# Patient Record
Sex: FEMALE | Race: WHITE | NOT HISPANIC OR LATINO | Employment: PART TIME | URBAN - METROPOLITAN AREA
[De-identification: names, ages, dates, MRNs, and addresses within clinical notes are randomized per-mention and may not be internally consistent; named-entity substitution may affect disease eponyms.]

---

## 2017-02-27 ENCOUNTER — APPOINTMENT (OUTPATIENT)
Dept: LAB | Age: 57
End: 2017-02-27
Payer: COMMERCIAL

## 2017-02-27 ENCOUNTER — TRANSCRIBE ORDERS (OUTPATIENT)
Dept: ADMINISTRATIVE | Age: 57
End: 2017-02-27

## 2017-02-27 DIAGNOSIS — M33.19: ICD-10-CM

## 2017-02-27 DIAGNOSIS — M33.90 DERMATOMYOSITIS (HCC): ICD-10-CM

## 2017-02-27 DIAGNOSIS — C50.112 BILATERAL MALIGNANT NEOPLASM OF CENTRAL PORTION OF BREAST IN FEMALE (HCC): ICD-10-CM

## 2017-02-27 DIAGNOSIS — R63.4 LOSS OF WEIGHT: Primary | ICD-10-CM

## 2017-02-27 DIAGNOSIS — M65.9 SAPHO SYNDROME (HCC): ICD-10-CM

## 2017-02-27 DIAGNOSIS — R63.4 LOSS OF WEIGHT: ICD-10-CM

## 2017-02-27 DIAGNOSIS — L40.3 SAPHO SYNDROME (HCC): ICD-10-CM

## 2017-02-27 DIAGNOSIS — Z15.02 GENETIC SUSCEPTIBILITY TO MALIGNANT NEOPLASM OF OVARY: ICD-10-CM

## 2017-02-27 DIAGNOSIS — M85.80 SAPHO SYNDROME (HCC): ICD-10-CM

## 2017-02-27 DIAGNOSIS — M86.9 SAPHO SYNDROME (HCC): ICD-10-CM

## 2017-02-27 DIAGNOSIS — C50.111 BILATERAL MALIGNANT NEOPLASM OF CENTRAL PORTION OF BREAST IN FEMALE (HCC): ICD-10-CM

## 2017-02-27 DIAGNOSIS — L70.9 SAPHO SYNDROME (HCC): ICD-10-CM

## 2017-02-27 DIAGNOSIS — M33.19: Primary | ICD-10-CM

## 2017-02-27 DIAGNOSIS — Z15.01 GENETIC SUSCEPTIBILITY TO MALIGNANT NEOPLASM OF BREAST: ICD-10-CM

## 2017-02-27 LAB
25(OH)D3 SERPL-MCNC: 38.3 NG/ML (ref 30–100)
ALBUMIN SERPL BCP-MCNC: 4.3 G/DL (ref 3.5–5)
ALP SERPL-CCNC: 58 U/L (ref 46–116)
ALT SERPL W P-5'-P-CCNC: 22 U/L (ref 12–78)
ANION GAP SERPL CALCULATED.3IONS-SCNC: 4 MMOL/L (ref 4–13)
AST SERPL W P-5'-P-CCNC: 17 U/L (ref 5–45)
BASOPHILS # BLD AUTO: 0.01 THOUSANDS/ΜL (ref 0–0.1)
BASOPHILS NFR BLD AUTO: 0 % (ref 0–1)
BILIRUB SERPL-MCNC: 0.52 MG/DL (ref 0.2–1)
BUN SERPL-MCNC: 15 MG/DL (ref 5–25)
CALCIUM SERPL-MCNC: 9.3 MG/DL (ref 8.3–10.1)
CANCER AG125 SERPL-ACNC: 7.2 U/ML (ref 0–30)
CHLORIDE SERPL-SCNC: 105 MMOL/L (ref 100–108)
CO2 SERPL-SCNC: 33 MMOL/L (ref 21–32)
CREAT SERPL-MCNC: 0.71 MG/DL (ref 0.6–1.3)
EOSINOPHIL # BLD AUTO: 0.04 THOUSAND/ΜL (ref 0–0.61)
EOSINOPHIL NFR BLD AUTO: 1 % (ref 0–6)
ERYTHROCYTE [DISTWIDTH] IN BLOOD BY AUTOMATED COUNT: 13.5 % (ref 11.6–15.1)
EST. AVERAGE GLUCOSE BLD GHB EST-MCNC: 105 MG/DL
GFR SERPL CREATININE-BSD FRML MDRD: >60 ML/MIN/1.73SQ M
GLUCOSE SERPL-MCNC: 84 MG/DL (ref 65–140)
HBA1C MFR BLD: 5.3 % (ref 4.2–6.3)
HCT VFR BLD AUTO: 41.3 % (ref 34.8–46.1)
HGB BLD-MCNC: 13.6 G/DL (ref 11.5–15.4)
LYMPHOCYTES # BLD AUTO: 1.17 THOUSANDS/ΜL (ref 0.6–4.47)
LYMPHOCYTES NFR BLD AUTO: 24 % (ref 14–44)
MCH RBC QN AUTO: 29.7 PG (ref 26.8–34.3)
MCHC RBC AUTO-ENTMCNC: 32.9 G/DL (ref 31.4–37.4)
MCV RBC AUTO: 90 FL (ref 82–98)
MONOCYTES # BLD AUTO: 0.36 THOUSAND/ΜL (ref 0.17–1.22)
MONOCYTES NFR BLD AUTO: 7 % (ref 4–12)
NEUTROPHILS # BLD AUTO: 3.25 THOUSANDS/ΜL (ref 1.85–7.62)
NEUTS SEG NFR BLD AUTO: 68 % (ref 43–75)
NRBC BLD AUTO-RTO: 0 /100 WBCS
PLATELET # BLD AUTO: 216 THOUSANDS/UL (ref 149–390)
PMV BLD AUTO: 9.7 FL (ref 8.9–12.7)
POTASSIUM SERPL-SCNC: 4.2 MMOL/L (ref 3.5–5.3)
PROT SERPL-MCNC: 7.6 G/DL (ref 6.4–8.2)
RBC # BLD AUTO: 4.58 MILLION/UL (ref 3.81–5.12)
SODIUM SERPL-SCNC: 142 MMOL/L (ref 136–145)
TSH SERPL DL<=0.05 MIU/L-ACNC: 1.35 UIU/ML (ref 0.36–3.74)
WBC # BLD AUTO: 4.84 THOUSAND/UL (ref 4.31–10.16)

## 2017-02-27 PROCEDURE — 80053 COMPREHEN METABOLIC PANEL: CPT

## 2017-02-27 PROCEDURE — 86304 IMMUNOASSAY TUMOR CA 125: CPT

## 2017-02-27 PROCEDURE — 82306 VITAMIN D 25 HYDROXY: CPT

## 2017-02-27 PROCEDURE — 85025 COMPLETE CBC W/AUTO DIFF WBC: CPT

## 2017-02-27 PROCEDURE — 36415 COLL VENOUS BLD VENIPUNCTURE: CPT

## 2017-02-27 PROCEDURE — 84443 ASSAY THYROID STIM HORMONE: CPT

## 2017-02-27 PROCEDURE — 83036 HEMOGLOBIN GLYCOSYLATED A1C: CPT

## 2017-03-13 ENCOUNTER — ALLSCRIPTS OFFICE VISIT (OUTPATIENT)
Dept: OTHER | Facility: OTHER | Age: 57
End: 2017-03-13

## 2017-03-16 ENCOUNTER — APPOINTMENT (OUTPATIENT)
Dept: LAB | Age: 57
End: 2017-03-16
Payer: COMMERCIAL

## 2017-03-16 ENCOUNTER — TRANSCRIBE ORDERS (OUTPATIENT)
Dept: ADMINISTRATIVE | Age: 57
End: 2017-03-16

## 2017-03-16 DIAGNOSIS — R35.0 URINARY FREQUENCY: Primary | ICD-10-CM

## 2017-03-16 LAB
BACTERIA UR QL AUTO: NORMAL /HPF
BILIRUB UR QL STRIP: NEGATIVE
CLARITY UR: CLEAR
COLOR UR: YELLOW
GLUCOSE UR STRIP-MCNC: NEGATIVE MG/DL
HGB UR QL STRIP.AUTO: NEGATIVE
HYALINE CASTS #/AREA URNS LPF: NORMAL /LPF
KETONES UR STRIP-MCNC: NEGATIVE MG/DL
LEUKOCYTE ESTERASE UR QL STRIP: NEGATIVE
NITRITE UR QL STRIP: NEGATIVE
NON-SQ EPI CELLS URNS QL MICRO: NORMAL /HPF
PH UR STRIP.AUTO: 7.5 [PH] (ref 4.5–8)
PROT UR STRIP-MCNC: ABNORMAL MG/DL
RBC #/AREA URNS AUTO: NORMAL /HPF
SP GR UR STRIP.AUTO: 1.02 (ref 1–1.03)
UROBILINOGEN UR QL STRIP.AUTO: 0.2 E.U./DL
WBC #/AREA URNS AUTO: NORMAL /HPF

## 2017-03-16 PROCEDURE — 81001 URINALYSIS AUTO W/SCOPE: CPT | Performed by: PHYSICIAN ASSISTANT

## 2017-03-23 ENCOUNTER — GENERIC CONVERSION - ENCOUNTER (OUTPATIENT)
Dept: OTHER | Facility: OTHER | Age: 57
End: 2017-03-23

## 2017-04-12 ENCOUNTER — TRANSCRIBE ORDERS (OUTPATIENT)
Dept: ADMINISTRATIVE | Age: 57
End: 2017-04-12

## 2017-04-12 ENCOUNTER — HOSPITAL ENCOUNTER (OUTPATIENT)
Dept: RADIOLOGY | Age: 57
Discharge: HOME | End: 2017-04-12
Payer: COMMERCIAL

## 2017-04-12 ENCOUNTER — APPOINTMENT (OUTPATIENT)
Dept: LAB | Age: 57
End: 2017-04-12
Payer: COMMERCIAL

## 2017-04-12 DIAGNOSIS — Z85.3 PERSONAL HISTORY OF MALIGNANT NEOPLASM OF BREAST: ICD-10-CM

## 2017-04-12 DIAGNOSIS — Z85.3 PERSONAL HISTORY OF MALIGNANT NEOPLASM OF BREAST: Primary | ICD-10-CM

## 2017-04-12 LAB
ALBUMIN SERPL BCP-MCNC: 4.2 G/DL (ref 3.5–5)
ALP SERPL-CCNC: 59 U/L (ref 46–116)
ALT SERPL W P-5'-P-CCNC: 19 U/L (ref 12–78)
ANION GAP SERPL CALCULATED.3IONS-SCNC: 4 MMOL/L (ref 4–13)
AST SERPL W P-5'-P-CCNC: 13 U/L (ref 5–45)
BASOPHILS # BLD AUTO: 0.01 THOUSANDS/ΜL (ref 0–0.1)
BASOPHILS NFR BLD AUTO: 0 % (ref 0–1)
BILIRUB SERPL-MCNC: 0.42 MG/DL (ref 0.2–1)
BUN SERPL-MCNC: 23 MG/DL (ref 5–25)
CALCIUM SERPL-MCNC: 9.1 MG/DL (ref 8.3–10.1)
CANCER AG27-29 SERPL-ACNC: 24.8 U/ML (ref 0–42.3)
CEA SERPL-MCNC: 1 NG/ML (ref 0–3)
CHLORIDE SERPL-SCNC: 104 MMOL/L (ref 100–108)
CO2 SERPL-SCNC: 32 MMOL/L (ref 21–32)
CREAT SERPL-MCNC: 0.7 MG/DL (ref 0.6–1.3)
EOSINOPHIL # BLD AUTO: 0.05 THOUSAND/ΜL (ref 0–0.61)
EOSINOPHIL NFR BLD AUTO: 1 % (ref 0–6)
ERYTHROCYTE [DISTWIDTH] IN BLOOD BY AUTOMATED COUNT: 13.5 % (ref 11.6–15.1)
GFR SERPL CREATININE-BSD FRML MDRD: >60 ML/MIN/1.73SQ M
GLUCOSE P FAST SERPL-MCNC: 80 MG/DL (ref 65–99)
HCT VFR BLD AUTO: 42.4 % (ref 34.8–46.1)
HGB BLD-MCNC: 13.8 G/DL (ref 11.5–15.4)
LYMPHOCYTES # BLD AUTO: 1.22 THOUSANDS/ΜL (ref 0.6–4.47)
LYMPHOCYTES NFR BLD AUTO: 24 % (ref 14–44)
MCH RBC QN AUTO: 29.7 PG (ref 26.8–34.3)
MCHC RBC AUTO-ENTMCNC: 32.5 G/DL (ref 31.4–37.4)
MCV RBC AUTO: 91 FL (ref 82–98)
MONOCYTES # BLD AUTO: 0.4 THOUSAND/ΜL (ref 0.17–1.22)
MONOCYTES NFR BLD AUTO: 8 % (ref 4–12)
NEUTROPHILS # BLD AUTO: 3.44 THOUSANDS/ΜL (ref 1.85–7.62)
NEUTS SEG NFR BLD AUTO: 67 % (ref 43–75)
NRBC BLD AUTO-RTO: 0 /100 WBCS
PLATELET # BLD AUTO: 233 THOUSANDS/UL (ref 149–390)
PMV BLD AUTO: 10 FL (ref 8.9–12.7)
POTASSIUM SERPL-SCNC: 4 MMOL/L (ref 3.5–5.3)
PROT SERPL-MCNC: 7.6 G/DL (ref 6.4–8.2)
RBC # BLD AUTO: 4.64 MILLION/UL (ref 3.81–5.12)
SODIUM SERPL-SCNC: 140 MMOL/L (ref 136–145)
WBC # BLD AUTO: 5.13 THOUSAND/UL (ref 4.31–10.16)

## 2017-04-12 PROCEDURE — 76700 US EXAM ABDOM COMPLETE: CPT

## 2017-04-12 PROCEDURE — 82378 CARCINOEMBRYONIC ANTIGEN: CPT

## 2017-04-12 PROCEDURE — 85025 COMPLETE CBC W/AUTO DIFF WBC: CPT

## 2017-04-12 PROCEDURE — 36415 COLL VENOUS BLD VENIPUNCTURE: CPT

## 2017-04-12 PROCEDURE — 80053 COMPREHEN METABOLIC PANEL: CPT

## 2017-04-12 PROCEDURE — 86300 IMMUNOASSAY TUMOR CA 15-3: CPT

## 2017-05-01 ENCOUNTER — ALLSCRIPTS OFFICE VISIT (OUTPATIENT)
Dept: OTHER | Facility: OTHER | Age: 57
End: 2017-05-01

## 2017-06-06 ENCOUNTER — ALLSCRIPTS OFFICE VISIT (OUTPATIENT)
Dept: OTHER | Facility: OTHER | Age: 57
End: 2017-06-06

## 2017-06-06 ENCOUNTER — TRANSCRIBE ORDERS (OUTPATIENT)
Dept: ADMINISTRATIVE | Facility: HOSPITAL | Age: 57
End: 2017-06-06

## 2017-06-06 DIAGNOSIS — M33.19: Primary | ICD-10-CM

## 2017-06-21 ENCOUNTER — GENERIC CONVERSION - ENCOUNTER (OUTPATIENT)
Dept: OTHER | Facility: OTHER | Age: 57
End: 2017-06-21

## 2017-09-11 ENCOUNTER — APPOINTMENT (OUTPATIENT)
Dept: LAB | Age: 57
End: 2017-09-11
Payer: COMMERCIAL

## 2017-09-11 ENCOUNTER — TRANSCRIBE ORDERS (OUTPATIENT)
Dept: ADMINISTRATIVE | Age: 57
End: 2017-09-11

## 2017-09-11 DIAGNOSIS — R35.0 URINARY FREQUENCY: Primary | ICD-10-CM

## 2017-09-11 DIAGNOSIS — R35.0 URINARY FREQUENCY: ICD-10-CM

## 2017-09-11 LAB — CANCER AG125 SERPL-ACNC: 6.7 U/ML (ref 0–30)

## 2017-09-11 PROCEDURE — 86304 IMMUNOASSAY TUMOR CA 125: CPT

## 2017-09-11 PROCEDURE — 36415 COLL VENOUS BLD VENIPUNCTURE: CPT

## 2017-09-18 ENCOUNTER — TRANSCRIBE ORDERS (OUTPATIENT)
Dept: ADMINISTRATIVE | Facility: HOSPITAL | Age: 57
End: 2017-09-18

## 2017-09-18 ENCOUNTER — TRANSCRIBE ORDERS (OUTPATIENT)
Dept: ADMINISTRATIVE | Age: 57
End: 2017-09-18

## 2017-09-18 ENCOUNTER — APPOINTMENT (OUTPATIENT)
Dept: LAB | Age: 57
End: 2017-09-18
Payer: COMMERCIAL

## 2017-09-18 ENCOUNTER — ALLSCRIPTS OFFICE VISIT (OUTPATIENT)
Dept: OTHER | Facility: OTHER | Age: 57
End: 2017-09-18

## 2017-09-18 DIAGNOSIS — R10.2 ADNEXAL TENDERNESS, RIGHT: Primary | ICD-10-CM

## 2017-09-18 DIAGNOSIS — R10.2 ADNEXAL TENDERNESS, RIGHT: ICD-10-CM

## 2017-09-18 DIAGNOSIS — Z15.02 GENETIC SUSCEPTIBILITY TO MALIGNANT NEOPLASM OF OVARY: ICD-10-CM

## 2017-09-18 DIAGNOSIS — Z15.02 GENETIC SUSCEPTIBILITY TO OVARIAN CANCER: Primary | ICD-10-CM

## 2017-09-18 DIAGNOSIS — Z15.01 GENETIC SUSCEPTIBILITY TO MALIGNANT NEOPLASM OF BREAST: ICD-10-CM

## 2017-09-18 LAB
BUN SERPL-MCNC: 17 MG/DL (ref 5–25)
CREAT SERPL-MCNC: 0.68 MG/DL (ref 0.6–1.3)
GFR SERPL CREATININE-BSD FRML MDRD: 99 ML/MIN/1.73SQ M

## 2017-09-18 PROCEDURE — 87086 URINE CULTURE/COLONY COUNT: CPT

## 2017-09-18 PROCEDURE — 82565 ASSAY OF CREATININE: CPT

## 2017-09-18 PROCEDURE — 84520 ASSAY OF UREA NITROGEN: CPT

## 2017-09-18 PROCEDURE — 36415 COLL VENOUS BLD VENIPUNCTURE: CPT

## 2017-09-19 LAB — BACTERIA UR CULT: NORMAL

## 2017-09-22 ENCOUNTER — HOSPITAL ENCOUNTER (OUTPATIENT)
Dept: RADIOLOGY | Age: 57
Discharge: HOME | End: 2017-09-22
Payer: COMMERCIAL

## 2017-09-22 DIAGNOSIS — Z15.02 GENETIC SUSCEPTIBILITY TO OVARIAN CANCER: ICD-10-CM

## 2017-09-22 PROCEDURE — 74177 CT ABD & PELVIS W/CONTRAST: CPT

## 2017-09-22 RX ADMIN — IOHEXOL 100 ML: 350 INJECTION, SOLUTION INTRAVENOUS at 12:57

## 2017-10-24 ENCOUNTER — TRANSCRIBE ORDERS (OUTPATIENT)
Dept: ADMINISTRATIVE | Age: 57
End: 2017-10-24

## 2017-10-24 ENCOUNTER — APPOINTMENT (OUTPATIENT)
Dept: LAB | Age: 57
End: 2017-10-24
Payer: COMMERCIAL

## 2017-10-24 DIAGNOSIS — R80.9 PROTEINURIA, UNSPECIFIED TYPE: ICD-10-CM

## 2017-10-24 DIAGNOSIS — Z13.6 SCREENING FOR ISCHEMIC HEART DISEASE: ICD-10-CM

## 2017-10-24 DIAGNOSIS — R80.9 PROTEINURIA, UNSPECIFIED TYPE: Primary | ICD-10-CM

## 2017-10-24 DIAGNOSIS — C50.211 MALIGNANT NEOPLASM OF UPPER-INNER QUADRANT OF RIGHT FEMALE BREAST, UNSPECIFIED ESTROGEN RECEPTOR STATUS (HCC): ICD-10-CM

## 2017-10-24 DIAGNOSIS — C50.211 MALIGNANT NEOPLASM OF UPPER-INNER QUADRANT OF RIGHT FEMALE BREAST, UNSPECIFIED ESTROGEN RECEPTOR STATUS (HCC): Primary | ICD-10-CM

## 2017-10-24 LAB
25(OH)D3 SERPL-MCNC: 39.9 NG/ML (ref 30–100)
ALBUMIN SERPL BCP-MCNC: 3.9 G/DL (ref 3.5–5)
ALP SERPL-CCNC: 54 U/L (ref 46–116)
ALT SERPL W P-5'-P-CCNC: 19 U/L (ref 12–78)
ANION GAP SERPL CALCULATED.3IONS-SCNC: 5 MMOL/L (ref 4–13)
AST SERPL W P-5'-P-CCNC: 13 U/L (ref 5–45)
BASOPHILS # BLD AUTO: 0.03 THOUSANDS/ΜL (ref 0–0.1)
BASOPHILS NFR BLD AUTO: 1 % (ref 0–1)
BILIRUB SERPL-MCNC: 0.4 MG/DL (ref 0.2–1)
BILIRUB UR QL STRIP: NEGATIVE
BUN SERPL-MCNC: 16 MG/DL (ref 5–25)
CALCIUM SERPL-MCNC: 8.9 MG/DL (ref 8.3–10.1)
CANCER AG27-29 SERPL-ACNC: 16.6 U/ML (ref 0–42.3)
CHLORIDE SERPL-SCNC: 106 MMOL/L (ref 100–108)
CLARITY UR: CLEAR
CO2 SERPL-SCNC: 30 MMOL/L (ref 21–32)
COLOR UR: YELLOW
CREAT SERPL-MCNC: 0.73 MG/DL (ref 0.6–1.3)
EOSINOPHIL # BLD AUTO: 0.06 THOUSAND/ΜL (ref 0–0.61)
EOSINOPHIL NFR BLD AUTO: 2 % (ref 0–6)
ERYTHROCYTE [DISTWIDTH] IN BLOOD BY AUTOMATED COUNT: 13.4 % (ref 11.6–15.1)
GFR SERPL CREATININE-BSD FRML MDRD: 94 ML/MIN/1.73SQ M
GLUCOSE P FAST SERPL-MCNC: 78 MG/DL (ref 65–99)
GLUCOSE UR STRIP-MCNC: NEGATIVE MG/DL
HCT VFR BLD AUTO: 39.9 % (ref 34.8–46.1)
HGB BLD-MCNC: 13.3 G/DL (ref 11.5–15.4)
HGB UR QL STRIP.AUTO: NEGATIVE
KETONES UR STRIP-MCNC: NEGATIVE MG/DL
LEUKOCYTE ESTERASE UR QL STRIP: NEGATIVE
LYMPHOCYTES # BLD AUTO: 1.14 THOUSANDS/ΜL (ref 0.6–4.47)
LYMPHOCYTES NFR BLD AUTO: 30 % (ref 14–44)
MCH RBC QN AUTO: 30.2 PG (ref 26.8–34.3)
MCHC RBC AUTO-ENTMCNC: 33.3 G/DL (ref 31.4–37.4)
MCV RBC AUTO: 91 FL (ref 82–98)
MONOCYTES # BLD AUTO: 0.37 THOUSAND/ΜL (ref 0.17–1.22)
MONOCYTES NFR BLD AUTO: 10 % (ref 4–12)
NEUTROPHILS # BLD AUTO: 2.26 THOUSANDS/ΜL (ref 1.85–7.62)
NEUTS SEG NFR BLD AUTO: 57 % (ref 43–75)
NITRITE UR QL STRIP: NEGATIVE
NRBC BLD AUTO-RTO: 0 /100 WBCS
PH UR STRIP.AUTO: 6 [PH] (ref 4.5–8)
PLATELET # BLD AUTO: 224 THOUSANDS/UL (ref 149–390)
PMV BLD AUTO: 9.7 FL (ref 8.9–12.7)
POTASSIUM SERPL-SCNC: 4.1 MMOL/L (ref 3.5–5.3)
PROT SERPL-MCNC: 7 G/DL (ref 6.4–8.2)
PROT UR STRIP-MCNC: NEGATIVE MG/DL
RBC # BLD AUTO: 4.4 MILLION/UL (ref 3.81–5.12)
SODIUM SERPL-SCNC: 141 MMOL/L (ref 136–145)
SP GR UR STRIP.AUTO: 1.01 (ref 1–1.03)
UROBILINOGEN UR QL STRIP.AUTO: 0.2 E.U./DL
WBC # BLD AUTO: 3.87 THOUSAND/UL (ref 4.31–10.16)

## 2017-10-24 PROCEDURE — 80053 COMPREHEN METABOLIC PANEL: CPT

## 2017-10-24 PROCEDURE — 87086 URINE CULTURE/COLONY COUNT: CPT

## 2017-10-24 PROCEDURE — 85025 COMPLETE CBC W/AUTO DIFF WBC: CPT

## 2017-10-24 PROCEDURE — 86300 IMMUNOASSAY TUMOR CA 15-3: CPT

## 2017-10-24 PROCEDURE — 80061 LIPID PANEL: CPT

## 2017-10-24 PROCEDURE — 36415 COLL VENOUS BLD VENIPUNCTURE: CPT

## 2017-10-24 PROCEDURE — 81003 URINALYSIS AUTO W/O SCOPE: CPT

## 2017-10-24 PROCEDURE — 82306 VITAMIN D 25 HYDROXY: CPT

## 2017-10-25 ENCOUNTER — GENERIC CONVERSION - ENCOUNTER (OUTPATIENT)
Dept: OTHER | Facility: OTHER | Age: 57
End: 2017-10-25

## 2017-10-25 LAB
BACTERIA UR CULT: NORMAL
CHOLEST SERPL-MCNC: 147 MG/DL (ref 50–200)
HDLC SERPL-MCNC: 71 MG/DL (ref 40–60)
LDLC SERPL CALC-MCNC: 67 MG/DL (ref 0–100)
TRIGL SERPL-MCNC: 44 MG/DL

## 2017-10-26 ENCOUNTER — GENERIC CONVERSION - ENCOUNTER (OUTPATIENT)
Dept: OTHER | Facility: OTHER | Age: 57
End: 2017-10-26

## 2017-10-27 ENCOUNTER — LAB REQUISITION (OUTPATIENT)
Dept: LAB | Facility: HOSPITAL | Age: 57
End: 2017-10-27
Payer: COMMERCIAL

## 2017-10-27 ENCOUNTER — ALLSCRIPTS OFFICE VISIT (OUTPATIENT)
Dept: OTHER | Facility: OTHER | Age: 57
End: 2017-10-27

## 2017-10-27 DIAGNOSIS — R35.0 FREQUENCY OF MICTURITION: ICD-10-CM

## 2017-10-27 LAB
BACTERIA UR QL AUTO: NORMAL /HPF
BILIRUB UR QL STRIP: NEGATIVE
CLARITY UR: CLEAR
COLOR UR: YELLOW
GLUCOSE UR STRIP-MCNC: NEGATIVE MG/DL
HGB UR QL STRIP.AUTO: NEGATIVE
HYALINE CASTS #/AREA URNS LPF: NORMAL /LPF
KETONES UR STRIP-MCNC: NEGATIVE MG/DL
LEUKOCYTE ESTERASE UR QL STRIP: ABNORMAL
NITRITE UR QL STRIP: NEGATIVE
NON-SQ EPI CELLS URNS QL MICRO: NORMAL /HPF
PH UR STRIP.AUTO: 6.5 [PH] (ref 4.5–8)
PROT UR STRIP-MCNC: NEGATIVE MG/DL
RBC #/AREA URNS AUTO: NORMAL /HPF
SP GR UR STRIP.AUTO: 1.01 (ref 1–1.03)
UROBILINOGEN UR QL STRIP.AUTO: 0.2 E.U./DL
WBC #/AREA URNS AUTO: NORMAL /HPF

## 2017-10-27 PROCEDURE — 81001 URINALYSIS AUTO W/SCOPE: CPT | Performed by: INTERNAL MEDICINE

## 2017-10-30 ENCOUNTER — ALLSCRIPTS OFFICE VISIT (OUTPATIENT)
Dept: OTHER | Facility: OTHER | Age: 57
End: 2017-10-30

## 2017-10-30 ENCOUNTER — TRANSCRIBE ORDERS (OUTPATIENT)
Dept: ADMINISTRATIVE | Facility: HOSPITAL | Age: 57
End: 2017-10-30

## 2017-10-30 ENCOUNTER — APPOINTMENT (OUTPATIENT)
Dept: RADIOLOGY | Age: 57
End: 2017-10-30
Payer: COMMERCIAL

## 2017-10-30 ENCOUNTER — TRANSCRIBE ORDERS (OUTPATIENT)
Dept: ADMINISTRATIVE | Age: 57
End: 2017-10-30

## 2017-10-30 DIAGNOSIS — C50.919 MALIGNANT NEOPLASM OF FEMALE BREAST (HCC): ICD-10-CM

## 2017-10-30 DIAGNOSIS — C50.919 MALIGNANT NEOPLASM OF FEMALE BREAST, UNSPECIFIED ESTROGEN RECEPTOR STATUS, UNSPECIFIED LATERALITY, UNSPECIFIED SITE OF BREAST (HCC): Primary | ICD-10-CM

## 2017-10-30 PROCEDURE — 71111 X-RAY EXAM RIBS/CHEST4/> VWS: CPT

## 2017-10-31 NOTE — PROGRESS NOTES
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)    Plan  Malignant neoplasm of breast    · * CT CHEST WO CONTRAST; Status:Need Information - Financial Authorization; Requested for:17Nov2017 10:00AM;    Perform:[facility]; RSR:[id number]; Last Updated By:Luis A Villareal; 10/30/2017 2:44:45 PM;Ordered; For:Malignant neoplasm of breast; Ordered By:ProRosa mullinssh;   · * NM BONE SCAN WHOLE BODY; Status:Need Information - Financial Authorization; Requested for:17Nov2017 10:30AM;    Perform:[facility]iology; ZUF:[id number]; Last Updated By:Luis A Villareal; 10/30/2017 2:45:22 PM;Ordered; For:Malignant neoplasm of breast; Ordered By:Proothi, Ernesto;   · * XR RIBS BILATERAL 4+ VIEW W PA CHEST; Status:Active; Requested PGD:[id number];    Perform:[facility]; [phone]; Ordered; For:Malignant neoplasm of breast; Ordered By:Proothi, Ernesto;   · Follow-up visit in 3 weeks Evaluation and Treatment  Follow-up  Status: Complete  Done:  [id number] 01:15PM   Ordered; For: Malignant neoplasm of breast; Ordered By: Cece To Performed:  Due: 17XZR2491; Last Updated By: Dania Danielle; 10/30/2017 2:41:19 PM    Discussion/Summary  Discussion Summary:   Follow-up visit for bilateral breast cancers and positive BRCA2  In 2008 patient had hormone receptor positive, HER-2 negative, stage IIA breast cancer on the right and stage IA on the left  One lymph node was positive on the right    Patient had bilateral mastectomies and reconstruction surgeries  She received 4 cycles of Taxotere plus Cytoxan followed by 5 years of Femara that she finished in September 2013  She underwent ITZEL and BSO  There is family history of BRCA2  There is family history of breast and ovarian cancer     Patient had a 9 mm nodule in the right lower lung and she had wedge resection and that came back negative for malignancy  She had history of vitamin D deficiency and that was corrected  Vitamin D level became normal and she continues to take 2000 units of vitamin D daily   had cutaneous melanoma in situ in between the breasts and that was surgically removed by Dr Marycarmen Barry and she follows with him  She has history of dermatomyositis  She is on Plaquenil for skin and joint symptoms and symptoms have improved significantly  She has a stable-appearing benign right hepatic lesion  She has renal cyst  Bone density had shown osteopenia  has a new bulge at left chest posteriorly that seems to be bulging from her rib  Also she has some pressure discomfort in the left lower abdomen that has been chronic and gets better post bowel movement  She goes for colonoscopy  She had CT scan of abdomen and pelvis recently and that did not show any malignancy  I have suggested rib x-rays, chest x-ray and bone scan  Physical examination and test results are as reported and discussed  She remains in remission from breast cancer  Patient with dermatomyositis are more prone to malignancy  She has history of breast cancers and melanoma in situ  She has history of BRCA2  She gets checked periodically for malignancy  She gets annual ultrasound of abdomen  She stays alert in case she has any unexplained symptom  She will come back after x-rays and bone scan  Condition discussed and explained in detail  Questions answered  Also discussed eating healthy foods and exercises as tolerated  discussed BRCA2 and various cancers associated with that  Counseling Documentation With Imm: The patient, patient's family was counseled regarding diagnostic results,-- prognosis,-- patient and family education,-- impressions  total time of encounter was 30 minutes-- and-- 20 minutes was spent counseling  Goals and Barriers: The patient has the current Goals: Cure from breast cancers  The patent has the current Barriers: No barriers     Patient's Capacity to Self-Care: Patient is able to Self-Care  Medication SE Review and Pt Understands Tx: The treatment plan was reviewed with the patient/guardian  The patient/guardian understands and agrees with the treatment plan   Self Referrals:   Self Referrals: No   Understands and agrees with treatment plan: The treatment plan was reviewed with the patient/guardian  The patient/guardian understands and agrees with the treatment plan      Chief Complaint  Chief Complaint: Chief Complaint:   The patient presents to the office today with Breast cancer  History of Present Illness  HPI: Follow-up visit for bilateral breast cancers and positive BRCA2  In 2008 patient had hormone receptor positive, HER-2 negative, stage IIA breast cancer on the right and stage IA on the left  One lymph node was positive on the right    Patient had bilateral mastectomies and reconstruction surgeries  She received 4 cycles of Taxotere plus Cytoxan followed by 5 years of Femara that she finished in September 2013  She underwent ITZEL and BSO  There is family history of BRCA2  There is family history of breast and ovarian cancer  Patient had a 9 mm nodule in the right lower lung and she had wedge resection and that came back negative for malignancy  She had history of vitamin D deficiency and that was corrected  Vitamin D level became normal and she continues to take 2000 units of vitamin D daily   had cutaneous melanoma in situ in between the breasts and that was surgically removed by Dr Henri Gil and she follows with him  She has history of dermatomyositis  She is on Plaquenil for skin and joint symptoms and symptoms have improved significantly  She has a stable-appearing benign right hepatic lesion  She has renal cyst  Bone density had shown osteopenia  has a new bulge at left chest posteriorly that seems to be bulging from her rib   Also she has some pressure discomfort in the left lower abdomen that has been chronic and gets better post bowel movement  She goes for colonoscopy  She had CT scan of abdomen and pelvis recently and that did not show any malignancy  I have suggested rib x-rays, chest x-ray and bone scan  Review of Systems                     Reviewed 14 systems  Other symptoms as in history of present illness  Complete-Female:   Constitutional: No fever, no chills, feels well, no tiredness, no recent weight gain or weight loss,-- no fever,-- not feeling poorly,-- no chills,-- not feeling tired-- and-- no recent weight loss  Eyes: No complaints of eye pain, no red eyes, no eyesight problems, no discharge, no dry eyes, no itching of eyes  ENT: no complaints of earache, no loss of hearing, no nose bleeds, no nasal discharge, no sore throat, no hoarseness  Cardiovascular: No complaints of slow heart rate, no fast heart rate, no chest pain, no palpitations, no leg claudication, no lower extremity edema  Respiratory: No complaints of shortness of breath, no wheezing, no cough, no SOB on exertion, no orthopnea, no PND  Gastrointestinal: No complaints of abdominal pain, no constipation, no nausea or vomiting, no diarrhea, no bloody stools,-- no abdominal pain,-- no nausea,-- no vomiting,-- no constipation,-- no diarrhea-- and-- no blood in stools  Genitourinary: No complaints of dysuria, no incontinence, no pelvic pain, no dysmenorrhea, no vaginal discharge or bleeding,-- no dysuria,-- no pelvic pain,-- no vaginal discharge,-- no incontinence,-- no dysmenorrhea-- and-- no unexplained vaginal bleeding  Musculoskeletal: arthralgias, but-- no joint swelling,-- no limb pain,-- no myalgias,-- no joint stiffness-- and-- no limb swelling  Integumentary: no rashes,-- no itching,-- no breast pain,-- no skin lesions,-- no skin wound-- and-- no breast lump  Neurological: No complaints of headache, no confusion, no convulsions, no numbness, no dizziness or fainting, no tingling, no limb weakness, no difficulty walking     Psychiatric: Not suicidal, no sleep disturbance, no anxiety or depression, no change in personality, no emotional problems  Endocrine: No complaints of proptosis, no hot flashes, no muscle weakness, no deepening of the voice, no feelings of weakness,-- no proptosis,-- no muscle weakness,-- no hot flashes-- and-- no deepening of the voice  no feelings of weakness   Hematologic/Lymphatic: No complaints of swollen glands, no swollen glands in the neck, does not bleed easily, does not bruise easily  ROS Reviewed:   ROS reviewed  Active Problems  1  Abdominal swelling (789 30) (R19 00)   2  Abnormal weight loss (783 21) (R63 4)   3  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)   4  Bilateral breast cancer (174 9) (C50 911,C50 912)   5  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)   (C50 211,C50 212)   6  BRCA positive (V84 01,V84 02) (Z15 01,Z15 02)   7  BRCA2 positive (V84 01) (Z15 01,Z15 02)   8  Depression with anxiety (300 4) (F41 8)   9  Dermatomyositis (710 3) (M33 90)   10  Fatigue (780 79) (R53 83)   11  Female pelvic pain (625 9) (R10 2)   12  Gallbladder polyp (575 6) (K82 4)   13  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)   14  History of breast cancer (V10 3) (Z85 3)   15  Malignant neoplasm of breast (174 9) (C50 919)   16  Need for prophylactic vaccination and inoculation against influenza (V04 81) (Z23)   17  Need for Tdap vaccination (V06 1) (Z23)   18  Nonspecific abnormal results of function study of thyroid (794 5) (R94 6)   19  Osteopenia (733 90) (M85 80)   20  Overweight (278 02) (E66 3)   21  Plantar fasciitis (728 71) (M72 2)   22  Proteinuria (791 0) (R80 9)   23  Rash, skin (782 1) (R21)   24  Screening for cardiovascular condition (V81 2) (Z13 6)   25  Sprain Of The Left Ankle (845 00)   26  Symptomatic menopausal or female climacteric states (627 2) (N95 1)   27  Urgency of urination (788 63) (R39 15)   28   Urinary frequency (788 41) (R35 0)   29  Vitamin D deficiency (268 9) (E55 9)   30  Weight gain (783 1) (R63 5)    Past Medical History  1  History of backache (V13 59) (Z87 39)   2  History of herpes zoster (V12 09) (Z86 19)   3  Need for prophylactic vaccination and inoculation against influenza (V04 81) (Z23)                         Reviewed no change     Surgical History  1  History of Appendectomy   2  History of Breast Surgery Mastectomy   3  History of Flexible Bronchoscopy (Diagnostic)   4  History of Hysterectomy   5  History of Knee Arthroscopy (Therapeutic)   6  History of Salpingo-oophorectomy Bilateral   7  History of Thoracoscopy With Diagnostic Wedge Resection Of Lung  Surgical History Reviewed: The surgical history was reviewed and updated today  Reviewed no change        Family History  Sister    1  Family history of Implantable Cardioverter-Defibrillator   2  Family history of Postpartum Dilated Cardiomyopathy  Family History    3  Family history of Cancer   4  Family history of Coronary Artery Disease (V17 49)   5  Family history of Heart Disease (V17 49)  Family History Reviewed: The family history was reviewed and updated today  Social History   · Caffeine Use   · Denied: History of Drug Use   · Never A Smoker   · Never Drank Alcohol   · No alcohol use  Social History Reviewed: The social history was reviewed and is unchanged  Current Meds   1  Aspirin 81 MG TABS; TAKE 1 TABLET DAILY; Therapy: (Recorded:25Mar2015) to Recorded   2  Calcium 600 MG Oral Tablet; take 2 tablet daily; Therapy: (Recorded:62Nui8573) to Recorded   3  Fish Oil 1200 MG Oral Capsule; TAKE 1 CAPSULE Daily; Therapy: (Recorded:25Mar2015) to Recorded   4  One-Daily Multi Vitamins TABS; TAKE 1 TABLET DAILY; Therapy: (Recorded:25Mar2015) to Recorded   5  Plaquenil 200 MG Oral Tablet; TAKE 1 TABLET DAILY; Therapy: 03GFY6177 to Recorded   6  Viibryd 10 MG Oral Tablet; Take 1 tablet daily;    Therapy: 19KCH1681 to (Waqas Moses)  Requested for: 29YDN8894; Last   Rx:[id number] Ordered   7  Vitamin D3 2000 UNIT Oral Capsule; TAKE 1 CAPSULE EVERY OTHER DAY; Therapy: (Recorded:22Rjm2595) to Recorded    Allergies  1  No Known Drug Allergies  2  No Known Environmental Allergies   3  No Known Food Allergies        Reviewed     Vitals  Vital Signs    Recorded: 29SEO0193 01:46PM   Temperature 97 4 F   Heart Rate 58   Respiration 15   Systolic 804   Diastolic 68   Height 5 ft 5 in   Weight 156 lb 8 oz   BMI Calculated 26 04   BSA Calculated 1 78   O2 Saturation 97   Pain Scale 0          Reviewed         Physical Exam        Patient is alert and oriented and not in any acute distress  Stable vitals  No icterus  No oral thrush  No palpable neck mass  Lung fields are clear to percussion and auscultation  Bulging  left rib posteriorly  Heart rate is regular  No lymphedema  Abdomen soft and nontender  No palpable abdominal mass  No ascites  No edema of ankles  No calf tenderness  No focal neurological deficit  No skin rash  No palpable lymphadenopathy  Good arterial pulses  No clubbing  Anxious  Performance status 0  ECOG 0       Results/Data  (1) CBC/PLT/DIFF 24Oct2017 06:51AM Proothi, Ernesto     Test Name Result Flag Reference   WBC COUNT 3 87 Thousand/uL L 4 31-10 16   RBC COUNT 4 40 Million/uL  3 81-5 12   HEMOGLOBIN 13 3 g/dL  11 5-15 4   HEMATOCRIT 39 9 %  34 8-46  1   MCV 91 fL  82-98   MCH 30 2 pg  26 8-34 3   MCHC 33 3 g/dL  31 4-37 4   RDW 13 4 %  11 6-15 1   MPV 9 7 fL  8 9-12 7   PLATELET COUNT 959 Thousands/uL  149-390   nRBC AUTOMATED 0 /100 WBCs     NEUTROPHILS RELATIVE PERCENT 57 %  43-75   LYMPHOCYTES RELATIVE PERCENT 30 %  14-44   MONOCYTES RELATIVE PERCENT 10 %  4-12   EOSINOPHILS RELATIVE PERCENT 2 %  0-6   BASOPHILS RELATIVE PERCENT 1 %  0-1   NEUTROPHILS ABSOLUTE COUNT 2 26 Thousands/? ??L  1 85-7 62   LYMPHOCYTES ABSOLUTE COUNT 1 14 Thousands/? ??L  0 60-4 47   MONOCYTES ABSOLUTE COUNT 0 37 Thousand/? ??L  0 17-1 22   EOSINOPHILS ABSOLUTE COUNT 0 06 Thousand/? ??L  0 00-0 61   BASOPHILS ABSOLUTE COUNT 0 03 Thousands/? ??L  0 00-0 10   This is a patient instruction: This test is non-fasting  Please drink two glasses of water morning of bloodwork  (1) CA 27 29 24Oct2017 06:51AM Proothi, Ernesto     Test Name Result Flag Reference   CA 27 29(NEW) 16 6 U/mL  0 0-42 3     (1) VITAMIN D 25-HYDROXY 24Oct2017 06:51AM Fina Pal     Test Name Result Flag Reference   VIT D 25-HYDROX 39 9 ng/mL  30 0-100 0   This assay is a certified procedure of the CDC Vitamin D Standardization Certification Program (VDSCP)     Deficiency <20ng/ml   Insufficiency 20-30ng/ml   Sufficient  ng/ml     *Patients undergoing fluorescein dye angiography may retain small amounts of fluorescein in the body for 48-72 hours post procedure  Samples containing fluorescein can produce falsely elevated Vitamin D values  If the patient had this procedure, a specimen should be resubmitted post fluorescein clearance  (1) COMPREHENSIVE METABOLIC PANEL 37GND0727 41:39CE Proothi, Ernesto     Test Name Result Flag Reference   SODIUM 141 mmol/L  136-145   POTASSIUM 4 1 mmol/L  3 5-5 3   CHLORIDE 106 mmol/L  100-108   CARBON DIOXIDE 30 mmol/L  21-32   ANION GAP (CALC) 5 mmol/L  4-13   BLOOD UREA NITROGEN 16 mg/dL  5-25   CREATININE 0 73 mg/dL  0 60-1 30   Standardized to IDMS reference method   CALCIUM 8 9 mg/dL  8 3-10 1   BILI, TOTAL 0 40 mg/dL  0 20-1 00   ALK PHOSPHATAS 54 U/L     ALT (SGPT) 19 U/L  12-78   Specimen collection should occur prior to Sulfasalazine and/or Sulfapyridine administration due to the potential for falsely depressed results  AST(SGOT) 13 U/L  5-45   Specimen collection should occur prior to Sulfasalazine administration due to the potential for falsely depressed results     ALBUMIN 3 9 g/dL  3 5-5 0   TOTAL PROTEIN 7 0 g/dL  6 4-8 2   eGFR 94 ml/min/1 73sq m     National Kidney Disease Education Program recommendations are as follows:  GFR calculation is accurate only with a steady state creatinine  Chronic Kidney disease less than 60 ml/min/1 73 sq  meters  Kidney failure less than 15 ml/min/1 73 sq  meters  GLUCOSE FASTING 78 mg/dL  65-99   Specimen collection should occur prior to Sulfasalazine administration due to the potential for falsely depressed results  Specimen collection should occur prior to Sulfapyridine administration due to the potential for falsely elevated results       Future Appointments    Date/Time Provider Specialty Site   12/08/2017 01:00 PM Fina Echeverria DO Internal Medicine [facility]   11/29/2017 01:15 PM Josefa Christian MD Hematology Oncology [facility]   03/19/2018 01:00 PM Amita Payton [facility] Gynecological Oncology [facility]     Signatures   Electronically signed by : Derk Dakin, MD; Oct 30 2017  5:53PM EST                       (Author)

## 2017-11-01 ENCOUNTER — GENERIC CONVERSION - ENCOUNTER (OUTPATIENT)
Dept: OTHER | Facility: OTHER | Age: 57
End: 2017-11-01

## 2017-11-06 ENCOUNTER — GENERIC CONVERSION - ENCOUNTER (OUTPATIENT)
Dept: OTHER | Facility: OTHER | Age: 57
End: 2017-11-06

## 2017-11-06 ENCOUNTER — HOSPITAL ENCOUNTER (OUTPATIENT)
Dept: PULMONOLOGY | Facility: HOSPITAL | Age: 57
Discharge: HOME | End: 2017-11-06
Payer: COMMERCIAL

## 2017-11-06 DIAGNOSIS — M33.19: ICD-10-CM

## 2017-11-06 PROCEDURE — 94729 DIFFUSING CAPACITY: CPT

## 2017-11-06 PROCEDURE — 94760 N-INVAS EAR/PLS OXIMETRY 1: CPT

## 2017-11-06 PROCEDURE — 94010 BREATHING CAPACITY TEST: CPT

## 2017-11-06 PROCEDURE — 94726 PLETHYSMOGRAPHY LUNG VOLUMES: CPT

## 2017-11-17 ENCOUNTER — HOSPITAL ENCOUNTER (OUTPATIENT)
Dept: RADIOLOGY | Age: 57
Discharge: HOME | End: 2017-11-17
Payer: COMMERCIAL

## 2017-11-17 DIAGNOSIS — C50.919 MALIGNANT NEOPLASM OF FEMALE BREAST, UNSPECIFIED ESTROGEN RECEPTOR STATUS, UNSPECIFIED LATERALITY, UNSPECIFIED SITE OF BREAST (HCC): ICD-10-CM

## 2017-11-17 PROCEDURE — 78306 BONE IMAGING WHOLE BODY: CPT

## 2017-11-17 PROCEDURE — 71250 CT THORAX DX C-: CPT

## 2017-11-17 PROCEDURE — A9503 TC99M MEDRONATE: HCPCS

## 2017-11-29 ENCOUNTER — GENERIC CONVERSION - ENCOUNTER (OUTPATIENT)
Dept: OTHER | Facility: OTHER | Age: 57
End: 2017-11-29

## 2017-12-04 ENCOUNTER — GENERIC CONVERSION - ENCOUNTER (OUTPATIENT)
Dept: INTERNAL MEDICINE CLINIC | Facility: CLINIC | Age: 57
End: 2017-12-04

## 2017-12-04 ENCOUNTER — GENERIC CONVERSION - ENCOUNTER (OUTPATIENT)
Dept: HEMATOLOGY ONCOLOGY | Facility: CLINIC | Age: 57
End: 2017-12-04

## 2017-12-05 PROCEDURE — 88341 IMHCHEM/IMCYTCHM EA ADD ANTB: CPT | Performed by: SURGERY

## 2017-12-05 PROCEDURE — 88313 SPECIAL STAINS GROUP 2: CPT | Performed by: SURGERY

## 2017-12-05 PROCEDURE — 88342 IMHCHEM/IMCYTCHM 1ST ANTB: CPT | Performed by: SURGERY

## 2017-12-05 PROCEDURE — 88305 TISSUE EXAM BY PATHOLOGIST: CPT | Performed by: SURGERY

## 2017-12-06 ENCOUNTER — LAB REQUISITION (OUTPATIENT)
Dept: LAB | Facility: HOSPITAL | Age: 57
End: 2017-12-06
Payer: COMMERCIAL

## 2017-12-06 DIAGNOSIS — R22.2 LOCALIZED SWELLING, MASS AND LUMP, TRUNK: ICD-10-CM

## 2017-12-07 ENCOUNTER — GENERIC CONVERSION - ENCOUNTER (OUTPATIENT)
Dept: HEMATOLOGY ONCOLOGY | Facility: CLINIC | Age: 57
End: 2017-12-07

## 2017-12-08 ENCOUNTER — ALLSCRIPTS OFFICE VISIT (OUTPATIENT)
Dept: OTHER | Facility: OTHER | Age: 57
End: 2017-12-08

## 2017-12-08 ENCOUNTER — GENERIC CONVERSION - ENCOUNTER (OUTPATIENT)
Dept: OTHER | Facility: OTHER | Age: 57
End: 2017-12-08

## 2017-12-11 ENCOUNTER — GENERIC CONVERSION - ENCOUNTER (OUTPATIENT)
Dept: INTERNAL MEDICINE CLINIC | Facility: CLINIC | Age: 57
End: 2017-12-11

## 2017-12-11 NOTE — PROGRESS NOTES
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)   4  Gallbladder polyp (575 6) (K82 4)   5  Vitamin D deficiency (268 9) (E55 9)   6  Encounter for preventive health examination (V70 0) (Z00 00)   7  Osteopenia (733 90) (M85 80)   8  Mass of soft tissue (729 90) (M79 9)  Assessment and plan 1  Anxiety and depression currently stable doing very well will continue with Viibryd 10 mg once a day a no suicidal ideation 2  Gallbladder polyp being monitored through [facility] 3  Vitamin-D deficiency stable will check a vitamin-D level 4  Breast cancer currently stable doing well be monitored through Hematology he 5  Soft tissue mass which was recently biopsied awaiting pathology return to office 6  months  call if any problems   Plan  Depression with anxiety    · Viibryd 10 MG Oral Tablet; Take 1 tablet daily  Encounter for screening mammogram for breast cancer    · * MAMMO SCREENING BILATERAL W CAD; Status:Permanent Deferral - Medical Deferral,Patient hadbilateral mastectomy; Health Maintenance    · *(Q) VITAMIN D, 25-HYDROXY, LC/MS/MS; Status:Active; Requested [id number]:[id number]; Overweight, Vitamin D deficiency    · (1) LIPID PANEL, FASTING; Status:Active; Requested [id number];   Vitamin D deficiency    · (1) COMPREHENSIVE METABOLIC PANEL; Status:Active; Requested [id number];     Chief Complaint  Chief Complaint Chronic Condition [facility]: Patient is here today for follow up of chronic conditions described in HPI  History of Present Illness  HPI: [age]-year old female coming in for a follow up office visit regarding anxiety/depression, soft tissue mass vitamin-D deficiency, proteinuria, gallbladder polyp; The patient reports me compliant taking medications without untoward side effects the   The patient is here to review his medical condition, update me on the medical condition and the patient reports me no hospitalizations and no ER visits  Review of laboratories  Patient reports me anxiety and depression are well controlled with current SSRI no suicidal ideation  The patient does report me she had recently noticed a mass of the back mid side she did bring this to the attention of Hematology who referred her to a general surgeon she has had a core needle biopsy and is awaiting the pathology currently we do not see results at this point in time  The patient will be following up with general surgeon Dr Manuel Lino regarding res results  The patient is following healthy and balanced diet, exercising routinely  Review of Systems  Complete-Female:  Constitutional: No fever, no chills, feels well, no tiredness, no recent weight gain or weight loss  Cardiovascular: No complaints of slow heart rate, no fast heart rate, no chest pain, no palpitations, no leg claudication, no lower extremity edema  Respiratory: No complaints of shortness of breath, no wheezing, no cough, no SOB on exertion, no orthopnea, no PND  Gastrointestinal: No complaints of abdominal pain, no constipation, no nausea or vomiting, no diarrhea, no bloody stools  Genitourinary: No complaints of dysuria, no incontinence, no pelvic pain, no dysmenorrhea, no vaginal discharge or bleeding  ROS Reviewed:   ROS reviewed  Active Problems  1  Abdominal swelling (789 30) (R19 00)   2  Abnormal weight loss (783 21) (R63 4)   3  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)   4  Bilateral breast cancer (174 9) (C50 911,C50 912)   5  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) (C50 211,C50 212)   6  BRCA positive (V84 01,V84 02) (Z15 01,Z15 02)   7  BRCA2 positive (V84 01) (Z15 01,Z15 02)   8  Depression with anxiety (300 4) (F41 8)   9  Dermatomyositis (710 3) (M33 90)   10  Fatigue (780 79) (R53 83)   11  Female pelvic pain (625 9) (R10 2)   12  Gallbladder polyp (575 6) (K82 4)   13   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)   14  History of breast cancer (V10 3) (Z85 3)   15  Lung nodule seen on imaging study (793 11) (R91 1)   16  Malignant neoplasm of breast (174 9) (C50 919)   17  Need for prophylactic vaccination and inoculation against influenza (V04 81) (Z23)   18  Need for Tdap vaccination (V06 1) (Z23)   19  Nonspecific abnormal results of function study of thyroid (794 5) (R94 6)   20  Osteopenia (733 90) (M85 80)   21  Overweight (278 02) (E66 3)   22  Plantar fasciitis (728 71) (M72 2)   23  Proteinuria (791 0) (R80 9)   24  Rash, skin (782 1) (R21)   25  Screening for cardiovascular condition (V81 2) (Z13 6)   26  Sprain Of The Left Ankle (845 00)   27  Subcutaneous nodule (782 2) (R22 9)   28  Symptomatic menopausal or female climacteric states (627 2) (N95 1)   29  Urgency of urination (788 63) (R39 15)   30  Urinary frequency (788 41) (R35 0)   31  Vitamin D deficiency (268 9) (E55 9)   32  Weight gain (783 1) (R63 5)    Past Medical History  1  History of backache (V13 59) (Z87 39)   2  History of herpes zoster (V12 09) (Z86 19)   3  Need for prophylactic vaccination and inoculation against influenza (V04 81) (Z23)  Active Problems And Past Medical History Reviewed: The active problems and past medical history were reviewed and updated today  Surgical History    1  History of Appendectomy   2  History of Breast Surgery Mastectomy   3  History of Flexible Bronchoscopy (Diagnostic)   4  History of Hysterectomy   5  History of Knee Arthroscopy (Therapeutic)   6  History of Salpingo-oophorectomy Bilateral   7  History of Thoracoscopy With Diagnostic Wedge Resection Of Lung  Surgical History Reviewed: The surgical history was reviewed and updated today  Family History  Sister    1  Family history of Implantable Cardioverter-Defibrillator   2  Family history of Postpartum Dilated Cardiomyopathy  Family History    3  Family history of Cancer   4   Family history of Coronary Artery Disease (V17 49)   5  Family history of Heart Disease (V17 49)  Family History Reviewed: The family history was reviewed and updated today  Social History     · Caffeine Use   · Denied: History of Drug Use   · Never a smoker   · Never Drank Alcohol   · No alcohol use  Social History Reviewed: The social history was reviewed and updated today  The social history was reviewed and is unchanged  Current Meds   1  Aspirin 81 MG TABS; TAKE 1 TABLET DAILY; Therapy: (Recorded:25Mar2015) to Recorded   2  Calcium 600 MG Oral Tablet; take 2 tablet daily; Therapy: (Recorded:87Iop4182) to Recorded   3  Fish Oil 1200 MG Oral Capsule; TAKE 1 CAPSULE Daily; Therapy: (Recorded:25Mar2015) to Recorded   4  One-Daily Multi Vitamins TABS; TAKE 1 TABLET DAILY; Therapy: (Recorded:25Mar2015) to Recorded   5  Plaquenil 200 MG Oral Tablet; TAKE 1 TABLET DAILY; Therapy: 27YGT6615 to Recorded   6  Viibryd 10 MG Oral Tablet; Take 1 tablet daily; Therapy: 11BXI2271 to (Susan Leiva)  Requested for: 28CJZ0676; Last Rx:52Ppl2414 Ordered   7  Vitamin D3 2000 UNIT Oral Capsule; TAKE 1 CAPSULE EVERY  DAY; Therapy: (Recorded:42Rkq0177) to Recorded  Medication List Reviewed: The medication list was reviewed and updated today  Allergies  1  No Known Drug Allergies  2  No Known Environmental Allergies   3  No Known Food Allergies    Vitals  Vital Signs    Recorded: 28YDE5989 12:56PM   Heart Rate 67   Respiration 16   Systolic 798, RUE, Sitting   Diastolic 80, RUE, Sitting   Height 5 ft 5 in   Weight 155 lb 0 4 oz   BMI Calculated 25 8   BSA Calculated 1 78   O2 Saturation 99       Physical Exam   Constitutional  General appearance: No acute distress, well appearing and well nourished  Eyes  Conjunctiva and lids: No swelling, erythema or discharge  Pupils and irises: Equal, round and reactive to light     Ears, Nose, Mouth, and Throat  External inspection of ears and nose: Normal    Otoscopic examination: Tympanic membranes translucent with normal light reflex  Canals patent without erythema  Nasal mucosa, septum, and turbinates: Normal without edema or erythema  Oropharynx: Normal with no erythema, edema, exudate or lesions  Pulmonary  Respiratory effort: No increased work of breathing or signs of respiratory distress  Auscultation of lungs: Clear to auscultation  Cardiovascular  Auscultation of heart: Normal rate and rhythm, normal S1 and S2, without murmurs  Examination of extremities for edema and/or varicosities: Normal    Abdomen  Abdomen: Non-tender, no masses  Psychiatric  Mood and affect: Normal   Mood and Affect: appropriate mood,-- not anxious,-- calm-- and-- not depressed  Results/Data  PHQ-9 Adult Depression Screening 31IFG4634 12:59PM UserRayray     Test Name Result Flag Reference   PHQ-9 Adult Depression Score 0       Over the last two weeks, how often have you been bothered by any of the following problems? Little interest or pleasure in doing things: Not at all - 0 Feeling down, depressed, or hopeless: Not at all - 0 Trouble falling or staying asleep, or sleeping too much: Not at all - 0 Feeling tired or having little energy: Not at all - 0 Poor appetite or over eating: Not at all - 0 Feeling bad about yourself - or that you are a failure or have let yourself or your family down: Not at all - 0 Trouble concentrating on things, such as reading the newspaper or watching television: Not at all - 0 Moving or speaking so slowly that other people could have noticed   Or the opposite -  being so fidgety or restless that you have been moving around a lot more than usual: Not at all - 0 Thoughts that you would be better off dead, or of hurting yourself in some way: Not at all - 0   PHQ-9 Adult Depression Screening Negative     PHQ-9 Difficulty Level Not difficult at all     PHQ-9 Severity No Depression       * CT CHEST WO CONTRAST [id number] 07:15AM Elena Howard     Test Name Result Flag Reference   CT CHEST WO CONTRAST (Report)       This is a summary report  The complete report is available in the patient's medical record  If you cannot access the medical record, please contact the sending organization for a detailed fax or copy  CT CHEST WITHOUT IV CONTRAST   INDICATION: C50 919: Malignant neoplasm of unspecified site of unspecified female breast  History taken directly from the electronic ordering system  COMPARISON: PET CT scan 11/25/2015  Chest CT 2/12/2012  TECHNIQUE: CT examination of the chest was performed without intravenous contrast  Reformatted images were created in axial, sagittal, and coronal planes  Radiation dose length product (DLP) for this visit: 159 mGy-cm   This examination, like all CT scans performed in the [facility], was performed utilizing techniques to minimize radiation dose exposure, including the use of iterative   reconstruction and automated exposure control  FINDINGS:   LUNGS: There is a 4 mm left basilar nodule medially on image 3/51, which is new since the prior exams  Stable tiny calcified granuloma more superiorly in the left lower lobe  There is no tracheal or endobronchial lesion  PLEURA: Unremarkable  HEART/GREAT VESSELS: Unremarkable for patient's age  MEDIASTINUM AND FEDERICO: Unremarkable  CHEST WALL AND LOWER NECK:  Bilateral axillary surgical clips again noted, with no adenopathy  Bilateral breast prostheses again identified  VISUALIZED STRUCTURES IN THE UPPER ABDOMEN: Stable subcentimeter hypodensity in the right hepatic lobe  OSSEOUS STRUCTURES: No acute fracture  No destructive osseous lesion  IMPRESSION:   New 4 mm left basilar pulmonary nodule  Based on current Fleischner Society 2017 Guidelines on incidental pulmonary nodule, patients with a known malignancy are at increased risk of metastasis and should receive initial three month follow-up chest CT      Workstation performed: [id number]   Signed by: Rosalio Jennings MD  11/22/17  RAD_DOSE  Modality Radiation Exposure Data   Order Radiation   Type Dose Range   Radiation Dose 159 mGy-cm 0 - 6000 mGy-cm     (1) CBC/PLT/DIFF 24Oct2017 06:51AM Proothi, Ernesto     Test Name Result Flag Reference   WBC COUNT 3 87 Thousand/uL L 4 31-10 16   RBC COUNT 4 40 Million/uL  3 81-5 12   HEMOGLOBIN 13 3 g/dL  11 5-15 4   HEMATOCRIT 39 9 %  34 8-46  1   MCV 91 fL  82-98   MCH 30 2 pg  26 8-34 3   MCHC 33 3 g/dL  31 4-37 4   RDW 13 4 %  11 6-15 1   MPV 9 7 fL  8 9-12 7   PLATELET COUNT 645 Thousands/uL  149-390   nRBC AUTOMATED 0 /100 WBCs     NEUTROPHILS RELATIVE PERCENT 57 %  43-75   LYMPHOCYTES RELATIVE PERCENT 30 %  14-44   MONOCYTES RELATIVE PERCENT 10 %  4-12   EOSINOPHILS RELATIVE PERCENT 2 %  0-6   BASOPHILS RELATIVE PERCENT 1 %  0-1   NEUTROPHILS ABSOLUTE COUNT 2 26 Thousands/? ??L  1 85-7 62   LYMPHOCYTES ABSOLUTE COUNT 1 14 Thousands/? ??L  0 60-4 47   MONOCYTES ABSOLUTE COUNT 0 37 Thousand/? ??L  0 17-1 22   EOSINOPHILS ABSOLUTE COUNT 0 06 Thousand/? ??L  0 00-0 61   BASOPHILS ABSOLUTE COUNT 0 03 Thousands/? ??L  0 00-0 10   This is a patient instruction: This test is non-fasting  Please drink two glasses of water morning of bloodwork  (1) CA 27 29 24Oct2017 06:51AM Proothi, Ernesto     Test Name Result Flag Reference   CA 27 29(NEW) 16 6 U/mL  0 0-42 3     (1) VITAMIN D 25-HYDROXY 24Oct2017 06:51AM Maries Darnell     Test Name Result Flag Reference   VIT D 25-HYDROX 39 9 ng/mL  30 0-100 0     This assay is a certified procedure of the CDC Vitamin D Standardization Certification Program (VDSCP)   Deficiency <20ng/ml  Insufficiency 20-30ng/ml  Sufficient  ng/ml   *Patients undergoing fluorescein dye angiography may retain small amounts of fluorescein in the body for 48-72 hours post procedure  Samples containing fluorescein can produce falsely elevated Vitamin D values   If the patient had this procedure, a specimen should be resubmitted post fluorescein clearance  (1) COMPREHENSIVE METABOLIC PANEL [id number] 30:44AN Prosusanna, Ernesto     Test Name Result Flag Reference   SODIUM 141 mmol/L  136-145   POTASSIUM 4 1 mmol/L  3 5-5 3   CHLORIDE 106 mmol/L  100-108   CARBON DIOXIDE 30 mmol/L  21-32   ANION GAP (CALC) 5 mmol/L  4-13   BLOOD UREA NITROGEN 16 mg/dL  5-25   CREATININE 0 73 mg/dL  0 60-1 30   Standardized to IDMS reference method   CALCIUM 8 9 mg/dL  8 3-10 1   BILI, TOTAL 0 40 mg/dL  0 20-1 00   ALK PHOSPHATAS 54 U/L     ALT (SGPT) 19 U/L  12-78   Specimen collection should occur prior to Sulfasalazine and/or Sulfapyridine administration due to the potential for falsely depressed results  AST(SGOT) 13 U/L  5-45   Specimen collection should occur prior to Sulfasalazine administration due to the potential for falsely depressed results  ALBUMIN 3 9 g/dL  3 5-5 0   TOTAL PROTEIN 7 0 g/dL  6 4-8 2   eGFR 94 ml/min/1 73sq m       National Kidney Disease Education Program recommendations are as follows: GFR calculation is accurate only with a steady state creatinine Chronic Kidney disease less than 60 ml/min/1 73 sq  meters Kidney failure less than 15 ml/min/1 73 sq  meters  GLUCOSE FASTING 78 mg/dL  65-99   Specimen collection should occur prior to Sulfasalazine administration due to the potential for falsely depressed results  Specimen collection should occur prior to Sulfapyridine administration due to the potential for falsely elevated results       Future Appointments    Date/Time Provider Specialty Site   06/14/2018 01:30 PM Melissa Frey DO Internal Medicine [facility]   03/02/2018 01:45 PM Kristine Howard MD Hematology Oncology [facility]   03/19/2018 01:00 PM Britney Grant [facility] Gynecological [facility]       Signatures   Electronically signed by : Mei Gottlieb DO; Dec 10 2017 10:27AM EST                       (Author)

## 2017-12-21 ENCOUNTER — GENERIC CONVERSION - ENCOUNTER (OUTPATIENT)
Dept: INTERNAL MEDICINE CLINIC | Facility: CLINIC | Age: 57
End: 2017-12-21

## 2018-01-12 VITALS
BODY MASS INDEX: 24.7 KG/M2 | TEMPERATURE: 96.7 F | WEIGHT: 148.25 LBS | HEIGHT: 65 IN | DIASTOLIC BLOOD PRESSURE: 72 MMHG | RESPIRATION RATE: 14 BRPM | OXYGEN SATURATION: 99 % | HEART RATE: 67 BPM | SYSTOLIC BLOOD PRESSURE: 108 MMHG

## 2018-01-12 VITALS
SYSTOLIC BLOOD PRESSURE: 116 MMHG | TEMPERATURE: 97.6 F | DIASTOLIC BLOOD PRESSURE: 72 MMHG | HEART RATE: 56 BPM | RESPIRATION RATE: 12 BRPM | WEIGHT: 148.06 LBS | BODY MASS INDEX: 24.67 KG/M2 | HEIGHT: 65 IN

## 2018-01-12 NOTE — RESULT NOTES
Message   Notify the patient urine culture showed mixed contaminants? Any UTI symptoms currently   Notify the patient normal lipid panel follow-up as scheduled        Verified Results  (1) URINE CULTURE 24Oct2017 06:51AM Jacqueline Enamorado     Test Name Result Flag Reference   CLINICAL REPORT (Report)     Test:        Urine culture  Specimen Source:  Urine, Clean Catch  Specimen Type:   Urine  Specimen Date:   10/24/2017 6:51 AM  Result Date:    10/25/2017 8:07 AM  Result Status:   Final result  Resulting Lab:    [facility]            Tel: [phone]      CULTURE                                       ------------------                                   20,000-29,000 cfu/ml      *** Mixed Contaminants X4 ***     (1) LIPID PANEL, FASTING 24Oct2017 06:51AM Jacqueline Enamorado     Test Name Result Flag Reference   CHOLESTEROL 147 mg/dL     HDL,DIRECT 71 mg/dL H 40-60   Specimen collection should occur prior to Metamizole administration due to the potential for falsley depressed results  LDL CHOLESTEROL CALCULATED 67 mg/dL  0-100   This is a patient instruction: This is a fasting test  Water, black tea or black coffee only after 9:00pm the night before the test  Drink 2 glasses of water the morning of the test         Triglyceride:        Normal <150 mg/dl   Borderline High 150-199 mg/dl   High 200-499 mg/dl   Very High >499 mg/dl      Cholesterol:       Desirable <200 mg/dl    Borderline High 200-239 mg/dl    High >239 mg/dl      HDL Cholesterol:       High>59 mg/dL    Low <41 mg/dL      This screening LDL is a calculated result  It does not have the accuracy of the Direct Measured LDL in the monitoring of patients with hyperlipidemia and/or statin therapy  Direct Measure LDL (KWJ336) must be ordered separately in these patients     TRIGLYCERIDES 44 mg/dL  <=150   Specimen collection should occur prior to N-Acetylcysteine or Metamizole administration due to the potential for falsely depressed results

## 2018-01-13 VITALS
BODY MASS INDEX: 24.83 KG/M2 | HEART RATE: 52 BPM | SYSTOLIC BLOOD PRESSURE: 102 MMHG | WEIGHT: 149.01 LBS | DIASTOLIC BLOOD PRESSURE: 72 MMHG | RESPIRATION RATE: 16 BRPM | HEIGHT: 65 IN

## 2018-01-13 VITALS
HEIGHT: 65 IN | HEART RATE: 64 BPM | SYSTOLIC BLOOD PRESSURE: 114 MMHG | TEMPERATURE: 97.9 F | BODY MASS INDEX: 25.38 KG/M2 | DIASTOLIC BLOOD PRESSURE: 68 MMHG | RESPIRATION RATE: 14 BRPM | WEIGHT: 152.31 LBS

## 2018-01-14 VITALS
WEIGHT: 156.5 LBS | TEMPERATURE: 97.4 F | HEIGHT: 65 IN | SYSTOLIC BLOOD PRESSURE: 108 MMHG | HEART RATE: 58 BPM | OXYGEN SATURATION: 97 % | RESPIRATION RATE: 15 BRPM | BODY MASS INDEX: 26.08 KG/M2 | DIASTOLIC BLOOD PRESSURE: 68 MMHG

## 2018-01-16 NOTE — RESULT NOTES
Message   Notify the patient normal urinalysis follow-up as scheduled   Notify the patient normal vitamin-D level follow-up as scheduled        Verified Results  (1) URINALYSIS (will reflex a microscopy if leukocytes, occult blood, protein or nitrites are not within normal limits) 24Oct2017 06:51AM Melissa Frey     Test Name Result Flag Reference   COLOR Yellow     CLARITY Clear     SPECIFIC GRAVITY UA 1 014  1 003-1 030   PH UA 6 0  4 5-8 0   LEUKOCYTE ESTERASE UA Negative  Negative   NITRITE UA Negative  Negative   PROTEIN UA Negative mg/dl  Negative   GLUCOSE UA Negative mg/dl  Negative   KETONES UA Negative mg/dl  Negative   UROBILINOGEN UA 0 2 E U /dl  0 2, 1 0 E U /dl   BILIRUBIN UA Negative  Negative   BLOOD UA Negative  Negative     (1) VITAMIN D 25-HYDROXY 24Oct2017 06:51AM Melissa Banco     Test Name Result Flag Reference   VIT D 25-HYDROX 39 9 ng/mL  30 0-100 0   This assay is a certified procedure of the CDC Vitamin D Standardization Certification Program (VDSCP)     Deficiency <20ng/ml   Insufficiency 20-30ng/ml   Sufficient  ng/ml     *Patients undergoing fluorescein dye angiography may retain small amounts of fluorescein in the body for 48-72 hours post procedure  Samples containing fluorescein can produce falsely elevated Vitamin D values  If the patient had this procedure, a specimen should be resubmitted post fluorescein clearance

## 2018-01-16 NOTE — MISCELLANEOUS
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)   (C50 211,C50 212)   6  BRCA positive (V84 01,V84 02) (Z15 01,Z15 02)   7  BRCA2 positive (V84 01) (Z15 01,Z15 02)   8  Depression with anxiety (300 4) (F41 8)   9  Dermatomyositis (710 3) (M33 90)   10  Fatigue (780 79) (R53 83)   11  Female pelvic pain (625 9) (R10 2)   12  Gallbladder polyp (575 6) (K82 4)   13  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)   14  History of breast cancer (V10 3) (Z85 3)   15  Malignant neoplasm of breast (174 9) (C50 919)   16  Need for prophylactic vaccination and inoculation against influenza (V04 81) (Z23)   17  Need for Tdap vaccination (V06 1) (Z23)   18  Nonspecific abnormal results of function study of thyroid (794 5) (R94 6)   19  Osteopenia (733 90) (M85 80)   20  Overweight (278 02) (E66 3)   21  Plantar fasciitis (728 71) (M72 2)   22  Proteinuria (791 0) (R80 9)   23  Rash, skin (782 1) (R21)   24  Screening for cardiovascular condition (V81 2) (Z13 6)   25  Sprain Of The Left Ankle (845 00)   26  Symptomatic menopausal or female climacteric states (627 2) (N95 1)   27  Urgency of urination (788 63) (R39 15)   28  Urinary frequency (788 41) (R35 0)   29  Vitamin D deficiency (268 9) (E55 9)   30  Weight gain (783 1) (R63 5)    Current Meds   1   Aspirin 81 MG TABS; TAKE 1 TABLET DAILY; Therapy: (Recorded:25Mar2015) to Recorded   2  Calcium 600 MG Oral Tablet; take 2 tablet daily; Therapy: (Recorded:18Sep2017) to Recorded   3  Fish Oil 1200 MG Oral Capsule; TAKE 1 CAPSULE Daily; Therapy: (Recorded:25Mar2015) to Recorded   4  One-Daily Multi Vitamins TABS; TAKE 1 TABLET DAILY; Therapy: (Recorded:25Mar2015) to Recorded   5  Plaquenil 200 MG Oral Tablet (Hydroxychloroquine Sulfate); TAKE 1 TABLET DAILY; Therapy: 79UWZ1564 to Recorded   6  Viibryd 10 MG Oral Tablet; Take 1 tablet daily; Therapy: 12VHZ4295 to (Claritza Danielson)  Requested for: 90SDR0077; Last   Rx:32Qgd0157 Ordered   7  Vitamin D3 2000 UNIT Oral Capsule; TAKE 1 CAPSULE EVERY OTHER DAY; Therapy: (Recorded:18Sep2017) to Recorded    Allergies    1  No Known Drug Allergies    2  No Known Environmental Allergies   3   No Known Food Allergies    Signatures   Electronically signed by : Devi Jacobsen, ; Nov 1 2017  3:38PM EST                       (Author)

## 2018-01-17 NOTE — RESULT NOTES
Message   Bone density scan shows ostepenia     Verified Results  * DXA BONE DENSITY SPINE HIP AND PELVIS [id number] 01:09PM Bernita Kanner     Test Name Result Flag Reference   DXA BONE DENSITY SPINE HIP AND PELVIS (Report)     CENTRAL DXA SCAN     CLINICAL HISTORY:  50year old post-menopausal  female risk factors include family history of osteoporosis  Degenerative arthritis  Breast carcinoma with bilateral mastectomy and chemotherapy (letrazol)  History of melanoma  Prior estrogen    use  TECHNIQUE: Bone densitometry was performed using a [employer] Horizon A  bone densitometer  Regions of interest appear properly placed  There are no obvious fractures or other confounding variables which could limit the study  COMPARISON: Several, most recent July 17, 2014     RESULTS:    LUMBAR SPINE: L1-L4:   BMD 0 827 gm/cm2   T-score -2 0   Z-score -1 0     LEFT TOTAL HIP:   BMD 0 928 gm/cm2   T-score -0 1   Z-score 0 5     LEFT FEMORAL NECK:   BMD 0 651 gm/cm2   T-score -1 8   Z-score -0 8           ASSESSMENT:   1  Based on the WHO classification, the T-score of -2 0 in the left hip is consistent with low bone mineral density  2  Since the prior study, there has been a decrease in the bone mineral densities of the lumbar spine and left hip  It should be noted however that the examinations were performed on dissimilar DXA units  3  The 10 year risk of hip fracture is 1 %, with the 10 year risk of major osteoporotic fracture being 6 %, as calculated by the WHO fracture risk assessment tool (FRAX)  The current NOF guidelines recommend treating patients with FRAX 10 year risk    score of >3% for hip fracture and >20% for major osteoporotic fracture  4  Any secondary causes of low bone mineral density should be excluded prior to treatment, if clinically indicated     5  A daily intake of at least 1200 mg calcium and 800 to 1000 IU of Vitamin D, as well as weight bearing and muscle strengthening exercise, fall prevention and avoidance of tobacco and excessive alcohol intake as basic preventive measures are suggested  6  Repeat DXA scan in 18-24 months as clinically indicated             WHO CLASSIFICATION:   Normal (a T-score of -1 0 or higher)   Low bone mineral density (a T-score of less than -1 0 but higher than -2 5)   Osteoporosis (a T-score of -2 5 or less)   Severe osteoporosis (a T-score of -2 5 or less with a fragility fracture)             Workstation performed: [id number]       Signatures   Electronically signed by : AUDRA Whitaker; Jul 18 2016  2:24PM EST                       (Author)

## 2018-01-17 NOTE — PROGRESS NOTES
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; Ordered By:Proothi, Ernesto;   · (1) CBC/PLT/DIFF; Status:Active; Requested GPV:[id number];    Perform:[facility]; Due:17Oct2017;Ordered; For: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; Ordered By:Proothi, Ernesto;   · (1) CEA; Status:Active; Requested GUB:[id number];    Perform:[facility]; Due:17Oct2017;Ordered; For: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; Ordered By:Proothi, Ernesto;   · (1) COMPREHENSIVE METABOLIC PANEL; Status:Active; Requested DZA:[id number];    Perform:[facility]; Due:17Oct2017;Ordered; For: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; Ordered By:Proothi, Ernesto;   · Drink plenty of fluids ; Status:Complete;   Done: 65RIF3348   Ordered; For: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; Ordered By:Proothi, Ernesto;   · Follow-up visit in 6 months Evaluation and Treatment  Follow-up  Status: Complete   Done: 74CSN6990 01:45PM   Ordered; For: 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; Ordered By: Carlene Osborne Performed:  Due: 67WMB8553; Last Updated By: Raza Pa; 5/2/2016 1:50:07 PM    Discussion/Summary  Discussion Summary:   Patient is here with her   She was tested positive for BRCA2 and she has history of bilateral breast cancers diagnosed and treated in 2008, stage IIA on the right and stage IA on the left  She states one lymph node was positive on the right  Hormone receptors were positive  Her-2 was negative  She had bilateral mastectomies and reconstruction surgeries  She received Taxotere plus Cytoxan chemotherapy for 4 cycles and Femara for 5 years until September 2013  She underwent ITZEL and BSO  There is family history of BRCA2  There is family history of breast and ovarian cancer  Patient had a 9 mm nodule in the right lower lung and she had wedge resection and that came back negative for malignancy  She had history of vitamin D deficiency and that was corrected  Vitamin D level became normal and she continues to take 2000 units of vitamin D daily   She had cutaneous melanoma in situ in between the breasts and that was surgically removed by Dr Jenae Fierro and she follows with him  She has nonspecific rash upper anterior chest that comes and goes and gives her burning sensation  She has discussed this with her dermatologist  She states biopsy has shown dermatomyositis  She has a stable-appearing benign right hepatic lesion  Bone density had shown osteopenia  Physical examination and test results are as reported and discussed  She remains in remission from breast cancer  Patient with dermatomyositis are more prone to malignancy  She has history of breast cancers and melanoma in situ  She has history of BRCA2  She gets checked periodically for malignancy  She already had PET/CT scan and colonoscopy  She does not have GI/gastric symptoms to rush for EGD  She could have EGD  When she went for colonoscopy next time in 2-3 years unless she has symptoms before to warrant EGD/GI evaluation  She is up-to-date with her medical checkups and GYN checkups  She will stay alert in case she has any unexplained symptom  Condition discussed and explained in detail  Questions answered  Also discussed eating healthy foods and exercises as tolerated  We discussed BRCA2 and various cancers associated with that  Understands and agrees with treatment plan:  The treatment plan was reviewed with the patient/guardian  The patient/guardian understands and agrees with the treatment plan   Counseling Documentation With Imm: The patient, patient's family was counseled regarding diagnostic results, prognosis, patient and family education, impressions  total time of encounter was 30 minutes and 20 minutes was spent counseling  Chief Complaint  Chief Complaint: Chief Complaint:   The patient presents to the office today with Follow Up  History of Present Illness  HPI: Patient is here with her   She was tested positive for BRCA2 and she has history of bilateral breast cancers diagnosed and treated in 2008, stage IIA on the right and stage IA on the left  She states one lymph node was positive on the right  Hormone receptors were positive  Her-2 was negative  She had bilateral mastectomies and reconstruction surgeries  She received Taxotere plus Cytoxan chemotherapy for 4 cycles and Femara for 5 years until September 2013  She underwent ITZEL and BSO  There is family history of BRCA2  There is family history of breast and ovarian cancer  Patient had a 9 mm nodule in the right lower lung and she had wedge resection and that came back negative for malignancy  She had history of vitamin D deficiency and that was corrected  Vitamin D level became normal and she continues to take 2000 units of vitamin D daily   She had cutaneous melanoma in situ in between the breasts and that was surgically removed by Dr Kayla Mendez and she follows with him  She has nonspecific rash upper anterior chest that comes and goes and gives her burning sensation  She has discussed this with her dermatologist  She states biopsy has shown dermatomyositis  She has a stable-appearing benign right hepatic lesion  Bone density had shown osteopenia  Review of Systems               Reviewed 14 systems  Other symptoms as in history of present illness     Complete-Female:   Constitutional: No fever, no chills, feels well, no tiredness, no recent weight gain or weight loss, no fever, not feeling poorly, no chills, not feeling tired and no recent weight loss  Eyes: No complaints of eye pain, no red eyes, no eyesight problems, no discharge, no dry eyes, no itching of eyes  ENT: no complaints of earache, no loss of hearing, no nose bleeds, no nasal discharge, no sore throat, no hoarseness  Cardiovascular: No complaints of slow heart rate, no fast heart rate, no chest pain, no palpitations, no leg claudication, no lower extremity edema  Respiratory: No complaints of shortness of breath, no wheezing, no cough, no SOB on exertion, no orthopnea, no PND  Gastrointestinal: No complaints of abdominal pain, no constipation, no nausea or vomiting, no diarrhea, no bloody stools, no abdominal pain, no nausea, no vomiting, no constipation, no diarrhea and no blood in stools  Genitourinary: No complaints of dysuria, no incontinence, no pelvic pain, no dysmenorrhea, no vaginal discharge or bleeding, no dysuria, no pelvic pain, no vaginal discharge, no incontinence, no dysmenorrhea and no unexplained vaginal bleeding  Musculoskeletal: No complaints of arthralgias, no myalgias, no joint swelling or stiffness, no limb pain or swelling  Integumentary: a rash, but as noted in HPI, no itching, no breast pain, no skin lesions, no skin wound and no breast lump  Neurological: No complaints of headache, no confusion, no convulsions, no numbness, no dizziness or fainting, no tingling, no limb weakness, no difficulty walking  Psychiatric: Not suicidal, no sleep disturbance, no anxiety or depression, no change in personality, no emotional problems  Endocrine: No complaints of proptosis, no hot flashes, no muscle weakness, no deepening of the voice, no feelings of weakness, no proptosis, no muscle weakness, no hot flashes and no deepening of the voice   no feelings of weakness   Hematologic/Lymphatic: No complaints of swollen glands, no swollen glands in the neck, does not bleed easily, does not bruise easily  ROS Reviewed:   ROS reviewed  Active Problems    1  Abdominal swelling (789 30) (R19 00)   2  Abnormal weight loss (783 21) (R63 4)   3  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)   4  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)   (C50 211,C50 212)   5  BRCA positive (V84 01,V84 02) (Z15 01,Z15 02)   6  BRCA2 positive (V84 01) (Z15 01,Z15 02)   7  Depression with anxiety (300 4) (F41 8)   8  Dermatomyositis (710 3) (M33 90)   9  Fatigue (780 79) (R53 83)   10  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)   11  History of breast cancer (V10 3) (Z85 3)   12  Malignant neoplasm of breast (174 9) (C50 919)   13  Need for prophylactic vaccination and inoculation against influenza (V04 81) (Z23)   14  Need for Tdap vaccination (V06 1) (Z23)   15  Nonspecific abnormal results of function study of thyroid (794 5) (R94 6)   16  Osteopenia (733 90) (M85 80)   17  Overweight (278 02) (E66 3)   18  Plantar fasciitis (728 71) (M72 2)   19  Rash, skin (782 1) (R21)   20  Sprain Of The Left Ankle (845 00)   21  Symptomatic menopausal or female climacteric states (627 2) (N95 1)   22  Urgency of urination (788 63) (R39 15)   23  Urinary frequency (788 41) (R35 0)   24  Vitamin D deficiency (268 9) (E55 9)   25  Weight gain (783 1) (R63 5)    Past Medical History    1  History of backache (V13 59) (Z87 39)   2  History of herpes zoster (V12 09) (Z86 19)   3  Need for prophylactic vaccination and inoculation against influenza (V04 81) (Z23)                   Reviewed no change     Surgical History    1  History of Appendectomy   2  History of Breast Surgery Mastectomy   3  History of Flexible Bronchoscopy (Diagnostic)   4  History of Hysterectomy   5  History of Knee Arthroscopy   6  History of Salpingo-oophorectomy Bilateral   7   History of Thoracoscopy With Diagnostic Wedge Resection Of Lung  Surgical History Reviewed: The surgical history was reviewed and updated today  Reviewed no change        Family History  Sister    1  Family history of Implantable Cardioverter-Defibrillator   2  Family history of Postpartum Dilated Cardiomyopathy  Family History    3  Family history of Cancer   4  Family history of Coronary Artery Disease (V17 49)   5  Family history of Heart Disease (V17 49)  Family History Reviewed: The family history was reviewed and updated today  Social History    · Caffeine Use   · Denied: History of Drug Use   · Never A Smoker   · Never Drank Alcohol   · No alcohol use  Social History Reviewed: The social history was reviewed and is unchanged  Current Meds   1  Aspirin 81 MG TABS; TAKE 1 TABLET DAILY; Therapy: (Recorded:25Mar2015) to Recorded   2  Calcium 600 MG Oral Tablet; take 2 tablet daily; Therapy: (Recorded:25Mar2015) to Recorded   3  Fish Oil 1200 MG Oral Capsule; TAKE 1 CAPSULE Daily; Therapy: (Recorded:25Mar2015) to Recorded   4  One-Daily Multi Vitamins TABS; TAKE 1 TABLET DAILY; Therapy: (Recorded:25Mar2015) to Recorded   5  Plaquenil 200 MG Oral Tablet; TAKE 1 TABLET DAILY; Therapy: [id number] to Recorded   6  Viibryd 10 MG Oral Tablet; Take 1 tablet daily; Therapy: 98OYN7219 to (Evaluate:33Edu3245)  Requested for: 85JWG5881; Last   Rx:17Mar2016 Ordered   7  Vitamin D3 2000 UNIT Oral Capsule; TAKE 1 CAPSULE EVERY OTHER DAY; Therapy: (Recorded:27Apr2015) to Recorded  Medication List Reviewed: The medication list was reviewed and updated today  Allergies    1  No Known Drug Allergies    2  No Known Environmental Allergies   3   No Known Food Allergies    Vitals  Vital Signs [Data Includes: Current Encounter]    Recorded: 94GYV6157 01:20PM   Temperature 98 F   Heart Rate 66   Respiration 14   Systolic 728   Diastolic 72   Height 5 ft 6 in   Weight 145 lb 8 0 oz   BMI Calculated 23 48   BSA Calculated 1 75   O2 Saturation 99   Pain Scale 0        Stable     Physical Exam    ECOG 0      Constitutional   General appearance: No acute distress, well appearing and well nourished  Alert  Not in distress  Head and Face   Head and face: Normal     Eyes   Conjunctiva and lids: No swelling, erythema or discharge  Pupils and irises: Equal, round, reactive to light  No icterus  Ears, Nose, Mouth, and Throat   External inspection of ears and nose: Normal   No oral thrush  Neck   Neck: Supple, symmetric, trachea midline, no masses  Thyroid: Normal, no thyromegaly  Pulmonary   Respiratory effort: No increased work of breathing or signs of respiratory distress  Percussion of chest: Normal     Auscultation of lungs: Clear to auscultation  Cardiovascular   Auscultation of heart: Normal rate and rhythm, normal S1 and S2, no murmurs  Pedal pulses: 2+ bilaterally  Examination of extremities for edema and/or varicosities: Normal   No calf tenderness  Chest   Breasts: Normal, no dimpling or skin changes appreciated  bilateral mastectomy with reconstruction  Palpation of breasts and axillae: Normal, no masses palpated  Abdomen   Abdomen: Non-tender, no masses  Liver and spleen: No hepatomegaly or splenomegaly  Examination for hernias: No hernia appreciated  Genitourinary She goes to her gynecologist    Lymphatic   Palpation of lymph nodes in neck: No lymphadenopathy  Palpation of lymph nodes in axillae: No lymphadenopathy  Palpation of lymph nodes in groin: No lymphadenopathy  Musculoskeletal   Gait and station: Normal     Digits and nails: Normal without clubbing or cyanosis  Joints, bones, and muscles: Normal     Range of motion: Normal     Stability: Normal     Muscle strength/tone: Normal     Skin   Skin and subcutaneous tissue: Abnormal   Non-specific macular rash upper anterior chest    Neurologic   Cranial nerves: Cranial nerves II-XII intact      Cortical function: Normal mental status  Coordination: Normal finger to nose and heel to shin  Psychiatric   Judgment and insight: Normal     Orientation to person, place, and time: Normal     Recent and remote memory: Intact  Mood and affect: Normal        Results/Data  Results   (1) CBC/PLT/DIFF [id number] 06:50AM Proothi, Ernesto     Test Name Result Flag Reference   WBC COUNT 4 73 Thousand/uL  4 31-10 16   RBC COUNT 4 33 Million/uL  3 81-5 12   HEMOGLOBIN 13 0 g/dL  11 5-15 4   HEMATOCRIT 39 4 %  34 8-46  1   MCV 91 fL  82-98   MCH 30 0 pg  26 8-34 3   MCHC 33 0 g/dL  31 4-37 4   RDW 13 5 %  11 6-15 1   MPV 10 2 fL  8 9-12 7   PLATELET COUNT 955 Thousands/uL  149-390   nRBC AUTOMATED 0 /100 WBCs     NEUTROPHILS RELATIVE PERCENT 59 %  43-75   LYMPHOCYTES RELATIVE PERCENT 25 %  14-44   MONOCYTES RELATIVE PERCENT 12 %  4-12   EOSINOPHILS RELATIVE PERCENT 3 %  0-6   BASOPHILS RELATIVE PERCENT 1 %  0-1   NEUTROPHILS ABSOLUTE COUNT 2 76 Thousands/µL  1 85-7 62   LYMPHOCYTES ABSOLUTE COUNT 1 20 Thousands/µL  0 60-4 47   MONOCYTES ABSOLUTE COUNT 0 57 Thousand/µL  0 17-1 22   EOSINOPHILS ABSOLUTE COUNT 0 16 Thousand/µL  0 00-0 61   BASOPHILS ABSOLUTE COUNT 0 03 Thousands/µL  0 00-0 10     (1) COMPREHENSIVE METABOLIC PANEL 12XNP6593 42:11VS [facility] Disease Education Program recommendations are as follows:  GFR calculation is accurate only with a steady state creatinine  Chronic Kidney disease less than 60 ml/min/1 73 sq  meters  Kidney failure less than 15 ml/min/1 73 sq  meters  Test Name Result Flag Reference   GLUCOSE,RANDM 84 mg/dL     If the patient is fasting, the ADA then defines impaired fasting glucose as > 100 mg/dL and diabetes as > or equal to 123 mg/dL     SODIUM 143 mmol/L  136-145   POTASSIUM 4 7 mmol/L  3 5-5 3   CHLORIDE 106 mmol/L  100-108   CARBON DIOXIDE 33 mmol/L H 21-32   ANION GAP (CALC) 4 mmol/L  4-13   BLOOD UREA NITROGEN 18 mg/dL  5-25   CREATININE 0 64 mg/dL  0 60-1 30 Standardized to IDMS reference method   CALCIUM 8 6 mg/dL  8 3-10 1   BILI, TOTAL 0 18 mg/dL L 0 20-1 00   ALK PHOSPHATAS 73 U/L     ALT (SGPT) 18 U/L  12-78   AST(SGOT) 15 U/L  5-45   ALBUMIN 3 7 g/dL  3 5-5 0   TOTAL PROTEIN 6 8 g/dL  6 4-8 2   eGFR Non-African American      >60 0 ml/min/1 73sq m     (1) CA 27 29 [id number] 06:50AM Proothi, Ernesto     Test Name Result Flag Reference   CA 27 29(NEW) 15 4 U/mL  0 0-42 3     * US ABDOMEN COMPLETE [id number] 06:36AM Proothi, Ernesto     Test Name Result Flag Reference   US ABDOMEN COMPLETE (Report)     ABDOMEN ULTRASOUND, COMPLETE     INDICATION: History of breast cancer  COMPARISON: PET CT scan 11/25/2015  CT scan 9/29/2014  TECHNIQUE:  Real-time ultrasound of the abdomen was performed with a curvilinear transducer with both volumetric sweeps and still imaging techniques  FINDINGS:     PANCREAS: Visualized portions of the pancreas are within normal limits  AORTA AND IVC: Visualized portions are normal for patient age  LIVER:   Size: Within normal range  The liver measures 16 cm in the midclavicular line  Contour: Surface contour is smooth  Parenchyma: Echogenicity and echotexture are within normal limits  Tiny simple cyst in the right hepatic lobe measures 8 x 5 x 6 mm  The main portal vein is patent and hepatopetal       BILIARY:   The gallbladder is normal in caliber  No wall thickening or pericholecystic fluid  No stones or sludge identified  There are multiple polyps, largest measuring 6 mm  Sonographic [facility] sign is negative  No intrahepatic biliary dilatation  CBD measures 2 mm  No choledocholithiasis  KIDNEY:    Right kidney measures 11 3 cm  Tiny simple cyst in the lower pole measures 5 x 5 x 5 mm  Left kidney measures 11 4 cm  Within normal limits  SPLEEN:    Measures 9 cm  Within normal limits  ASCITES: None  IMPRESSION:     1  Multiple tiny gallbladder polyps       2  Tiny hepatic cyst      3  Tiny right renal cyst        Workstation performed: [id number]     Signed by:   Rhonda Chacko MD   4/11/16     (1)  [id number] 02:42PM Madalyn Perkins     Test Name Result Flag Reference   (NEW) 6 9 U/mL  0 0-30 0     (1) G6PD QUANTITATIVE [id number] 08:22AM EPIC, Provider   Test ordered by: Etelvina Philipperomel   Performed at:  [address] [place] [address]  [id number]  : Larraine Rubinstein MD, Phone:  [phone]     Test Name Result Flag Reference   GLUCOSE-6-PHOSPHATE DEHYDROGENASE QUAL 199 U/10E12 RBC  146 - 376   When decreased, G-6-PD, Quant  values are associated with acutehemolytic anemia when deficient individuals are exposed to oxidativestress, such as with certain medications (e g , primaquine),infection, or ingestion of ricco beans  Caution: In patients with acute hemolysis (e g , abnormally low RBCvalues), testing for G-6-PD may be falsely normal because oldererythrocytes with a higher enzyme deficiency have been hemolyzed  Young erythrocytes and reticulocytes have normal or near-normalenzyme activity  Normal values of G-6-PD may be measured for severalweeks following a hemolytic event     RBC 4 55 x10E6/uL  3 77 - 5 28     COLONOSCOPY 26Jan2016 12:00AM      Test Name Result Flag Reference   Colonoscopy [id number] 12:00AM       Summary / No summary entered :      No summary entered  Documents attached :      Beto Hua; Enc: [id number] - Image Encounter - Latisha Carranzaman - (Internal Medicine) (Additional Information Document)  (Q) FECAL GLOBIN BY IMMUNOCHEMISTRY [id number] 12:00AM Lisabeth Messing     Test Name Result Flag Reference   FECAL GLOBIN BY$[email or web address]      FECAL GLOBIN BY IMMUNOCHEMISTRY         MICRO NUMBER:      [id number]    TEST STATUS:       FINAL    SPECIMEN SOURCE:   INSURE () FOBT TEST CARD    SPECIMEN QUALITY:  ADEQUATE    RESULT:            Not Detected We received an [employer] card with a non-specific                       order  Based upon the specimen submitted, the                       fecal globin test was performed  If this is not                       what you intended to order, please contact your                       local  immediately                       so that we can adjust our billing appropriately  You may also inquire about alternative or                        additional testing  * PET/CT Skull Base to Mid-Thigh [id number] 09:44AM Proothi, Ernesto     Test Name Result Flag Reference   PET With CT Skull Base to Mid-Thigh (Report)     [facility];[address] [facility];;[place];[address]   11/25/2015 7323   11/25/2015 1220   N/A     PET/CT SCAN     INDICATION- History of bilateral breast carcinoma status post   mastectomy no recent therapy  MODIFIER-   PS      COMPARISON- Bone scan October 26, 2015, PET scan November 12, 2013     CELL TYPE- Invasive and in situ carcinoma     TECHNIQUE-  12 7 mCi F-18-FDG administered IV  Multiplanar attenuation   corrected and non attenuation corrected PET images are available for   interpretation, and contiguous, low dose, axial CT sections were   obtained from the skull base through the femurs following the   administration of oral contrast material (7 5 cc Omnipaque-240 in 300   cc water)  Intravenous contrast material was not utilized  Fasting serum glucose- 86 mg/dl     FINDINGS-      VISUALIZED BRAIN-     No acute abnormalities are seen  HEAD/NECK-     There is a physiologic distribution of FDG  No FDG avid cervical   adenopathy is seen  CT images- Unremarkable  CHEST-     No FDG avid soft tissue lesions are seen  CT images- Postsurgical changes again noted within the right lower   lobe  Multiple surgical clips are seen within the axillary regions   compatible with lymph node dissection  Small granuloma noted within   the left lower lobe  No evidence of consolidation, pulmonary nodule or   mass  ABDOMEN-     No FDG avid soft tissue lesions are seen  CT images- Low-density lesion within the right lobe of the liver   measuring 8 mm unchanged since prior study  PELVIS-    No FDG avid soft tissue lesions are seen  CT images- Uterus is absent  OSSEOUS STRUCTURES-   No FDG avid lesions are seen  CT images- No significant findings  IMPRESSION-     No evidence recurrence  Stable PET/CT scan  Transcribed on- [id number]     DIDIER Slater MD   Reading Radiologist- DIDIER Arias MD   Electronically Signed- DIDIER Arias MD   Released Date Time- 11/25/15 1507   ------------------------------------------------------------------------------   9336^SANJUANITA VALLEJO   9336^SANJUANITA VALLEJO     * Bone/Joint Whole Body 26Oct2015 07:39AM Prosusanna, Julia Pablo     Test Name Result Flag Reference   Bone/Joint Whole Body (Report)     [facility];[address];;[place];[address]   10/26/2015 0800   10/26/2015 1105   N/A     BONE SCAN WHOLE BODY     INDICATION- History of bilateral breast carcinoma     PREVIOUS FILM CORRELATION-  This study is correlated with PET CT   November 12, 2013     TECHNIQUE-  This study was performed following the intravenous   administration of 25 0 mCi Tc-99m labeled MDP  Delayed, anterior and   posterior whole body images were acquired, 2-3 hours after   radiopharmaceutical administration  FINDINGS-     Anterior posterior whole body images were acquired somewhat limited by   high resolution spot views the ribs sternum and multiple projections  Images showed normal distribution within the calvarium  There is mild   increased uptake to the left of midline within the mid cervical spine   suggesting degenerative change  The thoracic and lumbar spine were   unremarkable  There was normal distribution within the ribs, sternum   and humeri       The osseous structures of the pelvis showed symmetric uptake within the   SI joints  No abnormal uptake is seen within the pelvis  Femurs were   unremarkable  Mild uptake is seen within the knees and toes   bilaterally suggesting osteoarthritis There is no scintigraphic   evidence of osseous metastasis  IMPRESSION-     1  No scintigraphic evidence of osseous metastasis           Transcribed on- [id number]     DIDIER Bal MD   Reading Radiologist- DIDIER Hyatt MD   Electronically Signed- DIDIER Hyatt MD   Released Date Time- 10/26/15 1108   ------------------------------------------------------------------------------   9336^SANJUANITA VALLEJO   9336^SANJUANITA Baker     Future Appointments    Date/Time Provider Specialty Site   09/19/2016 01:00 PM Aditi Whatley DO Internal Medicine [facility]   11/03/2016 01:45 PM Conner Wilson MD Hematology Oncology [facility]   09/12/2016 01:00 PM Devota Phalen, [facility] Gynecological On[facility]     Signatures   Electronically signed by : Jagdeep Richard MD; May  3 2016  6:26AM EST                       (Author)

## 2018-01-17 NOTE — RESULT NOTES
Message   please get the urine culture        Verified Results  (1) URINALYSIS w URINE C/S REFLEX (will reflex a microscopy if leukocytes, occult blood, or nitrites are not within normal limits) 27Oct2017 05:08PM Ardath Jorge     Test Name Result Flag Reference   COLOR Yellow     CLARITY Clear     PH UA 6 5  4 5-8 0   LEUKOCYTE ESTERASE UA Trace A Negative   NITRITE UA Negative  Negative   PROTEIN UA Negative mg/dl  Negative   GLUCOSE UA Negative mg/dl  Negative   KETONES UA Negative mg/dl  Negative   UROBILINOGEN UA 0 2 E U /dl  0 2, 1 0 E U /dl   BILIRUBIN UA Negative  Negative   BLOOD UA Negative  Negative   SPECIFIC GRAVITY UA 1 010  1 003-1 030     (1) URINALYSIS w URINE C/S REFLEX (will reflex a microscopy if leukocytes, occult blood, or nitrites are not within normal limits) 27Oct2017 05:08PM Ardath Jorge     Test Name Result Flag Reference   BACTERIA None Seen /hpf  None Seen, Occasional   EPITHELIAL CELLS None Seen /hpf  None Seen, Occasional   HYALINE CASTS None Seen /lpf  None Seen   RBC UA None Seen /hpf  None Seen, 0-5   WBC UA None Seen /hpf  None Seen, 0-5, 5-55, 5-65       Plan  Need for prophylactic vaccination and inoculation against influenza    · Fluzone Quadrivalent 0 5 ML Intramuscular Suspension Prefilled Syringe  Urinary frequency    · (1) URINALYSIS w URINE C/S REFLEX (will reflex a microscopy if leukocytes, occult  blood, or nitrites are not within normal limits); Status:Active;  Requested [id number];

## 2018-01-22 VITALS
DIASTOLIC BLOOD PRESSURE: 80 MMHG | RESPIRATION RATE: 16 BRPM | SYSTOLIC BLOOD PRESSURE: 124 MMHG | WEIGHT: 155.03 LBS | OXYGEN SATURATION: 99 % | HEIGHT: 65 IN | BODY MASS INDEX: 25.83 KG/M2 | HEART RATE: 67 BPM

## 2018-01-22 VITALS
RESPIRATION RATE: 15 BRPM | SYSTOLIC BLOOD PRESSURE: 120 MMHG | DIASTOLIC BLOOD PRESSURE: 78 MMHG | BODY MASS INDEX: 25.83 KG/M2 | OXYGEN SATURATION: 99 % | WEIGHT: 155 LBS | TEMPERATURE: 97.3 F | HEART RATE: 75 BPM | HEIGHT: 65 IN

## 2018-02-12 ENCOUNTER — TRANSCRIBE ORDERS (OUTPATIENT)
Dept: ADMINISTRATIVE | Age: 58
End: 2018-02-12

## 2018-02-12 ENCOUNTER — APPOINTMENT (OUTPATIENT)
Dept: LAB | Age: 58
End: 2018-02-12
Payer: COMMERCIAL

## 2018-02-12 ENCOUNTER — HOSPITAL ENCOUNTER (OUTPATIENT)
Dept: RADIOLOGY | Age: 58
Discharge: HOME | End: 2018-02-12
Payer: COMMERCIAL

## 2018-02-12 DIAGNOSIS — C50.211 MALIGNANT NEOPLASM OF UPPER-INNER QUADRANT OF RIGHT FEMALE BREAST, UNSPECIFIED ESTROGEN RECEPTOR STATUS (HCC): ICD-10-CM

## 2018-02-12 DIAGNOSIS — Z15.02 GENETIC SUSCEPTIBILITY TO MALIGNANT NEOPLASM OF OVARY: ICD-10-CM

## 2018-02-12 DIAGNOSIS — M33.19 OTHER DERMATOMYOSITIS WITH OTHER ORGAN INVOLVEMENT (HCC): Primary | ICD-10-CM

## 2018-02-12 DIAGNOSIS — C50.211 MALIGNANT NEOPLASM OF UPPER-INNER QUADRANT OF RIGHT FEMALE BREAST, UNSPECIFIED ESTROGEN RECEPTOR STATUS (HCC): Primary | ICD-10-CM

## 2018-02-12 DIAGNOSIS — C50.919 MALIGNANT NEOPLASM OF FEMALE BREAST (HCC): ICD-10-CM

## 2018-02-12 DIAGNOSIS — M33.19 OTHER DERMATOMYOSITIS WITH OTHER ORGAN INVOLVEMENT (HCC): ICD-10-CM

## 2018-02-12 DIAGNOSIS — Z15.01 GENETIC SUSCEPTIBILITY TO MALIGNANT NEOPLASM OF BREAST: ICD-10-CM

## 2018-02-12 DIAGNOSIS — Z15.01 GENETIC SUSCEPTIBILITY TO MALIGNANT NEOPLASM OF BREAST: Primary | ICD-10-CM

## 2018-02-12 LAB
ALBUMIN SERPL BCP-MCNC: 4.4 G/DL (ref 3.5–5)
ALP SERPL-CCNC: 66 U/L (ref 46–116)
ALT SERPL W P-5'-P-CCNC: 22 U/L (ref 12–78)
ANION GAP SERPL CALCULATED.3IONS-SCNC: 5 MMOL/L (ref 4–13)
AST SERPL W P-5'-P-CCNC: 17 U/L (ref 5–45)
BASOPHILS # BLD AUTO: 0.01 THOUSANDS/ΜL (ref 0–0.1)
BASOPHILS NFR BLD AUTO: 0 % (ref 0–1)
BILIRUB SERPL-MCNC: 0.51 MG/DL (ref 0.2–1)
BUN SERPL-MCNC: 16 MG/DL (ref 5–25)
CALCIUM SERPL-MCNC: 9.3 MG/DL (ref 8.3–10.1)
CANCER AG125 SERPL-ACNC: 6 U/ML (ref 0–30)
CANCER AG27-29 SERPL-ACNC: 19.9 U/ML (ref 0–42.3)
CEA SERPL-MCNC: 0.9 NG/ML (ref 0–3)
CHLORIDE SERPL-SCNC: 101 MMOL/L (ref 100–108)
CO2 SERPL-SCNC: 33 MMOL/L (ref 21–32)
CREAT SERPL-MCNC: 0.7 MG/DL (ref 0.6–1.3)
EOSINOPHIL # BLD AUTO: 0.06 THOUSAND/ΜL (ref 0–0.61)
EOSINOPHIL NFR BLD AUTO: 1 % (ref 0–6)
ERYTHROCYTE [DISTWIDTH] IN BLOOD BY AUTOMATED COUNT: 13.4 % (ref 11.6–15.1)
GFR SERPL CREATININE-BSD FRML MDRD: 98 ML/MIN/1.73SQ M
GLUCOSE SERPL-MCNC: 89 MG/DL (ref 65–140)
HCT VFR BLD AUTO: 41.3 % (ref 34.8–46.1)
HGB BLD-MCNC: 13.8 G/DL (ref 11.5–15.4)
LYMPHOCYTES # BLD AUTO: 1.18 THOUSANDS/ΜL (ref 0.6–4.47)
LYMPHOCYTES NFR BLD AUTO: 23 % (ref 14–44)
MCH RBC QN AUTO: 30 PG (ref 26.8–34.3)
MCHC RBC AUTO-ENTMCNC: 33.4 G/DL (ref 31.4–37.4)
MCV RBC AUTO: 90 FL (ref 82–98)
MONOCYTES # BLD AUTO: 0.37 THOUSAND/ΜL (ref 0.17–1.22)
MONOCYTES NFR BLD AUTO: 7 % (ref 4–12)
NEUTROPHILS # BLD AUTO: 3.59 THOUSANDS/ΜL (ref 1.85–7.62)
NEUTS SEG NFR BLD AUTO: 69 % (ref 43–75)
NRBC BLD AUTO-RTO: 0 /100 WBCS
PLATELET # BLD AUTO: 237 THOUSANDS/UL (ref 149–390)
PMV BLD AUTO: 10 FL (ref 8.9–12.7)
POTASSIUM SERPL-SCNC: 3.9 MMOL/L (ref 3.5–5.3)
PROT SERPL-MCNC: 7.9 G/DL (ref 6.4–8.2)
RBC # BLD AUTO: 4.6 MILLION/UL (ref 3.81–5.12)
SODIUM SERPL-SCNC: 139 MMOL/L (ref 136–145)
WBC # BLD AUTO: 5.21 THOUSAND/UL (ref 4.31–10.16)

## 2018-02-12 PROCEDURE — 85025 COMPLETE CBC W/AUTO DIFF WBC: CPT

## 2018-02-12 PROCEDURE — 80053 COMPREHEN METABOLIC PANEL: CPT

## 2018-02-12 PROCEDURE — 82378 CARCINOEMBRYONIC ANTIGEN: CPT

## 2018-02-12 PROCEDURE — 71250 CT THORAX DX C-: CPT

## 2018-02-12 PROCEDURE — 36415 COLL VENOUS BLD VENIPUNCTURE: CPT

## 2018-02-12 PROCEDURE — 86304 IMMUNOASSAY TUMOR CA 125: CPT

## 2018-02-12 PROCEDURE — 86300 IMMUNOASSAY TUMOR CA 15-3: CPT

## 2018-03-02 ENCOUNTER — OFFICE VISIT (OUTPATIENT)
Dept: HEMATOLOGY ONCOLOGY | Facility: CLINIC | Age: 58
End: 2018-03-02
Payer: COMMERCIAL

## 2018-03-02 VITALS
SYSTOLIC BLOOD PRESSURE: 108 MMHG | DIASTOLIC BLOOD PRESSURE: 72 MMHG | BODY MASS INDEX: 26.36 KG/M2 | HEART RATE: 63 BPM | HEIGHT: 65 IN | TEMPERATURE: 97.8 F | RESPIRATION RATE: 15 BRPM | OXYGEN SATURATION: 99 % | WEIGHT: 158.2 LBS

## 2018-03-02 DIAGNOSIS — Z17.0 BILATERAL MALIGNANT NEOPLASM OF BREAST IN FEMALE, ESTROGEN RECEPTOR POSITIVE, UNSPECIFIED SITE OF BREAST (HCC): Primary | ICD-10-CM

## 2018-03-02 DIAGNOSIS — C50.911 BILATERAL MALIGNANT NEOPLASM OF BREAST IN FEMALE, ESTROGEN RECEPTOR POSITIVE, UNSPECIFIED SITE OF BREAST (HCC): Primary | ICD-10-CM

## 2018-03-02 DIAGNOSIS — Z15.09 BRCA2 GENE MUTATION POSITIVE: ICD-10-CM

## 2018-03-02 DIAGNOSIS — Z15.01 BRCA2 GENE MUTATION POSITIVE: ICD-10-CM

## 2018-03-02 DIAGNOSIS — R22.2 CHEST WALL MASS: ICD-10-CM

## 2018-03-02 DIAGNOSIS — C50.912 BILATERAL MALIGNANT NEOPLASM OF BREAST IN FEMALE, ESTROGEN RECEPTOR POSITIVE, UNSPECIFIED SITE OF BREAST (HCC): Primary | ICD-10-CM

## 2018-03-02 PROCEDURE — 99215 OFFICE O/P EST HI 40 MIN: CPT | Performed by: INTERNAL MEDICINE

## 2018-03-02 RX ORDER — HYDROXYCHLOROQUINE SULFATE 200 MG/1
1 TABLET, FILM COATED ORAL DAILY
COMMUNITY
Start: 2016-03-17 | End: 2018-12-20 | Stop reason: SDUPTHER

## 2018-03-02 NOTE — LETTER
March 3, 2018     Yvon Drew  [address] [facility] [address] [place] [address]    Patient: Kaitlynn Rojas   YOB: 1960   Date of Visit: 3/2/2018       Dear Dr Tayo Oliva: Thank you for referring Caroline Flores to me for evaluation  Below are my notes for this consultation  If you have questions, please do not hesitate to call me  I look forward to following your patient along with you  Sincerely,        Vivi Badillo MD        CC: MD Vivi Gómez MD  3/3/2018 12:13 AM  Sign at close encounter    HPI:  Patient is here with her   Kaitlynn Rojas is a 56 y o  female with  history of bilateral breast cancers , positive BRCA2 in her and in her family and history of breast and ovarian cancer in her family  She has a lump at the back of her left lower rib cage  It looks like this was there in 2014 by looking at present CT scan report and that could be some change  This was recently evaluated by her surgeon and needle biopsy came back old hematoma     Patient had breast reconstruction surgery and there is a scar in that area and could that have something to do with this is old hematoma in the muscle  Negative bone scan    In 2008 patient had hormone receptor positive, HER-2 negative, stage IIA breast cancer on the right and stage IA on the left  One lymph node was positive on the right    Patient had bilateral mastectomies and reconstruction surgeries  She received 4 cycles of Taxotere plus Cytoxan followed by 5 years of Femara that she finished in September 2013  She underwent ITZEL and BSO  Patient had a 9 mm nodule in the right lower lung and she had wedge resection and that came back negative for malignancy  Now she has a  stable 4 mm nodule at left lung base and this will be monitored  She had history of vitamin D deficiency and that was corrected    She has history of cutaneous melanoma in situ in between the breasts and that was surgically removed by Dr Cassidy Mclaughlin and she follows with him  She has history of dermatomyositis  She is on Plaquenil for skin and joint symptoms and symptoms have improved significantly  She has a stable-appearing benign right hepatic lesion  She has renal cyst  Bone density had shown osteopenia  Current Outpatient Prescriptions:     aspirin 81 MG tablet, Take 81 mg by mouth daily  , Disp: , Rfl:     Calcium-Magnesium-Vitamin D (CALCIUM 500 PO), Take 1,000 mg by mouth daily  , Disp: , Rfl:     Cholecalciferol (VITAMIN D) 2000 UNITS CAPS, Take 2,000 Units/day by mouth , Disp: , Rfl:     hydroxychloroquine (PLAQUENIL) 200 mg tablet, Take 1 tablet by mouth daily, Disp: , Rfl:     multivitamin (THERAGRAN) TABS, Take 1 tablet by mouth daily  , Disp: , Rfl:     Omega-3 Fatty Acids (FISH OIL) 1200 MG CAPS, Take 1,200 mg by mouth daily  , Disp: , Rfl:     vilazodone (VIIBRYD) 10 mg tablet, Take 10 mg by mouth daily with breakfast , Disp: , Rfl:     No Known Allergies    ROS:  03/02/18 Reviewed 13 systems:  Presently no headaches, seizures, dizziness, diplopia, dysphagia, hoarseness, chest pain, palpitations, shortness of breath, cough, hemoptysis, abdominal pain, nausea, vomiting, change in bowel habits, melena, hematuria, fever, chills, bleeding, bone pains, skin rash, weight loss, numbness, tiredness , weakness, claudication and gait problem  No frequent infections  No hot flashes  Not unusually sensitive to heat or cold  Patient is anxious  No swollen glands  No swelling of the ankles  No GYN symptoms  Other symptoms as in HPI        /72 (BP Location: Left arm, Cuff Size: Adult)   Pulse 63   Temp 97 8 °F (36 6 °C) (Tympanic)   Resp 15   Ht 5' 5" (1 651 m)   Wt 71 8 kg (158 lb 3 2 oz)   SpO2 99%   BMI 26 33 kg/m²      Physical Exam:  Alert, oriented, not in distress, no icterus, no oral thrush, no palpable neck mass, clear lung fields, regular heart rate, abdomen  soft and non tender, no palpable abdominal mass, no ascites, no edema of ankles, no calf tenderness, no focal neurological deficit, no skin rash, no palpable lymphadenopathy, good arterial pulses, no clubbing  Patient is anxious  5 cm x 4 cm left posterior chest wall mass  Performance status 1      IMAGING:  CT chest abdomen pelvis w contrast    (Results Pending)       Lab Results  Results for orders placed or performed in visit on 02/12/18   CBC and differential   Result Value Ref Range    WBC 5 21 4 31 - 10 16 Thousand/uL    RBC 4 60 3 81 - 5 12 Million/uL    Hemoglobin 13 8 11 5 - 15 4 g/dL    Hematocrit 41 3 34 8 - 46 1 %    MCV 90 82 - 98 fL    MCH 30 0 26 8 - 34 3 pg    MCHC 33 4 31 4 - 37 4 g/dL    RDW 13 4 11 6 - 15 1 %    MPV 10 0 8 9 - 12 7 fL    Platelets 066 475 - 048 Thousands/uL    nRBC 0 /100 WBCs    Neutrophils Relative 69 43 - 75 %    Lymphocytes Relative 23 14 - 44 %    Monocytes Relative 7 4 - 12 %    Eosinophils Relative 1 0 - 6 %    Basophils Relative 0 0 - 1 %    Neutrophils Absolute 3 59 1 85 - 7 62 Thousands/µL    Lymphocytes Absolute 1 18 0 60 - 4 47 Thousands/µL    Monocytes Absolute 0 37 0 17 - 1 22 Thousand/µL    Eosinophils Absolute 0 06 0 00 - 0 61 Thousand/µL    Basophils Absolute 0 01 0 00 - 0 10 Thousands/µL   Comprehensive metabolic panel   Result Value Ref Range    Sodium 139 136 - 145 mmol/L    Potassium 3 9 3 5 - 5 3 mmol/L    Chloride 101 100 - 108 mmol/L    CO2 33 (H) 21 - 32 mmol/L    Anion Gap 5 4 - 13 mmol/L    BUN 16 5 - 25 mg/dL    Creatinine 0 70 0 60 - 1 30 mg/dL    Glucose 89 65 - 140 mg/dL    Calcium 9 3 8 3 - 10 1 mg/dL    AST 17 5 - 45 U/L    ALT 22 12 - 78 U/L    Alkaline Phosphatase 66 46 - 116 U/L    Total Protein 7 9 6 4 - 8 2 g/dL    Albumin 4 4 3 5 - 5 0 g/dL    Total Bilirubin 0 51 0 20 - 1 00 mg/dL    eGFR 98 ml/min/1 73sq m   Cancer antigen 27 29   Result Value Ref Range    CA 27 29 19 9 0 0 - 42 3 U/mL   CEA   Result Value Ref Range    CEA 0 9 0 0 - 3 0 ng/mL      Result Value Ref Range     6 0 0 0 - 30 0 U/mL           Reviewed lab and CT scan of chest and recent needle biopsy of the left chest wall mass and discussed with patient  Assessment and plan:  Gisel Velasquez is a 56 y o  female with  history of bilateral breast cancers , positive BRCA2 in her and in her family and history of breast and ovarian cancer in her family  She has a lump at the back of her left lower rib cage  It looks like this was there in 2014 by looking at present CT scan report and that could be some change  This was recently evaluated by her surgeon and needle biopsy came back old hematoma     Patient had breast reconstruction surgery and there is a scar in that area and could that have something to do with this is old hematoma in the muscle  Negative bone scan    In 2008 patient had hormone receptor positive, HER-2 negative, stage IIA breast cancer on the right and stage IA on the left  One lymph node was positive on the right    Patient had bilateral mastectomies and reconstruction surgeries  She received 4 cycles of Taxotere plus Cytoxan followed by 5 years of Femara that she finished in September 2013  She underwent ITZEL and BSO  Patient had a 9 mm nodule in the right lower lung and she had wedge resection and that came back negative for malignancy  Now she has a  stable 4 mm nodule at left lung base and this will be monitored  She had history of vitamin D deficiency and that was corrected  She has history of cutaneous melanoma in situ in between the breasts and that was surgically removed by Dr Robyne Dandy and she follows with him  She has history of dermatomyositis  She is on Plaquenil for skin and joint symptoms and symptoms have improved significantly  She has a stable-appearing benign right hepatic lesion  She has renal cyst  Bone density had shown osteopenia  She remains in remission from breast cancers  She gets checked for other cancers    She will check with her surgeon if any more biopsy needed to be done on this mass otherwise she will have follow-up CT scans prior to next visit in 6 months  She knows to report any change in this mass and any other medical problems related to our speciality to our office and to her surgeon  Condition and plan discussed  Questions answered  Goal is cure from breast cancer and early detection of any other cancer  Patient voiced understanding and agreement in the discussion  Counseling / Coordination of Care   Greater than 50% of total time was spent with the patient and / or family counseling and / or coordination of care

## 2018-03-03 NOTE — PROGRESS NOTES
HPI:  Patient is here with her   Sascha Abdi is a 56 y o  female with  history of bilateral breast cancers , positive BRCA2 in her and in her family and history of breast and ovarian cancer in her family  She has a lump at the back of her left lower rib cage  It looks like this was there in 2014 by looking at present CT scan report and that could be some change  This was recently evaluated by her surgeon and needle biopsy came back old hematoma     Patient had breast reconstruction surgery and there is a scar in that area and could that have something to do with this is old hematoma in the muscle  Negative bone scan    In 2008 patient had hormone receptor positive, HER-2 negative, stage IIA breast cancer on the right and stage IA on the left  One lymph node was positive on the right    Patient had bilateral mastectomies and reconstruction surgeries  She received 4 cycles of Taxotere plus Cytoxan followed by 5 years of Femara that she finished in September 2013  She underwent ITZEL and BSO  Patient had a 9 mm nodule in the right lower lung and she had wedge resection and that came back negative for malignancy  Now she has a  stable 4 mm nodule at left lung base and this will be monitored  She had history of vitamin D deficiency and that was corrected  She has history of cutaneous melanoma in situ in between the breasts and that was surgically removed by Dr Aniceto Pimentel and she follows with him  She has history of dermatomyositis  She is on Plaquenil for skin and joint symptoms and symptoms have improved significantly  She has a stable-appearing benign right hepatic lesion  She has renal cyst  Bone density had shown osteopenia  Current Outpatient Prescriptions:     aspirin 81 MG tablet, Take 81 mg by mouth daily  , Disp: , Rfl:     Calcium-Magnesium-Vitamin D (CALCIUM 500 PO), Take 1,000 mg by mouth daily  , Disp: , Rfl:     Cholecalciferol (VITAMIN D) 2000 UNITS CAPS, Take 2,000 Units/day by mouth , Disp: , Rfl:     hydroxychloroquine (PLAQUENIL) 200 mg tablet, Take 1 tablet by mouth daily, Disp: , Rfl:     multivitamin (THERAGRAN) TABS, Take 1 tablet by mouth daily  , Disp: , Rfl:     Omega-3 Fatty Acids (FISH OIL) 1200 MG CAPS, Take 1,200 mg by mouth daily  , Disp: , Rfl:     vilazodone (VIIBRYD) 10 mg tablet, Take 10 mg by mouth daily with breakfast , Disp: , Rfl:     No Known Allergies    ROS:  03/02/18 Reviewed 13 systems:  Presently no headaches, seizures, dizziness, diplopia, dysphagia, hoarseness, chest pain, palpitations, shortness of breath, cough, hemoptysis, abdominal pain, nausea, vomiting, change in bowel habits, melena, hematuria, fever, chills, bleeding, bone pains, skin rash, weight loss, numbness, tiredness , weakness, claudication and gait problem  No frequent infections  No hot flashes  Not unusually sensitive to heat or cold  Patient is anxious  No swollen glands  No swelling of the ankles  No GYN symptoms  Other symptoms as in HPI  /72 (BP Location: Left arm, Cuff Size: Adult)   Pulse 63   Temp 97 8 °F (36 6 °C) (Tympanic)   Resp 15   Ht 5' 5" (1 651 m)   Wt 71 8 kg (158 lb 3 2 oz)   SpO2 99%   BMI 26 33 kg/m²     Physical Exam:  Alert, oriented, not in distress, no icterus, no oral thrush, no palpable neck mass, clear lung fields, regular heart rate, abdomen  soft and non tender, no palpable abdominal mass, no ascites, no edema of ankles, no calf tenderness, no focal neurological deficit, no skin rash, no palpable lymphadenopathy, good arterial pulses, no clubbing  Patient is anxious  5 cm x 4 cm left posterior chest wall mass  Performance status 1      IMAGING:  CT chest abdomen pelvis w contrast    (Results Pending)       Lab Results  Results for orders placed or performed in visit on 02/12/18   CBC and differential   Result Value Ref Range    WBC 5 21 4 31 - 10 16 Thousand/uL    RBC 4 60 3 81 - 5 12 Million/uL    Hemoglobin 13 8 11 5 - 15 4 g/dL Hematocrit 41 3 34 8 - 46 1 %    MCV 90 82 - 98 fL    MCH 30 0 26 8 - 34 3 pg    MCHC 33 4 31 4 - 37 4 g/dL    RDW 13 4 11 6 - 15 1 %    MPV 10 0 8 9 - 12 7 fL    Platelets 792 356 - 648 Thousands/uL    nRBC 0 /100 WBCs    Neutrophils Relative 69 43 - 75 %    Lymphocytes Relative 23 14 - 44 %    Monocytes Relative 7 4 - 12 %    Eosinophils Relative 1 0 - 6 %    Basophils Relative 0 0 - 1 %    Neutrophils Absolute 3 59 1 85 - 7 62 Thousands/µL    Lymphocytes Absolute 1 18 0 60 - 4 47 Thousands/µL    Monocytes Absolute 0 37 0 17 - 1 22 Thousand/µL    Eosinophils Absolute 0 06 0 00 - 0 61 Thousand/µL    Basophils Absolute 0 01 0 00 - 0 10 Thousands/µL   Comprehensive metabolic panel   Result Value Ref Range    Sodium 139 136 - 145 mmol/L    Potassium 3 9 3 5 - 5 3 mmol/L    Chloride 101 100 - 108 mmol/L    CO2 33 (H) 21 - 32 mmol/L    Anion Gap 5 4 - 13 mmol/L    BUN 16 5 - 25 mg/dL    Creatinine 0 70 0 60 - 1 30 mg/dL    Glucose 89 65 - 140 mg/dL    Calcium 9 3 8 3 - 10 1 mg/dL    AST 17 5 - 45 U/L    ALT 22 12 - 78 U/L    Alkaline Phosphatase 66 46 - 116 U/L    Total Protein 7 9 6 4 - 8 2 g/dL    Albumin 4 4 3 5 - 5 0 g/dL    Total Bilirubin 0 51 0 20 - 1 00 mg/dL    eGFR 98 ml/min/1 73sq m   Cancer antigen 27 29   Result Value Ref Range    CA 27 29 19 9 0 0 - 42 3 U/mL   CEA   Result Value Ref Range    CEA 0 9 0 0 - 3 0 ng/mL      Result Value Ref Range     6 0 0 0 - 30 0 U/mL           Reviewed lab and CT scan of chest and recent needle biopsy of the left chest wall mass and discussed with patient  Assessment and plan:  Lay Holman is a 56 y o  female with  history of bilateral breast cancers , positive BRCA2 in her and in her family and history of breast and ovarian cancer in her family  She has a lump at the back of her left lower rib cage  It looks like this was there in 2014 by looking at present CT scan report and that could be some change    This was recently evaluated by her surgeon and needle biopsy came back old hematoma     Patient had breast reconstruction surgery and there is a scar in that area and could that have something to do with this is old hematoma in the muscle  Negative bone scan    In 2008 patient had hormone receptor positive, HER-2 negative, stage IIA breast cancer on the right and stage IA on the left  One lymph node was positive on the right    Patient had bilateral mastectomies and reconstruction surgeries  She received 4 cycles of Taxotere plus Cytoxan followed by 5 years of Femara that she finished in September 2013  She underwent ITZEL and BSO  Patient had a 9 mm nodule in the right lower lung and she had wedge resection and that came back negative for malignancy  Now she has a  stable 4 mm nodule at left lung base and this will be monitored  She had history of vitamin D deficiency and that was corrected  She has history of cutaneous melanoma in situ in between the breasts and that was surgically removed by Dr Johan Keller and she follows with him  She has history of dermatomyositis  She is on Plaquenil for skin and joint symptoms and symptoms have improved significantly  She has a stable-appearing benign right hepatic lesion  She has renal cyst  Bone density had shown osteopenia  She remains in remission from breast cancers  She gets checked for other cancers  She will check with her surgeon if any more biopsy needed to be done on this mass otherwise she will have follow-up CT scans prior to next visit in 6 months  She knows to report any change in this mass and any other medical problems related to our speciality to our office and to her surgeon  Condition and plan discussed  Questions answered  Goal is cure from breast cancer and early detection of any other cancer  Patient voiced understanding and agreement in the discussion     Counseling / Coordination of Care   Greater than 50% of total time was spent with the patient and / or family counseling and / or coordination of care

## 2018-03-19 ENCOUNTER — OFFICE VISIT (OUTPATIENT)
Dept: GYNECOLOGIC ONCOLOGY | Facility: CLINIC | Age: 58
End: 2018-03-19
Payer: COMMERCIAL

## 2018-03-19 VITALS
RESPIRATION RATE: 14 BRPM | HEART RATE: 64 BPM | DIASTOLIC BLOOD PRESSURE: 66 MMHG | WEIGHT: 156 LBS | HEIGHT: 65 IN | SYSTOLIC BLOOD PRESSURE: 112 MMHG | BODY MASS INDEX: 25.99 KG/M2

## 2018-03-19 DIAGNOSIS — Z17.0 MALIGNANT NEOPLASM OF CENTRAL PORTION OF BOTH BREASTS IN FEMALE, ESTROGEN RECEPTOR POSITIVE (HCC): ICD-10-CM

## 2018-03-19 DIAGNOSIS — C50.111 MALIGNANT NEOPLASM OF CENTRAL PORTION OF BOTH BREASTS IN FEMALE, ESTROGEN RECEPTOR POSITIVE (HCC): ICD-10-CM

## 2018-03-19 DIAGNOSIS — Z15.01 BRCA2 GENE MUTATION POSITIVE: Primary | ICD-10-CM

## 2018-03-19 DIAGNOSIS — Z15.09 BRCA2 GENE MUTATION POSITIVE: Primary | ICD-10-CM

## 2018-03-19 DIAGNOSIS — C50.112 MALIGNANT NEOPLASM OF CENTRAL PORTION OF BOTH BREASTS IN FEMALE, ESTROGEN RECEPTOR POSITIVE (HCC): ICD-10-CM

## 2018-03-19 PROBLEM — M33.13 DERMATOMYOSITIS (HCC): Status: ACTIVE | Noted: 2018-03-19

## 2018-03-19 PROBLEM — M33.90 DERMATOMYOSITIS (HCC): Status: ACTIVE | Noted: 2018-03-19

## 2018-03-19 PROCEDURE — 99214 OFFICE O/P EST MOD 30 MIN: CPT | Performed by: PHYSICIAN ASSISTANT

## 2018-03-19 NOTE — ASSESSMENT & PLAN NOTE
Clinically without evidence of peritoneal cancer   normal  Return to the office in 6 months for continued BRCA surveillance   will be performed prior to that visit

## 2018-03-19 NOTE — PROGRESS NOTES
Assessment/Plan:    Problem List Items Addressed This Visit        Other    Bilateral malignant neoplasm of breast in female, estrogen receptor positive ([address] )    BRCA2 gene mutation positive - Primary     Clinically without evidence of peritoneal cancer   normal  Return to the office in 6 months for continued BRCA surveillance   will be performed prior to that visit  Relevant Orders                CHIEF COMPLAINT:   BRCA surveillance    Problem:  1  BRCA 2 mutation  2  Bilateral breast cancer, stage IIA on left and stage IA on right, ER/CA positive, HER2 negative  3  Melanoma in situ of the chest wall   4  Dermatomyositis      Previous therapy:     Bilateral malignant neoplasm of breast in female, estrogen receptor positive ([address] )     Initial Diagnosis     Bilateral malignant neoplasm of breast in female, estrogen receptor positive [facility])          Surgery     Double mastectomy, reconstructive surgery          Chemotherapy     Chemotherapy with Taxotere and Cytoxan for 4 cycles followed by Femara from 2008 thru 2013  2  [facility], BSO    Patient ID: Gisel Velasquez is a 56 y o  female  who has no new complaints today  No vaginal bleeding, abdominal/pelvic pain  Normal bowel and bladder function  No interval change in medical history since last visit  Quality of life is good  The following portions of the patient's history were reviewed and updated as appropriate: allergies, current medications, past medical history, past surgical history and problem list     Review of Systems   Constitutional: Negative  HENT: Negative  Eyes: Negative  Respiratory: Negative  Cardiovascular: Negative  Gastrointestinal: Negative  Genitourinary: Negative  Musculoskeletal: Negative  Skin: Negative  Neurological: Negative  Psychiatric/Behavioral: Negative          Current Outpatient Prescriptions   Medication Sig Dispense Refill    aspirin 81 MG tablet Take 81 mg by mouth daily       Calcium-Magnesium-Vitamin D (CALCIUM 500 PO) Take 1,000 mg by mouth daily   Cholecalciferol (VITAMIN D) 2000 UNITS CAPS Take 2,000 Units/day by mouth   hydroxychloroquine (PLAQUENIL) 200 mg tablet Take 1 tablet by mouth daily      multivitamin (THERAGRAN) TABS Take 1 tablet by mouth daily   Omega-3 Fatty Acids (FISH OIL) 1200 MG CAPS Take 1,200 mg by mouth daily   vilazodone (VIIBRYD) 10 mg tablet Take 10 mg by mouth daily with breakfast        No current facility-administered medications for this visit  Objective: There were no vitals taken for this visit  There is no height or weight on file to calculate BMI  There is no height or weight on file to calculate BSA  Physical Exam   Constitutional: She is oriented to person, place, and time  She appears well-developed and well-nourished  HENT:   Head: Normocephalic and atraumatic  Neck: Normal range of motion  Neck supple  No thyromegaly present  Pulmonary/Chest: Effort normal    Abdominal: Soft  She exhibits no distension and no mass  There is no rebound  Genitourinary:   Genitourinary Comments: The external female genitalia is normal  The bartholin's, uretheral and skenes glands are normal  The urethral meatus is normal  Speculum exam reveals a grossly normal vagina  No masses, lesions or bleeding  Manual exam notes a surgical absent cervix, uterus and adnexal structures  No masses or fullness  Musculoskeletal: Normal range of motion  She exhibits no edema  Lymphadenopathy:     She has no cervical adenopathy  Neurological: She is alert and oriented to person, place, and time  Skin: Skin is warm and dry  No rash noted  Psychiatric: She has a normal mood and affect  Her behavior is normal    Vitals reviewed        Lab Results   Component Value Date     6 0 02/12/2018

## 2018-05-18 DIAGNOSIS — F32.A DEPRESSION, UNSPECIFIED DEPRESSION TYPE: Primary | ICD-10-CM

## 2018-05-20 RX ORDER — VILAZODONE HYDROCHLORIDE 10 MG/1
TABLET ORAL
Qty: 90 TABLET | Refills: 1 | Status: SHIPPED | OUTPATIENT
Start: 2018-05-20 | End: 2018-11-03 | Stop reason: SDUPTHER

## 2018-06-08 ENCOUNTER — TRANSCRIBE ORDERS (OUTPATIENT)
Dept: ADMINISTRATIVE | Age: 58
End: 2018-06-08

## 2018-06-08 ENCOUNTER — APPOINTMENT (OUTPATIENT)
Dept: LAB | Age: 58
End: 2018-06-08
Payer: COMMERCIAL

## 2018-06-08 DIAGNOSIS — E55.9 AVITAMINOSIS D: ICD-10-CM

## 2018-06-08 DIAGNOSIS — E66.3 SEVERELY OVERWEIGHT: ICD-10-CM

## 2018-06-08 DIAGNOSIS — E55.9 AVITAMINOSIS D: Primary | ICD-10-CM

## 2018-06-08 LAB
25(OH)D3 SERPL-MCNC: 49.3 NG/ML (ref 30–100)
ALBUMIN SERPL BCP-MCNC: 4.2 G/DL (ref 3.5–5)
ALP SERPL-CCNC: 62 U/L (ref 46–116)
ALT SERPL W P-5'-P-CCNC: 20 U/L (ref 12–78)
ANION GAP SERPL CALCULATED.3IONS-SCNC: 7 MMOL/L (ref 4–13)
AST SERPL W P-5'-P-CCNC: 16 U/L (ref 5–45)
BILIRUB SERPL-MCNC: 0.5 MG/DL (ref 0.2–1)
BUN SERPL-MCNC: 16 MG/DL (ref 5–25)
CALCIUM SERPL-MCNC: 9.2 MG/DL (ref 8.3–10.1)
CHLORIDE SERPL-SCNC: 103 MMOL/L (ref 100–108)
CHOLEST SERPL-MCNC: 135 MG/DL (ref 50–200)
CO2 SERPL-SCNC: 31 MMOL/L (ref 21–32)
CREAT SERPL-MCNC: 0.8 MG/DL (ref 0.6–1.3)
GFR SERPL CREATININE-BSD FRML MDRD: 83 ML/MIN/1.73SQ M
GLUCOSE P FAST SERPL-MCNC: 82 MG/DL (ref 65–99)
HDLC SERPL-MCNC: 69 MG/DL (ref 40–60)
LDLC SERPL CALC-MCNC: 56 MG/DL (ref 0–100)
NONHDLC SERPL-MCNC: 66 MG/DL
POTASSIUM SERPL-SCNC: 3.8 MMOL/L (ref 3.5–5.3)
PROT SERPL-MCNC: 7.5 G/DL (ref 6.4–8.2)
SODIUM SERPL-SCNC: 141 MMOL/L (ref 136–145)
TRIGL SERPL-MCNC: 50 MG/DL

## 2018-06-08 PROCEDURE — 82306 VITAMIN D 25 HYDROXY: CPT

## 2018-06-08 PROCEDURE — 36415 COLL VENOUS BLD VENIPUNCTURE: CPT

## 2018-06-08 PROCEDURE — 80061 LIPID PANEL: CPT

## 2018-06-08 PROCEDURE — 80053 COMPREHEN METABOLIC PANEL: CPT

## 2018-06-14 ENCOUNTER — OFFICE VISIT (OUTPATIENT)
Dept: INTERNAL MEDICINE CLINIC | Facility: CLINIC | Age: 58
End: 2018-06-14
Payer: COMMERCIAL

## 2018-06-14 VITALS
DIASTOLIC BLOOD PRESSURE: 74 MMHG | HEIGHT: 65 IN | OXYGEN SATURATION: 100 % | RESPIRATION RATE: 16 BRPM | BODY MASS INDEX: 25.83 KG/M2 | SYSTOLIC BLOOD PRESSURE: 119 MMHG | WEIGHT: 155 LBS | HEART RATE: 73 BPM

## 2018-06-14 DIAGNOSIS — F33.42 RECURRENT MAJOR DEPRESSIVE DISORDER, IN FULL REMISSION (HCC): ICD-10-CM

## 2018-06-14 DIAGNOSIS — Z13.6 SCREENING FOR CARDIOVASCULAR CONDITION: ICD-10-CM

## 2018-06-14 DIAGNOSIS — Z17.0 MALIGNANT NEOPLASM OF CENTRAL PORTION OF BOTH BREASTS IN FEMALE, ESTROGEN RECEPTOR POSITIVE (HCC): ICD-10-CM

## 2018-06-14 DIAGNOSIS — C50.111 MALIGNANT NEOPLASM OF CENTRAL PORTION OF BOTH BREASTS IN FEMALE, ESTROGEN RECEPTOR POSITIVE (HCC): ICD-10-CM

## 2018-06-14 DIAGNOSIS — Z11.59 ENCOUNTER FOR HEPATITIS C SCREENING TEST FOR LOW RISK PATIENT: Primary | ICD-10-CM

## 2018-06-14 DIAGNOSIS — C50.112 MALIGNANT NEOPLASM OF CENTRAL PORTION OF BOTH BREASTS IN FEMALE, ESTROGEN RECEPTOR POSITIVE (HCC): ICD-10-CM

## 2018-06-14 DIAGNOSIS — Z15.09 BRCA2 GENE MUTATION POSITIVE: ICD-10-CM

## 2018-06-14 DIAGNOSIS — M33.90 DERMATOMYOSITIS (HCC): ICD-10-CM

## 2018-06-14 DIAGNOSIS — Z15.01 BRCA2 GENE MUTATION POSITIVE: ICD-10-CM

## 2018-06-14 DIAGNOSIS — E55.9 VITAMIN D DEFICIENCY: ICD-10-CM

## 2018-06-14 PROCEDURE — 99214 OFFICE O/P EST MOD 30 MIN: CPT | Performed by: INTERNAL MEDICINE

## 2018-06-14 NOTE — PROGRESS NOTES
Assessment/Plan:           Problem List Items Addressed This Visit     Bilateral malignant neoplasm of breast in female, estrogen receptor positive ([facility] [address] )     Currently stable doing very well she is being monitored routinely by Hematology          BRCA2 gene mutation positive     Currently she is stable she sees Hematology routinely she does report me her daughter recently had a positive mammogram is undergoing diagnostic testing, her daughter is positive also for the bracket 2 gene  Dermatomyositis ([facility] [address] )     Currently stable doing well under the care of Dermatology she is on Plaquenil she has had her eye examination that was unrevealing  She has nose mild increase of the rash which she will discuss further with Dermatology  Encounter for hepatitis C screening test for low risk patient - Primary    Relevant Orders    Hepatitis C antibody    Vitamin D deficiency     Continue with current dose of vitamin-D will check a vitamin-D level         Relevant Orders    Vitamin D 25 hydroxy    Major depressive disorder in full remission ([facility] [address] )     Currently stable doing well continue with the SSRI, no suicidal ideation we will continue monitor  Return to office 6 months  call if any problems  Subjective:      Patient ID: Dominique Carson is a 56 y o  female  HPI 49-year old female coming in for a follow up office visit regarding vitamin-D deficiency, dermatomyositis, braca 2 gene mutation positive, depression, breast cancer, encounter for hepatitis C screening low risk patient; The patient reports me compliant taking medications without untoward side effects the  The patient is here to review his medical condition, update me on the medical condition and the patient reports me no hospitalizations and no ER visits  She reports me her depression is doing well no suicidal ideation  She reports me following healthy/balance diet, routine exercising      The following portions of the patient's history were reviewed and updated as appropriate: allergies, current medications, past family history, past social history, past surgical history and problem list     Review of Systems   Constitutional: Negative for activity change, appetite change and unexpected weight change  HENT: Negative for dental problem  Eyes: Negative for visual disturbance  Respiratory: Negative for cough and shortness of breath  Cardiovascular: Negative for chest pain  Gastrointestinal: Negative for abdominal pain, diarrhea, nausea and vomiting  Neurological: Negative for dizziness, light-headedness and headaches  Hematological: Negative for adenopathy  Psychiatric/Behavioral: Negative for self-injury and suicidal ideas  Objective:      /74 (BP Location: Right arm, Patient Position: Sitting, Cuff Size: Standard)   Pulse 73   Resp 16   Ht 5' 5" (1 651 m)   Wt 70 3 kg (155 lb)   SpO2 100%   BMI 25 79 kg/m²                     No Follow-up on file  No Known Allergies    Past Medical History:   Diagnosis Date    Benign neoplasm of lung     Right lung lobe benign  Removed 2010   Breast cancer ([facility] [address] )     Dermatomyositis [facility])      Past Surgical History:   Procedure Laterality Date    APPENDECTOMY      2011    COLONOSCOPY N/A 1/26/2016    Procedure: COLONOSCOPY;  Surgeon: Linette Hayes MD;  [facility]; Service:     HYSTERECTOMY      2007    MASTECTOMY      reconstruction 2008    OOPHORECTOMY      PELVIC LAPAROSCOPY       Current Outpatient Prescriptions on File Prior to Visit   Medication Sig Dispense Refill    aspirin 81 MG tablet Take 81 mg by mouth daily   Calcium-Magnesium-Vitamin D (CALCIUM 500 PO) Take 1,000 mg by mouth daily   Cholecalciferol (VITAMIN D) 2000 UNITS CAPS Take 2,000 Units/day by mouth   hydroxychloroquine (PLAQUENIL) 200 mg tablet Take 1 tablet by mouth daily      multivitamin (THERAGRAN) TABS Take 1 tablet by mouth daily        Omega-3 Fatty Acids (FISH OIL) 1200 MG CAPS Take 1,200 mg by mouth daily   VIIBRYD 10 MG tablet TAKE 1 TABLET DAILY 90 tablet 1     No current facility-administered medications on file prior to visit  Family History   Problem Relation Age of Onset    No Known Problems Mother     No Known Problems Father      Social History     Social History    Marital status: /Civil Union     Spouse name: N/A    Number of children: N/A    Years of education: N/A     Occupational History    Not on file       Social History Main Topics    Smoking status: Never Smoker    Smokeless tobacco: Never Used    Alcohol use No    Drug use: No    Sexual activity: Not on file     Other Topics Concern    Not on file     Social History Narrative    No narrative on file     Vitals:    06/14/18 1326   BP: 119/74   BP Location: Right arm   Patient Position: Sitting   Cuff Size: Standard   Pulse: 73   Resp: 16   SpO2: 100%   Weight: 70 3 kg (155 lb)   Height: 5' 5" (1 651 m)     Results for orders placed or performed in visit on 06/08/18   Lipid panel   Result Value Ref Range    Cholesterol 135 50 - 200 mg/dL    Triglycerides 50 <=150 mg/dL    HDL, Direct 69 (H) 40 - 60 mg/dL    LDL Calculated 56 0 - 100 mg/dL    Non-HDL-Chol (CHOL-HDL) 66 mg/dl   Vitamin D 25 hydroxy   Result Value Ref Range    Vit D, 25-Hydroxy 49 3 30 0 - 100 0 ng/mL   Comprehensive metabolic panel   Result Value Ref Range    Sodium 141 136 - 145 mmol/L    Potassium 3 8 3 5 - 5 3 mmol/L    Chloride 103 100 - 108 mmol/L    CO2 31 21 - 32 mmol/L    Anion Gap 7 4 - 13 mmol/L    BUN 16 5 - 25 mg/dL    Creatinine 0 80 0 60 - 1 30 mg/dL    Glucose, Fasting 82 65 - 99 mg/dL    Calcium 9 2 8 3 - 10 1 mg/dL    AST 16 5 - 45 U/L    ALT 20 12 - 78 U/L    Alkaline Phosphatase 62 46 - 116 U/L    Total Protein 7 5 6 4 - 8 2 g/dL    Albumin 4 2 3 5 - 5 0 g/dL    Total Bilirubin 0 50 0 20 - 1 00 mg/dL    eGFR 83 ml/min/1 73sq m     Weight (last 2 days)     None        Body mass index is 25 79 kg/m²  BP      Temp      Pulse     Resp      SpO2        Vitals:    06/14/18 1326   Weight: 70 3 kg (155 lb)     Vitals:    06/14/18 1326   Weight: 70 3 kg (155 lb)          Physical Exam   Constitutional: She appears well-developed and well-nourished  HENT:   Head: Normocephalic  Mouth/Throat: Oropharynx is clear and moist    Eyes: Conjunctivae are normal  Pupils are equal, round, and reactive to light  Right eye exhibits no discharge  Left eye exhibits no discharge  No scleral icterus  Neck: Neck supple  Cardiovascular: Normal rate, regular rhythm, normal heart sounds and intact distal pulses  Exam reveals no gallop and no friction rub  No murmur heard  Pulmonary/Chest: Breath sounds normal  No respiratory distress  She has no wheezes  She has no rales  Abdominal: Soft  Bowel sounds are normal  She exhibits no distension and no mass  There is no tenderness  There is no rebound and no guarding  Musculoskeletal: She exhibits no edema or deformity  Lymphadenopathy:     She has no cervical adenopathy  Neurological: She is alert   Coordination normal

## 2018-06-17 PROBLEM — F32.5 MAJOR DEPRESSIVE DISORDER IN FULL REMISSION (HCC): Status: ACTIVE | Noted: 2018-06-17

## 2018-06-17 PROBLEM — Z11.59 ENCOUNTER FOR HEPATITIS C SCREENING TEST FOR LOW RISK PATIENT: Status: ACTIVE | Noted: 2018-06-17

## 2018-06-17 PROBLEM — E55.9 VITAMIN D DEFICIENCY: Status: ACTIVE | Noted: 2018-06-17

## 2018-06-17 PROBLEM — Z13.6 SCREENING FOR CARDIOVASCULAR CONDITION: Status: ACTIVE | Noted: 2018-06-17

## 2018-06-17 NOTE — ASSESSMENT & PLAN NOTE
Currently stable doing well under the care of Dermatology she is on Plaquenil she has had her eye examination that was unrevealing  She has nose mild increase of the rash which she will discuss further with Dermatology

## 2018-06-17 NOTE — ASSESSMENT & PLAN NOTE
Currently she is stable she sees Hematology routinely she does report me her daughter recently had a positive mammogram is undergoing diagnostic testing, her daughter is positive also for the bracket 2 gene

## 2018-08-13 ENCOUNTER — TRANSCRIBE ORDERS (OUTPATIENT)
Dept: ADMINISTRATIVE | Age: 58
End: 2018-08-13

## 2018-08-13 ENCOUNTER — APPOINTMENT (OUTPATIENT)
Dept: LAB | Age: 58
End: 2018-08-13
Payer: COMMERCIAL

## 2018-08-13 DIAGNOSIS — Z17.0 BILATERAL MALIGNANT NEOPLASM OF BREAST IN FEMALE, ESTROGEN RECEPTOR POSITIVE, UNSPECIFIED SITE OF BREAST (HCC): ICD-10-CM

## 2018-08-13 DIAGNOSIS — C50.912 BILATERAL MALIGNANT NEOPLASM OF BREAST IN FEMALE, ESTROGEN RECEPTOR POSITIVE, UNSPECIFIED SITE OF BREAST (HCC): ICD-10-CM

## 2018-08-13 DIAGNOSIS — C50.911 BILATERAL MALIGNANT NEOPLASM OF BREAST IN FEMALE, ESTROGEN RECEPTOR POSITIVE, UNSPECIFIED SITE OF BREAST (HCC): ICD-10-CM

## 2018-08-13 LAB
ALBUMIN SERPL BCP-MCNC: 3.8 G/DL (ref 3.5–5)
ALP SERPL-CCNC: 52 U/L (ref 46–116)
ALT SERPL W P-5'-P-CCNC: 18 U/L (ref 12–78)
ANION GAP SERPL CALCULATED.3IONS-SCNC: 5 MMOL/L (ref 4–13)
AST SERPL W P-5'-P-CCNC: 17 U/L (ref 5–45)
BASOPHILS # BLD AUTO: 0.03 THOUSANDS/ΜL (ref 0–0.1)
BASOPHILS NFR BLD AUTO: 1 % (ref 0–1)
BILIRUB SERPL-MCNC: 0.49 MG/DL (ref 0.2–1)
BUN SERPL-MCNC: 18 MG/DL (ref 5–25)
CALCIUM SERPL-MCNC: 8.6 MG/DL (ref 8.3–10.1)
CANCER AG27-29 SERPL-ACNC: 15.4 U/ML (ref 0–42.3)
CEA SERPL-MCNC: 1 NG/ML (ref 0–3)
CHLORIDE SERPL-SCNC: 105 MMOL/L (ref 100–108)
CO2 SERPL-SCNC: 32 MMOL/L (ref 21–32)
CREAT SERPL-MCNC: 0.75 MG/DL (ref 0.6–1.3)
EOSINOPHIL # BLD AUTO: 0.08 THOUSAND/ΜL (ref 0–0.61)
EOSINOPHIL NFR BLD AUTO: 2 % (ref 0–6)
ERYTHROCYTE [DISTWIDTH] IN BLOOD BY AUTOMATED COUNT: 13 % (ref 11.6–15.1)
GFR SERPL CREATININE-BSD FRML MDRD: 90 ML/MIN/1.73SQ M
GLUCOSE P FAST SERPL-MCNC: 80 MG/DL (ref 65–99)
HCT VFR BLD AUTO: 39.9 % (ref 34.8–46.1)
HGB BLD-MCNC: 12.9 G/DL (ref 11.5–15.4)
IMM GRANULOCYTES # BLD AUTO: 0.01 THOUSAND/UL (ref 0–0.2)
IMM GRANULOCYTES NFR BLD AUTO: 0 % (ref 0–2)
LYMPHOCYTES # BLD AUTO: 1.24 THOUSANDS/ΜL (ref 0.6–4.47)
LYMPHOCYTES NFR BLD AUTO: 30 % (ref 14–44)
MCH RBC QN AUTO: 30.1 PG (ref 26.8–34.3)
MCHC RBC AUTO-ENTMCNC: 32.3 G/DL (ref 31.4–37.4)
MCV RBC AUTO: 93 FL (ref 82–98)
MONOCYTES # BLD AUTO: 0.44 THOUSAND/ΜL (ref 0.17–1.22)
MONOCYTES NFR BLD AUTO: 11 % (ref 4–12)
NEUTROPHILS # BLD AUTO: 2.38 THOUSANDS/ΜL (ref 1.85–7.62)
NEUTS SEG NFR BLD AUTO: 56 % (ref 43–75)
NRBC BLD AUTO-RTO: 0 /100 WBCS
PLATELET # BLD AUTO: 215 THOUSANDS/UL (ref 149–390)
PMV BLD AUTO: 10.1 FL (ref 8.9–12.7)
POTASSIUM SERPL-SCNC: 3.8 MMOL/L (ref 3.5–5.3)
PROT SERPL-MCNC: 6.9 G/DL (ref 6.4–8.2)
RBC # BLD AUTO: 4.29 MILLION/UL (ref 3.81–5.12)
SODIUM SERPL-SCNC: 142 MMOL/L (ref 136–145)
WBC # BLD AUTO: 4.18 THOUSAND/UL (ref 4.31–10.16)

## 2018-08-13 PROCEDURE — 36415 COLL VENOUS BLD VENIPUNCTURE: CPT

## 2018-08-13 PROCEDURE — 85025 COMPLETE CBC W/AUTO DIFF WBC: CPT

## 2018-08-13 PROCEDURE — 82378 CARCINOEMBRYONIC ANTIGEN: CPT

## 2018-08-13 PROCEDURE — 80053 COMPREHEN METABOLIC PANEL: CPT

## 2018-08-13 PROCEDURE — 86300 IMMUNOASSAY TUMOR CA 15-3: CPT

## 2018-08-20 ENCOUNTER — HOSPITAL ENCOUNTER (OUTPATIENT)
Dept: RADIOLOGY | Age: 58
Discharge: HOME | End: 2018-08-20
Payer: COMMERCIAL

## 2018-08-20 DIAGNOSIS — C50.912 BILATERAL MALIGNANT NEOPLASM OF BREAST IN FEMALE, ESTROGEN RECEPTOR POSITIVE, UNSPECIFIED SITE OF BREAST (HCC): ICD-10-CM

## 2018-08-20 DIAGNOSIS — Z17.0 BILATERAL MALIGNANT NEOPLASM OF BREAST IN FEMALE, ESTROGEN RECEPTOR POSITIVE, UNSPECIFIED SITE OF BREAST (HCC): ICD-10-CM

## 2018-08-20 DIAGNOSIS — C50.911 BILATERAL MALIGNANT NEOPLASM OF BREAST IN FEMALE, ESTROGEN RECEPTOR POSITIVE, UNSPECIFIED SITE OF BREAST (HCC): ICD-10-CM

## 2018-08-20 PROCEDURE — 74177 CT ABD & PELVIS W/CONTRAST: CPT

## 2018-08-20 PROCEDURE — 71260 CT THORAX DX C+: CPT

## 2018-08-20 RX ADMIN — IOHEXOL 100 ML: 350 INJECTION, SOLUTION INTRAVENOUS at 14:10

## 2018-09-11 ENCOUNTER — OFFICE VISIT (OUTPATIENT)
Dept: HEMATOLOGY ONCOLOGY | Facility: CLINIC | Age: 58
End: 2018-09-11
Payer: COMMERCIAL

## 2018-09-11 VITALS
TEMPERATURE: 96.5 F | WEIGHT: 155.4 LBS | DIASTOLIC BLOOD PRESSURE: 70 MMHG | SYSTOLIC BLOOD PRESSURE: 100 MMHG | OXYGEN SATURATION: 98 % | HEART RATE: 61 BPM | HEIGHT: 65 IN | BODY MASS INDEX: 25.89 KG/M2 | RESPIRATION RATE: 16 BRPM

## 2018-09-11 DIAGNOSIS — C50.112 MALIGNANT NEOPLASM OF CENTRAL PORTION OF BOTH BREASTS IN FEMALE, ESTROGEN RECEPTOR POSITIVE (HCC): Primary | ICD-10-CM

## 2018-09-11 DIAGNOSIS — R22.2 CHEST WALL MASS: ICD-10-CM

## 2018-09-11 DIAGNOSIS — R91.1 LUNG NODULE: ICD-10-CM

## 2018-09-11 DIAGNOSIS — Z15.09 BRCA2 GENE MUTATION POSITIVE: ICD-10-CM

## 2018-09-11 DIAGNOSIS — Z17.0 MALIGNANT NEOPLASM OF CENTRAL PORTION OF BOTH BREASTS IN FEMALE, ESTROGEN RECEPTOR POSITIVE (HCC): Primary | ICD-10-CM

## 2018-09-11 DIAGNOSIS — C50.111 MALIGNANT NEOPLASM OF CENTRAL PORTION OF BOTH BREASTS IN FEMALE, ESTROGEN RECEPTOR POSITIVE (HCC): Primary | ICD-10-CM

## 2018-09-11 DIAGNOSIS — Z15.01 BRCA2 GENE MUTATION POSITIVE: ICD-10-CM

## 2018-09-11 PROCEDURE — 99213 OFFICE O/P EST LOW 20 MIN: CPT | Performed by: PHYSICIAN ASSISTANT

## 2018-09-11 NOTE — PROGRESS NOTES
Hematology/Oncology Outpatient Follow-up  Dominique Carson 56 y o  female 1960 [id number]    Date:  9/11/2018      Assessment and Plan:  1  Malignant neoplasm of central portion of both breasts in female, estrogen receptor positive ([address] ), 2  Chest wall mass, 3  BRCA2 gene mutation positive  56year old female with history of bilateral breast cancers, BRCA2 mutation positive  She had bilateral mastectomies followed by ITZEL BSO  She continues to follow with GYN onc and is up to date with this  Her family members have been tested for BRCA  CBC shows slight decrease in total WBC, differential is normal  Continue to monitor, no further work up is needed  - CT chest abdomen pelvis w contrast; Future  - CBC and differential  - Comprehensive metabolic panel  - Cancer antigen 27 29  - CEA    4  Lung nodule  Due to history of BRCA, will continue to monitor patient's lung nodule  - CT chest abdomen pelvis w contrast; Future    Follow up in 6 months  HPI:  56year old female with history of bilateral breast cancers  She is BRCA2 positive  2008 patient had hormone receptor positive, HER-2 negative, stage IIA breast cancer on the right and stage IA on the left  One lymph node was positive on the right    Patient had bilateral mastectomies and reconstruction surgeries  She received 4 cycles of Taxotere plus Cytoxan followed by 5 years of Femara that she finished in September 2013  She underwent ITZEL and BSO  9 mm nodule in the right lower lung and she had wedge resection and that came back negative for malignancy  Left lower lobe pulmonary nodule 5 mm x 3 mm has been noted to be stable  Also, history of melanoma in situ in between the breasts  This was surgically removed by Dr Kina Inman and she continues to follow with him  She also has dermatomyositis and remains on plaquenil  Colonoscopy is up to date  She follows with Dr Edilberto Mckeon          Bilateral malignant neoplasm of breast in female, estrogen receptor positive ([facility] )     Initial Diagnosis     Bilateral malignant neoplasm of breast in female, estrogen receptor positive [facility])          Surgery     Double mastectomy, reconstructive surgery          Chemotherapy     Chemotherapy with Taxotere and Cytoxan for 4 cycles followed by Femara from 2008 thru 2013  ROS: Review of Systems   Constitutional: Negative for activity change, appetite change, chills, fatigue, fever and unexpected weight change  HENT: Negative for mouth sores and nosebleeds  Respiratory: Negative for cough and shortness of breath  Cardiovascular: Negative for chest pain, palpitations and leg swelling  Gastrointestinal: Negative for abdominal pain, blood in stool, constipation, diarrhea, nausea and vomiting  Genitourinary: Negative for difficulty urinating, dysuria and hematuria  Musculoskeletal: Negative for arthralgias, joint swelling and myalgias  Skin: Negative  Neurological: Positive for headaches (occassional migraines )  Negative for dizziness, weakness, light-headedness and numbness  Hematological: Negative  Psychiatric/Behavioral: Negative  Past Medical History:   Diagnosis Date    Benign neoplasm of lung     Right lung lobe benign  Removed 2010   Breast cancer ([facility] )     Dermatomyositis [facility])        Past Surgical History:   Procedure Laterality Date    APPENDECTOMY      2011    COLONOSCOPY N/A 1/26/2016    Procedure: COLONOSCOPY;  Surgeon: Yaquelin Simpson MD;  [facility];   Service:     HYSTERECTOMY      2007    MASTECTOMY      reconstruction 2008    OOPHORECTOMY      PELVIC LAPAROSCOPY         Social History     Social History    Marital status: /Civil Union     Spouse name: N/A    Number of children: N/A    Years of education: N/A     Social History Main Topics    Smoking status: Never Smoker    Smokeless tobacco: Never Used    Alcohol use No    Drug use: No    Sexual activity: Not Asked Other Topics Concern    None     Social History Narrative    None       Family History   Problem Relation Age of Onset    No Known Problems Mother     No Known Problems Father        No Known Allergies      Current Outpatient Prescriptions:     aspirin 81 MG tablet, Take 81 mg by mouth daily  , Disp: , Rfl:     Calcium-Magnesium-Vitamin D (CALCIUM 500 PO), Take 1,000 mg by mouth daily  , Disp: , Rfl:     Cholecalciferol (VITAMIN D) 2000 UNITS CAPS, Take 2,000 Units/day by mouth , Disp: , Rfl:     hydroxychloroquine (PLAQUENIL) 200 mg tablet, Take 1 tablet by mouth daily, Disp: , Rfl:     multivitamin (THERAGRAN) TABS, Take 1 tablet by mouth daily  , Disp: , Rfl:     Omega-3 Fatty Acids (FISH OIL) 1200 MG CAPS, Take 1,200 mg by mouth daily  , Disp: , Rfl:     VIIBRYD 10 MG tablet, TAKE 1 TABLET DAILY, Disp: 90 tablet, Rfl: 1      Physical Exam:  /70 (BP Location: Left arm, Cuff Size: Standard)   Pulse 61   Temp (!) 96 5 °F (35 8 °C) (Tympanic)   Resp 16   Ht 5' 5" (1 651 m)   Wt 70 5 kg (155 lb 6 4 oz)   SpO2 98%   BMI 25 86 kg/m²     Physical Exam   Constitutional: She is oriented to person, place, and time  She appears well-developed and well-nourished  No distress  HENT:   Head: Normocephalic and atraumatic  Eyes: Conjunctivae are normal  No scleral icterus  Neck: Normal range of motion  Neck supple  Cardiovascular: Normal rate, regular rhythm and normal heart sounds  No murmur heard  Pulmonary/Chest: Effort normal and breath sounds normal  No respiratory distress  Abdominal: Soft  There is no tenderness  Musculoskeletal: Normal range of motion  She exhibits no edema or tenderness  Lymphadenopathy:     She has no cervical adenopathy  Neurological: She is alert and oriented to person, place, and time  No cranial nerve deficit  Skin: Skin is warm and dry  Psychiatric: She has a normal mood and affect  Vitals reviewed          Patient voiced understanding and agreement in the above discussion  Aware to contact our office with questions/symptoms in the interim

## 2018-09-17 ENCOUNTER — APPOINTMENT (OUTPATIENT)
Dept: LAB | Age: 58
End: 2018-09-17
Payer: COMMERCIAL

## 2018-09-17 DIAGNOSIS — Z15.01 BRCA2 GENE MUTATION POSITIVE: ICD-10-CM

## 2018-09-17 DIAGNOSIS — Z15.09 BRCA2 GENE MUTATION POSITIVE: ICD-10-CM

## 2018-09-17 LAB — CANCER AG125 SERPL-ACNC: 5.9 U/ML (ref 0–30)

## 2018-09-17 PROCEDURE — 36415 COLL VENOUS BLD VENIPUNCTURE: CPT

## 2018-09-17 PROCEDURE — 86304 IMMUNOASSAY TUMOR CA 125: CPT

## 2018-09-24 ENCOUNTER — OFFICE VISIT (OUTPATIENT)
Dept: GYNECOLOGIC ONCOLOGY | Facility: CLINIC | Age: 58
End: 2018-09-24
Payer: COMMERCIAL

## 2018-09-24 VITALS
HEART RATE: 65 BPM | TEMPERATURE: 98.5 F | DIASTOLIC BLOOD PRESSURE: 70 MMHG | RESPIRATION RATE: 14 BRPM | BODY MASS INDEX: 25.91 KG/M2 | WEIGHT: 155.5 LBS | SYSTOLIC BLOOD PRESSURE: 110 MMHG | HEIGHT: 65 IN

## 2018-09-24 DIAGNOSIS — Z15.01 BRCA2 GENE MUTATION POSITIVE: Primary | ICD-10-CM

## 2018-09-24 DIAGNOSIS — Z15.09 BRCA2 GENE MUTATION POSITIVE: Primary | ICD-10-CM

## 2018-09-24 PROCEDURE — 99214 OFFICE O/P EST MOD 30 MIN: CPT | Performed by: PHYSICIAN ASSISTANT

## 2018-09-24 NOTE — PROGRESS NOTES
Assessment/Plan:    Problem List Items Addressed This Visit        Other    BRCA2 gene mutation positive - Primary     Clinically without evidence of peritoneal cancer   normal  Return to the office in 6 months for continued BRCA surveillance with a pre=visit   Relevant Orders                CHIEF COMPLAINT:   BRCA surveillance    Problem:  1  BRCA 2 mutation  2  Bilateral breast cancer, stage IIA on left and stage IA on right, ER/NE positive, HER2 negative  3  Melanoma in situ of the chest wall   4  Dermatomyositis    Previous therapy:     Bilateral malignant neoplasm of breast in female, estrogen receptor positive ([facility] [address] )     Initial Diagnosis     Bilateral malignant neoplasm of breast in female, estrogen receptor positive [facility])          Surgery     Double mastectomy, reconstructive surgery          Chemotherapy     Chemotherapy with Taxotere and Cytoxan for 4 cycles followed by Femara from 2008 thru 2013  Patient ID: Baldev Henriquez is a 56 y o  female  who has no new complaints today  No vaginal bleeding, abdominal/pelvic pain  Normal bowel and bladder function  No interval change in medical history since last visit  Quality of life is good  The following portions of the patient's history were reviewed and updated as appropriate: allergies, current medications, past medical history and problem list     Review of Systems   Constitutional: Negative  HENT: Negative  Eyes: Negative  Respiratory: Negative  Cardiovascular: Negative  Gastrointestinal: Negative  Genitourinary: Negative  Musculoskeletal: Negative  Skin: Negative  Neurological: Negative  Psychiatric/Behavioral: Negative  Current Outpatient Prescriptions   Medication Sig Dispense Refill    aspirin 81 MG tablet Take 81 mg by mouth daily   Calcium-Magnesium-Vitamin D (CALCIUM 500 PO) Take 1,000 mg by mouth daily        Cholecalciferol (VITAMIN D) 2000 UNITS CAPS Take 2,000 Units/day by mouth   hydroxychloroquine (PLAQUENIL) 200 mg tablet Take 1 tablet by mouth daily      multivitamin (THERAGRAN) TABS Take 1 tablet by mouth daily   Omega-3 Fatty Acids (FISH OIL) 1200 MG CAPS Take 1,200 mg by mouth daily   VIIBRYD 10 MG tablet TAKE 1 TABLET DAILY 90 tablet 1     No current facility-administered medications for this visit  Objective:    Blood pressure 110/70, pulse 65, temperature 98 5 °F (36 9 °C), temperature source Oral, resp  rate 14, height 5' 5" (1 651 m), weight 70 5 kg (155 lb 8 oz)  Body mass index is 25 88 kg/m²  Body surface area is 1 78 meters squared  Physical Exam   Constitutional: She is oriented to person, place, and time  She appears well-developed and well-nourished  HENT:   Head: Normocephalic and atraumatic  Neck: Normal range of motion  Neck supple  No thyromegaly present  Pulmonary/Chest: Effort normal    Abdominal: Soft  She exhibits no distension and no mass  There is no rebound  Genitourinary:   Genitourinary Comments: The external female genitalia is normal  The bartholin's, uretheral and skenes glands are normal  The urethral meatus is normal (midline with no lesions)  Anus without fissure or lesion  Speculum exam reveals a grossly normal vagina  No masses, lesions, discharge or bleeding  No significant cystocele or rectocele noted  Manual exam notes a surgical absent cervix, uterus and adnexal structures  No masses or fullness  Bladder is without fullness, mass or tenderness  Musculoskeletal: Normal range of motion  She exhibits no edema  Lymphadenopathy:     She has no cervical adenopathy  Neurological: She is alert and oriented to person, place, and time  Skin: Skin is warm and dry  No rash noted  Psychiatric: She has a normal mood and affect  Her behavior is normal    Vitals reviewed        Lab Results   Component Value Date     5 9 09/17/2018

## 2018-11-02 ENCOUNTER — IMMUNIZATION (OUTPATIENT)
Dept: INTERNAL MEDICINE CLINIC | Facility: CLINIC | Age: 58
End: 2018-11-02
Payer: COMMERCIAL

## 2018-11-02 DIAGNOSIS — Z23 NEED FOR IMMUNIZATION AGAINST INFLUENZA: Primary | ICD-10-CM

## 2018-11-02 PROCEDURE — 90471 IMMUNIZATION ADMIN: CPT

## 2018-11-02 PROCEDURE — 90682 RIV4 VACC RECOMBINANT DNA IM: CPT

## 2018-11-03 DIAGNOSIS — F32.A DEPRESSION, UNSPECIFIED DEPRESSION TYPE: ICD-10-CM

## 2018-11-04 RX ORDER — VILAZODONE HYDROCHLORIDE 10 MG/1
TABLET ORAL
Qty: 90 TABLET | Refills: 1 | Status: SHIPPED | OUTPATIENT
Start: 2018-11-04 | End: 2019-05-16 | Stop reason: SDUPTHER

## 2018-12-14 ENCOUNTER — TRANSCRIBE ORDERS (OUTPATIENT)
Dept: ADMINISTRATIVE | Age: 58
End: 2018-12-14

## 2018-12-14 ENCOUNTER — APPOINTMENT (OUTPATIENT)
Dept: LAB | Age: 58
End: 2018-12-14
Payer: COMMERCIAL

## 2018-12-14 DIAGNOSIS — Z11.59 ENCOUNTER FOR HEPATITIS C SCREENING TEST FOR LOW RISK PATIENT: ICD-10-CM

## 2018-12-14 DIAGNOSIS — E55.9 VITAMIN D DEFICIENCY: ICD-10-CM

## 2018-12-14 DIAGNOSIS — Z13.6 SCREENING FOR CARDIOVASCULAR CONDITION: ICD-10-CM

## 2018-12-14 LAB
25(OH)D3 SERPL-MCNC: 43.2 NG/ML (ref 30–100)
ALBUMIN SERPL BCP-MCNC: 3.9 G/DL (ref 3.5–5)
ALP SERPL-CCNC: 62 U/L (ref 46–116)
ALT SERPL W P-5'-P-CCNC: 22 U/L (ref 12–78)
ANION GAP SERPL CALCULATED.3IONS-SCNC: 3 MMOL/L (ref 4–13)
AST SERPL W P-5'-P-CCNC: 17 U/L (ref 5–45)
BASOPHILS # BLD AUTO: 0.03 THOUSANDS/ΜL (ref 0–0.1)
BASOPHILS NFR BLD AUTO: 1 % (ref 0–1)
BILIRUB SERPL-MCNC: 0.32 MG/DL (ref 0.2–1)
BUN SERPL-MCNC: 18 MG/DL (ref 5–25)
CALCIUM SERPL-MCNC: 9.7 MG/DL (ref 8.3–10.1)
CANCER AG27-29 SERPL-ACNC: 23.6 U/ML (ref 0–42.3)
CEA SERPL-MCNC: 0.7 NG/ML (ref 0–3)
CHLORIDE SERPL-SCNC: 105 MMOL/L (ref 100–108)
CHOLEST SERPL-MCNC: 167 MG/DL (ref 50–200)
CO2 SERPL-SCNC: 32 MMOL/L (ref 21–32)
CREAT SERPL-MCNC: 0.74 MG/DL (ref 0.6–1.3)
EOSINOPHIL # BLD AUTO: 0.14 THOUSAND/ΜL (ref 0–0.61)
EOSINOPHIL NFR BLD AUTO: 3 % (ref 0–6)
ERYTHROCYTE [DISTWIDTH] IN BLOOD BY AUTOMATED COUNT: 13.3 % (ref 11.6–15.1)
GFR SERPL CREATININE-BSD FRML MDRD: 91 ML/MIN/1.73SQ M
GLUCOSE P FAST SERPL-MCNC: 82 MG/DL (ref 65–99)
HCT VFR BLD AUTO: 41.6 % (ref 34.8–46.1)
HCV AB SER QL: NORMAL
HDLC SERPL-MCNC: 70 MG/DL (ref 40–60)
HGB BLD-MCNC: 13.1 G/DL (ref 11.5–15.4)
IMM GRANULOCYTES # BLD AUTO: 0 THOUSAND/UL (ref 0–0.2)
IMM GRANULOCYTES NFR BLD AUTO: 0 % (ref 0–2)
LDLC SERPL CALC-MCNC: 86 MG/DL (ref 0–100)
LYMPHOCYTES # BLD AUTO: 1.15 THOUSANDS/ΜL (ref 0.6–4.47)
LYMPHOCYTES NFR BLD AUTO: 27 % (ref 14–44)
MCH RBC QN AUTO: 29.6 PG (ref 26.8–34.3)
MCHC RBC AUTO-ENTMCNC: 31.5 G/DL (ref 31.4–37.4)
MCV RBC AUTO: 94 FL (ref 82–98)
MONOCYTES # BLD AUTO: 0.39 THOUSAND/ΜL (ref 0.17–1.22)
MONOCYTES NFR BLD AUTO: 9 % (ref 4–12)
NEUTROPHILS # BLD AUTO: 2.49 THOUSANDS/ΜL (ref 1.85–7.62)
NEUTS SEG NFR BLD AUTO: 60 % (ref 43–75)
NRBC BLD AUTO-RTO: 0 /100 WBCS
PLATELET # BLD AUTO: 233 THOUSANDS/UL (ref 149–390)
PMV BLD AUTO: 9.7 FL (ref 8.9–12.7)
POTASSIUM SERPL-SCNC: 4 MMOL/L (ref 3.5–5.3)
PROT SERPL-MCNC: 7.2 G/DL (ref 6.4–8.2)
RBC # BLD AUTO: 4.43 MILLION/UL (ref 3.81–5.12)
SODIUM SERPL-SCNC: 140 MMOL/L (ref 136–145)
TRIGL SERPL-MCNC: 55 MG/DL
WBC # BLD AUTO: 4.2 THOUSAND/UL (ref 4.31–10.16)

## 2018-12-14 PROCEDURE — 80053 COMPREHEN METABOLIC PANEL: CPT | Performed by: PHYSICIAN ASSISTANT

## 2018-12-14 PROCEDURE — 86300 IMMUNOASSAY TUMOR CA 15-3: CPT | Performed by: PHYSICIAN ASSISTANT

## 2018-12-14 PROCEDURE — 85025 COMPLETE CBC W/AUTO DIFF WBC: CPT | Performed by: PHYSICIAN ASSISTANT

## 2018-12-14 PROCEDURE — 82378 CARCINOEMBRYONIC ANTIGEN: CPT | Performed by: PHYSICIAN ASSISTANT

## 2018-12-14 PROCEDURE — 86803 HEPATITIS C AB TEST: CPT

## 2018-12-14 PROCEDURE — 36415 COLL VENOUS BLD VENIPUNCTURE: CPT | Performed by: PHYSICIAN ASSISTANT

## 2018-12-14 PROCEDURE — 82306 VITAMIN D 25 HYDROXY: CPT

## 2018-12-14 PROCEDURE — 80061 LIPID PANEL: CPT

## 2018-12-20 ENCOUNTER — OFFICE VISIT (OUTPATIENT)
Dept: INTERNAL MEDICINE CLINIC | Facility: CLINIC | Age: 58
End: 2018-12-20
Payer: COMMERCIAL

## 2018-12-20 VITALS
RESPIRATION RATE: 16 BRPM | SYSTOLIC BLOOD PRESSURE: 114 MMHG | BODY MASS INDEX: 26.73 KG/M2 | HEIGHT: 65 IN | WEIGHT: 160.4 LBS | OXYGEN SATURATION: 98 % | DIASTOLIC BLOOD PRESSURE: 68 MMHG | HEART RATE: 62 BPM

## 2018-12-20 DIAGNOSIS — D72.819 LEUKOPENIA, UNSPECIFIED TYPE: ICD-10-CM

## 2018-12-20 DIAGNOSIS — R89.9 ABNORMAL LABORATORY TEST: Primary | ICD-10-CM

## 2018-12-20 DIAGNOSIS — F32.A DEPRESSION, UNSPECIFIED DEPRESSION TYPE: ICD-10-CM

## 2018-12-20 DIAGNOSIS — Z87.2 H/O DERMATOMYOSITIS: ICD-10-CM

## 2018-12-20 DIAGNOSIS — M33.90 DERMATOMYOSITIS (HCC): ICD-10-CM

## 2018-12-20 PROCEDURE — 99214 OFFICE O/P EST MOD 30 MIN: CPT | Performed by: INTERNAL MEDICINE

## 2018-12-20 RX ORDER — HYDROXYCHLOROQUINE SULFATE 200 MG/1
TABLET, FILM COATED ORAL
Refills: 0
Start: 2018-12-20 | End: 2019-12-19 | Stop reason: SINTOL

## 2018-12-20 NOTE — PROGRESS NOTES
Assessment/Plan:           Problem List Items Addressed This Visit        Musculoskeletal and Integument    Dermatomyositis ([address] )     Currently stable doing well the Plaquenil dose has been cut down by Dermatology will continue to monitor  Other    Abnormal laboratory test - Primary     Low anion gap will check a comprehensive metabolic panel, SPEP UPEP and 6 months  Relevant Orders    Comprehensive metabolic panel    Protein electrophoresis, serum    Protein electrophoresis, urine    Leukopenia     Currently asymptomatic will continue monitor patient does see Hematology  Relevant Orders    CBC (Includes Diff/Plt) (Refl)    Depression     Stable and doing well continue with current medical regiment will continue monitor  Other Visit Diagnoses     H/O dermatomyositis        Relevant Medications    hydroxychloroquine (PLAQUENIL) 200 mg tablet          Return to office 6  months  call if any problems  Subjective:      Patient ID: oDrothy Ozuna is a 66 y o  female  HPI  [age]year old female coming in for a follow up office visit regarding dermatomyositis, leukopenia, depression, low anion gap; The patient reports me compliant taking medications without untoward side effects the  The patient is here to review his medical condition, update me on the medical condition and the patient reports me no hospitalizations and no ER visits  Overall she is feeling well she reports me she could be doing better on her diet she continues remain active exercising  She reports me her mood is doing well no suicidal ideation she continues to see Hematology routinely currently stable    The following portions of the patient's history were reviewed and updated as appropriate: allergies, current medications, past family history, past medical history, past social history, past surgical history and problem list     Review of Systems   Constitutional: Negative for activity change, appetite change and unexpected weight change  HENT: Negative for congestion and postnasal drip  Eyes: Negative for visual disturbance  Respiratory: Negative for cough and shortness of breath  Cardiovascular: Negative for chest pain  Gastrointestinal: Negative for abdominal pain, diarrhea, nausea and vomiting  Neurological: Negative for dizziness, light-headedness and headaches  Hematological: Negative for adenopathy  no depression, no anxiety    Objective:                  No Follow-up on file  No Known Allergies    Past Medical History:   Diagnosis Date    Benign neoplasm of lung     Right lung lobe benign  Removed 2010   Breast cancer ([facility] [address] )     Dermatomyositis [facility])      Past Surgical History:   Procedure Laterality Date    APPENDECTOMY      2011    COLONOSCOPY N/A 1/26/2016    Procedure: COLONOSCOPY;  Surgeon: Catrachito Link MD;  Location:  GI LAB; Service:     HYSTERECTOMY      2007    MASTECTOMY      reconstruction 2008    OOPHORECTOMY      PELVIC LAPAROSCOPY       Current Outpatient Prescriptions on File Prior to Visit   Medication Sig Dispense Refill    aspirin 81 MG tablet Take 81 mg by mouth daily   Calcium-Magnesium-Vitamin D (CALCIUM 500 PO) Take 1,000 mg by mouth daily   Cholecalciferol (VITAMIN D) 2000 UNITS CAPS Take 2,000 Units/day by mouth   multivitamin (THERAGRAN) TABS Take 1 tablet by mouth daily   Omega-3 Fatty Acids (FISH OIL) 1200 MG CAPS Take 1,200 mg by mouth daily   VIIBRYD 10 MG tablet TAKE 1 TABLET DAILY 90 tablet 1     No current facility-administered medications on file prior to visit  Family History   Problem Relation Age of Onset    No Known Problems Mother     No Known Problems Father      Social History     Social History    Marital status: /Civil Union     Spouse name: N/A    Number of children: N/A    Years of education: N/A     Occupational History    Not on file       Social History Main Topics    Smoking status: Never Smoker    Smokeless tobacco: Never Used    Alcohol use No    Drug use: No    Sexual activity: Not on file     Other Topics Concern    Not on file     Social History Narrative    No narrative on file     Vitals:    12/20/18 1338   BP: 114/68   BP Location: Left arm   Patient Position: Sitting   Cuff Size: Standard   Pulse: 62   Resp: 16   SpO2: 98%   Weight: 72 8 kg (160 lb 6 4 oz)   Height: 5' 5" (1 651 m)     Results for orders placed or performed in visit on 12/14/18   Hepatitis C antibody   Result Value Ref Range    Hepatitis C Ab Non-reactive Non-reactive   Lipid Panel with Direct LDL reflex   Result Value Ref Range    Cholesterol 167 50 - 200 mg/dL    Triglycerides 55 <=150 mg/dL    HDL, Direct 70 (H) 40 - 60 mg/dL    LDL Calculated 86 0 - 100 mg/dL   Vitamin D 25 hydroxy   Result Value Ref Range    Vit D, 25-Hydroxy 43 2 30 0 - 100 0 ng/mL     Weight (last 2 days)     None        Body mass index is 26 69 kg/m²  BP      Temp      Pulse     Resp      SpO2        Vitals:    12/20/18 1338   Weight: 72 8 kg (160 lb 6 4 oz)     Vitals:    12/20/18 1338   Weight: 72 8 kg (160 lb 6 4 oz)         /68 (BP Location: Left arm, Patient Position: Sitting, Cuff Size: Standard)   Pulse 62   Resp 16   Ht 5' 5" (1 651 m)   Wt 72 8 kg (160 lb 6 4 oz)   SpO2 98%   BMI 26 69 kg/m²          Physical Exam   Constitutional: She appears well-developed and well-nourished  HENT:   Head: Normocephalic  Mouth/Throat: Oropharynx is clear and moist    Eyes: Pupils are equal, round, and reactive to light  Conjunctivae are normal  Right eye exhibits no discharge  Left eye exhibits no discharge  No scleral icterus  Neck: Neck supple  Cardiovascular: Normal rate, regular rhythm, normal heart sounds and intact distal pulses  Exam reveals no gallop and no friction rub  No murmur heard  Pulmonary/Chest: Breath sounds normal  No respiratory distress  She has no wheezes  She has no rales  Abdominal: Soft  Bowel sounds are normal  She exhibits no distension and no mass  There is no tenderness  There is no rebound and no guarding  Musculoskeletal: She exhibits no edema or deformity  Lymphadenopathy:     She has no cervical adenopathy  Neurological: She is alert  Coordination normal    Psychiatric: Her mood appears not anxious  She does not exhibit a depressed mood  She expresses no suicidal ideation

## 2018-12-25 PROBLEM — R89.9 ABNORMAL LABORATORY TEST: Status: ACTIVE | Noted: 2018-12-25

## 2018-12-25 PROBLEM — D72.819 LEUKOPENIA: Status: ACTIVE | Noted: 2018-12-25

## 2018-12-25 PROBLEM — F32.A DEPRESSION: Status: ACTIVE | Noted: 2018-12-25

## 2018-12-25 NOTE — ASSESSMENT & PLAN NOTE
Currently stable doing well the Plaquenil dose has been cut down by Dermatology will continue to monitor

## 2019-03-04 ENCOUNTER — TELEPHONE (OUTPATIENT)
Dept: HEMATOLOGY ONCOLOGY | Facility: CLINIC | Age: 59
End: 2019-03-04

## 2019-03-04 ENCOUNTER — TRANSCRIBE ORDERS (OUTPATIENT)
Dept: ADMINISTRATIVE | Age: 59
End: 2019-03-04

## 2019-03-04 ENCOUNTER — APPOINTMENT (OUTPATIENT)
Dept: LAB | Age: 59
End: 2019-03-04
Payer: COMMERCIAL

## 2019-03-04 ENCOUNTER — HOSPITAL ENCOUNTER (OUTPATIENT)
Dept: RADIOLOGY | Age: 59
Discharge: HOME | End: 2019-03-04
Payer: COMMERCIAL

## 2019-03-04 DIAGNOSIS — C50.111 MALIGNANT NEOPLASM OF CENTRAL PORTION OF BOTH BREASTS IN FEMALE, ESTROGEN RECEPTOR POSITIVE (HCC): Primary | ICD-10-CM

## 2019-03-04 DIAGNOSIS — Z15.01 BRCA2 GENE MUTATION POSITIVE: ICD-10-CM

## 2019-03-04 DIAGNOSIS — Z17.0 MALIGNANT NEOPLASM OF CENTRAL PORTION OF BOTH BREASTS IN FEMALE, ESTROGEN RECEPTOR POSITIVE (HCC): Primary | ICD-10-CM

## 2019-03-04 DIAGNOSIS — Z15.09 BRCA2 GENE MUTATION POSITIVE: ICD-10-CM

## 2019-03-04 DIAGNOSIS — R91.1 LUNG NODULE: ICD-10-CM

## 2019-03-04 DIAGNOSIS — C50.112 MALIGNANT NEOPLASM OF CENTRAL PORTION OF BOTH BREASTS IN FEMALE, ESTROGEN RECEPTOR POSITIVE (HCC): Primary | ICD-10-CM

## 2019-03-04 DIAGNOSIS — R22.2 CHEST WALL MASS: ICD-10-CM

## 2019-03-04 DIAGNOSIS — Z17.0 MALIGNANT NEOPLASM OF CENTRAL PORTION OF BOTH BREASTS IN FEMALE, ESTROGEN RECEPTOR POSITIVE (HCC): ICD-10-CM

## 2019-03-04 DIAGNOSIS — C50.111 MALIGNANT NEOPLASM OF CENTRAL PORTION OF BOTH BREASTS IN FEMALE, ESTROGEN RECEPTOR POSITIVE (HCC): ICD-10-CM

## 2019-03-04 DIAGNOSIS — C50.112 MALIGNANT NEOPLASM OF CENTRAL PORTION OF BOTH BREASTS IN FEMALE, ESTROGEN RECEPTOR POSITIVE (HCC): ICD-10-CM

## 2019-03-04 LAB
ALBUMIN SERPL BCP-MCNC: 4.2 G/DL (ref 3.5–5)
ALP SERPL-CCNC: 65 U/L (ref 46–116)
ALT SERPL W P-5'-P-CCNC: 18 U/L (ref 12–78)
ANION GAP SERPL CALCULATED.3IONS-SCNC: 3 MMOL/L (ref 4–13)
AST SERPL W P-5'-P-CCNC: 14 U/L (ref 5–45)
BASOPHILS # BLD AUTO: 0.02 THOUSANDS/ΜL (ref 0–0.1)
BASOPHILS NFR BLD AUTO: 0 % (ref 0–1)
BILIRUB SERPL-MCNC: 0.51 MG/DL (ref 0.2–1)
BUN SERPL-MCNC: 17 MG/DL (ref 5–25)
CALCIUM SERPL-MCNC: 9 MG/DL (ref 8.3–10.1)
CANCER AG125 SERPL-ACNC: 7.1 U/ML (ref 0–30)
CANCER AG27-29 SERPL-ACNC: 26.8 U/ML (ref 0–42.3)
CEA SERPL-MCNC: 0.7 NG/ML (ref 0–3)
CHLORIDE SERPL-SCNC: 101 MMOL/L (ref 100–108)
CO2 SERPL-SCNC: 31 MMOL/L (ref 21–32)
CREAT SERPL-MCNC: 0.7 MG/DL (ref 0.6–1.3)
EOSINOPHIL # BLD AUTO: 0.06 THOUSAND/ΜL (ref 0–0.61)
EOSINOPHIL NFR BLD AUTO: 1 % (ref 0–6)
ERYTHROCYTE [DISTWIDTH] IN BLOOD BY AUTOMATED COUNT: 13.1 % (ref 11.6–15.1)
GFR SERPL CREATININE-BSD FRML MDRD: 97 ML/MIN/1.73SQ M
GLUCOSE SERPL-MCNC: 80 MG/DL (ref 65–140)
HCT VFR BLD AUTO: 42.7 % (ref 34.8–46.1)
HGB BLD-MCNC: 14 G/DL (ref 11.5–15.4)
IMM GRANULOCYTES # BLD AUTO: 0.01 THOUSAND/UL (ref 0–0.2)
IMM GRANULOCYTES NFR BLD AUTO: 0 % (ref 0–2)
LYMPHOCYTES # BLD AUTO: 1.16 THOUSANDS/ΜL (ref 0.6–4.47)
LYMPHOCYTES NFR BLD AUTO: 23 % (ref 14–44)
MCH RBC QN AUTO: 30.2 PG (ref 26.8–34.3)
MCHC RBC AUTO-ENTMCNC: 32.8 G/DL (ref 31.4–37.4)
MCV RBC AUTO: 92 FL (ref 82–98)
MONOCYTES # BLD AUTO: 0.4 THOUSAND/ΜL (ref 0.17–1.22)
MONOCYTES NFR BLD AUTO: 8 % (ref 4–12)
NEUTROPHILS # BLD AUTO: 3.46 THOUSANDS/ΜL (ref 1.85–7.62)
NEUTS SEG NFR BLD AUTO: 68 % (ref 43–75)
NRBC BLD AUTO-RTO: 0 /100 WBCS
PLATELET # BLD AUTO: 223 THOUSANDS/UL (ref 149–390)
PMV BLD AUTO: 9.6 FL (ref 8.9–12.7)
POTASSIUM SERPL-SCNC: 3.9 MMOL/L (ref 3.5–5.3)
PROT SERPL-MCNC: 7.6 G/DL (ref 6.4–8.2)
RBC # BLD AUTO: 4.64 MILLION/UL (ref 3.81–5.12)
SODIUM SERPL-SCNC: 135 MMOL/L (ref 136–145)
WBC # BLD AUTO: 5.11 THOUSAND/UL (ref 4.31–10.16)

## 2019-03-04 PROCEDURE — 74177 CT ABD & PELVIS W/CONTRAST: CPT

## 2019-03-04 PROCEDURE — 80053 COMPREHEN METABOLIC PANEL: CPT | Performed by: PHYSICIAN ASSISTANT

## 2019-03-04 PROCEDURE — 36415 COLL VENOUS BLD VENIPUNCTURE: CPT | Performed by: PHYSICIAN ASSISTANT

## 2019-03-04 PROCEDURE — 82378 CARCINOEMBRYONIC ANTIGEN: CPT | Performed by: PHYSICIAN ASSISTANT

## 2019-03-04 PROCEDURE — 86300 IMMUNOASSAY TUMOR CA 15-3: CPT | Performed by: PHYSICIAN ASSISTANT

## 2019-03-04 PROCEDURE — 85025 COMPLETE CBC W/AUTO DIFF WBC: CPT | Performed by: PHYSICIAN ASSISTANT

## 2019-03-04 PROCEDURE — 86304 IMMUNOASSAY TUMOR CA 125: CPT

## 2019-03-04 PROCEDURE — 71260 CT THORAX DX C+: CPT

## 2019-03-04 RX ADMIN — IOHEXOL 100 ML: 350 INJECTION, SOLUTION INTRAVENOUS at 13:06

## 2019-03-04 NOTE — TELEPHONE ENCOUNTER
Placed orders for labs and LVM informing patient that the lab orders are in the system  Instructed patient to call our office if she had any additional questions

## 2019-03-04 NOTE — TELEPHONE ENCOUNTER
VANESSA informing patient that her appt on 03/11/19 needed to be R/S  R/S her appt to 03/14/19 at 2:30 pm with WALKER Mcclellan at the [place] location  Instructed patient to call the office if appt date, time, and/or location does not work for schedule

## 2019-03-04 NOTE — TELEPHONE ENCOUNTER
The patient was at the [facility] location for blood work and there were no orders placed in the account  She asked if orders can be placed into the account   The patient can be reached at [phone]

## 2019-03-14 ENCOUNTER — OFFICE VISIT (OUTPATIENT)
Dept: HEMATOLOGY ONCOLOGY | Facility: CLINIC | Age: 59
End: 2019-03-14
Payer: COMMERCIAL

## 2019-03-14 VITALS
SYSTOLIC BLOOD PRESSURE: 116 MMHG | TEMPERATURE: 97.6 F | DIASTOLIC BLOOD PRESSURE: 74 MMHG | HEIGHT: 65 IN | WEIGHT: 159 LBS | HEART RATE: 62 BPM | OXYGEN SATURATION: 97 % | BODY MASS INDEX: 26.49 KG/M2

## 2019-03-14 DIAGNOSIS — Z17.0 MALIGNANT NEOPLASM OF CENTRAL PORTION OF BOTH BREASTS IN FEMALE, ESTROGEN RECEPTOR POSITIVE (HCC): Primary | ICD-10-CM

## 2019-03-14 DIAGNOSIS — C50.111 MALIGNANT NEOPLASM OF CENTRAL PORTION OF BOTH BREASTS IN FEMALE, ESTROGEN RECEPTOR POSITIVE (HCC): Primary | ICD-10-CM

## 2019-03-14 DIAGNOSIS — C50.112 MALIGNANT NEOPLASM OF CENTRAL PORTION OF BOTH BREASTS IN FEMALE, ESTROGEN RECEPTOR POSITIVE (HCC): Primary | ICD-10-CM

## 2019-03-14 DIAGNOSIS — R91.1 LUNG NODULE: ICD-10-CM

## 2019-03-14 DIAGNOSIS — Z15.09 BRCA2 GENE MUTATION POSITIVE: ICD-10-CM

## 2019-03-14 DIAGNOSIS — Z15.01 BRCA2 GENE MUTATION POSITIVE: ICD-10-CM

## 2019-03-14 PROCEDURE — 99214 OFFICE O/P EST MOD 30 MIN: CPT | Performed by: PHYSICIAN ASSISTANT

## 2019-03-14 NOTE — PROGRESS NOTES
Hematology/Oncology Outpatient Follow-up  Diana Benitez 66 y o  female 1960 [id number]    Date:  3/14/2019      Assessment and Plan:    1  Malignant neoplasm of central portion of both breasts in female, estrogen receptor positive ([facility] [address] ), 2  BRCA2 gene mutation positive  53-year-old female with history of breast cancer as below  She has bracket to mutation  She also follows with Gynecologic Oncology every 6 months  She states that her 25and 51-year-old daughters have been tested  Her 51-year-old daughter presents the mutation and follows with [facility] in [place]  Her 51-year-old daughter pursues active surveillance even without mutation  She states that her sister also has positive mutation in had prophylactic TAHBSO  She has frequent monitoring of breast imaging but did not have mastectomy  Reviewed CT the chest abdomen pelvis  Continued surveillance of abdomen with ultrasound is recommended  Discussed ultrasound versus CT scan  Ultrasound does not have radiation as opposed to CT scan  Due to no concerning findings on CT scan at present, recommend ultrasound as long-term imaging of the abdomen is recommended with her mutation status  This is arranged for prior to visit 6 months  Reviewed with patient that labs are normal   Repeat labs in 6 months  Follow-up at that time  - CBC and differential  - Comprehensive metabolic panel  - Cancer antigen 27 29  -   - CEA    3  Lung nodule  Patient has history of lung nodules as below  Most recent scans was a stable 5 mm right lower lobe pulmonary nodule  Stability noted from November 2017 until present  Reviewed with Dr Ezio Macias    He would like patient have repeat CT chest prior to visit in 6 months     - CT chest wo contrast; Future      HPI:        Bilateral malignant neoplasm of breast in female, estrogen receptor positive ([facility] [address] )     Initial Diagnosis     Bilateral malignant neoplasm of breast in female, estrogen receptor positive [facility])          Surgery     Double mastectomy, reconstructive surgery          Chemotherapy     Chemotherapy with Taxotere and Cytoxan for 4 cycles followed by Femara from 2008 thru 2013           56year old female with history of bilateral breast cancers  She is BRCA2 positive        2008 patient had hormone receptor positive, HER-2 negative, stage IIA breast cancer on the right and stage IA on the left  One lymph node was positive on the right    Patient had bilateral mastectomies and reconstruction surgeries  She received 4 cycles of Taxotere plus Cytoxan followed by 5 years of Femara that she finished in September 2013  She underwent ITZEL and BSO      9 mm nodule in the right lower lung and she had wedge resection and that came back negative for malignancy       She also has history of left lower lobe pulmonary nodule 5 mm x 3 mm       Also, history of melanoma in situ in between the breasts  This was surgically removed by Dr Ekaterina Sykes and she continues to follow with him       She also has dermatomyositis and remains on plaquenil       Colonoscopy is up to date  She follows with Dr Faith Koroma  Most recently performed in January 2016  He recommended follow up in 5-10 years  Recommended patient to follow up at sooner interval, 5 years  Interval history: takes plaquenil every other day and tolerates this better than every day  ROS: Review of Systems   Constitutional: Negative for appetite change, chills, fatigue, fever and unexpected weight change  Respiratory: Negative for cough and shortness of breath  Cardiovascular: Negative for chest pain, palpitations and leg swelling  Gastrointestinal: Negative for abdominal pain, blood in stool, constipation, diarrhea, nausea and vomiting  Genitourinary: Negative for difficulty urinating, dysuria and hematuria  Musculoskeletal: Negative for arthralgias  Skin: Negative      Neurological: Negative for dizziness, weakness, light-headedness, numbness and headaches  Hematological: Negative  Psychiatric/Behavioral: Negative  Past Medical History:   Diagnosis Date    Benign neoplasm of lung     Right lung lobe benign  Removed 2010   Breast cancer ([facility] [address] )     Dermatomyositis [facility])        Past Surgical History:   Procedure Laterality Date    APPENDECTOMY      2011    COLONOSCOPY N/A 1/26/2016    Procedure: COLONOSCOPY;  Surgeon: Lashay Oneal MD;  Location:  GI LAB;   Service:     HYSTERECTOMY      2007    MASTECTOMY      reconstruction 2008    OOPHORECTOMY      PELVIC LAPAROSCOPY         Social History     Socioeconomic History    Marital status: /Civil Union     Spouse name: None    Number of children: None    Years of education: None    Highest education level: None   Occupational History    None   Social Needs    Financial resource strain: None    Food insecurity:     Worry: None     Inability: None    Transportation needs:     Medical: None     Non-medical: None   Tobacco Use    Smoking status: Never Smoker    Smokeless tobacco: Never Used   Substance and Sexual Activity    Alcohol use: No    Drug use: No    Sexual activity: None   Lifestyle    Physical activity:     Days per week: None     Minutes per session: None    Stress: None   Relationships    Social connections:     Talks on phone: None     Gets together: None     Attends Taoism service: None     Active member of club or organization: None     Attends meetings of clubs or organizations: None     Relationship status: None    Intimate partner violence:     Fear of current or ex partner: None     Emotionally abused: None     Physically abused: None     Forced sexual activity: None   Other Topics Concern    None   Social History Narrative    None       Family History   Problem Relation Age of Onset    No Known Problems Mother     No Known Problems Father        No Known Allergies      Current Outpatient Medications:     hydroxychloroquine (PLAQUENIL) 200 mg tablet, qod (Patient taking differently: qod), Disp: , Rfl: 0    aspirin 81 MG tablet, Take 81 mg by mouth daily  , Disp: , Rfl:     Calcium-Magnesium-Vitamin D (CALCIUM 500 PO), Take 1,000 mg by mouth daily  , Disp: , Rfl:     Cholecalciferol (VITAMIN D) 2000 UNITS CAPS, Take 2,000 Units/day by mouth , Disp: , Rfl:     multivitamin (THERAGRAN) TABS, Take 1 tablet by mouth daily  , Disp: , Rfl:     Omega-3 Fatty Acids (FISH OIL) 1200 MG CAPS, Take 1,200 mg by mouth daily  , Disp: , Rfl:     VIIBRYD 10 MG tablet, TAKE 1 TABLET DAILY, Disp: 90 tablet, Rfl: 1      Physical Exam:  /74 (BP Location: Left arm, Patient Position: Sitting, Cuff Size: Adult)   Pulse 62   Temp 97 6 °F (36 4 °C) (Temporal)   Ht 5' 5" (1 651 m)   Wt 72 1 kg (159 lb)   SpO2 97%   BMI 26 46 kg/m²     Physical Exam   Constitutional: She is oriented to person, place, and time  She appears well-developed and well-nourished  No distress  HENT:   Head: Normocephalic and atraumatic  Eyes: Conjunctivae are normal  No scleral icterus  Neck: Normal range of motion  Neck supple  Cardiovascular: Normal rate, regular rhythm and normal heart sounds  No murmur heard  Pulmonary/Chest: Effort normal and breath sounds normal  No respiratory distress  Abdominal: Soft  There is no tenderness  Musculoskeletal: Normal range of motion  She exhibits no edema or tenderness  Lymphadenopathy:        Head (right side): No submandibular, no tonsillar and no occipital adenopathy present  Head (left side): No submandibular, no tonsillar and no occipital adenopathy present  She has no cervical adenopathy  She has no axillary adenopathy  Right: No supraclavicular adenopathy present  Left: No supraclavicular adenopathy present  Neurological: She is alert and oriented to person, place, and time  No cranial nerve deficit  Skin: Skin is warm and dry  Psychiatric: She has a normal mood and affect  Vitals reviewed  Labs:  Lab Results   Component Value Date    WBC 5 11 03/04/2019    HGB 14 0 03/04/2019    HCT 42 7 03/04/2019    MCV 92 03/04/2019     03/04/2019     Lab Results   Component Value Date     09/15/2015    K 3 9 03/04/2019     03/04/2019    CO2 31 03/04/2019    ANIONGAP 12 09/15/2015    BUN 17 03/04/2019    CREATININE 0 70 03/04/2019    GLUCOSE 124 09/15/2015    GLUF 82 12/14/2018    CALCIUM 9 0 03/04/2019    AST 14 03/04/2019    ALT 18 03/04/2019    ALKPHOS 65 03/04/2019    PROT 7 6 11/19/2015    BILITOT 0 46 09/15/2015    EGFR 97 03/04/2019       Patient voiced understanding and agreement in the above discussion  Aware to contact our office with questions/symptoms in the interim

## 2019-03-25 ENCOUNTER — OFFICE VISIT (OUTPATIENT)
Dept: GYNECOLOGIC ONCOLOGY | Facility: CLINIC | Age: 59
End: 2019-03-25
Payer: COMMERCIAL

## 2019-03-25 VITALS
SYSTOLIC BLOOD PRESSURE: 110 MMHG | HEART RATE: 78 BPM | BODY MASS INDEX: 25.99 KG/M2 | HEIGHT: 65 IN | DIASTOLIC BLOOD PRESSURE: 70 MMHG | WEIGHT: 156 LBS

## 2019-03-25 DIAGNOSIS — Z15.09 BRCA2 GENE MUTATION POSITIVE: Primary | ICD-10-CM

## 2019-03-25 DIAGNOSIS — Z15.01 BRCA2 GENE MUTATION POSITIVE: Primary | ICD-10-CM

## 2019-03-25 PROCEDURE — 99212 OFFICE O/P EST SF 10 MIN: CPT | Performed by: PHYSICIAN ASSISTANT

## 2019-03-25 NOTE — ASSESSMENT & PLAN NOTE
S/P prophylactic TLH, BSO  Clinically without evidence of peritoneal cancer   normal  Return to the office in 6 months for continued BRCA surveillance with a pre-visit

## 2019-03-25 NOTE — PROGRESS NOTES
Assessment/Plan:    Problem List Items Addressed This Visit        Other    BRCA2 gene mutation positive - Primary     S/P prophylactic TLH, BSO  Clinically without evidence of peritoneal cancer   normal  Return to the office in 6 months for continued BRCA surveillance with a pre-visit   Relevant Orders                CHIEF COMPLAINT:   BRCA surveillance    Problem:  1  BRCA 2 mutation  2  Bilateral breast cancer, stage IIA on left and stage IA on right, ER/WI positive, HER2 negative  3  Melanoma in situ of the chest wall   4  Dermatomyositis        Previous therapy:     Bilateral malignant neoplasm of breast in female, estrogen receptor positive ([facility] [address] )     Initial Diagnosis     Bilateral malignant neoplasm of breast in female, estrogen receptor positive [facility])          Surgery     Double mastectomy, reconstructive surgery          Chemotherapy     Chemotherapy with Taxotere and Cytoxan for 4 cycles followed by Femara from 2008 thru 2013  2  Prophylactic TLH, BSO      Patient ID: Connie Jo is a 66 y o  female  who has no new complaints today  No vaginal bleeding, abdominal/pelvic pain  Normal bowel and bladder function  No interval change in medical history since last visit  She notes a recent flare in her dermatomyositis, which is causing irritation of her skin  Quality of life is good  The following portions of the patient's history were reviewed and updated as appropriate: allergies, current medications, past medical history and problem list     Review of Systems   Constitutional: Negative  HENT: Negative  Eyes: Negative  Respiratory: Negative  Cardiovascular: Negative  Gastrointestinal: Negative  Genitourinary: Negative  Musculoskeletal: Negative  Skin: Positive for rash  Neurological: Negative  Psychiatric/Behavioral: Negative          Current Outpatient Medications   Medication Sig Dispense Refill    aspirin 81 MG tablet Take 81 mg by mouth daily   Calcium-Magnesium-Vitamin D (CALCIUM 500 PO) Take 1,000 mg by mouth daily   Cholecalciferol (VITAMIN D) 2000 UNITS CAPS Take 2,000 Units/day by mouth   hydroxychloroquine (PLAQUENIL) 200 mg tablet qod (Patient taking differently: qod)  0    multivitamin (THERAGRAN) TABS Take 1 tablet by mouth daily   Omega-3 Fatty Acids (FISH OIL) 1200 MG CAPS Take 1,200 mg by mouth daily   VIIBRYD 10 MG tablet TAKE 1 TABLET DAILY 90 tablet 1     No current facility-administered medications for this visit  Objective:    Blood pressure 110/70, pulse 78, height 5' 5" (1 651 m), weight 70 8 kg (156 lb)  Body mass index is 25 96 kg/m²  Body surface area is 1 78 meters squared  Physical Exam   Constitutional: She is oriented to person, place, and time  She appears well-developed and well-nourished  HENT:   Head: Normocephalic and atraumatic  Neck: Normal range of motion  Neck supple  No thyromegaly present  Pulmonary/Chest: Effort normal    Abdominal: Soft  She exhibits no distension and no mass  There is no rebound  Genitourinary:   Genitourinary Comments: The external female genitalia is normal  The bartholin's, uretheral and skenes glands are normal  The urethral meatus is normal (midline with no lesions)  Anus without fissure or lesion  Speculum exam reveals a grossly normal vagina  No masses, lesions, discharge or bleeding  No significant cystocele or rectocele noted  Bimanual exam notes a surgical absent cervix, uterus and adnexal structures  No masses or fullness  Bladder is without fullness, mass or tenderness  Musculoskeletal: Normal range of motion  She exhibits no edema  Lymphadenopathy:     She has no cervical adenopathy  Neurological: She is alert and oriented to person, place, and time  Skin: Skin is warm and dry  No rash noted  Psychiatric: She has a normal mood and affect  Her behavior is normal    Vitals reviewed        Lab Results   Component Value Date     7 1 03/04/2019

## 2019-05-16 DIAGNOSIS — F32.A DEPRESSION, UNSPECIFIED DEPRESSION TYPE: ICD-10-CM

## 2019-05-16 RX ORDER — VILAZODONE HYDROCHLORIDE 10 MG/1
TABLET ORAL
Qty: 90 TABLET | Refills: 1 | Status: SHIPPED | OUTPATIENT
Start: 2019-05-16 | End: 2019-10-31 | Stop reason: SDUPTHER

## 2019-06-14 ENCOUNTER — APPOINTMENT (OUTPATIENT)
Dept: LAB | Age: 59
End: 2019-06-14
Payer: COMMERCIAL

## 2019-06-14 ENCOUNTER — TRANSCRIBE ORDERS (OUTPATIENT)
Dept: ADMINISTRATIVE | Age: 59
End: 2019-06-14

## 2019-06-14 ENCOUNTER — TRANSCRIBE ORDERS (OUTPATIENT)
Dept: LAB | Age: 59
End: 2019-06-14

## 2019-06-14 DIAGNOSIS — R89.9 ABNORMAL LABORATORY TEST: ICD-10-CM

## 2019-06-14 DIAGNOSIS — D72.819 LEUKOPENIA, UNSPECIFIED TYPE: Primary | ICD-10-CM

## 2019-06-14 DIAGNOSIS — D72.819 LEUKOPENIA, UNSPECIFIED TYPE: ICD-10-CM

## 2019-06-14 LAB
ALBUMIN SERPL BCP-MCNC: 3.8 G/DL (ref 3.5–5)
ALP SERPL-CCNC: 63 U/L (ref 46–116)
ALT SERPL W P-5'-P-CCNC: 23 U/L (ref 12–78)
ANION GAP SERPL CALCULATED.3IONS-SCNC: 3 MMOL/L (ref 4–13)
AST SERPL W P-5'-P-CCNC: 15 U/L (ref 5–45)
BASOPHILS # BLD AUTO: 0.03 THOUSANDS/ΜL (ref 0–0.1)
BASOPHILS NFR BLD AUTO: 1 % (ref 0–1)
BILIRUB SERPL-MCNC: 0.38 MG/DL (ref 0.2–1)
BUN SERPL-MCNC: 20 MG/DL (ref 5–25)
CALCIUM SERPL-MCNC: 8.9 MG/DL (ref 8.3–10.1)
CHLORIDE SERPL-SCNC: 106 MMOL/L (ref 100–108)
CO2 SERPL-SCNC: 33 MMOL/L (ref 21–32)
CREAT SERPL-MCNC: 0.74 MG/DL (ref 0.6–1.3)
EOSINOPHIL # BLD AUTO: 0.12 THOUSAND/ΜL (ref 0–0.61)
EOSINOPHIL NFR BLD AUTO: 3 % (ref 0–6)
ERYTHROCYTE [DISTWIDTH] IN BLOOD BY AUTOMATED COUNT: 13.2 % (ref 11.6–15.1)
GFR SERPL CREATININE-BSD FRML MDRD: 91 ML/MIN/1.73SQ M
GLUCOSE P FAST SERPL-MCNC: 86 MG/DL (ref 65–99)
HCT VFR BLD AUTO: 40.8 % (ref 34.8–46.1)
HGB BLD-MCNC: 13 G/DL (ref 11.5–15.4)
IMM GRANULOCYTES # BLD AUTO: 0.01 THOUSAND/UL (ref 0–0.2)
IMM GRANULOCYTES NFR BLD AUTO: 0 % (ref 0–2)
LYMPHOCYTES # BLD AUTO: 1.24 THOUSANDS/ΜL (ref 0.6–4.47)
LYMPHOCYTES NFR BLD AUTO: 26 % (ref 14–44)
MCH RBC QN AUTO: 29.6 PG (ref 26.8–34.3)
MCHC RBC AUTO-ENTMCNC: 31.9 G/DL (ref 31.4–37.4)
MCV RBC AUTO: 93 FL (ref 82–98)
MONOCYTES # BLD AUTO: 0.47 THOUSAND/ΜL (ref 0.17–1.22)
MONOCYTES NFR BLD AUTO: 10 % (ref 4–12)
NEUTROPHILS # BLD AUTO: 2.82 THOUSANDS/ΜL (ref 1.85–7.62)
NEUTS SEG NFR BLD AUTO: 60 % (ref 43–75)
NRBC BLD AUTO-RTO: 0 /100 WBCS
PLATELET # BLD AUTO: 223 THOUSANDS/UL (ref 149–390)
PMV BLD AUTO: 9.8 FL (ref 8.9–12.7)
POTASSIUM SERPL-SCNC: 4.3 MMOL/L (ref 3.5–5.3)
PROT SERPL-MCNC: 7.1 G/DL (ref 6.4–8.2)
RBC # BLD AUTO: 4.39 MILLION/UL (ref 3.81–5.12)
SODIUM SERPL-SCNC: 142 MMOL/L (ref 136–145)
WBC # BLD AUTO: 4.69 THOUSAND/UL (ref 4.31–10.16)

## 2019-06-14 PROCEDURE — 84165 PROTEIN E-PHORESIS SERUM: CPT

## 2019-06-14 PROCEDURE — 36415 COLL VENOUS BLD VENIPUNCTURE: CPT

## 2019-06-14 PROCEDURE — 80053 COMPREHEN METABOLIC PANEL: CPT

## 2019-06-14 PROCEDURE — 85025 COMPLETE CBC W/AUTO DIFF WBC: CPT

## 2019-06-14 PROCEDURE — 84165 PROTEIN E-PHORESIS SERUM: CPT | Performed by: PATHOLOGY

## 2019-06-18 LAB
ALBUMIN SERPL ELPH-MCNC: 4.24 G/DL (ref 3.5–5)
ALBUMIN SERPL ELPH-MCNC: 63.3 % (ref 52–65)
ALPHA1 GLOB SERPL ELPH-MCNC: 0.24 G/DL (ref 0.1–0.4)
ALPHA1 GLOB SERPL ELPH-MCNC: 3.6 % (ref 2.5–5)
ALPHA2 GLOB SERPL ELPH-MCNC: 0.65 G/DL (ref 0.4–1.2)
ALPHA2 GLOB SERPL ELPH-MCNC: 9.7 % (ref 7–13)
BETA GLOB ABNORMAL SERPL ELPH-MCNC: 0.36 G/DL (ref 0.4–0.8)
BETA1 GLOB SERPL ELPH-MCNC: 5.3 % (ref 5–13)
BETA2 GLOB SERPL ELPH-MCNC: 4.1 % (ref 2–8)
BETA2+GAMMA GLOB SERPL ELPH-MCNC: 0.27 G/DL (ref 0.2–0.5)
GAMMA GLOB ABNORMAL SERPL ELPH-MCNC: 0.94 G/DL (ref 0.5–1.6)
GAMMA GLOB SERPL ELPH-MCNC: 14 % (ref 12–22)
IGG/ALB SER: 1.72 {RATIO} (ref 1.1–1.8)
PROT PATTERN SERPL ELPH-IMP: ABNORMAL
PROT SERPL-MCNC: 6.7 G/DL (ref 6.4–8.2)

## 2019-06-27 ENCOUNTER — OFFICE VISIT (OUTPATIENT)
Dept: INTERNAL MEDICINE CLINIC | Facility: CLINIC | Age: 59
End: 2019-06-27
Payer: COMMERCIAL

## 2019-06-27 VITALS
SYSTOLIC BLOOD PRESSURE: 118 MMHG | RESPIRATION RATE: 16 BRPM | OXYGEN SATURATION: 99 % | BODY MASS INDEX: 26.76 KG/M2 | WEIGHT: 160.6 LBS | HEIGHT: 65 IN | HEART RATE: 47 BPM | DIASTOLIC BLOOD PRESSURE: 68 MMHG

## 2019-06-27 DIAGNOSIS — Z13.6 SCREENING FOR CARDIOVASCULAR CONDITION: ICD-10-CM

## 2019-06-27 DIAGNOSIS — M33.90 DERMATOMYOSITIS (HCC): Primary | ICD-10-CM

## 2019-06-27 DIAGNOSIS — R91.1 LUNG NODULE: ICD-10-CM

## 2019-06-27 DIAGNOSIS — Z15.01 BRCA2 GENE MUTATION POSITIVE: ICD-10-CM

## 2019-06-27 DIAGNOSIS — Z23 NEED FOR ZOSTAVAX ADMINISTRATION: ICD-10-CM

## 2019-06-27 DIAGNOSIS — E55.9 VITAMIN D DEFICIENCY: ICD-10-CM

## 2019-06-27 DIAGNOSIS — M79.89 SOFT TISSUE MASS: ICD-10-CM

## 2019-06-27 DIAGNOSIS — F32.A DEPRESSION, UNSPECIFIED DEPRESSION TYPE: ICD-10-CM

## 2019-06-27 DIAGNOSIS — F33.42 RECURRENT MAJOR DEPRESSIVE DISORDER, IN FULL REMISSION (HCC): ICD-10-CM

## 2019-06-27 DIAGNOSIS — Z15.09 BRCA2 GENE MUTATION POSITIVE: ICD-10-CM

## 2019-06-27 PROCEDURE — 90750 HZV VACC RECOMBINANT IM: CPT

## 2019-06-27 PROCEDURE — 3008F BODY MASS INDEX DOCD: CPT | Performed by: INTERNAL MEDICINE

## 2019-06-27 PROCEDURE — 99214 OFFICE O/P EST MOD 30 MIN: CPT | Performed by: INTERNAL MEDICINE

## 2019-06-27 PROCEDURE — 90471 IMMUNIZATION ADMIN: CPT

## 2019-07-01 ENCOUNTER — OFFICE VISIT (OUTPATIENT)
Dept: LAB | Age: 59
End: 2019-07-01
Payer: COMMERCIAL

## 2019-07-01 DIAGNOSIS — R22.2 MASS ON BACK: ICD-10-CM

## 2019-07-01 LAB
ATRIAL RATE: 60 BPM
PR INTERVAL: 126 MS
QRS AXIS: 139 DEGREES
QRSD INTERVAL: 94 MS
QT INTERVAL: 428 MS
QTC INTERVAL: 428 MS
T WAVE AXIS: 9 DEGREES
VENTRICULAR RATE: 60 BPM

## 2019-07-01 PROCEDURE — 93010 ELECTROCARDIOGRAM REPORT: CPT | Performed by: INTERNAL MEDICINE

## 2019-07-01 PROCEDURE — 93005 ELECTROCARDIOGRAM TRACING: CPT

## 2019-07-01 NOTE — H&P (VIEW-ONLY)
Assessment/Plan:  Patient has a history of a left breast mastectomy     She had a latissimus Dorsi flap harvest   She has noted to have a posterior left rib mass  She feels it is enlarging in size  Previous workup with biopsy was consistent with scar tissue  I believe it is retraction of the muscle left revealed the flap  Since it is enlarging over biopsy is recommended in light of her history of dermatomyositis  Diagnoses and all orders for this visit:    Malignant neoplasm of nipple of both breasts in female, estrogen receptor positive ([address] )    Soft tissue mass  -     Ambulatory referral to General Surgery        Subjective:      Patient ID: Jonatan Mcdonald is a 66 y o  female  Patient presents for evaluation of mass on the left  of her back  States she has had the mass for 2 years  History of biopsy 12/2017  Has increased in size and she has discomfort  States she has a history of dermatomyositis  CT chest abdomen pelvis 3/4/2019  IMPRESSION:  1  Postoperative changes as above, without convincing evidence of metastatic disease in the chest, abdomen or pelvis  2   5 mm right lower lobe pulmonary nodule is unchanged since 11/17/2017  Based on current Fleischner Society 2017 Guidelines on incidental pulmonary nodule, patients with a known malignancy are at increased risk of metastasis and should receive followup   CT at intervals appropriate for the type of cancer and its risk of pulmonary metastases  3   Unchanged superficial nodule left posterior chest which was previously biopsied with benign results      The following portions of the patient's history were reviewed and updated as appropriate:     She  has a past medical history of Benign neoplasm of lung, Breast cancer ([address] ), and Dermatomyositis ([address] )  She  has a past surgical history that includes Hysterectomy; Appendectomy; Mastectomy; Colonoscopy (N/A, 1/26/2016); Oophorectomy; and Breast surgery    Her family history includes No Known Problems in her father and mother  She  reports that she has never smoked  She has never used smokeless tobacco  She reports that she does not drink alcohol or use drugs  Current Outpatient Medications   Medication Sig Dispense Refill    Calcium-Magnesium-Vitamin D (CALCIUM 500 PO) Take 1,000 mg by mouth daily   Cholecalciferol (VITAMIN D) 2000 UNITS CAPS Take 2,000 Units/day by mouth   hydroxychloroquine (PLAQUENIL) 200 mg tablet qod (Patient taking differently: Take 200 mg by mouth daily with breakfast qod)  0    multivitamin (THERAGRAN) TABS Take 1 tablet by mouth daily   Omega-3 Fatty Acids (FISH OIL) 1200 MG CAPS Take 1,200 mg by mouth daily   VIIBRYD 10 MG tablet TAKE 1 TABLET DAILY 90 tablet 1     No current facility-administered medications for this visit  She has No Known Allergies       Review of Systems   Constitutional: Negative  Negative for activity change  HENT: Negative  Eyes: Negative  Respiratory: Negative  Cardiovascular: Negative  Gastrointestinal: Negative  Endocrine: Negative  Genitourinary: Negative  Musculoskeletal: Positive for back pain (Secondary to mass upper left rib posterior)  Skin: Negative  Allergic/Immunologic: Negative  Neurological: Negative  Psychiatric/Behavioral: Negative for agitation, behavioral problems and confusion  The patient is not nervous/anxious  Objective:      /73 (BP Location: Left arm, Patient Position: Sitting)   Pulse 60   Resp 12   Ht 5' 5" (1 651 m)   Wt 70 3 kg (155 lb)   BMI 25 79 kg/m²          Physical Exam   Constitutional: She is oriented to person, place, and time  She appears well-developed and well-nourished  HENT:   Head: Normocephalic and atraumatic  Nose: Nose normal    Eyes: Pupils are equal, round, and reactive to light  EOM are normal    Neck: Normal range of motion  Neck supple  Cardiovascular: Normal rate, regular rhythm and normal heart sounds  Pulmonary/Chest: Effort normal and breath sounds normal            Abdominal: Soft  Bowel sounds are normal    Musculoskeletal: Normal range of motion  Neurological: She is alert and oriented to person, place, and time  Skin: Skin is warm and dry  Psychiatric: She has a normal mood and affect

## 2019-07-10 NOTE — PRE-PROCEDURE INSTRUCTIONS
Pre-Surgery Instructions:   Medication Instructions    Calcium-Magnesium-Vitamin D (CALCIUM 500 PO) Instructed patient per Anesthesia Guidelines   Cholecalciferol (VITAMIN D) 2000 UNITS CAPS Instructed patient per Anesthesia Guidelines   hydroxychloroquine (PLAQUENIL) 200 mg tablet Instructed patient per Anesthesia Guidelines   multivitamin (THERAGRAN) TABS Instructed patient per Anesthesia Guidelines   Omega-3 Fatty Acids (FISH OIL) 1200 MG CAPS Instructed patient per Anesthesia Guidelines   VIIBRYD 10 MG tablet Instructed patient per Anesthesia Guidelines  Pre-op and bathing instructions given  Patient has hibiclens

## 2019-07-17 ENCOUNTER — ANESTHESIA EVENT (OUTPATIENT)
Dept: PERIOP | Facility: HOSPITAL | Age: 59
End: 2019-07-17
Payer: COMMERCIAL

## 2019-07-17 RX ORDER — CEFAZOLIN SODIUM 1 G/50ML
1000 SOLUTION INTRAVENOUS ONCE
Status: COMPLETED | OUTPATIENT
Start: 2019-07-18 | End: 2019-07-18

## 2019-07-17 NOTE — ANESTHESIA PREPROCEDURE EVALUATION
Review of Systems/Medical History          Cardiovascular  EKG reviewed,    Pulmonary  Not a smoker , No recent URI ,        GI/Hepatic    No GERD ,             Endo/Other     GYN    Hysterectomy, Breast cancer Right mastectomy and left mastectomy       Hematology   Musculoskeletal       Neurology    Cerebral bleeding with side effects ,    Psychology   Depression ,              Physical Exam    Airway    Mallampati score: I  TM Distance: >3 FB  Neck ROM: full     Dental   No notable dental hx     Cardiovascular      Pulmonary      Other Findings        Lab Results   Component Value Date    GLUC 80 03/04/2019    GLUF 86 06/14/2019    ALT 23 06/14/2019    AST 15 06/14/2019    BUN 20 06/14/2019    CALCIUM 8 9 06/14/2019     06/14/2019    CHOL 177 10/02/2014    CO2 33 (H) 06/14/2019    CREATININE 0 74 06/14/2019    HDL 70 (H) 12/14/2018    HCT 40 8 06/14/2019    HGB 13 0 06/14/2019    PROT 7 6 11/19/2015    HGBA1C 5 3 02/27/2017     06/14/2019    K 4 3 06/14/2019     09/15/2015    TRIG 55 12/14/2018    WBC 4 69 06/14/2019       Anesthesia Plan  ASA Score- 2     Anesthesia Type- general with ASA Monitors  Additional Monitors:   Airway Plan: ETT  Plan Factors-Patient not instructed to abstain from smoking on day of procedure  Patient did not smoke on day of surgery  Induction- intravenous  Postoperative Plan-     Informed Consent- Anesthetic plan and risks discussed with patient and spouse  I personally reviewed this patient with the CRNA  Discussed and agreed on the Anesthesia Plan with the CRNA  Quincy Duval

## 2019-07-18 ENCOUNTER — HOSPITAL ENCOUNTER (OUTPATIENT)
Facility: HOSPITAL | Age: 59
Setting detail: OUTPATIENT SURGERY
Discharge: HOME | End: 2019-07-18
Attending: SURGERY | Admitting: SURGERY
Payer: COMMERCIAL

## 2019-07-18 ENCOUNTER — ANESTHESIA (OUTPATIENT)
Dept: PERIOP | Facility: HOSPITAL | Age: 59
End: 2019-07-18
Payer: COMMERCIAL

## 2019-07-18 VITALS
RESPIRATION RATE: 20 BRPM | DIASTOLIC BLOOD PRESSURE: 51 MMHG | OXYGEN SATURATION: 99 % | WEIGHT: 155 LBS | HEIGHT: 65 IN | BODY MASS INDEX: 25.83 KG/M2 | HEART RATE: 56 BPM | TEMPERATURE: 98.1 F | SYSTOLIC BLOOD PRESSURE: 103 MMHG

## 2019-07-18 DIAGNOSIS — R22.2 MASS ON BACK: ICD-10-CM

## 2019-07-18 PROCEDURE — 21933 EXC BACK TUM DEEP 5 CM/>: CPT | Performed by: SURGERY

## 2019-07-18 PROCEDURE — 88307 TISSUE EXAM BY PATHOLOGIST: CPT | Performed by: PATHOLOGY

## 2019-07-18 RX ORDER — SODIUM CHLORIDE, SODIUM LACTATE, POTASSIUM CHLORIDE, CALCIUM CHLORIDE 600; 310; 30; 20 MG/100ML; MG/100ML; MG/100ML; MG/100ML
125 INJECTION, SOLUTION INTRAVENOUS CONTINUOUS
Status: DISCONTINUED | OUTPATIENT
Start: 2019-07-18 | End: 2019-07-18 | Stop reason: HOSPADM

## 2019-07-18 RX ORDER — GLYCOPYRROLATE 0.2 MG/ML
INJECTION INTRAMUSCULAR; INTRAVENOUS AS NEEDED
Status: DISCONTINUED | OUTPATIENT
Start: 2019-07-18 | End: 2019-07-18 | Stop reason: SURG

## 2019-07-18 RX ORDER — MIDAZOLAM HYDROCHLORIDE 1 MG/ML
INJECTION INTRAMUSCULAR; INTRAVENOUS AS NEEDED
Status: DISCONTINUED | OUTPATIENT
Start: 2019-07-18 | End: 2019-07-18 | Stop reason: SURG

## 2019-07-18 RX ORDER — ONDANSETRON 2 MG/ML
INJECTION INTRAMUSCULAR; INTRAVENOUS AS NEEDED
Status: DISCONTINUED | OUTPATIENT
Start: 2019-07-18 | End: 2019-07-18 | Stop reason: SURG

## 2019-07-18 RX ORDER — BUPIVACAINE HYDROCHLORIDE AND EPINEPHRINE 2.5; 5 MG/ML; UG/ML
INJECTION, SOLUTION EPIDURAL; INFILTRATION; INTRACAUDAL; PERINEURAL AS NEEDED
Status: DISCONTINUED | OUTPATIENT
Start: 2019-07-18 | End: 2019-07-18 | Stop reason: HOSPADM

## 2019-07-18 RX ORDER — HYDROMORPHONE HCL/PF 1 MG/ML
0.5 SYRINGE (ML) INJECTION
Status: DISCONTINUED | OUTPATIENT
Start: 2019-07-18 | End: 2019-07-18 | Stop reason: HOSPADM

## 2019-07-18 RX ORDER — LIDOCAINE HYDROCHLORIDE 10 MG/ML
INJECTION, SOLUTION INFILTRATION; PERINEURAL AS NEEDED
Status: DISCONTINUED | OUTPATIENT
Start: 2019-07-18 | End: 2019-07-18 | Stop reason: SURG

## 2019-07-18 RX ORDER — EPHEDRINE SULFATE 50 MG/ML
INJECTION INTRAVENOUS AS NEEDED
Status: DISCONTINUED | OUTPATIENT
Start: 2019-07-18 | End: 2019-07-18 | Stop reason: SURG

## 2019-07-18 RX ORDER — MAGNESIUM HYDROXIDE 1200 MG/15ML
LIQUID ORAL AS NEEDED
Status: DISCONTINUED | OUTPATIENT
Start: 2019-07-18 | End: 2019-07-18 | Stop reason: HOSPADM

## 2019-07-18 RX ORDER — PROPOFOL 10 MG/ML
INJECTION, EMULSION INTRAVENOUS AS NEEDED
Status: DISCONTINUED | OUTPATIENT
Start: 2019-07-18 | End: 2019-07-18 | Stop reason: SURG

## 2019-07-18 RX ORDER — ONDANSETRON 2 MG/ML
4 INJECTION INTRAMUSCULAR; INTRAVENOUS EVERY 4 HOURS PRN
Status: DISCONTINUED | OUTPATIENT
Start: 2019-07-18 | End: 2019-07-18 | Stop reason: HOSPADM

## 2019-07-18 RX ORDER — FENTANYL CITRATE/PF 50 MCG/ML
25 SYRINGE (ML) INJECTION
Status: DISCONTINUED | OUTPATIENT
Start: 2019-07-18 | End: 2019-07-18 | Stop reason: HOSPADM

## 2019-07-18 RX ORDER — ONDANSETRON 2 MG/ML
4 INJECTION INTRAMUSCULAR; INTRAVENOUS ONCE AS NEEDED
Status: DISCONTINUED | OUTPATIENT
Start: 2019-07-18 | End: 2019-07-18 | Stop reason: HOSPADM

## 2019-07-18 RX ORDER — OXYCODONE HYDROCHLORIDE AND ACETAMINOPHEN 5; 325 MG/1; MG/1
1 TABLET ORAL EVERY 4 HOURS PRN
Status: DISCONTINUED | OUTPATIENT
Start: 2019-07-18 | End: 2019-07-18 | Stop reason: HOSPADM

## 2019-07-18 RX ORDER — DEXAMETHASONE SODIUM PHOSPHATE 10 MG/ML
INJECTION, SOLUTION INTRAMUSCULAR; INTRAVENOUS AS NEEDED
Status: DISCONTINUED | OUTPATIENT
Start: 2019-07-18 | End: 2019-07-18 | Stop reason: SURG

## 2019-07-18 RX ADMIN — DEXAMETHASONE SODIUM PHOSPHATE 4 MG: 10 INJECTION, SOLUTION INTRAMUSCULAR; INTRAVENOUS at 10:09

## 2019-07-18 RX ADMIN — CEFAZOLIN SODIUM 1000 MG: 1 SOLUTION INTRAVENOUS at 09:57

## 2019-07-18 RX ADMIN — SODIUM CHLORIDE, SODIUM LACTATE, POTASSIUM CHLORIDE, AND CALCIUM CHLORIDE 125 ML/HR: .6; .31; .03; .02 INJECTION, SOLUTION INTRAVENOUS at 07:59

## 2019-07-18 RX ADMIN — EPHEDRINE SULFATE 10 MG: 50 INJECTION, SOLUTION INTRAVENOUS at 10:33

## 2019-07-18 RX ADMIN — MIDAZOLAM HYDROCHLORIDE 2 MG: 1 INJECTION, SOLUTION INTRAMUSCULAR; INTRAVENOUS at 09:57

## 2019-07-18 RX ADMIN — LIDOCAINE HYDROCHLORIDE ANHYDROUS 50 MG: 10 INJECTION, SOLUTION INFILTRATION at 10:03

## 2019-07-18 RX ADMIN — GLYCOPYRROLATE 0.1 MG: 0.2 INJECTION, SOLUTION INTRAMUSCULAR; INTRAVENOUS at 10:16

## 2019-07-18 RX ADMIN — EPHEDRINE SULFATE 10 MG: 50 INJECTION, SOLUTION INTRAVENOUS at 10:16

## 2019-07-18 RX ADMIN — ONDANSETRON 4 MG: 2 INJECTION INTRAMUSCULAR; INTRAVENOUS at 10:16

## 2019-07-18 RX ADMIN — PROPOFOL 200 MG: 10 INJECTION, EMULSION INTRAVENOUS at 10:03

## 2019-07-18 NOTE — ANESTHESIA POSTPROCEDURE EVALUATION
Post-Op Assessment Note    CV Status:  Stable  Pain Score: 0    Pain management: adequate     Mental Status:  Alert   Hydration Status:  Stable   PONV Controlled:  None   Airway Patency:  Patent and adequate   Post Op Vitals Reviewed: Yes      Staff: CRNA   Comments: 6 L FM          BP      Temp     Pulse    Resp      SpO2

## 2019-07-18 NOTE — DISCHARGE INSTRUCTIONS
Diet and activity as tolerated  May shower  May drive when not taking pain medication  Please call [phone] for a follow-up office visit for 2 weeks

## 2019-07-18 NOTE — OP NOTE
OPERATIVE REPORT  PATIENT NAME: Aleyda Santamaria    :  1960  MRN: [id number]  Pt Location: AN OR ROOM 02    SURGERY DATE: 2019    Surgeon(s) and Role:     * Tulio Garcia MD - Primary     * Yessy Perrin MD - Assisting    Preop Diagnosis:  Mass on back [R22 2]    Post-Op Diagnosis Codes:     * Mass on back [R22 2]    Procedure-excision of muscular mass from left back  Specimen(s):  ID Type Source Tests Collected by Time Destination   1 : Left mid back mass Tissue Soft Tissue, Other TISSUE EXAM Tulio Garcia MD 2019 1020        Estimated Blood Loss:   Minimal    Drains:  * No LDAs found *    Anesthesia Type:   General    Operative Indications: Mass on back [R22 2]      Operative Findings:  Independent, nonsmoker, ASA 2, wound class 1, BMI 26, weight 155, height 65    Cardiovascular  EKG reviewed,     Pulmonary  Not a smoker , No recent URI ,          GI/Hepatic     No GERD ,                 Endo/Other       GYN     Hysterectomy, Breast cancer Right mastectomy and left mastectomy         Hematology    Musculoskeletal         Neurology     Cerebral bleeding with side effects ,     Psychology   Depression ,                    Complications:   None    Procedure and Technique:  Patient was identified visually and via armband  Placed supine position  After LMA intubation she was turned the right lateral decubitus position  The mid left back was prepped and draped in a sterile fashion  0 25% Marcaine with epinephrine was utilized throughout the procedure  Incision was made over the previous latissimus Cueva flap  This incision was carried down through skin subcutaneous tissue  Superficial musculature was divided  The deep musculature was exposed  Of the muscular mass was then excised  This measures 5 x 3 cm in size  Margins 0  His most consistent with grossly necrotic muscular tissue devascularized  There is irrigated  Aspirated dry  Hemostasis was assured    The wound was then closed with 0 Vicryl muscular subcutaneous elements followed by 4 Monocryl suture and Dermabond  Patient tolerated this procedure well  Sponge instrument count correct x2     I was present for the entire procedure    Patient Disposition:  PACU     SIGNATURE: Cade Thomas MD  DATE: July 18, 2019  TIME: 10:46 AM

## 2019-08-05 PROBLEM — R22.2 MASS ON BACK: Status: RESOLVED | Noted: 2019-07-01 | Resolved: 2019-08-05

## 2019-09-03 ENCOUNTER — HOSPITAL ENCOUNTER (OUTPATIENT)
Dept: RADIOLOGY | Age: 59
Discharge: HOME | End: 2019-09-03
Payer: COMMERCIAL

## 2019-09-03 ENCOUNTER — APPOINTMENT (OUTPATIENT)
Dept: LAB | Age: 59
End: 2019-09-03
Payer: COMMERCIAL

## 2019-09-03 DIAGNOSIS — Z15.09 BRCA2 GENE MUTATION POSITIVE: ICD-10-CM

## 2019-09-03 DIAGNOSIS — Z15.01 BRCA2 GENE MUTATION POSITIVE: ICD-10-CM

## 2019-09-03 DIAGNOSIS — R91.1 LUNG NODULE: ICD-10-CM

## 2019-09-03 LAB
ALBUMIN SERPL BCP-MCNC: 4.2 G/DL (ref 3.5–5)
ALP SERPL-CCNC: 63 U/L (ref 46–116)
ALT SERPL W P-5'-P-CCNC: 18 U/L (ref 12–78)
ANION GAP SERPL CALCULATED.3IONS-SCNC: 4 MMOL/L (ref 4–13)
AST SERPL W P-5'-P-CCNC: 12 U/L (ref 5–45)
BASOPHILS # BLD AUTO: 0.03 THOUSANDS/ΜL (ref 0–0.1)
BASOPHILS NFR BLD AUTO: 1 % (ref 0–1)
BILIRUB SERPL-MCNC: 0.36 MG/DL (ref 0.2–1)
BUN SERPL-MCNC: 16 MG/DL (ref 5–25)
CALCIUM SERPL-MCNC: 9.3 MG/DL (ref 8.3–10.1)
CANCER AG125 SERPL-ACNC: 6.9 U/ML (ref 0–30)
CANCER AG27-29 SERPL-ACNC: 21.6 U/ML (ref 0–42.3)
CEA SERPL-MCNC: 0.8 NG/ML (ref 0–3)
CHLORIDE SERPL-SCNC: 110 MMOL/L (ref 100–108)
CO2 SERPL-SCNC: 30 MMOL/L (ref 21–32)
CREAT SERPL-MCNC: 0.71 MG/DL (ref 0.6–1.3)
EOSINOPHIL # BLD AUTO: 0.09 THOUSAND/ΜL (ref 0–0.61)
EOSINOPHIL NFR BLD AUTO: 2 % (ref 0–6)
ERYTHROCYTE [DISTWIDTH] IN BLOOD BY AUTOMATED COUNT: 13.6 % (ref 11.6–15.1)
GFR SERPL CREATININE-BSD FRML MDRD: 96 ML/MIN/1.73SQ M
GLUCOSE P FAST SERPL-MCNC: 85 MG/DL (ref 65–99)
HCT VFR BLD AUTO: 41.9 % (ref 34.8–46.1)
HGB BLD-MCNC: 13.4 G/DL (ref 11.5–15.4)
IMM GRANULOCYTES # BLD AUTO: 0.01 THOUSAND/UL (ref 0–0.2)
IMM GRANULOCYTES NFR BLD AUTO: 0 % (ref 0–2)
LYMPHOCYTES # BLD AUTO: 1.16 THOUSANDS/ΜL (ref 0.6–4.47)
LYMPHOCYTES NFR BLD AUTO: 28 % (ref 14–44)
MCH RBC QN AUTO: 29.7 PG (ref 26.8–34.3)
MCHC RBC AUTO-ENTMCNC: 32 G/DL (ref 31.4–37.4)
MCV RBC AUTO: 93 FL (ref 82–98)
MONOCYTES # BLD AUTO: 0.39 THOUSAND/ΜL (ref 0.17–1.22)
MONOCYTES NFR BLD AUTO: 9 % (ref 4–12)
NEUTROPHILS # BLD AUTO: 2.52 THOUSANDS/ΜL (ref 1.85–7.62)
NEUTS SEG NFR BLD AUTO: 60 % (ref 43–75)
NRBC BLD AUTO-RTO: 0 /100 WBCS
PLATELET # BLD AUTO: 214 THOUSANDS/UL (ref 149–390)
PMV BLD AUTO: 10.4 FL (ref 8.9–12.7)
POTASSIUM SERPL-SCNC: 3.9 MMOL/L (ref 3.5–5.3)
PROT SERPL-MCNC: 7.3 G/DL (ref 6.4–8.2)
RBC # BLD AUTO: 4.51 MILLION/UL (ref 3.81–5.12)
SODIUM SERPL-SCNC: 144 MMOL/L (ref 136–145)
WBC # BLD AUTO: 4.2 THOUSAND/UL (ref 4.31–10.16)

## 2019-09-03 PROCEDURE — 71250 CT THORAX DX C-: CPT

## 2019-09-03 PROCEDURE — 86300 IMMUNOASSAY TUMOR CA 15-3: CPT | Performed by: PHYSICIAN ASSISTANT

## 2019-09-03 PROCEDURE — 82378 CARCINOEMBRYONIC ANTIGEN: CPT | Performed by: PHYSICIAN ASSISTANT

## 2019-09-03 PROCEDURE — 86304 IMMUNOASSAY TUMOR CA 125: CPT | Performed by: PHYSICIAN ASSISTANT

## 2019-09-03 PROCEDURE — 76700 US EXAM ABDOM COMPLETE: CPT

## 2019-09-03 PROCEDURE — 80053 COMPREHEN METABOLIC PANEL: CPT | Performed by: PHYSICIAN ASSISTANT

## 2019-09-03 PROCEDURE — 36415 COLL VENOUS BLD VENIPUNCTURE: CPT | Performed by: PHYSICIAN ASSISTANT

## 2019-09-03 PROCEDURE — 85025 COMPLETE CBC W/AUTO DIFF WBC: CPT | Performed by: PHYSICIAN ASSISTANT

## 2019-09-06 ENCOUNTER — TELEPHONE (OUTPATIENT)
Dept: HEMATOLOGY ONCOLOGY | Facility: CLINIC | Age: 59
End: 2019-09-06

## 2019-09-06 NOTE — TELEPHONE ENCOUNTER
Called RAD reading room and informed them that the patient has an appt on 9/9/19 and has a CT and US that needs to be read  I was informed it will be read prior to patients appt

## 2019-09-09 ENCOUNTER — OFFICE VISIT (OUTPATIENT)
Dept: HEMATOLOGY ONCOLOGY | Facility: CLINIC | Age: 59
End: 2019-09-09
Payer: COMMERCIAL

## 2019-09-09 VITALS
BODY MASS INDEX: 26.26 KG/M2 | SYSTOLIC BLOOD PRESSURE: 102 MMHG | DIASTOLIC BLOOD PRESSURE: 50 MMHG | OXYGEN SATURATION: 98 % | TEMPERATURE: 98.3 F | WEIGHT: 157.6 LBS | HEIGHT: 65 IN | HEART RATE: 57 BPM | RESPIRATION RATE: 18 BRPM

## 2019-09-09 DIAGNOSIS — Z85.820 HISTORY OF MELANOMA: ICD-10-CM

## 2019-09-09 DIAGNOSIS — Z17.0 MALIGNANT NEOPLASM OF NIPPLE OF BOTH BREASTS IN FEMALE, ESTROGEN RECEPTOR POSITIVE (HCC): Primary | ICD-10-CM

## 2019-09-09 DIAGNOSIS — Z15.01 BRCA2 GENE MUTATION POSITIVE: ICD-10-CM

## 2019-09-09 DIAGNOSIS — D72.819 LEUKOPENIA, UNSPECIFIED TYPE: ICD-10-CM

## 2019-09-09 DIAGNOSIS — Z15.09 BRCA2 GENE MUTATION POSITIVE: ICD-10-CM

## 2019-09-09 DIAGNOSIS — C50.012 MALIGNANT NEOPLASM OF NIPPLE OF BOTH BREASTS IN FEMALE, ESTROGEN RECEPTOR POSITIVE (HCC): Primary | ICD-10-CM

## 2019-09-09 DIAGNOSIS — C50.011 MALIGNANT NEOPLASM OF NIPPLE OF BOTH BREASTS IN FEMALE, ESTROGEN RECEPTOR POSITIVE (HCC): Primary | ICD-10-CM

## 2019-09-09 DIAGNOSIS — R91.1 LUNG NODULE: ICD-10-CM

## 2019-09-09 PROCEDURE — 99214 OFFICE O/P EST MOD 30 MIN: CPT | Performed by: INTERNAL MEDICINE

## 2019-09-09 NOTE — PROGRESS NOTES
HPI:  Follow-up visit for positive BRCA 2, history of bilateral breast cancers, family history of breast and ovarian cancers and patient also has history of cutaneous melanoma in situ  History of dermatomyositis  Patient has been feeling well at present  In July his surgical scar was removed from left mastectomy scar posteriorly and there was no cancer in that  In 2008 patient had hormone receptor positive, HER-2 negative, stage IIA breast cancer on the right and stage IA on the left  One lymph node was positive on the right    Patient had bilateral mastectomies and reconstruction surgeries  She received 4 cycles of Taxotere plus Cytoxan followed by 5 years of Femara that she finished in September 2013  She underwent ITZEL and BSO  Patient had a 9 mm nodule in the right lower lung and she had wedge resection and that came back negative for malignancy  Now she has a  stable 5 mm nodule at left lung base and this is being monitored  She had history of vitamin D deficiency and that was corrected  She has history of cutaneous melanoma in situ in between the breasts and that was surgically removed by Dr Buddy Cole and she follows with him  She has history of dermatomyositis  She is on Plaquenil for skin and joint symptoms and symptoms have improved significantly  She has renal and liver cysts and also tiny in polyp in gallbladder  Bone density had shown osteopenia  Current Outpatient Medications:     Calcium-Magnesium-Vitamin D (CALCIUM 500 PO), Take 1,000 mg by mouth daily  , Disp: , Rfl:     Cholecalciferol (VITAMIN D) 2000 UNITS CAPS, Take 2,000 Units/day by mouth , Disp: , Rfl:     hydroxychloroquine (PLAQUENIL) 200 mg tablet, qod (Patient taking differently: Take 200 mg by mouth daily with breakfast qod), Disp: , Rfl: 0    multivitamin (THERAGRAN) TABS, Take 1 tablet by mouth daily  , Disp: , Rfl:     Omega-3 Fatty Acids (FISH OIL) 1200 MG CAPS, Take 1,200 mg by mouth daily  , Disp: , Rfl:     VIIBRYD 10 MG tablet, TAKE 1 TABLET DAILY, Disp: 90 tablet, Rfl: 1    No Known Allergies       Bilateral malignant neoplasm of breast in female, estrogen receptor positive (HCC)     Initial Diagnosis     Bilateral malignant neoplasm of breast in female, estrogen receptor positive [facility])       Surgery     Double mastectomy, reconstructive surgery       Chemotherapy     Chemotherapy with Taxotere and Cytoxan for 4 cycles followed by Femara from 2008 thru 2013  ROS:  09/09/19 Reviewed 13 systems:  Presently no neurological, cardiac, pulmonary, GI and  symptoms  No other symptoms like fever, chills, bleeding, bone pains, skin rash, weight loss,  tiredness ,  weakness, numbness,  claudication and gait problem  No frequent infections  Not unusually sensitive to heat or cold  No swelling of the ankles  No swollen glands  Patient is anxious  Other symptoms are in HPI        /50 (BP Location: Right arm, Patient Position: Sitting, Cuff Size: Adult)   Pulse 57   Temp 98 3 °F (36 8 °C)   Resp 18   Ht 5' 5" (1 651 m)   Wt 71 5 kg (157 lb 9 6 oz)   SpO2 98%   BMI 26 23 kg/m²     Physical Exam:    Patient is alert and oriented  She is not in distress  Vital signs are as above  There is no scleral icterus, no oral thrush, no palpable neck mass, clear lung fields, regular heart rate, abdomen  soft and non tender, no palpable abdominal mass, no ascites, no edema of ankles, no calf tenderness, no focal neurological deficit, no skin rash, no palpable lymphadenopathy in the neck and axillary areas, good arterial pulses, no clubbing  Patient is anxious  Performance status 0  IMAGING:  IMPRESSION:     Stable 5 mm left lower lobe nodule    No new nodule      Nearly resolved left paraspinal mass noted on the previous exam            Workstation performed: [id number]      Imaging     CT chest wo contrast (Order: [id number]) - 9/3/2019     IMPRESSION:     Liver and right renal cysts      Gallbladder polyps as described above                  Workstation performed: [id number]      Imaging     US abdomen complete (Order: [id number]) - 9/3/2019     LABS:  Results for orders placed or performed during the hospital encounter of 07/18/19   Tissue Exam   Result Value Ref Range    Case Report       Surgical Pathology Report                         Case: [id number]                                   Authorizing Provider:  Beth Boas, MD            Collected:           07/18/2019 1020              Ordering Location:     [facility]        Received:            07/18/2019 [address]  [facility]                                                      Pathologist:           Puja Giordano MD                                                        Specimen:    Soft Tissue, Other, Left mid back mass                                                     Final Diagnosis       A  Left mid back, excision:  - Extensivley necrotic material with focal calcification   - No viable tissue present for evaluation  Additional Information       All controls performed with the immunohistochemical stains reported above reacted appropriately  These tests were developed and their performance characteristics determined by [facility] or [facility]  They may not be cleared or approved by the [place]  Food and Drug Administration  The FDA has determined that such clearance or approval is not necessary  These tests are used for clinical purposes  They should not be regarded as investigational or for research  This laboratory has been approved by CLIA 88, designated as a high-complexity laboratory and is qualified to perform these tests  Gross Description       A  The specimen is received in formalin, labeled with the patient's name and hospital number, and is designated "left mid back mass    The specimen consists of multiple tan pink rubbery soft tissue fragments measuring in aggregate 2 7 x 2 5 x 1 6 centimeters  Fragments are sectioned revealing rubbery, grossly inflamed appearing tan to red to colorless cut surfaces  Representative sections  Two cassettes  Note: The estimated total formalin fixation time based upon information provided by the submitting clinician and the standard processing schedule is under 72 hours  MCrites       The remaining specimen is entirely submitted [facility] cassettes 3-6  [facility]       Labs, Imaging, & Other studies:   All pertinent labs and imaging studies were personally reviewed  Normal CEA and CA 27-29 and differential count  Lab Results   Component Value Date     09/15/2015    K 3 9 09/03/2019     (H) 09/03/2019    CO2 30 09/03/2019    ANIONGAP 12 09/15/2015    BUN 16 09/03/2019    CREATININE 0 71 09/03/2019    GLUCOSE 124 09/15/2015    GLUF 85 09/03/2019    CALCIUM 9 3 09/03/2019    AST 12 09/03/2019    ALT 18 09/03/2019    ALKPHOS 63 09/03/2019    PROT 7 6 11/19/2015    BILITOT 0 46 09/15/2015    EGFR 96 09/03/2019     Lab Results   Component Value Date    WBC 4 20 (L) 09/03/2019    HGB 13 4 09/03/2019    HCT 41 9 09/03/2019    MCV 93 09/03/2019     09/03/2019     Case Report   Surgical Pathology Report                         Case: [id number]                                    Authorizing Provider: Gerard Thomas MD            Collected:           07/18/2019 1020               Ordering Location:     [facility]        Received:            07/18/2019 1034                                      [facility]                                                       Pathologist:           [id number], MD                                                         Specimen:    Soft Tissue, Other, Left mid back mass                                                     Final Diagnosis   A   Left mid back, excision:  - Extensivley necrotic material with focal calcification   - No viable tissue present for evaluation  Electronically signed by [id number], MD on 7/25/2019 at  9:37 AM   Additional Information        Reviewed and discussed with patient  Assessment and plan: Follow-up visit for positive BRCA 2, history of bilateral breast cancers, family history of breast and ovarian cancers and patient also has history of cutaneous melanoma in situ  History of dermatomyositis  Patient has been feeling well at present  In July his surgical scar was removed from left mastectomy scar posteriorly and there was no cancer in that  In 2008 patient had hormone receptor positive, HER-2 negative, stage IIA breast cancer on the right and stage IA on the left  One lymph node was positive on the right    Patient had bilateral mastectomies and reconstruction surgeries  She received 4 cycles of Taxotere plus Cytoxan followed by 5 years of Femara that she finished in September 2013  She underwent ITZEL and BSO  Patient had a 9 mm nodule in the right lower lung and she had wedge resection and that came back negative for malignancy  Now she has a  stable 5 mm nodule at left lung base and this is being monitored  She had history of vitamin D deficiency and that was corrected  She has history of cutaneous melanoma in situ in between the breasts and that was surgically removed by Dr Adolfo Krueger and she follows with him  She has history of dermatomyositis  She is on Plaquenil for skin and joint symptoms and symptoms have improved significantly  She has renal and liver cysts and also tiny in polyp in gallbladder  Bone density had shown osteopenia  She remains in remission from breast cancers  She gets checked for other cancers  Condition and plan discussed  Questions answered  Goal is cure from breast cancer and early detection of any other cancer  Discussed the importance of eating healthy foods, staying active and health screening tests  Patient is capable of self-care  See orders below    1  Malignant neoplasm of nipple of both breasts in female, estrogen receptor positive ([facility] [address] )    - Cancer antigen 27 29; Future  - CBC and differential; Future  - CEA; Future  - Comprehensive metabolic panel; Future    2  Lung nodule      3  Leukopenia, unspecified type      4  BRCA2 gene mutation positive      5  History of melanoma      Patient voiced understanding and agreement in the discussion  Patient voiced understanding and agreement in the discussion  Counseling / Coordination of Care   Greater than 50% of total time was spent with the patient and / or family counseling and / or coordination of care

## 2019-09-19 DIAGNOSIS — Z12.39 SCREENING FOR BREAST CANCER: Primary | ICD-10-CM

## 2019-09-23 ENCOUNTER — OFFICE VISIT (OUTPATIENT)
Dept: GYNECOLOGIC ONCOLOGY | Facility: CLINIC | Age: 59
End: 2019-09-23
Payer: COMMERCIAL

## 2019-09-23 VITALS
BODY MASS INDEX: 25.71 KG/M2 | DIASTOLIC BLOOD PRESSURE: 74 MMHG | SYSTOLIC BLOOD PRESSURE: 120 MMHG | WEIGHT: 154.3 LBS | HEART RATE: 78 BPM | HEIGHT: 65 IN

## 2019-09-23 DIAGNOSIS — Z15.01 BRCA2 GENE MUTATION POSITIVE: Primary | ICD-10-CM

## 2019-09-23 DIAGNOSIS — Z15.09 BRCA2 GENE MUTATION POSITIVE: Primary | ICD-10-CM

## 2019-09-23 PROCEDURE — 99212 OFFICE O/P EST SF 10 MIN: CPT | Performed by: PHYSICIAN ASSISTANT

## 2019-09-23 NOTE — ASSESSMENT & PLAN NOTE
Pathogenic BRCA2 mutation with personal history of b/l breast cancer and melanoma in situ  She is s/p prophylactic TLH, BSO  Her clinical exam is without evidence of peritoneal cancer    normal      Return to the office in 6 months for continued BRCA surveillance with a pre-visit

## 2019-09-23 NOTE — PROGRESS NOTES
Assessment/Plan:    Problem List Items Addressed This Visit        Other    BRCA2 gene mutation positive - Primary     Pathogenic BRCA2 mutation with personal history of b/l breast cancer and melanoma in situ  She is s/p prophylactic TLH, BSO  Her clinical exam is without evidence of peritoneal cancer   normal      Return to the office in 6 months for continued BRCA surveillance with a pre-visit            Relevant Orders                CHIEF COMPLAINT:   BRCA surveillance    Problem:  1  BRCA 2 mutation  2  Bilateral breast cancer, stage IIA on left and stage IA on right, ER/OK positive, HER2 negative  3  Melanoma in situ of the chest wall   4  Dermatomyositis    Previous therapy:     Bilateral malignant neoplasm of breast in female, estrogen receptor positive ([facility] [address] )     Initial Diagnosis     Bilateral malignant neoplasm of breast in female, estrogen receptor positive [facility])       Surgery     Double mastectomy, reconstructive surgery       Chemotherapy     Chemotherapy with Taxotere and Cytoxan for 4 cycles followed by Femara from 2008 thru 2013  Patient ID: Baldemar Vides is a 66 y o  female  who has no new complaints today  No vaginal bleeding, abdominal/pelvic pain  Normal bowel and bladder function  No interval change in medical history since last visit  Quality of life is good  The following portions of the patient's history were reviewed and updated as appropriate: allergies, current medications, past medical history and problem list     Review of Systems   Constitutional: Negative  HENT: Negative  Eyes: Negative  Respiratory: Negative  Cardiovascular: Negative  Gastrointestinal: Negative  Genitourinary: Negative  Musculoskeletal: Negative  Skin: Negative  Neurological: Negative  Psychiatric/Behavioral: Negative          Current Outpatient Medications   Medication Sig Dispense Refill    Calcium-Magnesium-Vitamin D (CALCIUM 500 PO) Take 1,000 mg by mouth daily   Cholecalciferol (VITAMIN D) 2000 UNITS CAPS Take 2,000 Units/day by mouth   hydroxychloroquine (PLAQUENIL) 200 mg tablet qod (Patient taking differently: Take 200 mg by mouth daily with breakfast qod)  0    multivitamin (THERAGRAN) TABS Take 1 tablet by mouth daily   Omega-3 Fatty Acids (FISH OIL) 1200 MG CAPS Take 1,200 mg by mouth daily   VIIBRYD 10 MG tablet TAKE 1 TABLET DAILY 90 tablet 1     No current facility-administered medications for this visit  Objective:    Blood pressure 120/74, pulse 78, height 5' 5" (1 651 m), weight 70 kg (154 lb 4 8 oz)  Body mass index is 25 68 kg/m²  Body surface area is 1 77 meters squared  Physical Exam   Constitutional: She is oriented to person, place, and time  She appears well-developed and well-nourished  HENT:   Head: Normocephalic and atraumatic  Neck: Normal range of motion  Neck supple  No thyromegaly present  Pulmonary/Chest: Effort normal    Abdominal: Soft  She exhibits no distension and no mass  There is no rebound  Genitourinary:   Genitourinary Comments: The external female genitalia is normal  The bartholin's, uretheral and skenes glands are normal  The urethral meatus is normal (midline with no lesions)  Anus without fissure or lesion  Speculum exam reveals a grossly normal vagina  No masses, lesions, discharge or bleeding  No significant cystocele or rectocele noted  Bimanual exam notes a surgical absent cervix, uterus and adnexal structures  No masses or fullness  Bladder is without fullness, mass or tenderness  Musculoskeletal: Normal range of motion  She exhibits no edema  Lymphadenopathy:     She has no cervical adenopathy  Neurological: She is alert and oriented to person, place, and time  Skin: Skin is warm and dry  No rash noted  Psychiatric: She has a normal mood and affect  Her behavior is normal    Vitals reviewed        Lab Results   Component Value Date     6 9 09/03/2019

## 2019-10-31 DIAGNOSIS — F32.A DEPRESSION, UNSPECIFIED DEPRESSION TYPE: ICD-10-CM

## 2019-10-31 RX ORDER — VILAZODONE HYDROCHLORIDE 10 MG/1
TABLET ORAL
Qty: 90 TABLET | Refills: 4 | Status: SHIPPED | OUTPATIENT
Start: 2019-10-31 | End: 2020-11-22

## 2019-11-25 ENCOUNTER — TELEPHONE (OUTPATIENT)
Dept: INTERNAL MEDICINE CLINIC | Facility: CLINIC | Age: 59
End: 2019-11-25

## 2019-11-25 ENCOUNTER — CLINICAL SUPPORT (OUTPATIENT)
Dept: INTERNAL MEDICINE CLINIC | Facility: CLINIC | Age: 59
End: 2019-11-25
Payer: COMMERCIAL

## 2019-11-25 DIAGNOSIS — Z23 FLU VACCINE NEED: Primary | ICD-10-CM

## 2019-11-25 PROCEDURE — 90682 RIV4 VACC RECOMBINANT DNA IM: CPT | Performed by: INTERNAL MEDICINE

## 2019-11-25 PROCEDURE — 90471 IMMUNIZATION ADMIN: CPT | Performed by: INTERNAL MEDICINE

## 2019-11-25 NOTE — TELEPHONE ENCOUNTER
Pt's  asking if the pt should receive fluzone flu shot this year vs the regular one    Please advise ty (pt sched at 4pm today)

## 2019-12-06 ENCOUNTER — APPOINTMENT (OUTPATIENT)
Dept: LAB | Age: 59
End: 2019-12-06
Payer: COMMERCIAL

## 2019-12-06 ENCOUNTER — TRANSCRIBE ORDERS (OUTPATIENT)
Dept: ADMINISTRATIVE | Age: 59
End: 2019-12-06

## 2019-12-06 DIAGNOSIS — Z13.6 SCREENING FOR CARDIOVASCULAR CONDITION: ICD-10-CM

## 2019-12-06 LAB
ALBUMIN SERPL BCP-MCNC: 4.4 G/DL (ref 3.5–5)
ALP SERPL-CCNC: 61 U/L (ref 46–116)
ALT SERPL W P-5'-P-CCNC: 15 U/L (ref 12–78)
ANION GAP SERPL CALCULATED.3IONS-SCNC: 2 MMOL/L (ref 4–13)
AST SERPL W P-5'-P-CCNC: 10 U/L (ref 5–45)
BILIRUB SERPL-MCNC: 0.39 MG/DL (ref 0.2–1)
BUN SERPL-MCNC: 27 MG/DL (ref 5–25)
CALCIUM SERPL-MCNC: 9.3 MG/DL (ref 8.3–10.1)
CHLORIDE SERPL-SCNC: 108 MMOL/L (ref 100–108)
CHOLEST SERPL-MCNC: 176 MG/DL (ref 50–200)
CO2 SERPL-SCNC: 33 MMOL/L (ref 21–32)
CREAT SERPL-MCNC: 0.79 MG/DL (ref 0.6–1.3)
GFR SERPL CREATININE-BSD FRML MDRD: 84 ML/MIN/1.73SQ M
GLUCOSE P FAST SERPL-MCNC: 88 MG/DL (ref 65–99)
HDLC SERPL-MCNC: 70 MG/DL
LDLC SERPL CALC-MCNC: 93 MG/DL (ref 0–100)
POTASSIUM SERPL-SCNC: 4.5 MMOL/L (ref 3.5–5.3)
PROT SERPL-MCNC: 7.2 G/DL (ref 6.4–8.2)
SODIUM SERPL-SCNC: 143 MMOL/L (ref 136–145)
TRIGL SERPL-MCNC: 66 MG/DL

## 2019-12-06 PROCEDURE — 80053 COMPREHEN METABOLIC PANEL: CPT

## 2019-12-06 PROCEDURE — 36415 COLL VENOUS BLD VENIPUNCTURE: CPT

## 2019-12-06 PROCEDURE — 80061 LIPID PANEL: CPT

## 2019-12-19 ENCOUNTER — OFFICE VISIT (OUTPATIENT)
Dept: INTERNAL MEDICINE CLINIC | Facility: CLINIC | Age: 59
End: 2019-12-19
Payer: COMMERCIAL

## 2019-12-19 VITALS
BODY MASS INDEX: 25.46 KG/M2 | HEIGHT: 65 IN | SYSTOLIC BLOOD PRESSURE: 122 MMHG | DIASTOLIC BLOOD PRESSURE: 74 MMHG | OXYGEN SATURATION: 98 % | HEART RATE: 60 BPM | WEIGHT: 152.8 LBS | RESPIRATION RATE: 14 BRPM

## 2019-12-19 DIAGNOSIS — F32.A DEPRESSION, UNSPECIFIED DEPRESSION TYPE: ICD-10-CM

## 2019-12-19 DIAGNOSIS — E55.9 VITAMIN D DEFICIENCY: Primary | ICD-10-CM

## 2019-12-19 DIAGNOSIS — Z23 NEED FOR VACCINATION: ICD-10-CM

## 2019-12-19 DIAGNOSIS — R91.1 LUNG NODULE: ICD-10-CM

## 2019-12-19 DIAGNOSIS — Z13.6 SCREENING FOR CARDIOVASCULAR CONDITION: ICD-10-CM

## 2019-12-19 DIAGNOSIS — M33.90 DERMATOMYOSITIS (HCC): ICD-10-CM

## 2019-12-19 PROCEDURE — 90471 IMMUNIZATION ADMIN: CPT

## 2019-12-19 PROCEDURE — 90750 HZV VACC RECOMBINANT IM: CPT

## 2019-12-19 PROCEDURE — 99214 OFFICE O/P EST MOD 30 MIN: CPT | Performed by: INTERNAL MEDICINE

## 2019-12-19 NOTE — PROGRESS NOTES
Assessment/Plan:    Dermatomyositis ([facility] [address] )  Unable to tolerate Plaquenil secondary to hyper pigmentation, she will be starting oral methotrexate in the near future which I think is a very reasonable idea we did review the risks benefits and side effects with the medication including increased risk for infection, monitoring of the liver enzymes she will continue follow-up with dermatology regarding this medication  Depression  Clinically stable and doing well continue the current medical regiment will continue monitor  No SI continue Viibryd current dose    Lung nodule  Currently stable being monitored by serial CT scan via Hematology I did review the Hematology note    Vitamin D deficiency  Continue with current dose of vitamin-D and will check a vitamin-D level         Problem List Items Addressed This Visit        Musculoskeletal and Integument    Dermatomyositis ([facility] [address] )     Unable to tolerate Plaquenil secondary to hyper pigmentation, she will be starting oral methotrexate in the near future which I think is a very reasonable idea we did review the risks benefits and side effects with the medication including increased risk for infection, monitoring of the liver enzymes she will continue follow-up with dermatology regarding this medication  Relevant Orders    CBC (Includes Diff/Plt) (Refl)       Other    Vitamin D deficiency - Primary     Continue with current dose of vitamin-D and will check a vitamin-D level         Relevant Orders    Comprehensive metabolic panel    Vitamin D 25 hydroxy    Depression     Clinically stable and doing well continue the current medical regiment will continue monitor    No SI continue Viibryd current dose         Lung nodule     Currently stable being monitored by serial CT scan via Hematology I did review the Hematology note           Other Visit Diagnoses     Screening for cardiovascular condition        Relevant Orders    Lipid Panel with Direct LDL reflex    Need for vaccination        Relevant Orders    Zoster Vaccine Recombinant IM (Completed)          Return to office 6  months  call if any problems  Subjective:      Patient ID: Brandin Fernández is a 64 y o  female  HPI 64-year old female coming in for a follow up office visit regarding vitamin-D deficiency, depression, lung nodule, dermatomyositis; The patient reports me compliant taking medications without untoward side effects the  The patient is here to review his medical condition, update me on the medical condition and the patient reports me no hospitalizations and no ER visits  She reports me she has developed hyper pigmentation secondary to the Plaquenil therefore has discontinued the medicine via Dermatology  She reports me she will be starting methotrexate in the near future number like to discuss this further with me today  Also she reports me following healthy imbalance diet and continues to exercise routinely  She sees Hematology routinely for follow-up regarding the breast cancer, vitamin-D deficiency and lung nodule  off the plaquenel  Secondary to     The following portions of the patient's history were reviewed and updated as appropriate: allergies, current medications, past family history, past medical history, past social history, past surgical history and problem list   She reports me her mood is been doing very good and tolerates the Viibryd without any side effects  Review of Systems   Constitutional: Negative for activity change, appetite change and unexpected weight change  HENT: Negative for congestion and postnasal drip  Eyes: Negative for visual disturbance  Respiratory: Negative for cough and shortness of breath  Cardiovascular: Negative for chest pain  Gastrointestinal: Negative for abdominal pain, diarrhea, nausea and vomiting  Neurological: Negative for dizziness, light-headedness and headaches  Hematological: Negative for adenopathy     Psychiatric/Behavioral: Negative for suicidal ideas  The patient is not nervous/anxious  Objective:    No follow-ups on file  No results found  No Known Allergies    Past Medical History:   Diagnosis Date    Benign neoplasm of lung     Right lung lobe benign  Removed 2010   Breast cancer ([facility] [address] )     Breathing difficulty 2011    no breathing difficulties but patient reported drop in BP during anesthesia for appendectomy and was advised to have cardiologist consult    Depression     Dermatomyositis ([facility] [address] )     Lung nodule     Wears glasses      Past Surgical History:   Procedure Laterality Date    APPENDECTOMY      2011    BREAST SURGERY      Bilateral Mastectomy    COLONOSCOPY N/A 1/26/2016    Procedure: COLONOSCOPY;  Surgeon: Hima Tineo MD;  Location: [facility]; Service:     HYSTERECTOMY      2007    LUNG BIOPSY      MASS EXCISION Left 7/18/2019    Procedure: MID BACK MASS EXCISION BIOPSY;  Surgeon: Kennedi Mortensen MD;  Location: [facility];  Service: General    MASTECTOMY      reconstruction 2008    OOPHORECTOMY       Current Outpatient Medications on File Prior to Visit   Medication Sig Dispense Refill    Calcium-Magnesium-Vitamin D (CALCIUM 500 PO) Take 1,000 mg by mouth daily   Cholecalciferol (VITAMIN D) 2000 UNITS CAPS Take 2,000 Units/day by mouth   multivitamin (THERAGRAN) TABS Take 1 tablet by mouth daily   Omega-3 Fatty Acids (FISH OIL) 1200 MG CAPS Take 1,200 mg by mouth daily   VIIBRYD 10 MG tablet TAKE 1 TABLET DAILY 90 tablet 4     No current facility-administered medications on file prior to visit        Family History   Problem Relation Age of Onset    No Known Problems Mother     No Known Problems Father      Social History     Socioeconomic History    Marital status: /Civil Union     Spouse name: Not on file    Number of children: Not on file    Years of education: Not on file    Highest education level: Not on file   Occupational History    Not on file Social Needs    Financial resource strain: Not on file    Food insecurity:     Worry: Not on file     Inability: Not on file    Transportation needs:     Medical: Not on file     Non-medical: Not on file   Tobacco Use    Smoking status: Never Smoker    Smokeless tobacco: Never Used   Substance and Sexual Activity    Alcohol use: No    Drug use: No    Sexual activity: Yes   Lifestyle    Physical activity:     Days per week: Not on file     Minutes per session: Not on file    Stress: Not on file   Relationships    Social connections:     Talks on phone: Not on file     Gets together: Not on file     Attends Jainism service: Not on file     Active member of club or organization: Not on file     Attends meetings of clubs or organizations: Not on file     Relationship status: Not on file    Intimate partner violence:     Fear of current or ex partner: Not on file     Emotionally abused: Not on file     Physically abused: Not on file     Forced sexual activity: Not on file   Other Topics Concern    Not on file   Social History Narrative    Not on file     Vitals:    12/19/19 1303   BP: 122/74   Pulse: 60   Resp: 14   SpO2: 98%   Weight: 69 3 kg (152 lb 12 8 oz)   Height: 5' 5" (1 651 m)     Results for orders placed or performed in visit on 12/06/19   Comprehensive metabolic panel   Result Value Ref Range    Sodium 143 136 - 145 mmol/L    Potassium 4 5 3 5 - 5 3 mmol/L    Chloride 108 100 - 108 mmol/L    CO2 33 (H) 21 - 32 mmol/L    ANION GAP 2 (L) 4 - 13 mmol/L    BUN 27 (H) 5 - 25 mg/dL    Creatinine 0 79 0 60 - 1 30 mg/dL    Glucose, Fasting 88 65 - 99 mg/dL    Calcium 9 3 8 3 - 10 1 mg/dL    AST 10 5 - 45 U/L    ALT 15 12 - 78 U/L    Alkaline Phosphatase 61 46 - 116 U/L    Total Protein 7 2 6 4 - 8 2 g/dL    Albumin 4 4 3 5 - 5 0 g/dL    Total Bilirubin 0 39 0 20 - 1 00 mg/dL    eGFR 84 ml/min/1 73sq m   Lipid Panel with Direct LDL reflex   Result Value Ref Range    Cholesterol 176 50 - 200 mg/dL Triglycerides 66 <=150 mg/dL    HDL, Direct 70 >=40 mg/dL    LDL Calculated 93 0 - 100 mg/dL     Weight (last 2 days)     Date/Time   Weight    12/19/19 1303   69 3 (152 8)            Body mass index is 25 43 kg/m²  BP      Temp      Pulse     Resp      SpO2        Vitals:    12/19/19 1303   Weight: 69 3 kg (152 lb 12 8 oz)     Vitals:    12/19/19 1303   Weight: 69 3 kg (152 lb 12 8 oz)       /74   Pulse 60   Resp 14   Ht 5' 5" (1 651 m)   Wt 69 3 kg (152 lb 12 8 oz)   SpO2 98%   BMI 25 43 kg/m²          Physical Exam   Constitutional: She appears well-developed and well-nourished  HENT:   Head: Normocephalic  Mouth/Throat: Oropharynx is clear and moist    Eyes: Pupils are equal, round, and reactive to light  Conjunctivae are normal  Right eye exhibits no discharge  Left eye exhibits no discharge  No scleral icterus  Neck: Neck supple  Cardiovascular: Normal rate, regular rhythm, normal heart sounds and intact distal pulses  Exam reveals no gallop and no friction rub  No murmur heard  Pulmonary/Chest: Breath sounds normal  No respiratory distress  She has no wheezes  She has no rales  Abdominal: Soft  Bowel sounds are normal  She exhibits no distension and no mass  There is no tenderness  There is no rebound and no guarding  Musculoskeletal: She exhibits no edema or deformity  Lymphadenopathy:     She has no cervical adenopathy  Neurological: She is alert  Coordination normal    Psychiatric: Her mood appears not anxious  She does not exhibit a depressed mood  She expresses no suicidal ideation

## 2019-12-21 NOTE — ASSESSMENT & PLAN NOTE
Unable to tolerate Plaquenil secondary to hyper pigmentation, she will be starting oral methotrexate in the near future which I think is a very reasonable idea we did review the risks benefits and side effects with the medication including increased risk for infection, monitoring of the liver enzymes she will continue follow-up with dermatology regarding this medication

## 2019-12-21 NOTE — ASSESSMENT & PLAN NOTE
Clinically stable and doing well continue the current medical regiment will continue monitor    No SI continue Viibryd current dose

## 2019-12-23 ENCOUNTER — TELEPHONE (OUTPATIENT)
Dept: HEMATOLOGY ONCOLOGY | Facility: CLINIC | Age: 59
End: 2019-12-23

## 2019-12-23 NOTE — TELEPHONE ENCOUNTER
Spoke with patient and reviewed per Dr Candance Ly that he is ok with patient taking Methotrexate as long as she is aware of the possibilities of secondary malignancies developing  Patient stated she is aware and this is the next drug they are recommending because of the purple staining from the Plaquenil that needed to be stopped  Reviewed Dr Dai Malhotra ([facility] will actively be managing this new treatment for her)  Patient requested I call Dr Dai Malhotra office and also let him know Dr Candance Ly is ok with Methotrexate  Patient appreciative  Called [facility] at [phone] and spoke with Jewel Jonse  Provided the same information to her for Dr Dai Malhotra  She stated she will pass the message along to him regarding the ok on methotrexate

## 2019-12-23 NOTE — TELEPHONE ENCOUNTER
The patient called in stating that her dermatologist (Delonte Martinez) from [address] [facility] took the patient off the plaquenil on 12/16/2019 due to purple staining of her neck and chest  Dr Garcia would like the patient to start on  Methotrexate  He advised the patient to check with Dr Howard to make sure this would okay to start  The patient is requesting a call back at [phone]  The patient  was taking the Plaquenil once in the evening with the dose of 200 mg

## 2020-01-21 ENCOUNTER — TRANSCRIBE ORDERS (OUTPATIENT)
Dept: ADMINISTRATIVE | Age: 60
End: 2020-01-21

## 2020-01-21 ENCOUNTER — APPOINTMENT (OUTPATIENT)
Dept: LAB | Age: 60
End: 2020-01-21
Payer: COMMERCIAL

## 2020-01-21 DIAGNOSIS — Z79.899 ENCOUNTER FOR LONG-TERM (CURRENT) USE OF OTHER MEDICATIONS: ICD-10-CM

## 2020-01-21 DIAGNOSIS — M33.19 OTHER DERMATOMYOSITIS WITH OTHER ORGAN INVOLVEMENT (HCC): ICD-10-CM

## 2020-01-21 DIAGNOSIS — M33.19 OTHER DERMATOMYOSITIS WITH OTHER ORGAN INVOLVEMENT (HCC): Primary | ICD-10-CM

## 2020-01-21 LAB
ALBUMIN SERPL BCP-MCNC: 3.6 G/DL (ref 3.5–5)
ALP SERPL-CCNC: 63 U/L (ref 46–116)
ALT SERPL W P-5'-P-CCNC: 32 U/L (ref 12–78)
ANION GAP SERPL CALCULATED.3IONS-SCNC: 3 MMOL/L (ref 4–13)
AST SERPL W P-5'-P-CCNC: 13 U/L (ref 5–45)
BASOPHILS # BLD AUTO: 0.03 THOUSANDS/ΜL (ref 0–0.1)
BASOPHILS NFR BLD AUTO: 1 % (ref 0–1)
BILIRUB SERPL-MCNC: 0.35 MG/DL (ref 0.2–1)
BUN SERPL-MCNC: 21 MG/DL (ref 5–25)
CALCIUM SERPL-MCNC: 9 MG/DL (ref 8.3–10.1)
CHLORIDE SERPL-SCNC: 108 MMOL/L (ref 100–108)
CO2 SERPL-SCNC: 32 MMOL/L (ref 21–32)
CREAT SERPL-MCNC: 0.84 MG/DL (ref 0.6–1.3)
EOSINOPHIL # BLD AUTO: 0.08 THOUSAND/ΜL (ref 0–0.61)
EOSINOPHIL NFR BLD AUTO: 2 % (ref 0–6)
ERYTHROCYTE [DISTWIDTH] IN BLOOD BY AUTOMATED COUNT: 13.6 % (ref 11.6–15.1)
GFR SERPL CREATININE-BSD FRML MDRD: 77 ML/MIN/1.73SQ M
GLUCOSE P FAST SERPL-MCNC: 88 MG/DL (ref 65–99)
HCT VFR BLD AUTO: 40.9 % (ref 34.8–46.1)
HGB BLD-MCNC: 13.1 G/DL (ref 11.5–15.4)
IMM GRANULOCYTES # BLD AUTO: 0.01 THOUSAND/UL (ref 0–0.2)
IMM GRANULOCYTES NFR BLD AUTO: 0 % (ref 0–2)
LYMPHOCYTES # BLD AUTO: 1.17 THOUSANDS/ΜL (ref 0.6–4.47)
LYMPHOCYTES NFR BLD AUTO: 25 % (ref 14–44)
MCH RBC QN AUTO: 30.2 PG (ref 26.8–34.3)
MCHC RBC AUTO-ENTMCNC: 32 G/DL (ref 31.4–37.4)
MCV RBC AUTO: 94 FL (ref 82–98)
MONOCYTES # BLD AUTO: 0.4 THOUSAND/ΜL (ref 0.17–1.22)
MONOCYTES NFR BLD AUTO: 9 % (ref 4–12)
NEUTROPHILS # BLD AUTO: 2.99 THOUSANDS/ΜL (ref 1.85–7.62)
NEUTS SEG NFR BLD AUTO: 63 % (ref 43–75)
NRBC BLD AUTO-RTO: 0 /100 WBCS
PLATELET # BLD AUTO: 219 THOUSANDS/UL (ref 149–390)
PMV BLD AUTO: 9.7 FL (ref 8.9–12.7)
POTASSIUM SERPL-SCNC: 4.1 MMOL/L (ref 3.5–5.3)
PROT SERPL-MCNC: 7.1 G/DL (ref 6.4–8.2)
RBC # BLD AUTO: 4.34 MILLION/UL (ref 3.81–5.12)
SODIUM SERPL-SCNC: 143 MMOL/L (ref 136–145)
WBC # BLD AUTO: 4.68 THOUSAND/UL (ref 4.31–10.16)

## 2020-01-21 PROCEDURE — 85025 COMPLETE CBC W/AUTO DIFF WBC: CPT

## 2020-01-21 PROCEDURE — 36415 COLL VENOUS BLD VENIPUNCTURE: CPT

## 2020-01-21 PROCEDURE — 80053 COMPREHEN METABOLIC PANEL: CPT

## 2020-03-02 ENCOUNTER — TRANSCRIBE ORDERS (OUTPATIENT)
Dept: ADMINISTRATIVE | Age: 60
End: 2020-03-02

## 2020-03-02 ENCOUNTER — APPOINTMENT (OUTPATIENT)
Dept: LAB | Age: 60
End: 2020-03-02
Payer: COMMERCIAL

## 2020-03-02 DIAGNOSIS — Z17.0 MALIGNANT NEOPLASM OF NIPPLE OF BOTH BREASTS IN FEMALE, ESTROGEN RECEPTOR POSITIVE (HCC): ICD-10-CM

## 2020-03-02 DIAGNOSIS — C50.012 MALIGNANT NEOPLASM OF NIPPLE OF BOTH BREASTS IN FEMALE, ESTROGEN RECEPTOR POSITIVE (HCC): ICD-10-CM

## 2020-03-02 DIAGNOSIS — M33.19 OTHER DERMATOMYOSITIS WITH OTHER ORGAN INVOLVEMENT (HCC): Primary | ICD-10-CM

## 2020-03-02 DIAGNOSIS — Z15.01 BRCA2 GENE MUTATION POSITIVE: ICD-10-CM

## 2020-03-02 DIAGNOSIS — M33.19 OTHER DERMATOMYOSITIS WITH OTHER ORGAN INVOLVEMENT (HCC): ICD-10-CM

## 2020-03-02 DIAGNOSIS — C50.011 MALIGNANT NEOPLASM OF NIPPLE OF BOTH BREASTS IN FEMALE, ESTROGEN RECEPTOR POSITIVE (HCC): ICD-10-CM

## 2020-03-02 DIAGNOSIS — Z15.09 BRCA2 GENE MUTATION POSITIVE: ICD-10-CM

## 2020-03-02 LAB
ALBUMIN SERPL BCP-MCNC: 4 G/DL (ref 3.5–5)
ALP SERPL-CCNC: 69 U/L (ref 46–116)
ALT SERPL W P-5'-P-CCNC: 34 U/L (ref 12–78)
ANION GAP SERPL CALCULATED.3IONS-SCNC: 6 MMOL/L (ref 4–13)
AST SERPL W P-5'-P-CCNC: 18 U/L (ref 5–45)
BASOPHILS # BLD AUTO: 0.04 THOUSANDS/ΜL (ref 0–0.1)
BASOPHILS NFR BLD AUTO: 1 % (ref 0–1)
BILIRUB SERPL-MCNC: 0.32 MG/DL (ref 0.2–1)
BUN SERPL-MCNC: 26 MG/DL (ref 5–25)
CALCIUM SERPL-MCNC: 9.4 MG/DL (ref 8.3–10.1)
CANCER AG125 SERPL-ACNC: 8.2 U/ML (ref 0–30)
CANCER AG27-29 SERPL-ACNC: 22 U/ML (ref 0–42.3)
CEA SERPL-MCNC: 0.6 NG/ML (ref 0–3)
CHLORIDE SERPL-SCNC: 108 MMOL/L (ref 100–108)
CO2 SERPL-SCNC: 28 MMOL/L (ref 21–32)
CREAT SERPL-MCNC: 0.72 MG/DL (ref 0.6–1.3)
EOSINOPHIL # BLD AUTO: 0.08 THOUSAND/ΜL (ref 0–0.61)
EOSINOPHIL NFR BLD AUTO: 2 % (ref 0–6)
ERYTHROCYTE [DISTWIDTH] IN BLOOD BY AUTOMATED COUNT: 14.4 % (ref 11.6–15.1)
GFR SERPL CREATININE-BSD FRML MDRD: 93 ML/MIN/1.73SQ M
GLUCOSE P FAST SERPL-MCNC: 95 MG/DL (ref 65–99)
HCT VFR BLD AUTO: 41.2 % (ref 34.8–46.1)
HGB BLD-MCNC: 13.5 G/DL (ref 11.5–15.4)
IMM GRANULOCYTES # BLD AUTO: 0.01 THOUSAND/UL (ref 0–0.2)
IMM GRANULOCYTES NFR BLD AUTO: 0 % (ref 0–2)
LYMPHOCYTES # BLD AUTO: 1.16 THOUSANDS/ΜL (ref 0.6–4.47)
LYMPHOCYTES NFR BLD AUTO: 25 % (ref 14–44)
MCH RBC QN AUTO: 30.2 PG (ref 26.8–34.3)
MCHC RBC AUTO-ENTMCNC: 32.8 G/DL (ref 31.4–37.4)
MCV RBC AUTO: 92 FL (ref 82–98)
MONOCYTES # BLD AUTO: 0.45 THOUSAND/ΜL (ref 0.17–1.22)
MONOCYTES NFR BLD AUTO: 10 % (ref 4–12)
NEUTROPHILS # BLD AUTO: 2.88 THOUSANDS/ΜL (ref 1.85–7.62)
NEUTS SEG NFR BLD AUTO: 62 % (ref 43–75)
NRBC BLD AUTO-RTO: 0 /100 WBCS
PLATELET # BLD AUTO: 239 THOUSANDS/UL (ref 149–390)
PMV BLD AUTO: 9.4 FL (ref 8.9–12.7)
POTASSIUM SERPL-SCNC: 4 MMOL/L (ref 3.5–5.3)
PROT SERPL-MCNC: 7.4 G/DL (ref 6.4–8.2)
RBC # BLD AUTO: 4.47 MILLION/UL (ref 3.81–5.12)
SODIUM SERPL-SCNC: 142 MMOL/L (ref 136–145)
WBC # BLD AUTO: 4.62 THOUSAND/UL (ref 4.31–10.16)

## 2020-03-02 PROCEDURE — 85025 COMPLETE CBC W/AUTO DIFF WBC: CPT

## 2020-03-02 PROCEDURE — 86300 IMMUNOASSAY TUMOR CA 15-3: CPT

## 2020-03-02 PROCEDURE — 82378 CARCINOEMBRYONIC ANTIGEN: CPT

## 2020-03-02 PROCEDURE — 80053 COMPREHEN METABOLIC PANEL: CPT

## 2020-03-02 PROCEDURE — 36415 COLL VENOUS BLD VENIPUNCTURE: CPT

## 2020-03-02 PROCEDURE — 86304 IMMUNOASSAY TUMOR CA 125: CPT

## 2020-03-06 ENCOUNTER — OFFICE VISIT (OUTPATIENT)
Dept: INTERNAL MEDICINE CLINIC | Facility: CLINIC | Age: 60
End: 2020-03-06
Payer: COMMERCIAL

## 2020-03-06 ENCOUNTER — NURSE TRIAGE (OUTPATIENT)
Dept: OTHER | Facility: OTHER | Age: 60
End: 2020-03-06

## 2020-03-06 VITALS
OXYGEN SATURATION: 99 % | TEMPERATURE: 101 F | WEIGHT: 155.4 LBS | HEIGHT: 65 IN | RESPIRATION RATE: 14 BRPM | BODY MASS INDEX: 25.89 KG/M2 | HEART RATE: 62 BPM | DIASTOLIC BLOOD PRESSURE: 64 MMHG | SYSTOLIC BLOOD PRESSURE: 110 MMHG

## 2020-03-06 DIAGNOSIS — R68.89 FLU-LIKE SYMPTOMS: Primary | ICD-10-CM

## 2020-03-06 LAB
FLUAV RNA NPH QL NAA+PROBE: DETECTED
FLUBV RNA NPH QL NAA+PROBE: ABNORMAL
RSV RNA NPH QL NAA+PROBE: ABNORMAL

## 2020-03-06 PROCEDURE — 3008F BODY MASS INDEX DOCD: CPT | Performed by: NURSE PRACTITIONER

## 2020-03-06 PROCEDURE — 99213 OFFICE O/P EST LOW 20 MIN: CPT | Performed by: NURSE PRACTITIONER

## 2020-03-06 PROCEDURE — 87631 RESP VIRUS 3-5 TARGETS: CPT | Performed by: NURSE PRACTITIONER

## 2020-03-06 PROCEDURE — 1036F TOBACCO NON-USER: CPT | Performed by: NURSE PRACTITIONER

## 2020-03-06 RX ORDER — OSELTAMIVIR PHOSPHATE 75 MG/1
75 CAPSULE ORAL EVERY 12 HOURS SCHEDULED
Qty: 10 CAPSULE | Refills: 0 | Status: SHIPPED | OUTPATIENT
Start: 2020-03-06 | End: 2020-03-11

## 2020-03-06 RX ORDER — FOLIC ACID 1 MG/1
TABLET ORAL
COMMUNITY
Start: 2019-12-30 | End: 2022-07-26 | Stop reason: SDUPTHER

## 2020-03-06 NOTE — PROGRESS NOTES
Assessment/Plan:    Flu-like symptoms  Swabbed for flu  Start tamiflu  Fluids and rest       Diagnoses and all orders for this visit:    Flu-like symptoms  -     oseltamivir (TAMIFLU) 75 mg capsule; Take 1 capsule (75 mg total) by mouth every 12 (twelve) hours for 5 days  -     Cancel: Influenza A/B and RSV PCR  -     Influenza A/B and RSV PCR    Other orders  -     methotrexate 2 5 mg tablet; Take 10 mg by mouth once a week   -     folic acid (FOLVITE) 1 mg tablet          Subjective:      Patient ID: Jada Shahid is a 64 y o  female  Patient complains of two days headache and sore throat  Today all of a sudden she has a fever of 101  Some chills and fatigue  She is on methotrexate  She did have the flu shot      The following portions of the patient's history were reviewed and updated as appropriate: allergies, current medications, past family history, past medical history, past social history, past surgical history and problem list     Review of Systems   Constitutional: Positive for fatigue and fever  HENT: Positive for sore throat  Eyes: Negative  Respiratory: Positive for cough  Cardiovascular: Negative  Gastrointestinal: Negative  Musculoskeletal: Negative  Neurological: Negative  Objective:      /64   Pulse 62   Temp (!) 101 °F (38 3 °C) (Tympanic)   Resp 14   Ht 5' 5" (1 651 m)   Wt 70 5 kg (155 lb 6 4 oz)   SpO2 99%   BMI 25 86 kg/m²          Physical Exam   Constitutional: She is oriented to person, place, and time  She appears well-developed and well-nourished  HENT:   Head: Normocephalic and atraumatic  Right Ear: External ear normal    Left Ear: External ear normal    Nose: Nose normal    Mouth/Throat: Oropharynx is clear and moist    Eyes: Pupils are equal, round, and reactive to light  Conjunctivae are normal    Neck: Normal range of motion  Neck supple  Cardiovascular: Normal rate and regular rhythm     Pulmonary/Chest: Effort normal and breath sounds normal    Abdominal: Soft  Bowel sounds are normal    Musculoskeletal: Normal range of motion  Neurological: She is alert and oriented to person, place, and time  Skin: Skin is warm and dry  Nursing note and vitals reviewed

## 2020-03-06 NOTE — TELEPHONE ENCOUNTER
Regarding: Appointment Request  ----- Message from Maxine Fitzgerald sent at 3/6/2020  7:09 AM EST -----  "I am unsure if I should be seen today, I have a compromised immune system and an oral temp of 100 3 "

## 2020-03-06 NOTE — TELEPHONE ENCOUNTER
Reason for Disposition   [1] Sore throat is the only symptom AND [2] present > 48 hours    Answer Assessment - Initial Assessment Questions  1  ONSET: Yesterday  2  SEVERITY: MILD (1-3):  doesn't interfere with eating or normal activities  3  STREP EXPOSURE:Does not know  5  FEVER: T 100 3 (Oral) @ 0630 took Acetaminophen 1000 mg @ 0700  The patient takes Methotrexate for an Autoimmune Disorder she has      Protocols used: SORE THROAT-ADULT-AH

## 2020-03-07 NOTE — TELEPHONE ENCOUNTER
Reason for Disposition   Vomiting a prescription medication    Answer Assessment - Initial Assessment Questions  1  VOMITING SEVERITY: "How many times have you vomited in the past 24 hours?"      - MILD:  1 - 2 times/day     - MODERATE: 3 - 5 times/day, decreased oral intake without significant weight loss or symptoms of dehydration     - SEVERE: 6 or more times/day, vomits everything or nearly everything, with significant weight loss, symptoms of dehydration       Mild, patient vomited 1 time around 1900   2  ONSET: "When did the vomiting begin?"        Today, after 1 hour of the first dose of Temiflu   3  FLUIDS: "What fluids or food have you vomited up today?" "Have you been able to keep any fluids down?"      Patient's  is unable to answer  4  ABDOMINAL PAIN: "Are your having any abdominal pain?" If yes : "How bad is it and what does it feel like?" (e g , crampy, dull, intermittent, constant)       Patient's  denies if patient has any abdominal pain  5  DIARRHEA: "Is there any diarrhea?" If so, ask: "How many times today?"       No   6  CONTACTS: "Is there anyone else in the family with the same symptoms?"        No   7  CAUSE: "What do you think is causing your vomiting?"      Possibly reaction to Temiflu   8  HYDRATION STATUS: "Any signs of dehydration?" (e g , dry mouth [not only dry lips], too weak to stand) "When did you last urinate?"      Patient's  is unable to answer   9   OTHER SYMPTOMS: "Do you have any other symptoms?" (e g , fever, headache, vertigo, vomiting blood or coffee grounds, recent head injury)      Fever of 100 4 oral at 2015    Protocols used: [facility] AND [facility]

## 2020-03-07 NOTE — TELEPHONE ENCOUNTER
Regarding: Vomiting  ----- Message from Adina Ladd sent at 3/6/2020  8:09 PM EST -----  "my wife was giving Tamiflu and after the first dose she threw it up and I am concerned "

## 2020-03-07 NOTE — TELEPHONE ENCOUNTER
Unable to reach Dr Vanna Mast  Patient's  was advised to have patient the next dose of Temiflu if no nausea or vomiting  If nausea or vomiting persists in a morning to call back  If patient develops nausea or vomiting after the next dose of Temiflu, to call back  Patient's  agreed with advice

## 2020-03-09 ENCOUNTER — OFFICE VISIT (OUTPATIENT)
Dept: HEMATOLOGY ONCOLOGY | Facility: CLINIC | Age: 60
End: 2020-03-09
Payer: COMMERCIAL

## 2020-03-09 VITALS
HEIGHT: 65 IN | TEMPERATURE: 98.2 F | OXYGEN SATURATION: 100 % | BODY MASS INDEX: 25.33 KG/M2 | RESPIRATION RATE: 17 BRPM | SYSTOLIC BLOOD PRESSURE: 116 MMHG | WEIGHT: 152 LBS | DIASTOLIC BLOOD PRESSURE: 72 MMHG | HEART RATE: 65 BPM

## 2020-03-09 DIAGNOSIS — Z15.09 BRCA2 GENE MUTATION POSITIVE: ICD-10-CM

## 2020-03-09 DIAGNOSIS — Z85.820 HISTORY OF MELANOMA: ICD-10-CM

## 2020-03-09 DIAGNOSIS — C50.012 MALIGNANT NEOPLASM OF NIPPLE OF BOTH BREASTS IN FEMALE, ESTROGEN RECEPTOR POSITIVE (HCC): Primary | ICD-10-CM

## 2020-03-09 DIAGNOSIS — Z15.01 BRCA2 GENE MUTATION POSITIVE: ICD-10-CM

## 2020-03-09 DIAGNOSIS — Z17.0 MALIGNANT NEOPLASM OF NIPPLE OF BOTH BREASTS IN FEMALE, ESTROGEN RECEPTOR POSITIVE (HCC): Primary | ICD-10-CM

## 2020-03-09 DIAGNOSIS — R91.1 LUNG NODULE: ICD-10-CM

## 2020-03-09 DIAGNOSIS — M33.90 DERMATOMYOSITIS (HCC): ICD-10-CM

## 2020-03-09 DIAGNOSIS — C50.011 MALIGNANT NEOPLASM OF NIPPLE OF BOTH BREASTS IN FEMALE, ESTROGEN RECEPTOR POSITIVE (HCC): Primary | ICD-10-CM

## 2020-03-09 PROCEDURE — 99214 OFFICE O/P EST MOD 30 MIN: CPT | Performed by: INTERNAL MEDICINE

## 2020-03-09 PROCEDURE — 1036F TOBACCO NON-USER: CPT | Performed by: INTERNAL MEDICINE

## 2020-03-09 PROCEDURE — 3008F BODY MASS INDEX DOCD: CPT | Performed by: INTERNAL MEDICINE

## 2020-03-09 NOTE — PROGRESS NOTES
HPI:  Follow-up visit for history of hormone receptor-positive bilateral breast cancers and positive BRCA 2 gene mutation in her and in her family  Family history of breast and ovarian cancers  Patient has history of melanoma in situ and history of dermatomyositis  Patient had bilateral mastectomies, reconstruction surgeries, 4 cycles of Taxotere plus Cytoxan followed by 5 years of Femara that she finished in September 2013  Patient also had ITZEL and BSO  She follows with her dermatologist and ophthalmologist for screening for melanoma  She goes for abdominal imaging studies for abdomen /pancreas  In 2008 patient had hormone receptor positive, HER-2 negative, stage IIA breast cancer on the right and stage IA on the left  One lymph node was positive on the right      Patient had a 9 mm nodule in the right lower lung and she had wedge resection and that came back negative for malignancy  Now she has a  stable 5 mm nodule at left lung base and this is being monitored   She has renal and liver cysts and also tiny in polyp in gallbladder  Bone density had shown osteopenia  Patient has some tiredness  She is recovering from influenza a and is on Tamiflu  No more fever and muscle aches  Residual mild upper respiratory symptoms    Current Outpatient Medications:     Calcium-Magnesium-Vitamin D (CALCIUM 500 PO), Take 1,000 mg by mouth daily  , Disp: , Rfl:     Cholecalciferol (VITAMIN D) 2000 UNITS CAPS, Take 2,000 Units/day by mouth , Disp: , Rfl:     folic acid (FOLVITE) 1 mg tablet, , Disp: , Rfl:     methotrexate 2 5 mg tablet, Take 10 mg by mouth once a week , Disp: , Rfl:     multivitamin (THERAGRAN) TABS, Take 1 tablet by mouth daily  , Disp: , Rfl:     Omega-3 Fatty Acids (FISH OIL) 1200 MG CAPS, Take 1,200 mg by mouth daily  , Disp: , Rfl:     oseltamivir (TAMIFLU) 75 mg capsule, Take 1 capsule (75 mg total) by mouth every 12 (twelve) hours for 5 days, Disp: 10 capsule, Rfl: 0    VIIBRYD 10 MG tablet, TAKE 1 TABLET DAILY, Disp: 90 tablet, Rfl: 4    No Known Allergies       Bilateral malignant neoplasm of breast in female, estrogen receptor positive (HCC)     Initial Diagnosis     Bilateral malignant neoplasm of breast in female, estrogen receptor positive [facility])       Surgery     Double mastectomy, reconstructive surgery       Chemotherapy     Chemotherapy with Taxotere and Cytoxan for 4 cycles followed by Femara from 2008 thru 2013  ROS:  03/09/20 Reviewed 13 systems:  Presently no other neurological, cardiac, pulmonary, GI and  symptoms other than mentioned above  No other symptoms like   chills, bleeding, bone pains, skin rash, weight loss, arthritic symptoms,   weakness, numbness,  claudication and gait problem  No frequent infections  Not unusually sensitive to heat or cold  No swelling of the ankles  No swollen glands  Patient is anxious  Other symptoms are in HPI        /72 (BP Location: Left arm, Patient Position: Sitting, Cuff Size: Adult)   Pulse 65   Temp 98 2 °F (36 8 °C)   Resp 17   Ht 5' 5" (1 651 m)   Wt 68 9 kg (152 lb)   SpO2 100%   BMI 25 29 kg/m²     Physical Exam:  Medical student Jackelyn Bills was with me in the room during examination  Alert, oriented, not in distress, no icterus, no oral thrush, no palpable neck mass, clear lung fields, regular heart rate, abdomen  soft and non tender, no palpable abdominal mass, no ascites, no edema of ankles, no calf tenderness, no focal neurological deficit, no skin rash, no palpable lymphadenopathy in the neck and axillary areas, good arterial pulses, no clubbing  Patient is anxious    Performance status 1   No lymphedema    IMAGING:      LABS:  Results for orders placed or performed in visit on 03/06/20   Influenza A/B and RSV PCR   Result Value Ref Range    INFLUENZA A PCR Detected (A) None Detected    INFLUENZA B PCR None Detected None Detected    RSV PCR None Detected None Detected     Labs, Imaging, & Other studies: All pertinent labs and imaging studies were personally reviewed    Lab Results   Component Value Date     09/15/2015    K 4 0 03/02/2020     03/02/2020    CO2 28 03/02/2020    ANIONGAP 12 09/15/2015    BUN 26 (H) 03/02/2020    CREATININE 0 72 03/02/2020    GLUCOSE 124 09/15/2015    GLUF 95 03/02/2020    CALCIUM 9 4 03/02/2020    AST 18 03/02/2020    ALT 34 03/02/2020    ALKPHOS 69 03/02/2020    PROT 7 6 11/19/2015    BILITOT 0 46 09/15/2015    EGFR 93 03/02/2020     Lab Results   Component Value Date    WBC 4 62 03/02/2020    HGB 13 5 03/02/2020    HCT 41 2 03/02/2020    MCV 92 03/02/2020     03/02/2020       Reviewed and discussed with patient  Assessment and plan: Follow-up visit for history of hormone receptor-positive bilateral breast cancers and positive BRCA 2 gene mutation in her and in her family  Family history of breast and ovarian cancers  Patient has history of melanoma in situ and history of dermatomyositis  Patient had bilateral mastectomies, reconstruction surgeries, 4 cycles of Taxotere plus Cytoxan followed by 5 years of Femara that she finished in September 2013  Patient also had ITZEL and BSO  She follows with her dermatologist and ophthalmologist for screening for melanoma  She goes for abdominal imaging studies for abdomen /pancreas  In 2008 patient had hormone receptor positive, HER-2 negative, stage IIA breast cancer on the right and stage IA on the left  One lymph node was positive on the right      Patient had a 9 mm nodule in the right lower lung and she had wedge resection and that came back negative for malignancy  Now she has a  stable 5 mm nodule at left lung base and this is being monitored   She has renal and liver cysts and also tiny in polyp in gallbladder  Bone density had shown osteopenia  Patient has some tiredness  She is recovering from influenza a and is on Tamiflu  No more fever and muscle aches  Residual mild upper respiratory symptoms    Physical examination and test results are as recorded and discussed  Breast cancers as remain in remission  She gets checked for other cancers like melanoma, pancreatic cancer and ovarian cancer and others  She had ITZEL and BSO  She has history of melanoma in situ  She goes for abdominal imaging studies  She follows with her primary physician and other consultants  Her family has been aware of positive BRCA 2 and there is family history of breast and ovarian cancer   Mukeshlizzy Pollard All discussed in detail  Questions answered  Discussed the importance of eating healthy foods, staying active and health screening test   Patient is capable of self-care  1  Malignant neoplasm of nipple of both breasts in female, estrogen receptor positive ([facility] [address] )    - ; Future  - Cancer antigen 27 29; Future  - CBC and differential; Future  - CEA; Future  - Comprehensive metabolic panel; Future  - CT chest abdomen pelvis w contrast; Future    2  BRCA2 gene mutation positive    - ; Future  - CT chest abdomen pelvis w contrast; Future    3  History of melanoma    - CT chest abdomen pelvis w contrast; Future    4  Lung nodule    - CT chest abdomen pelvis w contrast; Future    5  Dermatomyositis ([facility] [address] )    - CT chest abdomen pelvis w contrast; Future        Patient voiced understanding and agreement in the discussion  Counseling / Coordination of Care   Greater than 50% of total time was spent with the patient and / or family counseling and / or coordination of care

## 2020-04-13 ENCOUNTER — OFFICE VISIT (OUTPATIENT)
Dept: GYNECOLOGIC ONCOLOGY | Facility: CLINIC | Age: 60
End: 2020-04-13
Payer: COMMERCIAL

## 2020-04-13 VITALS
WEIGHT: 149.4 LBS | TEMPERATURE: 98.3 F | HEIGHT: 65 IN | HEART RATE: 72 BPM | DIASTOLIC BLOOD PRESSURE: 80 MMHG | BODY MASS INDEX: 24.89 KG/M2 | SYSTOLIC BLOOD PRESSURE: 116 MMHG

## 2020-04-13 DIAGNOSIS — Z15.09 BRCA2 GENE MUTATION POSITIVE: Primary | ICD-10-CM

## 2020-04-13 DIAGNOSIS — Z85.820 HISTORY OF MELANOMA: ICD-10-CM

## 2020-04-13 DIAGNOSIS — Z15.01 BRCA2 GENE MUTATION POSITIVE: Primary | ICD-10-CM

## 2020-04-13 DIAGNOSIS — Z85.3 HISTORY OF BREAST CANCER: ICD-10-CM

## 2020-04-13 PROCEDURE — 3008F BODY MASS INDEX DOCD: CPT | Performed by: PHYSICIAN ASSISTANT

## 2020-04-13 PROCEDURE — 99212 OFFICE O/P EST SF 10 MIN: CPT | Performed by: PHYSICIAN ASSISTANT

## 2020-04-13 PROCEDURE — 1036F TOBACCO NON-USER: CPT | Performed by: PHYSICIAN ASSISTANT

## 2020-04-13 RX ORDER — CLOBETASOL PROPIONATE 0.5 MG/G
CREAM TOPICAL AS NEEDED
COMMUNITY
Start: 2020-03-27

## 2020-06-13 ENCOUNTER — APPOINTMENT (OUTPATIENT)
Dept: LAB | Age: 60
End: 2020-06-13
Payer: COMMERCIAL

## 2020-06-13 DIAGNOSIS — M33.90 DERMATOMYOSITIS (HCC): ICD-10-CM

## 2020-06-13 DIAGNOSIS — Z13.6 SCREENING FOR CARDIOVASCULAR CONDITION: ICD-10-CM

## 2020-06-13 DIAGNOSIS — E55.9 VITAMIN D DEFICIENCY: ICD-10-CM

## 2020-06-13 LAB
25(OH)D3 SERPL-MCNC: 53.8 NG/ML (ref 30–100)
ALBUMIN SERPL BCP-MCNC: 3.8 G/DL (ref 3.5–5)
ALP SERPL-CCNC: 73 U/L (ref 46–116)
ALT SERPL W P-5'-P-CCNC: 32 U/L (ref 12–78)
ANION GAP SERPL CALCULATED.3IONS-SCNC: 3 MMOL/L (ref 4–13)
AST SERPL W P-5'-P-CCNC: 21 U/L (ref 5–45)
BASOPHILS # BLD AUTO: 0.03 THOUSANDS/ΜL (ref 0–0.1)
BASOPHILS NFR BLD AUTO: 1 % (ref 0–1)
BILIRUB SERPL-MCNC: 0.48 MG/DL (ref 0.2–1)
BUN SERPL-MCNC: 22 MG/DL (ref 5–25)
CALCIUM SERPL-MCNC: 8.8 MG/DL (ref 8.3–10.1)
CHLORIDE SERPL-SCNC: 105 MMOL/L (ref 100–108)
CHOLEST SERPL-MCNC: 171 MG/DL (ref 50–200)
CO2 SERPL-SCNC: 32 MMOL/L (ref 21–32)
CREAT SERPL-MCNC: 0.69 MG/DL (ref 0.6–1.3)
EOSINOPHIL # BLD AUTO: 0.06 THOUSAND/ΜL (ref 0–0.61)
EOSINOPHIL NFR BLD AUTO: 2 % (ref 0–6)
ERYTHROCYTE [DISTWIDTH] IN BLOOD BY AUTOMATED COUNT: 13.1 % (ref 11.6–15.1)
GFR SERPL CREATININE-BSD FRML MDRD: 97 ML/MIN/1.73SQ M
GLUCOSE P FAST SERPL-MCNC: 79 MG/DL (ref 65–99)
HCT VFR BLD AUTO: 40.7 % (ref 34.8–46.1)
HDLC SERPL-MCNC: 68 MG/DL
HGB BLD-MCNC: 13 G/DL (ref 11.5–15.4)
IMM GRANULOCYTES # BLD AUTO: 0.01 THOUSAND/UL (ref 0–0.2)
IMM GRANULOCYTES NFR BLD AUTO: 0 % (ref 0–2)
LDLC SERPL CALC-MCNC: 91 MG/DL (ref 0–100)
LYMPHOCYTES # BLD AUTO: 1.25 THOUSANDS/ΜL (ref 0.6–4.47)
LYMPHOCYTES NFR BLD AUTO: 31 % (ref 14–44)
MCH RBC QN AUTO: 30.1 PG (ref 26.8–34.3)
MCHC RBC AUTO-ENTMCNC: 31.9 G/DL (ref 31.4–37.4)
MCV RBC AUTO: 94 FL (ref 82–98)
MONOCYTES # BLD AUTO: 0.36 THOUSAND/ΜL (ref 0.17–1.22)
MONOCYTES NFR BLD AUTO: 9 % (ref 4–12)
NEUTROPHILS # BLD AUTO: 2.34 THOUSANDS/ΜL (ref 1.85–7.62)
NEUTS SEG NFR BLD AUTO: 57 % (ref 43–75)
NRBC BLD AUTO-RTO: 0 /100 WBCS
PLATELET # BLD AUTO: 226 THOUSANDS/UL (ref 149–390)
PMV BLD AUTO: 9.9 FL (ref 8.9–12.7)
POTASSIUM SERPL-SCNC: 4 MMOL/L (ref 3.5–5.3)
PROT SERPL-MCNC: 7.1 G/DL (ref 6.4–8.2)
RBC # BLD AUTO: 4.32 MILLION/UL (ref 3.81–5.12)
SODIUM SERPL-SCNC: 140 MMOL/L (ref 136–145)
TRIGL SERPL-MCNC: 58 MG/DL
WBC # BLD AUTO: 4.05 THOUSAND/UL (ref 4.31–10.16)

## 2020-06-13 PROCEDURE — 85025 COMPLETE CBC W/AUTO DIFF WBC: CPT

## 2020-06-13 PROCEDURE — 80053 COMPREHEN METABOLIC PANEL: CPT

## 2020-06-13 PROCEDURE — 82306 VITAMIN D 25 HYDROXY: CPT

## 2020-06-13 PROCEDURE — 80061 LIPID PANEL: CPT

## 2020-06-13 PROCEDURE — 36415 COLL VENOUS BLD VENIPUNCTURE: CPT

## 2020-06-23 ENCOUNTER — OFFICE VISIT (OUTPATIENT)
Dept: INTERNAL MEDICINE CLINIC | Facility: CLINIC | Age: 60
End: 2020-06-23
Payer: COMMERCIAL

## 2020-06-23 VITALS
HEIGHT: 65 IN | TEMPERATURE: 98.8 F | DIASTOLIC BLOOD PRESSURE: 70 MMHG | BODY MASS INDEX: 25.72 KG/M2 | WEIGHT: 154.4 LBS | OXYGEN SATURATION: 98 % | RESPIRATION RATE: 16 BRPM | SYSTOLIC BLOOD PRESSURE: 118 MMHG | HEART RATE: 53 BPM

## 2020-06-23 DIAGNOSIS — Z00.00 WELLNESS EXAMINATION: ICD-10-CM

## 2020-06-23 DIAGNOSIS — Z13.1 SCREENING FOR DIABETES MELLITUS: ICD-10-CM

## 2020-06-23 DIAGNOSIS — Z13.6 SCREENING FOR CARDIOVASCULAR CONDITION: Primary | ICD-10-CM

## 2020-06-23 PROCEDURE — 3008F BODY MASS INDEX DOCD: CPT | Performed by: INTERNAL MEDICINE

## 2020-06-23 PROCEDURE — 99396 PREV VISIT EST AGE 40-64: CPT | Performed by: INTERNAL MEDICINE

## 2020-07-15 ENCOUNTER — TRANSCRIBE ORDERS (OUTPATIENT)
Dept: LAB | Facility: HOSPITAL | Age: 60
End: 2020-07-15

## 2020-07-15 ENCOUNTER — APPOINTMENT (OUTPATIENT)
Dept: LAB | Facility: HOSPITAL | Age: 60
End: 2020-07-15
Payer: COMMERCIAL

## 2020-07-15 DIAGNOSIS — M33.19 OTHER DERMATOMYOSITIS WITH OTHER ORGAN INVOLVEMENT (HCC): ICD-10-CM

## 2020-07-15 DIAGNOSIS — M33.19 OTHER DERMATOMYOSITIS WITH OTHER ORGAN INVOLVEMENT (HCC): Primary | ICD-10-CM

## 2020-07-15 LAB
ALBUMIN SERPL BCP-MCNC: 3.8 G/DL (ref 3.5–5)
ALP SERPL-CCNC: 76 U/L (ref 46–116)
ALT SERPL W P-5'-P-CCNC: 25 U/L (ref 12–78)
ANION GAP SERPL CALCULATED.3IONS-SCNC: 3 MMOL/L (ref 4–13)
AST SERPL W P-5'-P-CCNC: 16 U/L (ref 5–45)
BASOPHILS # BLD AUTO: 0.03 THOUSANDS/ΜL (ref 0–0.1)
BASOPHILS NFR BLD AUTO: 1 % (ref 0–1)
BILIRUB SERPL-MCNC: 0.26 MG/DL (ref 0.2–1)
BUN SERPL-MCNC: 23 MG/DL (ref 5–25)
CALCIUM SERPL-MCNC: 9.6 MG/DL (ref 8.3–10.1)
CHLORIDE SERPL-SCNC: 106 MMOL/L (ref 100–108)
CO2 SERPL-SCNC: 32 MMOL/L (ref 21–32)
CREAT SERPL-MCNC: 0.66 MG/DL (ref 0.6–1.3)
EOSINOPHIL # BLD AUTO: 0.07 THOUSAND/ΜL (ref 0–0.61)
EOSINOPHIL NFR BLD AUTO: 1 % (ref 0–6)
ERYTHROCYTE [DISTWIDTH] IN BLOOD BY AUTOMATED COUNT: 13.4 % (ref 11.6–15.1)
GFR SERPL CREATININE-BSD FRML MDRD: 98 ML/MIN/1.73SQ M
GLUCOSE SERPL-MCNC: 70 MG/DL (ref 65–140)
HCT VFR BLD AUTO: 41.5 % (ref 34.8–46.1)
HGB BLD-MCNC: 13.2 G/DL (ref 11.5–15.4)
IMM GRANULOCYTES # BLD AUTO: 0.01 THOUSAND/UL (ref 0–0.2)
IMM GRANULOCYTES NFR BLD AUTO: 0 % (ref 0–2)
LYMPHOCYTES # BLD AUTO: 1.37 THOUSANDS/ΜL (ref 0.6–4.47)
LYMPHOCYTES NFR BLD AUTO: 28 % (ref 14–44)
MCH RBC QN AUTO: 30.1 PG (ref 26.8–34.3)
MCHC RBC AUTO-ENTMCNC: 31.8 G/DL (ref 31.4–37.4)
MCV RBC AUTO: 95 FL (ref 82–98)
MONOCYTES # BLD AUTO: 0.45 THOUSAND/ΜL (ref 0.17–1.22)
MONOCYTES NFR BLD AUTO: 9 % (ref 4–12)
NEUTROPHILS # BLD AUTO: 2.95 THOUSANDS/ΜL (ref 1.85–7.62)
NEUTS SEG NFR BLD AUTO: 61 % (ref 43–75)
NRBC BLD AUTO-RTO: 0 /100 WBCS
PLATELET # BLD AUTO: 221 THOUSANDS/UL (ref 149–390)
PMV BLD AUTO: 9.8 FL (ref 8.9–12.7)
POTASSIUM SERPL-SCNC: 3.9 MMOL/L (ref 3.5–5.3)
PROT SERPL-MCNC: 7.4 G/DL (ref 6.4–8.2)
RBC # BLD AUTO: 4.39 MILLION/UL (ref 3.81–5.12)
SODIUM SERPL-SCNC: 141 MMOL/L (ref 136–145)
WBC # BLD AUTO: 4.88 THOUSAND/UL (ref 4.31–10.16)

## 2020-07-15 PROCEDURE — 36415 COLL VENOUS BLD VENIPUNCTURE: CPT

## 2020-07-15 PROCEDURE — 80053 COMPREHEN METABOLIC PANEL: CPT

## 2020-07-15 PROCEDURE — 85025 COMPLETE CBC W/AUTO DIFF WBC: CPT

## 2020-09-09 ENCOUNTER — APPOINTMENT (OUTPATIENT)
Dept: LAB | Age: 60
End: 2020-09-09
Payer: COMMERCIAL

## 2020-09-09 ENCOUNTER — HOSPITAL ENCOUNTER (OUTPATIENT)
Dept: RADIOLOGY | Age: 60
Discharge: HOME | End: 2020-09-09
Payer: COMMERCIAL

## 2020-09-09 DIAGNOSIS — Z17.0 MALIGNANT NEOPLASM OF NIPPLE OF BOTH BREASTS IN FEMALE, ESTROGEN RECEPTOR POSITIVE (HCC): ICD-10-CM

## 2020-09-09 DIAGNOSIS — R91.1 LUNG NODULE: ICD-10-CM

## 2020-09-09 DIAGNOSIS — Z15.09 BRCA2 GENE MUTATION POSITIVE: ICD-10-CM

## 2020-09-09 DIAGNOSIS — M33.90 DERMATOMYOSITIS (HCC): ICD-10-CM

## 2020-09-09 DIAGNOSIS — Z15.01 BRCA2 GENE MUTATION POSITIVE: ICD-10-CM

## 2020-09-09 DIAGNOSIS — C50.011 MALIGNANT NEOPLASM OF NIPPLE OF BOTH BREASTS IN FEMALE, ESTROGEN RECEPTOR POSITIVE (HCC): ICD-10-CM

## 2020-09-09 DIAGNOSIS — Z85.820 HISTORY OF MELANOMA: ICD-10-CM

## 2020-09-09 DIAGNOSIS — C50.012 MALIGNANT NEOPLASM OF NIPPLE OF BOTH BREASTS IN FEMALE, ESTROGEN RECEPTOR POSITIVE (HCC): ICD-10-CM

## 2020-09-09 LAB
ALBUMIN SERPL BCP-MCNC: 4.1 G/DL (ref 3.5–5)
ALP SERPL-CCNC: 66 U/L (ref 46–116)
ALT SERPL W P-5'-P-CCNC: 41 U/L (ref 12–78)
ANION GAP SERPL CALCULATED.3IONS-SCNC: 4 MMOL/L (ref 4–13)
AST SERPL W P-5'-P-CCNC: 25 U/L (ref 5–45)
BASOPHILS # BLD AUTO: 0.03 THOUSANDS/ΜL (ref 0–0.1)
BASOPHILS NFR BLD AUTO: 1 % (ref 0–1)
BILIRUB SERPL-MCNC: 0.56 MG/DL (ref 0.2–1)
BUN SERPL-MCNC: 14 MG/DL (ref 5–25)
CALCIUM SERPL-MCNC: 9.3 MG/DL (ref 8.3–10.1)
CEA SERPL-MCNC: 0.8 NG/ML (ref 0–3)
CHLORIDE SERPL-SCNC: 102 MMOL/L (ref 100–108)
CO2 SERPL-SCNC: 32 MMOL/L (ref 21–32)
CREAT SERPL-MCNC: 0.62 MG/DL (ref 0.6–1.3)
EOSINOPHIL # BLD AUTO: 0.05 THOUSAND/ΜL (ref 0–0.61)
EOSINOPHIL NFR BLD AUTO: 1 % (ref 0–6)
ERYTHROCYTE [DISTWIDTH] IN BLOOD BY AUTOMATED COUNT: 14.3 % (ref 11.6–15.1)
GFR SERPL CREATININE-BSD FRML MDRD: 100 ML/MIN/1.73SQ M
GLUCOSE SERPL-MCNC: 76 MG/DL (ref 65–140)
HCT VFR BLD AUTO: 40.4 % (ref 34.8–46.1)
HGB BLD-MCNC: 12.8 G/DL (ref 11.5–15.4)
IMM GRANULOCYTES # BLD AUTO: 0.01 THOUSAND/UL (ref 0–0.2)
IMM GRANULOCYTES NFR BLD AUTO: 0 % (ref 0–2)
LYMPHOCYTES # BLD AUTO: 1.31 THOUSANDS/ΜL (ref 0.6–4.47)
LYMPHOCYTES NFR BLD AUTO: 27 % (ref 14–44)
MCH RBC QN AUTO: 30 PG (ref 26.8–34.3)
MCHC RBC AUTO-ENTMCNC: 31.7 G/DL (ref 31.4–37.4)
MCV RBC AUTO: 95 FL (ref 82–98)
MONOCYTES # BLD AUTO: 0.37 THOUSAND/ΜL (ref 0.17–1.22)
MONOCYTES NFR BLD AUTO: 8 % (ref 4–12)
NEUTROPHILS # BLD AUTO: 3.03 THOUSANDS/ΜL (ref 1.85–7.62)
NEUTS SEG NFR BLD AUTO: 63 % (ref 43–75)
NRBC BLD AUTO-RTO: 0 /100 WBCS
PLATELET # BLD AUTO: 229 THOUSANDS/UL (ref 149–390)
PMV BLD AUTO: 10.3 FL (ref 8.9–12.7)
POTASSIUM SERPL-SCNC: 4.1 MMOL/L (ref 3.5–5.3)
PROT SERPL-MCNC: 7.2 G/DL (ref 6.4–8.2)
RBC # BLD AUTO: 4.26 MILLION/UL (ref 3.81–5.12)
SODIUM SERPL-SCNC: 138 MMOL/L (ref 136–145)
WBC # BLD AUTO: 4.8 THOUSAND/UL (ref 4.31–10.16)

## 2020-09-09 PROCEDURE — 80053 COMPREHEN METABOLIC PANEL: CPT

## 2020-09-09 PROCEDURE — 85025 COMPLETE CBC W/AUTO DIFF WBC: CPT

## 2020-09-09 PROCEDURE — 82378 CARCINOEMBRYONIC ANTIGEN: CPT

## 2020-09-09 PROCEDURE — 74177 CT ABD & PELVIS W/CONTRAST: CPT

## 2020-09-09 PROCEDURE — 86304 IMMUNOASSAY TUMOR CA 125: CPT

## 2020-09-09 PROCEDURE — 71260 CT THORAX DX C+: CPT

## 2020-09-09 PROCEDURE — 36415 COLL VENOUS BLD VENIPUNCTURE: CPT

## 2020-09-09 PROCEDURE — 86300 IMMUNOASSAY TUMOR CA 15-3: CPT

## 2020-09-09 RX ADMIN — IOHEXOL 100 ML: 350 INJECTION, SOLUTION INTRAVENOUS at 14:50

## 2020-09-10 LAB
CANCER AG125 SERPL-ACNC: 7.6 U/ML (ref 0–30)
CANCER AG27-29 SERPL-ACNC: 20 U/ML (ref 0–42.3)

## 2020-09-15 ENCOUNTER — TELEPHONE (OUTPATIENT)
Dept: HEMATOLOGY ONCOLOGY | Facility: CLINIC | Age: 60
End: 2020-09-15

## 2020-09-17 ENCOUNTER — OFFICE VISIT (OUTPATIENT)
Dept: HEMATOLOGY ONCOLOGY | Facility: CLINIC | Age: 60
End: 2020-09-17
Payer: COMMERCIAL

## 2020-09-17 VITALS
WEIGHT: 160 LBS | BODY MASS INDEX: 26.66 KG/M2 | DIASTOLIC BLOOD PRESSURE: 76 MMHG | OXYGEN SATURATION: 100 % | TEMPERATURE: 98.3 F | HEART RATE: 59 BPM | HEIGHT: 65 IN | RESPIRATION RATE: 18 BRPM | SYSTOLIC BLOOD PRESSURE: 118 MMHG

## 2020-09-17 DIAGNOSIS — Z17.0 MALIGNANT NEOPLASM OF NIPPLE OF BOTH BREASTS IN FEMALE, ESTROGEN RECEPTOR POSITIVE (HCC): Primary | ICD-10-CM

## 2020-09-17 DIAGNOSIS — Z15.01 BRCA2 GENE MUTATION POSITIVE: ICD-10-CM

## 2020-09-17 DIAGNOSIS — C50.012 MALIGNANT NEOPLASM OF NIPPLE OF BOTH BREASTS IN FEMALE, ESTROGEN RECEPTOR POSITIVE (HCC): Primary | ICD-10-CM

## 2020-09-17 DIAGNOSIS — C50.011 MALIGNANT NEOPLASM OF NIPPLE OF BOTH BREASTS IN FEMALE, ESTROGEN RECEPTOR POSITIVE (HCC): Primary | ICD-10-CM

## 2020-09-17 DIAGNOSIS — Z15.09 BRCA2 GENE MUTATION POSITIVE: ICD-10-CM

## 2020-09-17 PROCEDURE — 99214 OFFICE O/P EST MOD 30 MIN: CPT | Performed by: INTERNAL MEDICINE

## 2020-09-17 NOTE — PROGRESS NOTES
HPI:  This is a follow-up visit for BRCA2 gene mutation, bilateral breast cancers and history of melanoma in situ  Also history of dermatomyositis and she has been on methotrexate 10 mg 1 day a week and folic acid  She had bilateral mastectomies  She had ITZEL and BSO  She goes for pancreatic checkup and also goes to her dermatologist for screening of skin for melanoma and ophthalmologist for ocular melanoma  She states dermatomyositis flare ups have decreased and are milder  History of hormone receptor-positive bilateral breast cancers and positive BRCA 2 gene mutation in her and in her family  Family history of breast and ovarian cancers  In 2008 patient had hormone receptor positive, HER-2 negative, stage IIA breast cancer on the right and stage IA on the left  One lymph node was positive on the right        Patient had bilateral mastectomies, reconstruction surgeries, 4 cycles of Taxotere plus Cytoxan followed by 5 years of Femara that she finished in September 2013  Patient had a 9 mm nodule in the right lower lung and she had wedge resection and that came back negative for malignancy  Now she has a  stable 5 mm nodule at left lung base and this is being monitored   She has renal and liver cysts and also tiny in polyp in gallbladder  Bone density had shown osteopenia  Patient has some tiredness                     Current Outpatient Medications:     Calcium-Magnesium-Vitamin D (CALCIUM 500 PO), Take 1,000 mg by mouth daily  , Disp: , Rfl:     Cholecalciferol (VITAMIN D) 2000 UNITS CAPS, Take 2,000 Units/day by mouth , Disp: , Rfl:     clobetasol (TEMOVATE) 0 05 % cream, PLEASE SEE ATTACHED FOR DETAILED DIRECTIONS, Disp: , Rfl:     folic acid (FOLVITE) 1 mg tablet, , Disp: , Rfl:     methotrexate 2 5 mg tablet, Take 10 mg by mouth once a week , Disp: , Rfl:     multivitamin (THERAGRAN) TABS, Take 1 tablet by mouth daily  , Disp: , Rfl:     Omega-3 Fatty Acids (FISH OIL) 1200 MG CAPS, Take 1,200 mg by mouth daily  , Disp: , Rfl:     VIIBRYD 10 MG tablet, TAKE 1 TABLET DAILY, Disp: 90 tablet, Rfl: 4    No Known Allergies    Oncology History   Bilateral malignant neoplasm of breast in female, estrogen receptor positive (HCC)    Initial Diagnosis    Bilateral malignant neoplasm of breast in female, estrogen receptor positive [facility])      Surgery    Double mastectomy, reconstructive surgery      Chemotherapy    Chemotherapy with Taxotere and Cytoxan for 4 cycles followed by Femara from 2008 thru 2013  ROS:  09/17/20 Reviewed 13 systems: See symptoms in HPI:  Tiredness  Dermatomyositis  Presently no headaches, seizures, dizziness, diplopia, dysphagia, hoarseness, chest pain, palpitations, shortness of breath, cough, hemoptysis, abdominal pain, nausea, vomiting, change in bowel habits, melena, hematuria, fever, chills, bleeding, bone pains,  weight loss, arthritic symptoms,  weakness, numbness,  claudication and gait problem  No frequent infections  Not unusually sensitive to heat or cold  No swelling of the ankles  No swollen glands  Patient is anxious  /76 (BP Location: Left arm, Patient Position: Sitting, Cuff Size: Adult)   Pulse 59   Temp 98 3 °F (36 8 °C)   Resp 18   Ht 5' 5" (1 651 m)   Wt 72 6 kg (160 lb)   SpO2 100%   BMI 26 63 kg/m²     Physical Exam:  Alert, oriented, not in distress, no icterus, no oral thrush, no palpable neck mass, clear lung fields, regular heart rate, abdomen  soft and non tender, no palpable abdominal mass, no ascites, no edema of ankles, no calf tenderness, no focal neurological deficit, no skin rash, no palpable lymphadenopathy in the neck and axillary areas, good arterial pulses, no clubbing  Patient is anxious  Performance status 0  IMAGING:  IMPRESSION:     1  Stable 4 mm left lower lobe pulmonary nodule  No new pulmonary nodules identified      2    No CT evidence of metastatic or recurrent disease within the abdomen or pelvis            Workstation performed: [id number]      Imaging     CT chest abdomen pelvis w contrast (Order: [id number]) - 9/9/2020       LABS:  Results for orders placed or performed in visit on 09/09/20      Result Value Ref Range     7 6 0 0 - 30 0 U/mL   Cancer antigen 27 29   Result Value Ref Range    CA 27 29 20 0 0 0 - 42 3 U/mL   CBC and differential   Result Value Ref Range    WBC 4 80 4 31 - 10 16 Thousand/uL    RBC 4 26 3 81 - 5 12 Million/uL    Hemoglobin 12 8 11 5 - 15 4 g/dL    Hematocrit 40 4 34 8 - 46 1 %    MCV 95 82 - 98 fL    MCH 30 0 26 8 - 34 3 pg    MCHC 31 7 31 4 - 37 4 g/dL    RDW 14 3 11 6 - 15 1 %    MPV 10 3 8 9 - 12 7 fL    Platelets 641 584 - 393 Thousands/uL    nRBC 0 /100 WBCs    Neutrophils Relative 63 43 - 75 %    Immat GRANS % 0 0 - 2 %    Lymphocytes Relative 27 14 - 44 %    Monocytes Relative 8 4 - 12 %    Eosinophils Relative 1 0 - 6 %    Basophils Relative 1 0 - 1 %    Neutrophils Absolute 3 03 1 85 - 7 62 Thousands/µL    Immature Grans Absolute 0 01 0 00 - 0 20 Thousand/uL    Lymphocytes Absolute 1 31 0 60 - 4 47 Thousands/µL    Monocytes Absolute 0 37 0 17 - 1 22 Thousand/µL    Eosinophils Absolute 0 05 0 00 - 0 61 Thousand/µL    Basophils Absolute 0 03 0 00 - 0 10 Thousands/µL   CEA   Result Value Ref Range    CEA 0 8 0 0 - 3 0 ng/mL   Comprehensive metabolic panel   Result Value Ref Range    Sodium 138 136 - 145 mmol/L    Potassium 4 1 3 5 - 5 3 mmol/L    Chloride 102 100 - 108 mmol/L    CO2 32 21 - 32 mmol/L    ANION GAP 4 4 - 13 mmol/L    BUN 14 5 - 25 mg/dL    Creatinine 0 62 0 60 - 1 30 mg/dL    Glucose 76 65 - 140 mg/dL    Calcium 9 3 8 3 - 10 1 mg/dL    AST 25 5 - 45 U/L    ALT 41 12 - 78 U/L    Alkaline Phosphatase 66 46 - 116 U/L    Total Protein 7 2 6 4 - 8 2 g/dL    Albumin 4 1 3 5 - 5 0 g/dL    Total Bilirubin 0 56 0 20 - 1 00 mg/dL    eGFR 100 ml/min/1 73sq m     Labs, Imaging, & Other studies:   All pertinent labs and imaging studies were personally reviewed    Lab Results   Component Value Date     09/15/2015    K 4 1 09/09/2020     09/09/2020    CO2 32 09/09/2020    ANIONGAP 12 09/15/2015    BUN 14 09/09/2020    CREATININE 0 62 09/09/2020    GLUCOSE 124 09/15/2015    GLUF 79 06/13/2020    CALCIUM 9 3 09/09/2020    AST 25 09/09/2020    ALT 41 09/09/2020    ALKPHOS 66 09/09/2020    PROT 7 6 11/19/2015    BILITOT 0 46 09/15/2015    EGFR 100 09/09/2020     Lab Results   Component Value Date    WBC 4 80 09/09/2020    HGB 12 8 09/09/2020    HCT 40 4 09/09/2020    MCV 95 09/09/2020     09/09/2020       Reviewed and discussed with patient  Assessment and plan: This is a follow-up visit for BRCA2 gene mutation, bilateral breast cancers and history of melanoma in situ  Also history of dermatomyositis and she has been on methotrexate 10 mg 1 day a week and folic acid  She had bilateral mastectomies  She had ITZEL and BSO  She goes for pancreatic checkup and also goes to her dermatologist for screening of skin for melanoma and ophthalmologist for ocular melanoma  She states dermatomyositis flare ups have decreased and are milder  History of hormone receptor-positive bilateral breast cancers and positive BRCA 2 gene mutation in her and in her family  Family history of breast and ovarian cancers  In 2008 patient had hormone receptor positive, HER-2 negative, stage IIA breast cancer on the right and stage IA on the left  One lymph node was positive on the right        Patient had bilateral mastectomies, reconstruction surgeries, 4 cycles of Taxotere plus Cytoxan followed by 5 years of Femara that she finished in September 2013  Patient had a 9 mm nodule in the right lower lung and she had wedge resection and that came back negative for malignancy  Now she has a  stable 5 mm nodule at left lung base and this is being monitored   She has renal and liver cysts and also tiny in polyp in gallbladder   Bone density had shown osteopenia  Patient has some tiredness      Physical examination and test results are as recorded and discussed  Breast cancers  remain in remission  She gets checked for other cancers like melanoma, pancreatic cancer and peritoneal cancer and others  She had ITZEL and BSO  She has history of melanoma in situ  She goes for abdominal imaging studies  She follows with her primary physician and other consultants  Her family has been aware of positive BRCA 2 and there is family history of breast and ovarian cancer   Liz Gaytan All discussed in detail  Questions answered  Discussed the importance of eating healthy foods, staying active and health screening test   Patient is capable of self-care  Goal is cure from breast cancers and early detection  of other cancers at risk  Discussed precautions against coronavirus  1  Malignant neoplasm of nipple of both breasts in female, estrogen receptor positive ([facility] [address] )    - CBC and differential; Future  - Comprehensive metabolic panel; Future  - Cancer antigen 27 29; Future  - ; Future    2  BRCA2 gene mutation positive    - CBC and differential; Future  - Comprehensive metabolic panel; Future  - Cancer antigen 27 29; Future  - ; Future         3  History of melanoma     -     4  Lung nodule          5  Dermatomyositis [facility])               Patient voiced understanding and agreement in the discussion  Counseling / Coordination of Care   Greater than 50% of total time was spent with the patient and / or family counseling and / or coordination of care

## 2020-10-05 ENCOUNTER — APPOINTMENT (OUTPATIENT)
Dept: LAB | Age: 60
End: 2020-10-05
Payer: COMMERCIAL

## 2020-10-05 DIAGNOSIS — C50.012 MALIGNANT NEOPLASM OF NIPPLE OF BOTH BREASTS IN FEMALE, ESTROGEN RECEPTOR POSITIVE (HCC): ICD-10-CM

## 2020-10-05 DIAGNOSIS — Z15.01 BRCA2 GENE MUTATION POSITIVE: ICD-10-CM

## 2020-10-05 DIAGNOSIS — Z15.09 BRCA2 GENE MUTATION POSITIVE: ICD-10-CM

## 2020-10-05 DIAGNOSIS — C50.011 MALIGNANT NEOPLASM OF NIPPLE OF BOTH BREASTS IN FEMALE, ESTROGEN RECEPTOR POSITIVE (HCC): ICD-10-CM

## 2020-10-05 DIAGNOSIS — Z17.0 MALIGNANT NEOPLASM OF NIPPLE OF BOTH BREASTS IN FEMALE, ESTROGEN RECEPTOR POSITIVE (HCC): ICD-10-CM

## 2020-10-05 PROCEDURE — 36415 COLL VENOUS BLD VENIPUNCTURE: CPT

## 2020-10-05 PROCEDURE — 86304 IMMUNOASSAY TUMOR CA 125: CPT

## 2020-10-06 LAB — CANCER AG125 SERPL-ACNC: 8.2 U/ML (ref 0–30)

## 2020-10-10 ENCOUNTER — IMMUNIZATIONS (OUTPATIENT)
Dept: INTERNAL MEDICINE CLINIC | Facility: CLINIC | Age: 60
End: 2020-10-10
Payer: COMMERCIAL

## 2020-10-10 DIAGNOSIS — Z23 ENCOUNTER FOR IMMUNIZATION: ICD-10-CM

## 2020-10-10 PROCEDURE — 90682 RIV4 VACC RECOMBINANT DNA IM: CPT

## 2020-10-10 PROCEDURE — 90471 IMMUNIZATION ADMIN: CPT

## 2020-10-13 ENCOUNTER — OFFICE VISIT (OUTPATIENT)
Dept: GYNECOLOGIC ONCOLOGY | Facility: CLINIC | Age: 60
End: 2020-10-13
Payer: COMMERCIAL

## 2020-10-13 VITALS
DIASTOLIC BLOOD PRESSURE: 78 MMHG | SYSTOLIC BLOOD PRESSURE: 122 MMHG | RESPIRATION RATE: 18 BRPM | TEMPERATURE: 98.3 F | BODY MASS INDEX: 26.91 KG/M2 | WEIGHT: 161.5 LBS | HEIGHT: 65 IN | HEART RATE: 55 BPM

## 2020-10-13 DIAGNOSIS — Z15.09 BRCA2 GENE MUTATION POSITIVE: Primary | ICD-10-CM

## 2020-10-13 DIAGNOSIS — Z15.01 BRCA2 GENE MUTATION POSITIVE: Primary | ICD-10-CM

## 2020-10-13 PROCEDURE — 1036F TOBACCO NON-USER: CPT | Performed by: PHYSICIAN ASSISTANT

## 2020-10-13 PROCEDURE — 99212 OFFICE O/P EST SF 10 MIN: CPT | Performed by: PHYSICIAN ASSISTANT

## 2020-11-21 DIAGNOSIS — F32.A DEPRESSION, UNSPECIFIED DEPRESSION TYPE: ICD-10-CM

## 2020-11-22 RX ORDER — VILAZODONE HYDROCHLORIDE 10 MG/1
TABLET ORAL
Qty: 90 TABLET | Refills: 3 | Status: SHIPPED | OUTPATIENT
Start: 2020-11-22 | End: 2021-01-08

## 2020-12-07 ENCOUNTER — LAB (OUTPATIENT)
Dept: LAB | Age: 60
End: 2020-12-07
Payer: COMMERCIAL

## 2020-12-07 ENCOUNTER — TRANSCRIBE ORDERS (OUTPATIENT)
Dept: ADMINISTRATIVE | Age: 60
End: 2020-12-07

## 2020-12-07 DIAGNOSIS — Z79.899 ENCOUNTER FOR LONG-TERM (CURRENT) USE OF OTHER MEDICATIONS: Primary | ICD-10-CM

## 2020-12-07 DIAGNOSIS — M33.19 OTHER DERMATOMYOSITIS WITH OTHER ORGAN INVOLVEMENT (HCC): ICD-10-CM

## 2020-12-07 DIAGNOSIS — Z79.899 ENCOUNTER FOR LONG-TERM (CURRENT) USE OF OTHER MEDICATIONS: ICD-10-CM

## 2020-12-07 LAB
ALBUMIN SERPL BCP-MCNC: 4.1 G/DL (ref 3.5–5)
ALP SERPL-CCNC: 86 U/L (ref 46–116)
ALT SERPL W P-5'-P-CCNC: 70 U/L (ref 12–78)
ANION GAP SERPL CALCULATED.3IONS-SCNC: 2 MMOL/L (ref 4–13)
AST SERPL W P-5'-P-CCNC: 37 U/L (ref 5–45)
BASOPHILS # BLD AUTO: 0.03 THOUSANDS/ΜL (ref 0–0.1)
BASOPHILS NFR BLD AUTO: 1 % (ref 0–1)
BILIRUB SERPL-MCNC: 0.32 MG/DL (ref 0.2–1)
BUN SERPL-MCNC: 20 MG/DL (ref 5–25)
CALCIUM SERPL-MCNC: 9.5 MG/DL (ref 8.3–10.1)
CHLORIDE SERPL-SCNC: 107 MMOL/L (ref 100–108)
CO2 SERPL-SCNC: 32 MMOL/L (ref 21–32)
CREAT SERPL-MCNC: 0.7 MG/DL (ref 0.6–1.3)
EOSINOPHIL # BLD AUTO: 0.06 THOUSAND/ΜL (ref 0–0.61)
EOSINOPHIL NFR BLD AUTO: 1 % (ref 0–6)
ERYTHROCYTE [DISTWIDTH] IN BLOOD BY AUTOMATED COUNT: 13.6 % (ref 11.6–15.1)
GFR SERPL CREATININE-BSD FRML MDRD: 96 ML/MIN/1.73SQ M
GLUCOSE SERPL-MCNC: 84 MG/DL (ref 65–140)
HCT VFR BLD AUTO: 41.5 % (ref 34.8–46.1)
HGB BLD-MCNC: 13.3 G/DL (ref 11.5–15.4)
IMM GRANULOCYTES # BLD AUTO: 0.01 THOUSAND/UL (ref 0–0.2)
IMM GRANULOCYTES NFR BLD AUTO: 0 % (ref 0–2)
LYMPHOCYTES # BLD AUTO: 1.23 THOUSANDS/ΜL (ref 0.6–4.47)
LYMPHOCYTES NFR BLD AUTO: 23 % (ref 14–44)
MCH RBC QN AUTO: 30.6 PG (ref 26.8–34.3)
MCHC RBC AUTO-ENTMCNC: 32 G/DL (ref 31.4–37.4)
MCV RBC AUTO: 95 FL (ref 82–98)
MONOCYTES # BLD AUTO: 0.46 THOUSAND/ΜL (ref 0.17–1.22)
MONOCYTES NFR BLD AUTO: 9 % (ref 4–12)
NEUTROPHILS # BLD AUTO: 3.52 THOUSANDS/ΜL (ref 1.85–7.62)
NEUTS SEG NFR BLD AUTO: 66 % (ref 43–75)
NRBC BLD AUTO-RTO: 0 /100 WBCS
PLATELET # BLD AUTO: 232 THOUSANDS/UL (ref 149–390)
PMV BLD AUTO: 9.6 FL (ref 8.9–12.7)
POTASSIUM SERPL-SCNC: 4.3 MMOL/L (ref 3.5–5.3)
PROT SERPL-MCNC: 7.4 G/DL (ref 6.4–8.2)
RBC # BLD AUTO: 4.35 MILLION/UL (ref 3.81–5.12)
SODIUM SERPL-SCNC: 141 MMOL/L (ref 136–145)
WBC # BLD AUTO: 5.31 THOUSAND/UL (ref 4.31–10.16)

## 2020-12-07 PROCEDURE — 36415 COLL VENOUS BLD VENIPUNCTURE: CPT

## 2020-12-07 PROCEDURE — 80053 COMPREHEN METABOLIC PANEL: CPT

## 2020-12-07 PROCEDURE — 85025 COMPLETE CBC W/AUTO DIFF WBC: CPT

## 2020-12-28 ENCOUNTER — LAB (OUTPATIENT)
Dept: LAB | Age: 60
End: 2020-12-28
Payer: COMMERCIAL

## 2020-12-28 DIAGNOSIS — Z13.1 SCREENING FOR DIABETES MELLITUS: ICD-10-CM

## 2020-12-28 DIAGNOSIS — Z13.6 SCREENING FOR CARDIOVASCULAR CONDITION: ICD-10-CM

## 2020-12-28 LAB
ALBUMIN SERPL BCP-MCNC: 4 G/DL (ref 3.5–5)
ALP SERPL-CCNC: 84 U/L (ref 46–116)
ALT SERPL W P-5'-P-CCNC: 48 U/L (ref 12–78)
ANION GAP SERPL CALCULATED.3IONS-SCNC: 1 MMOL/L (ref 4–13)
AST SERPL W P-5'-P-CCNC: 23 U/L (ref 5–45)
BILIRUB SERPL-MCNC: 0.38 MG/DL (ref 0.2–1)
BUN SERPL-MCNC: 17 MG/DL (ref 5–25)
CALCIUM SERPL-MCNC: 9.2 MG/DL (ref 8.3–10.1)
CHLORIDE SERPL-SCNC: 108 MMOL/L (ref 100–108)
CHOLEST SERPL-MCNC: 177 MG/DL (ref 50–200)
CO2 SERPL-SCNC: 33 MMOL/L (ref 21–32)
CREAT SERPL-MCNC: 0.73 MG/DL (ref 0.6–1.3)
EST. AVERAGE GLUCOSE BLD GHB EST-MCNC: 100 MG/DL
GFR SERPL CREATININE-BSD FRML MDRD: 91 ML/MIN/1.73SQ M
GLUCOSE P FAST SERPL-MCNC: 81 MG/DL (ref 65–99)
HBA1C MFR BLD: 5.1 %
HDLC SERPL-MCNC: 75 MG/DL
LDLC SERPL CALC-MCNC: 89 MG/DL (ref 0–100)
POTASSIUM SERPL-SCNC: 4.2 MMOL/L (ref 3.5–5.3)
PROT SERPL-MCNC: 7.2 G/DL (ref 6.4–8.2)
SODIUM SERPL-SCNC: 142 MMOL/L (ref 136–145)
TRIGL SERPL-MCNC: 65 MG/DL

## 2020-12-28 PROCEDURE — 36415 COLL VENOUS BLD VENIPUNCTURE: CPT

## 2020-12-28 PROCEDURE — 80053 COMPREHEN METABOLIC PANEL: CPT

## 2020-12-28 PROCEDURE — 80061 LIPID PANEL: CPT

## 2020-12-28 PROCEDURE — 83036 HEMOGLOBIN GLYCOSYLATED A1C: CPT

## 2021-01-08 ENCOUNTER — OFFICE VISIT (OUTPATIENT)
Dept: INTERNAL MEDICINE CLINIC | Facility: CLINIC | Age: 61
End: 2021-01-08
Payer: COMMERCIAL

## 2021-01-08 VITALS
BODY MASS INDEX: 27.66 KG/M2 | DIASTOLIC BLOOD PRESSURE: 70 MMHG | TEMPERATURE: 95.4 F | HEIGHT: 65 IN | OXYGEN SATURATION: 98 % | HEART RATE: 78 BPM | SYSTOLIC BLOOD PRESSURE: 120 MMHG | WEIGHT: 166 LBS

## 2021-01-08 DIAGNOSIS — M33.90 DERMATOMYOSITIS (HCC): ICD-10-CM

## 2021-01-08 DIAGNOSIS — Z15.09 BRCA2 GENE MUTATION POSITIVE: ICD-10-CM

## 2021-01-08 DIAGNOSIS — Z15.01 BRCA2 GENE MUTATION POSITIVE: ICD-10-CM

## 2021-01-08 DIAGNOSIS — F41.9 ANXIETY: Primary | ICD-10-CM

## 2021-01-08 DIAGNOSIS — Z13.6 SCREENING FOR CARDIOVASCULAR CONDITION: ICD-10-CM

## 2021-01-08 DIAGNOSIS — Z12.11 SCREENING FOR MALIGNANT NEOPLASM OF COLON: ICD-10-CM

## 2021-01-08 PROCEDURE — 3725F SCREEN DEPRESSION PERFORMED: CPT | Performed by: INTERNAL MEDICINE

## 2021-01-08 PROCEDURE — 1036F TOBACCO NON-USER: CPT | Performed by: INTERNAL MEDICINE

## 2021-01-08 PROCEDURE — 3008F BODY MASS INDEX DOCD: CPT | Performed by: INTERNAL MEDICINE

## 2021-01-08 PROCEDURE — 99214 OFFICE O/P EST MOD 30 MIN: CPT | Performed by: INTERNAL MEDICINE

## 2021-01-08 RX ORDER — SERTRALINE HYDROCHLORIDE 25 MG/1
25 TABLET, FILM COATED ORAL DAILY
Qty: 90 TABLET | Refills: 1 | Status: SHIPPED | OUTPATIENT
Start: 2021-01-08 | End: 2021-10-22 | Stop reason: ALTCHOICE

## 2021-01-08 NOTE — PROGRESS NOTES
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    She has follow-up with Hematology currently she is stable she will be going for her colonoscopy in the near future    Screening for cardiovascular condition  I have counselled the pt to follow a healthy and balanced diet ,and recommend routine exercise  Check lipid profile    Screening for malignant neoplasm of colon  Counseled, check screening colonoscopy    Anxiety  Reports me a change in her insurance formulary/worsening anxiety; no SI would like to change to a medication that is covered by her insurance will discontinue Viibryd and start Zoloft 25 mg once daily she can call in 2 weeks for titration, if any side effects or any problems please notify me         Problem List Items Addressed This Visit        Musculoskeletal and Integument    Dermatomyositis ([facility] [address] )     Clinically stable and doing well continue the current medical regiment will continue monitor  Working with Rheumatology currently on methotrexate fairly well controlled            Other    BRCA2 gene mutation positive     1  Double mastectomy, reconstructive surgery  2  Hysterectomy and bilateral salpingo-oophorectomy  3   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    She has follow-up with Hematology currently she is stable she will be going for her colonoscopy in the near future         Anxiety - Primary     Reports me a change in her insurance formulary/worsening anxiety; no SI would like to change to a medication that is covered by her insurance will discontinue Viibryd and start Zoloft 25 mg once daily she can call in 2 weeks for titration, if any side effects or any problems please notify me         Relevant Medications    sertraline (ZOLOFT) 25 mg tablet    Screening for malignant neoplasm of colon     Counseled, check screening colonoscopy         Relevant Orders    Ambulatory referral to Colorectal Surgery    Screening for cardiovascular condition     I have counselled the pt to follow a healthy and balanced diet ,and recommend routine exercise  Check lipid profile         Relevant Orders    Comprehensive metabolic panel    Lipid Panel with Direct LDL reflex          Return to office 6  months  call if any problems  Subjective:      Patient ID: Mendez Interiano is a 64 y o  female  HPI 65-year old female coming in for a follow up office visit regarding anxiety, dermatomyositis, BRCA gene mutation; The patient reports me compliant taking medications without untoward side effects the  The patient is here to review his medical condition, update me on the medical condition and the patient reports me no hospitalizations and no ER visits  a lot more mind less eating; she has all the info from weight watchers, stress relief,  Life stressor working from home anxiety higher no depression no si sleep is normal     ? Should I take fish oil got bruising a fall,     Due for colonoscopy    Stopped mtx early covid - now back on MTX  Wants the cov vaccine        The following portions of the patient's history were reviewed and updated as appropriate: allergies, current medications, past family history, past medical history, past social history, past surgical history and problem list     Review of Systems   Constitutional: Negative for activity change, appetite change and unexpected weight change  HENT: Negative for congestion and postnasal drip  Eyes: Negative for visual disturbance  Respiratory: Negative for cough and shortness of breath  Cardiovascular: Negative for chest pain  Gastrointestinal: Negative for abdominal pain, diarrhea, nausea and vomiting  Neurological: Negative for dizziness, light-headedness and headaches  Psychiatric/Behavioral: Negative for suicidal ideas  The patient is nervous/anxious  Objective:    No follow-ups on file  No results found  No Known Allergies    Past Medical History:   Diagnosis Date    Benign neoplasm of lung     Right lung lobe benign  Removed 2010   Breast cancer ([facility] [address] )     Breathing difficulty 2011    no breathing difficulties but patient reported drop in BP during anesthesia for appendectomy and was advised to have cardiologist consult    Depression     Dermatomyositis ([facility] [address] )     Lung nodule     Wears glasses      Past Surgical History:   Procedure Laterality Date    APPENDECTOMY      2011    BREAST SURGERY      Bilateral Mastectomy    COLONOSCOPY N/A 1/26/2016    Procedure: COLONOSCOPY;  Surgeon: Evelyn Ames MD;  Location: [facility]; Service:     HYSTERECTOMY      2007    LUNG BIOPSY      MASS EXCISION Left 7/18/2019    Procedure: MID BACK MASS EXCISION BIOPSY;  Surgeon: Samella Najjar, MD;  Location: [facility];  Service: General    MASTECTOMY      reconstruction 2008    OOPHORECTOMY       Current Outpatient Medications on File Prior to Visit   Medication Sig Dispense Refill    Calcium-Magnesium-Vitamin D (CALCIUM 500 PO) Take 1,000 mg by mouth daily   Cholecalciferol (VITAMIN D) 2000 UNITS CAPS Take 2,000 Units/day by mouth   clobetasol (TEMOVATE) 0 05 % cream PLEASE SEE ATTACHED FOR DETAILED DIRECTIONS      folic acid (FOLVITE) 1 mg tablet       methotrexate 2 5 mg tablet Take 10 mg by mouth once a week       multivitamin (THERAGRAN) TABS Take 1 tablet by mouth daily  No current facility-administered medications on file prior to visit        Family History   Problem Relation Age of Onset    No Known Problems Mother     No Known Problems Father      Social History     Socioeconomic History    Marital status: /Civil Union     Spouse name: Not on file    Number of children: Not on file    Years of education: Not on file    Highest education level: Not on file   Occupational History    Not on file   Social Needs    Financial resource strain: Not on file    Food insecurity     Worry: Not on file     Inability: Not on file    Transportation needs     Medical: Not on file     Non-medical: Not on file   Tobacco Use    Smoking status: Never Smoker    Smokeless tobacco: Never Used   Substance and Sexual Activity    Alcohol use: No    Drug use: No    Sexual activity: Yes   Lifestyle    Physical activity     Days per week: Not on file     Minutes per session: Not on file    Stress: Not on file   Relationships    Social connections     Talks on phone: Not on file     Gets together: Not on file     Attends [employer] service: Not on file     Active member of club or organization: Not on file     Attends meetings of clubs or organizations: Not on file     Relationship status: Not on file    Intimate partner violence     Fear of current or ex partner: Not on file     Emotionally abused: Not on file     Physically abused: Not on file     Forced sexual activity: Not on file   Other Topics Concern    Not on file   Social History Narrative    Not on file     Vitals:    01/08/21 1556   BP: 120/70   Pulse: 78   Temp: (!) 95 4 °F (35 2 °C)   TempSrc: Temporal   SpO2: 98%   Weight: 75 3 kg (166 lb)   Height: 5' 5" (1 651 m)     Results for orders placed or performed in visit on 12/28/20   Comprehensive metabolic panel   Result Value Ref Range    Sodium 142 136 - 145 mmol/L    Potassium 4 2 3 5 - 5 3 mmol/L    Chloride 108 100 - 108 mmol/L    CO2 33 (H) 21 - 32 mmol/L    ANION GAP 1 (L) 4 - 13 mmol/L    BUN 17 5 - 25 mg/dL    Creatinine 0 73 0 60 - 1 30 mg/dL    Glucose, Fasting 81 65 - 99 mg/dL    Calcium 9 2 8 3 - 10 1 mg/dL    AST 23 5 - 45 U/L    ALT 48 12 - 78 U/L    Alkaline Phosphatase 84 46 - 116 U/L    Total Protein 7 2 6 4 - 8 2 g/dL    Albumin 4 0 3 5 - 5 0 g/dL    Total Bilirubin 0 38 0 20 - 1 00 mg/dL    eGFR 91 ml/min/1 73sq m   Lipid Panel with Direct LDL reflex   Result Value Ref Range    Cholesterol 177 50 - 200 mg/dL    Triglycerides 65 <=150 mg/dL    HDL, Direct 75 >=40 mg/dL    LDL Calculated 89 0 - 100 mg/dL   Hemoglobin A1C   Result Value Ref Range    Hemoglobin A1C 5 1 Normal 3 8-5 6%; PreDiabetic 5 7-6 4%; Diabetic >=6 5%; Glycemic control for adults with diabetes <7 0% %     mg/dl     Weight (last 2 days)     Date/Time   Weight    01/08/21 1556   75 3 (166)            Body mass index is 27 62 kg/m²  BP      Temp     Pulse     Resp      SpO2        Vitals:    01/08/21 1556   Weight: 75 3 kg (166 lb)     Vitals:    01/08/21 1556   Weight: 75 3 kg (166 lb)       /70   Pulse 78   Temp (!) 95 4 °F (35 2 °C) (Temporal)   Ht 5' 5" (1 651 m)   Wt 75 3 kg (166 lb)   SpO2 98%   BMI 27 62 kg/m²          Physical Exam  Constitutional:       Appearance: She is well-developed  HENT:      Head: Normocephalic  Eyes:      General: No scleral icterus  Right eye: No discharge  Left eye: No discharge  Conjunctiva/sclera: Conjunctivae normal       Pupils: Pupils are equal, round, and reactive to light  Neck:      Musculoskeletal: Neck supple  Cardiovascular:      Rate and Rhythm: Normal rate and regular rhythm  Heart sounds: Normal heart sounds  No murmur  No friction rub  No gallop  Pulmonary:      Effort: No respiratory distress  Breath sounds: Normal breath sounds  No wheezing or rales  Abdominal:      General: Bowel sounds are normal  There is no distension  Palpations: Abdomen is soft  There is no mass  Tenderness: There is no abdominal tenderness  There is no guarding or rebound  Musculoskeletal:         General: No deformity  Lymphadenopathy:      Cervical: No cervical adenopathy  Neurological:      Mental Status: She is alert        Coordination: Coordination normal    Psychiatric: Mood and Affect: Mood is anxious  Mood is not depressed  Thought Content: Thought content does not include suicidal ideation

## 2021-01-10 PROBLEM — F41.9 ANXIETY: Status: ACTIVE | Noted: 2021-01-10

## 2021-01-10 PROBLEM — Z12.11 SCREENING FOR MALIGNANT NEOPLASM OF COLON: Status: ACTIVE | Noted: 2021-01-10

## 2021-01-10 PROBLEM — Z13.6 SCREENING FOR CARDIOVASCULAR CONDITION: Status: ACTIVE | Noted: 2021-01-10

## 2021-01-10 NOTE — ASSESSMENT & PLAN NOTE
Reports me a change in her insurance formulary/worsening anxiety; no SI would like to change to a medication that is covered by her insurance will discontinue Viibryd and start Zoloft 25 mg once daily she can call in 2 weeks for titration, if any side effects or any problems please notify me

## 2021-01-10 NOTE — ASSESSMENT & PLAN NOTE
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

## 2021-01-10 NOTE — ASSESSMENT & PLAN NOTE
I have counselled the pt to follow a healthy and balanced diet ,and recommend routine exercise    Check lipid profile

## 2021-01-10 NOTE — ASSESSMENT & PLAN NOTE
Clinically stable and doing well continue the current medical regiment will continue monitor    Working with Rheumatology currently on methotrexate fairly well controlled

## 2021-02-26 DIAGNOSIS — Z23 ENCOUNTER FOR IMMUNIZATION: ICD-10-CM

## 2021-03-09 ENCOUNTER — ANESTHESIA (OUTPATIENT)
Dept: GASTROENTEROLOGY | Facility: HOSPITAL | Age: 61
End: 2021-03-09

## 2021-03-09 ENCOUNTER — HOSPITAL ENCOUNTER (OUTPATIENT)
Dept: GASTROENTEROLOGY | Facility: HOSPITAL | Age: 61
Setting detail: OUTPATIENT SURGERY
Discharge: HOME | End: 2021-03-09
Attending: SURGERY | Admitting: SURGERY
Payer: COMMERCIAL

## 2021-03-09 ENCOUNTER — ANESTHESIA EVENT (OUTPATIENT)
Dept: GASTROENTEROLOGY | Facility: HOSPITAL | Age: 61
End: 2021-03-09

## 2021-03-09 VITALS
WEIGHT: 163 LBS | TEMPERATURE: 97 F | HEIGHT: 65 IN | BODY MASS INDEX: 27.16 KG/M2 | RESPIRATION RATE: 18 BRPM | DIASTOLIC BLOOD PRESSURE: 55 MMHG | SYSTOLIC BLOOD PRESSURE: 104 MMHG | HEART RATE: 49 BPM | OXYGEN SATURATION: 100 %

## 2021-03-09 DIAGNOSIS — Z12.11 ENCOUNTER FOR SCREENING FOR MALIGNANT NEOPLASM OF COLON: ICD-10-CM

## 2021-03-09 RX ORDER — LIDOCAINE HYDROCHLORIDE 10 MG/ML
0.5 INJECTION, SOLUTION EPIDURAL; INFILTRATION; INTRACAUDAL; PERINEURAL ONCE AS NEEDED
Status: CANCELLED | OUTPATIENT
Start: 2021-03-09

## 2021-03-09 RX ORDER — SODIUM CHLORIDE, SODIUM LACTATE, POTASSIUM CHLORIDE, CALCIUM CHLORIDE 600; 310; 30; 20 MG/100ML; MG/100ML; MG/100ML; MG/100ML
50 INJECTION, SOLUTION INTRAVENOUS CONTINUOUS
Status: CANCELLED | OUTPATIENT
Start: 2021-03-09

## 2021-03-09 RX ORDER — PROPOFOL 10 MG/ML
INJECTION, EMULSION INTRAVENOUS CONTINUOUS PRN
Status: DISCONTINUED | OUTPATIENT
Start: 2021-03-09 | End: 2021-03-09

## 2021-03-09 RX ORDER — LIDOCAINE HYDROCHLORIDE 10 MG/ML
INJECTION, SOLUTION EPIDURAL; INFILTRATION; INTRACAUDAL; PERINEURAL AS NEEDED
Status: DISCONTINUED | OUTPATIENT
Start: 2021-03-09 | End: 2021-03-09

## 2021-03-09 RX ORDER — SODIUM CHLORIDE 9 MG/ML
INJECTION, SOLUTION INTRAVENOUS CONTINUOUS PRN
Status: DISCONTINUED | OUTPATIENT
Start: 2021-03-09 | End: 2021-03-09

## 2021-03-09 RX ORDER — PROPOFOL 10 MG/ML
INJECTION, EMULSION INTRAVENOUS AS NEEDED
Status: DISCONTINUED | OUTPATIENT
Start: 2021-03-09 | End: 2021-03-09

## 2021-03-09 RX ADMIN — PROPOFOL 20 MG: 10 INJECTION, EMULSION INTRAVENOUS at 09:08

## 2021-03-09 RX ADMIN — SODIUM CHLORIDE: 9 INJECTION, SOLUTION INTRAVENOUS at 08:20

## 2021-03-09 RX ADMIN — PROPOFOL 30 MG: 10 INJECTION, EMULSION INTRAVENOUS at 09:19

## 2021-03-09 RX ADMIN — PROPOFOL 40 MG: 10 INJECTION, EMULSION INTRAVENOUS at 09:11

## 2021-03-09 RX ADMIN — PROPOFOL 40 MG: 10 INJECTION, EMULSION INTRAVENOUS at 09:07

## 2021-03-09 RX ADMIN — PROPOFOL 75 MCG/KG/MIN: 10 INJECTION, EMULSION INTRAVENOUS at 09:07

## 2021-03-09 RX ADMIN — LIDOCAINE HYDROCHLORIDE 50 MG: 10 INJECTION, SOLUTION EPIDURAL; INFILTRATION; INTRACAUDAL; PERINEURAL at 09:07

## 2021-03-09 NOTE — ANESTHESIA POSTPROCEDURE EVALUATION
Post-Op Assessment Note    CV Status:  Stable  Pain Score: 0    Pain management: adequate     Mental Status:  Alert and awake   Hydration Status:  Euvolemic   PONV Controlled:  Controlled   Airway Patency:  Patent      Post Op Vitals Reviewed: Yes      Staff: Anesthesiologist, CRNA         No complications documented      BP (!) 88/47 (03/09/21 0926)    Temp (!) 97 °F (36 1 °C) (03/09/21 0926)    Pulse (!) 47 (03/09/21 0926)   Resp 18 (03/09/21 0926)    SpO2 100 % (03/09/21 0926)

## 2021-03-09 NOTE — ANESTHESIA PREPROCEDURE EVALUATION
Procedure:  COLONOSCOPY    Relevant Problems   ANESTHESIA (within normal limits)      GYN   (+) Bilateral malignant neoplasm of breast in female, estrogen receptor positive (HCC)      MUSCULOSKELETAL   (+) Dermatomyositis (HCC)      NEURO/PSYCH   (+) Anxiety   (+) Depression   (+) History of breast cancer   (+) History of melanoma   (+) Major depressive disorder in full remission [facility])        Physical Exam    Airway    Mallampati score: I  TM Distance: >3 FB  Neck ROM: full     Dental   No notable dental hx     Cardiovascular  Cardiovascular exam normal    Pulmonary  Pulmonary exam normal     Other Findings        Anesthesia Plan  ASA Score- 2     Anesthesia Type- IV sedation with anesthesia with ASA Monitors  Additional Monitors:   Airway Plan:     Comment: I, Kiara Gandhi MD, discussed risks (reviewed with patient on the anesthesia consent form), benefits and alternatives of monitored sedation including the possibility under sedation to have recall or mild discomfort          Plan Factors-    Chart reviewed  Patient summary reviewed  Induction- intravenous  Postoperative Plan- Plan for postoperative opioid use  Informed Consent- Anesthetic plan and risks discussed with patient  I personally reviewed this patient with the CRNA  Discussed and agreed on the Anesthesia Plan with the CRNA  Jemal Casiano

## 2021-03-18 ENCOUNTER — APPOINTMENT (OUTPATIENT)
Dept: LAB | Age: 61
End: 2021-03-18
Payer: COMMERCIAL

## 2021-03-18 ENCOUNTER — TRANSCRIBE ORDERS (OUTPATIENT)
Dept: ADMINISTRATIVE | Age: 61
End: 2021-03-18

## 2021-03-18 DIAGNOSIS — Z15.01 BRCA2 GENE MUTATION POSITIVE: ICD-10-CM

## 2021-03-18 DIAGNOSIS — Z13.6 SCREENING FOR CARDIOVASCULAR CONDITION: ICD-10-CM

## 2021-03-18 DIAGNOSIS — C50.012 MALIGNANT NEOPLASM OF NIPPLE OF BOTH BREASTS IN FEMALE, ESTROGEN RECEPTOR POSITIVE (HCC): ICD-10-CM

## 2021-03-18 DIAGNOSIS — C50.011 MALIGNANT NEOPLASM OF NIPPLE OF BOTH BREASTS IN FEMALE, ESTROGEN RECEPTOR POSITIVE (HCC): ICD-10-CM

## 2021-03-18 DIAGNOSIS — Z15.01 GENETIC SUSCEPTIBILITY TO MALIGNANT NEOPLASM OF BREAST: ICD-10-CM

## 2021-03-18 DIAGNOSIS — Z15.09 GENETIC SUSCEPTIBILITY TO OTHER MALIGNANT NEOPLASM: ICD-10-CM

## 2021-03-18 DIAGNOSIS — Z15.09 BRCA2 GENE MUTATION POSITIVE: ICD-10-CM

## 2021-03-18 DIAGNOSIS — Z17.0 MALIGNANT NEOPLASM OF NIPPLE OF BOTH BREASTS IN FEMALE, ESTROGEN RECEPTOR POSITIVE (HCC): ICD-10-CM

## 2021-03-18 DIAGNOSIS — Z15.01 GENETIC SUSCEPTIBILITY TO MALIGNANT NEOPLASM OF BREAST: Primary | ICD-10-CM

## 2021-03-18 LAB
ALBUMIN SERPL BCP-MCNC: 4.1 G/DL (ref 3.5–5)
ALP SERPL-CCNC: 79 U/L (ref 46–116)
ALT SERPL W P-5'-P-CCNC: 31 U/L (ref 12–78)
ANION GAP SERPL CALCULATED.3IONS-SCNC: 5 MMOL/L (ref 4–13)
AST SERPL W P-5'-P-CCNC: 23 U/L (ref 5–45)
BASOPHILS # BLD AUTO: 0.02 THOUSANDS/ΜL (ref 0–0.1)
BASOPHILS NFR BLD AUTO: 1 % (ref 0–1)
BILIRUB SERPL-MCNC: 0.47 MG/DL (ref 0.2–1)
BUN SERPL-MCNC: 15 MG/DL (ref 5–25)
CALCIUM SERPL-MCNC: 9.1 MG/DL (ref 8.3–10.1)
CANCER AG125 SERPL-ACNC: 6.6 U/ML (ref 0–30)
CANCER AG27-29 SERPL-ACNC: 23.8 U/ML (ref 0–42.3)
CHLORIDE SERPL-SCNC: 105 MMOL/L (ref 100–108)
CO2 SERPL-SCNC: 30 MMOL/L (ref 21–32)
CREAT SERPL-MCNC: 0.86 MG/DL (ref 0.6–1.3)
EOSINOPHIL # BLD AUTO: 0.12 THOUSAND/ΜL (ref 0–0.61)
EOSINOPHIL NFR BLD AUTO: 3 % (ref 0–6)
ERYTHROCYTE [DISTWIDTH] IN BLOOD BY AUTOMATED COUNT: 13 % (ref 11.6–15.1)
GFR SERPL CREATININE-BSD FRML MDRD: 75 ML/MIN/1.73SQ M
GLUCOSE P FAST SERPL-MCNC: 83 MG/DL (ref 65–99)
HCT VFR BLD AUTO: 41 % (ref 34.8–46.1)
HGB BLD-MCNC: 13.1 G/DL (ref 11.5–15.4)
IMM GRANULOCYTES # BLD AUTO: 0 THOUSAND/UL (ref 0–0.2)
IMM GRANULOCYTES NFR BLD AUTO: 0 % (ref 0–2)
LYMPHOCYTES # BLD AUTO: 1.22 THOUSANDS/ΜL (ref 0.6–4.47)
LYMPHOCYTES NFR BLD AUTO: 30 % (ref 14–44)
MCH RBC QN AUTO: 30.3 PG (ref 26.8–34.3)
MCHC RBC AUTO-ENTMCNC: 32 G/DL (ref 31.4–37.4)
MCV RBC AUTO: 95 FL (ref 82–98)
MONOCYTES # BLD AUTO: 0.37 THOUSAND/ΜL (ref 0.17–1.22)
MONOCYTES NFR BLD AUTO: 9 % (ref 4–12)
NEUTROPHILS # BLD AUTO: 2.39 THOUSANDS/ΜL (ref 1.85–7.62)
NEUTS SEG NFR BLD AUTO: 57 % (ref 43–75)
NRBC BLD AUTO-RTO: 0 /100 WBCS
PLATELET # BLD AUTO: 224 THOUSANDS/UL (ref 149–390)
PMV BLD AUTO: 10.1 FL (ref 8.9–12.7)
POTASSIUM SERPL-SCNC: 4 MMOL/L (ref 3.5–5.3)
PROT SERPL-MCNC: 7.1 G/DL (ref 6.4–8.2)
RBC # BLD AUTO: 4.32 MILLION/UL (ref 3.81–5.12)
SODIUM SERPL-SCNC: 140 MMOL/L (ref 136–145)
WBC # BLD AUTO: 4.12 THOUSAND/UL (ref 4.31–10.16)

## 2021-03-18 PROCEDURE — 80053 COMPREHEN METABOLIC PANEL: CPT

## 2021-03-18 PROCEDURE — 85025 COMPLETE CBC W/AUTO DIFF WBC: CPT

## 2021-03-18 PROCEDURE — 36415 COLL VENOUS BLD VENIPUNCTURE: CPT

## 2021-03-18 PROCEDURE — 86304 IMMUNOASSAY TUMOR CA 125: CPT

## 2021-03-18 PROCEDURE — 86300 IMMUNOASSAY TUMOR CA 15-3: CPT

## 2021-03-25 ENCOUNTER — OFFICE VISIT (OUTPATIENT)
Dept: HEMATOLOGY ONCOLOGY | Facility: CLINIC | Age: 61
End: 2021-03-25
Payer: COMMERCIAL

## 2021-03-25 VITALS
BODY MASS INDEX: 27.66 KG/M2 | HEART RATE: 65 BPM | HEIGHT: 65 IN | WEIGHT: 166 LBS | DIASTOLIC BLOOD PRESSURE: 72 MMHG | RESPIRATION RATE: 16 BRPM | OXYGEN SATURATION: 100 % | TEMPERATURE: 98 F | SYSTOLIC BLOOD PRESSURE: 110 MMHG

## 2021-03-25 DIAGNOSIS — M81.8 OSTEOPOROSIS DUE TO AROMATASE INHIBITOR: ICD-10-CM

## 2021-03-25 DIAGNOSIS — M33.90 DERMATOMYOSITIS (HCC): ICD-10-CM

## 2021-03-25 DIAGNOSIS — C50.012 MALIGNANT NEOPLASM OF NIPPLE OF BOTH BREASTS IN FEMALE, ESTROGEN RECEPTOR POSITIVE (HCC): Primary | ICD-10-CM

## 2021-03-25 DIAGNOSIS — Z15.09 BRCA2 GENE MUTATION POSITIVE: ICD-10-CM

## 2021-03-25 DIAGNOSIS — R91.1 LUNG NODULE: ICD-10-CM

## 2021-03-25 DIAGNOSIS — Z15.01 BRCA2 GENE MUTATION POSITIVE: ICD-10-CM

## 2021-03-25 DIAGNOSIS — Z14.8 CARRIER OF HIGH RISK CANCER GENE MUTATION: ICD-10-CM

## 2021-03-25 DIAGNOSIS — Z17.0 MALIGNANT NEOPLASM OF NIPPLE OF BOTH BREASTS IN FEMALE, ESTROGEN RECEPTOR POSITIVE (HCC): Primary | ICD-10-CM

## 2021-03-25 DIAGNOSIS — T38.6X5A OSTEOPOROSIS DUE TO AROMATASE INHIBITOR: ICD-10-CM

## 2021-03-25 DIAGNOSIS — C50.011 MALIGNANT NEOPLASM OF NIPPLE OF BOTH BREASTS IN FEMALE, ESTROGEN RECEPTOR POSITIVE (HCC): Primary | ICD-10-CM

## 2021-03-25 PROCEDURE — 1036F TOBACCO NON-USER: CPT | Performed by: INTERNAL MEDICINE

## 2021-03-25 PROCEDURE — 99214 OFFICE O/P EST MOD 30 MIN: CPT | Performed by: INTERNAL MEDICINE

## 2021-03-25 NOTE — PROGRESS NOTES
HPI:    Patient has history of bilateral breast cancers, BRCA2 gene mutation in her and her family, dermatomyositis and melanoma in situ  Family history of breast and ovarian cancer  For dermatomyositis she has been on methotrexate 10 mg 1 day a week and folic acid  She had bilateral mastectomies  She had ITZEL and BSO  She goes for pancreatic checkup and also goes to her dermatologist for screening of skin for melanoma and ophthalmologist for ocular melanoma  In 2008 patient had hormone receptor positive, HER-2 negative, stage IIA breast cancer on the right and stage IA on the left  One lymph node was positive on the right        Patient had bilateral mastectomies, reconstruction surgeries, 4 cycles of Taxotere plus Cytoxan followed by 5 years of Femara that she finished in September 2013  Patient had a 9 mm nodule in the right lower lung and she had wedge resection and that came back negative for malignancy  Now she has a  stable 5 mm nodule at left lung base and this is being monitored   She has renal and liver cysts and also tiny in polyp in gallbladder     She had colonoscopy in February 2021 and she states that was clear  Has some tiredness                   Current Outpatient Medications:     Calcium-Magnesium-Vitamin D (CALCIUM 500 PO), Take 1,000 mg by mouth daily  , Disp: , Rfl:     Cholecalciferol (VITAMIN D) 2000 UNITS CAPS, Take 2,000 Units/day by mouth , Disp: , Rfl:     clobetasol (TEMOVATE) 0 05 % cream, PLEASE SEE ATTACHED FOR DETAILED DIRECTIONS, Disp: , Rfl:     folic acid (FOLVITE) 1 mg tablet, , Disp: , Rfl:     methotrexate 2 5 mg tablet, Take 10 mg by mouth once a week , Disp: , Rfl:     multivitamin (THERAGRAN) TABS, Take 1 tablet by mouth daily  , Disp: , Rfl:     sertraline (ZOLOFT) 25 mg tablet, Take 1 tablet (25 mg total) by mouth daily, Disp: 90 tablet, Rfl: 1    No Known Allergies    Oncology History   Bilateral malignant neoplasm of breast in female, estrogen receptor positive ([facility] [address] )    Initial Diagnosis    Bilateral malignant neoplasm of breast in female, estrogen receptor positive [facility])      Surgery    Double mastectomy, reconstructive surgery      Chemotherapy    Chemotherapy with Taxotere and Cytoxan for 4 cycles followed by Femara from 2008 thru 2013  ROS:  03/25/21 Reviewed 13 systems: See symptoms in HPI:    Presently   No other neurological, cardiac, pulmonary, GI and  symptoms other than listed in HPI  No fever, chills, bleeding, bone pains,  weight loss, night sweats, arthritic symptoms,  weakness, numbness,  claudication and gait problem  No frequent infections  Not unusually sensitive to heat or cold  No swelling of the ankles  No swollen glands  Patient is anxious  /72 (BP Location: Left arm, Patient Position: Sitting, Cuff Size: Adult)   Pulse 65   Temp 98 °F (36 7 °C)   Resp 16   Ht 5' 5" (1 651 m)   Wt 75 3 kg (166 lb)   SpO2 100%   BMI 27 62 kg/m²     Physical Exam: Our physician assistant Yolanda Kelly was in the room with me  the whole time   Alert, oriented, not in distress stable vitals, no icterus, no oral thrush, no palpable neck mass, clear lung fields, regular heart rate, abdomen  soft and non tender, no palpable abdominal mass, no ascites, no edema of ankles, no calf tenderness, no focal neurological deficit, no skin rash, no palpable lymphadenopathy in the neck and axillary areas, good arterial pulses, no clubbing  Patient is anxious  Performance status 0  IMAGING:  IMPRESSION:     1  Stable 4 mm left lower lobe pulmonary nodule  No new pulmonary nodules identified      2    No CT evidence of metastatic or recurrent disease within the abdomen or pelvis            Workstation performed: [id number]      Imaging     CT chest abdomen pelvis w contrast (Order: [id number]) - 9/9/2020       LABS:    Results for orders placed or performed in visit on 03/18/21   CBC and differential   Result Value Ref Range    WBC 4 12 (L) 4 31 - 10 16 Thousand/uL    RBC 4 32 3 81 - 5 12 Million/uL    Hemoglobin 13 1 11 5 - 15 4 g/dL    Hematocrit 41 0 34 8 - 46 1 %    MCV 95 82 - 98 fL    MCH 30 3 26 8 - 34 3 pg    MCHC 32 0 31 4 - 37 4 g/dL    RDW 13 0 11 6 - 15 1 %    MPV 10 1 8 9 - 12 7 fL    Platelets 859 399 - 074 Thousands/uL    nRBC 0 /100 WBCs    Neutrophils Relative 57 43 - 75 %    Immat GRANS % 0 0 - 2 %    Lymphocytes Relative 30 14 - 44 %    Monocytes Relative 9 4 - 12 %    Eosinophils Relative 3 0 - 6 %    Basophils Relative 1 0 - 1 %    Neutrophils Absolute 2 39 1 85 - 7 62 Thousands/µL    Immature Grans Absolute 0 00 0 00 - 0 20 Thousand/uL    Lymphocytes Absolute 1 22 0 60 - 4 47 Thousands/µL    Monocytes Absolute 0 37 0 17 - 1 22 Thousand/µL    Eosinophils Absolute 0 12 0 00 - 0 61 Thousand/µL    Basophils Absolute 0 02 0 00 - 0 10 Thousands/µL   Comprehensive metabolic panel   Result Value Ref Range    Sodium 140 136 - 145 mmol/L    Potassium 4 0 3 5 - 5 3 mmol/L    Chloride 105 100 - 108 mmol/L    CO2 30 21 - 32 mmol/L    ANION GAP 5 4 - 13 mmol/L    BUN 15 5 - 25 mg/dL    Creatinine 0 86 0 60 - 1 30 mg/dL    Glucose, Fasting 83 65 - 99 mg/dL    Calcium 9 1 8 3 - 10 1 mg/dL    AST 23 5 - 45 U/L    ALT 31 12 - 78 U/L    Alkaline Phosphatase 79 46 - 116 U/L    Total Protein 7 1 6 4 - 8 2 g/dL    Albumin 4 1 3 5 - 5 0 g/dL    Total Bilirubin 0 47 0 20 - 1 00 mg/dL    eGFR 75 ml/min/1 73sq m   Cancer antigen 27 29   Result Value Ref Range    CA 27 29 23 8 0 0 - 42 3 U/mL      Result Value Ref Range     6 6 0 0 - 30 0 U/mL     Labs, Imaging, & Other studies:   All pertinent labs and imaging studies were personally reviewed    Lab Results   Component Value Date     09/15/2015    K 4 0 03/18/2021     03/18/2021    CO2 30 03/18/2021    ANIONGAP 12 09/15/2015    BUN 15 03/18/2021    CREATININE 0 86 03/18/2021    GLUCOSE 124 09/15/2015    GLUF 83 03/18/2021    CALCIUM 9 1 03/18/2021    AST 23 03/18/2021    ALT 31 03/18/2021    ALKPHOS 79 03/18/2021    PROT 7 6 11/19/2015    BILITOT 0 46 09/15/2015    EGFR 75 03/18/2021     Lab Results   Component Value Date    WBC 4 12 (L) 03/18/2021    HGB 13 1 03/18/2021    HCT 41 0 03/18/2021    MCV 95 03/18/2021     03/18/2021       Reviewed CBC, CMP, CA 27-29, CA-125 and CT scan chest and discussed with patient  Assessment and plan:  Patient has history of bilateral breast cancers, BRCA2 gene mutation in her and her family, dermatomyositis and melanoma in situ  Family history of breast and ovarian cancer  For dermatomyositis she has been on methotrexate 10 mg 1 day a week and folic acid  She had bilateral mastectomies  She had ITZEL and BSO  She goes for pancreatic checkup and also goes to her dermatologist for screening of skin for melanoma and ophthalmologist for ocular melanoma  In 2008 patient had hormone receptor positive, HER-2 negative, stage IIA breast cancer on the right and stage IA on the left  One lymph node was positive on the right        Patient had bilateral mastectomies, reconstruction surgeries, 4 cycles of Taxotere plus Cytoxan followed by 5 years of Femara that she finished in September 2013  Patient had a 9 mm nodule in the right lower lung and she had wedge resection and that came back negative for malignancy  Now she has a  stable 5 mm nodule at left lung base and this is being monitored   She has renal and liver cysts and also tiny in polyp in gallbladder     She had colonoscopy in February 2021 and she states that was clear  Has some tiredness             Physical examination and test results are as recorded and discussed  Reviewed CBC, CMP, CA 27-29, CA-125 and CT scan chest and discussed with patient  Breast cancers  remain in remission  She gets checked for other cancers like melanoma, pancreatic cancer and peritoneal cancer and others  She had ITZEL and BSO  She has history of melanoma in situ      She follows with her primary physician and other consultants        All discussed in detail  Questions answered  Discussed the importance of eating healthy foods, staying active and health screening test   Patient is capable of self-care  Goal is cure from breast cancers and early detection  of other cancers at risk  Discussed precautions against coronavirus     Goal is cure from breast cancers and melanoma and early  Detection of other cancers mentioned above  1  Malignant neoplasm of nipple of both breasts in female, estrogen receptor positive ([facility] [address] )    - CT chest abdomen pelvis w contrast; Future  - ; Future  - Cancer antigen 27 29; Future    2  BRCA2 gene mutation positive    - CT chest abdomen pelvis w contrast; Future  - ; Future    3  Lung nodule    - CT chest abdomen pelvis w contrast; Future    4  Dermatomyositis ([facility] [address] )    - CBC and differential; Future  - Comprehensive metabolic panel; Future    5  Carrier of high risk cancer gene mutation    - CT chest abdomen pelvis w contrast; Future  - ; Future    6  Osteoporosis due to aromatase inhibitor  - DXA bone density spine hip and pelvis; Future           Ordered blood work prior to CT scan in September 2021  DEXA scan any time  Follow-up in 6 months       Patient voiced understanding and agreement in the discussion  Counseling / Coordination of Care     Ivan Hughes   Provided counseling and support

## 2021-04-07 ENCOUNTER — TRANSCRIBE ORDERS (OUTPATIENT)
Dept: ADMINISTRATIVE | Age: 61
End: 2021-04-07

## 2021-04-07 ENCOUNTER — APPOINTMENT (OUTPATIENT)
Dept: LAB | Age: 61
End: 2021-04-07
Payer: COMMERCIAL

## 2021-04-07 DIAGNOSIS — Z15.01 GENETIC SUSCEPTIBILITY TO MALIGNANT NEOPLASM OF BREAST: ICD-10-CM

## 2021-04-07 DIAGNOSIS — Z15.01 GENETIC SUSCEPTIBILITY TO MALIGNANT NEOPLASM OF BREAST: Primary | ICD-10-CM

## 2021-04-07 LAB — CANCER AG125 SERPL-ACNC: 6.4 U/ML (ref 0–30)

## 2021-04-07 PROCEDURE — 86304 IMMUNOASSAY TUMOR CA 125: CPT

## 2021-04-07 PROCEDURE — 36415 COLL VENOUS BLD VENIPUNCTURE: CPT

## 2021-04-13 ENCOUNTER — OFFICE VISIT (OUTPATIENT)
Dept: GYNECOLOGIC ONCOLOGY | Facility: CLINIC | Age: 61
End: 2021-04-13
Payer: COMMERCIAL

## 2021-04-13 VITALS
RESPIRATION RATE: 18 BRPM | BODY MASS INDEX: 27.49 KG/M2 | TEMPERATURE: 98.1 F | WEIGHT: 165 LBS | OXYGEN SATURATION: 96 % | DIASTOLIC BLOOD PRESSURE: 62 MMHG | HEIGHT: 65 IN | SYSTOLIC BLOOD PRESSURE: 98 MMHG | HEART RATE: 58 BPM

## 2021-04-13 DIAGNOSIS — Z15.09 BRCA2 GENE MUTATION POSITIVE: Primary | ICD-10-CM

## 2021-04-13 DIAGNOSIS — Z15.01 BRCA2 GENE MUTATION POSITIVE: Primary | ICD-10-CM

## 2021-04-13 DIAGNOSIS — Z85.3 HISTORY OF BREAST CANCER: ICD-10-CM

## 2021-04-13 PROCEDURE — 99213 OFFICE O/P EST LOW 20 MIN: CPT | Performed by: PHYSICIAN ASSISTANT

## 2021-04-13 PROCEDURE — 1036F TOBACCO NON-USER: CPT | Performed by: PHYSICIAN ASSISTANT

## 2021-04-13 PROCEDURE — 3008F BODY MASS INDEX DOCD: CPT | Performed by: PHYSICIAN ASSISTANT

## 2021-04-13 NOTE — PROGRESS NOTES
Assessment/Plan:    Problem List Items Addressed This Visit        Other    BRCA2 gene mutation positive - Primary     71-year-old with pathogenic BRCA2 mutation with personal history of b/l breast cancer and melanoma in situ  She is s/p prophylactic TLH, BSO  Her clinical exam is without evidence of peritoneal cancer   normal       Return to the office in 6 months for continued BRCA surveillance with a pre-visit   Relevant Orders        History of breast cancer     Continue regular follow-up with Dr Ba Garcia  CHIEF COMPLAINT:   BRCA surveillance    Problem:  1  BRCA 2 mutation  2  Bilateral breast cancer, stage IIA on left and stage IA on right, ER/[place] positive, HER2 negative  3  Melanoma in situ of the chest wall   4  Dermatomyositis    Previous therapy:  Oncology History   Bilateral malignant neoplasm of breast in female, estrogen receptor positive ([facility] [address] )    Initial Diagnosis    Bilateral malignant neoplasm of breast in female, estrogen receptor positive [facility])      Surgery    Double mastectomy, reconstructive surgery      Chemotherapy    Chemotherapy with Taxotere and Cytoxan for 4 cycles followed by Femara from 2008 thru 2013  Patient ID: Lay Holman is a 64 y o  female  who presents to the office for BRCA surveillance  She has been well in the interim  The patient is without acute complaints  She denies vaginal bleeding, discharge or pelvic pain  Normal bowel/bladder function  Appetite is appropriate  She recently had f/u with Dr Ba Garcia, and is stable from a breast cancer standpoint  Her  4/7/21 was reviewed and is normal  She underwent surveillance CT imaging in September 2020  Report was reviewed  The following portions of the patient's history were reviewed and updated as appropriate: allergies, current medications, past medical history, past surgical history and problem list     Review of Systems   Constitutional: Negative      HENT: Negative  Eyes: Negative  Respiratory: Negative  Cardiovascular: Negative  Gastrointestinal: Negative  Genitourinary: Negative  Musculoskeletal: Negative  Skin: Negative  Neurological: Negative  Psychiatric/Behavioral: Negative  Current Outpatient Medications   Medication Sig Dispense Refill    Calcium-Magnesium-Vitamin D (CALCIUM 500 PO) Take 1,000 mg by mouth daily   Cholecalciferol (VITAMIN D) 2000 UNITS CAPS Take 2,000 Units/day by mouth   clobetasol (TEMOVATE) 0 05 % cream PLEASE SEE ATTACHED FOR DETAILED DIRECTIONS      folic acid (FOLVITE) 1 mg tablet       methotrexate 2 5 mg tablet Take 10 mg by mouth once a week       multivitamin (THERAGRAN) TABS Take 1 tablet by mouth daily   sertraline (ZOLOFT) 25 mg tablet Take 1 tablet (25 mg total) by mouth daily 90 tablet 1     No current facility-administered medications for this visit  Objective:    Blood pressure 98/62, pulse 58, temperature 98 1 °F (36 7 °C), temperature source Temporal, resp  rate 18, height 5' 5" (1 651 m), weight 74 8 kg (165 lb), SpO2 96 %  Body mass index is 27 46 kg/m²  Body surface area is 1 82 meters squared  Physical Exam  Vitals signs reviewed  Exam conducted with a chaperone present  Constitutional:       General: She is not in acute distress  Appearance: Normal appearance  She is not ill-appearing  HENT:      Head: Normocephalic and atraumatic  Mouth/Throat:      Mouth: Mucous membranes are moist    Eyes:      General:         Right eye: No discharge  Left eye: No discharge  Conjunctiva/sclera: Conjunctivae normal    Pulmonary:      Effort: Pulmonary effort is normal    Abdominal:      Palpations: Abdomen is soft  There is no mass  Tenderness: There is no abdominal tenderness  Hernia: No hernia is present  Genitourinary:     Comments:  The external female genitalia is normal  The bartholin's, uretheral and skenes glands are normal  The urethral meatus is normal (midline with no lesions)  Anus without fissure or lesion  Speculum exam reveals a grossly normal vagina  No masses, lesions,discharge or bleeding  No significant cystocele or rectocele noted  Bimanual exam notes a surgical absent cervix, uterus and adnexal structures  No masses or fullness  Bladder is without fullness, mass or tenderness  Musculoskeletal:      Right lower leg: No edema  Left lower leg: No edema  Skin:     General: Skin is warm and dry  Coloration: Skin is not jaundiced  Findings: No rash  Neurological:      General: No focal deficit present  Mental Status: She is alert and oriented to person, place, and time  Cranial Nerves: No cranial nerve deficit  Sensory: No sensory deficit  Motor: No weakness  Gait: Gait normal    Psychiatric:         Mood and Affect: Mood normal          Behavior: Behavior normal          Thought Content:  Thought content normal          Judgment: Judgment normal             Trend:  Lab Results   Component Value Date     6 4 04/07/2021     6 6 03/18/2021     8 2 10/05/2020     7 6 09/09/2020     8 2 03/02/2020     6 9 09/03/2019     7 1 03/04/2019     5 9 09/17/2018     6 0 02/12/2018     6 7 09/11/2017     7 2 02/27/2017     7 3 08/31/2016     6 9 03/08/2016     8 1 09/15/2015     8 0 04/20/2015     8 5 09/29/2014     8 2 04/09/2014

## 2021-04-14 NOTE — ASSESSMENT & PLAN NOTE
62-year-old with pathogenic BRCA2 mutation with personal history of b/l breast cancer and melanoma in situ  She is s/p prophylactic TLH, BSO  Her clinical exam is without evidence of peritoneal cancer   normal       Return to the office in 6 months for continued BRCA surveillance with a pre-visit

## 2021-05-11 ENCOUNTER — HOSPITAL ENCOUNTER (OUTPATIENT)
Dept: RADIOLOGY | Age: 61
Discharge: HOME | End: 2021-05-11
Payer: COMMERCIAL

## 2021-05-11 DIAGNOSIS — M81.8 OSTEOPOROSIS DUE TO AROMATASE INHIBITOR: ICD-10-CM

## 2021-05-11 DIAGNOSIS — T38.6X5A OSTEOPOROSIS DUE TO AROMATASE INHIBITOR: ICD-10-CM

## 2021-05-11 PROCEDURE — 77080 DXA BONE DENSITY AXIAL: CPT

## 2021-06-11 ENCOUNTER — TRANSCRIBE ORDERS (OUTPATIENT)
Dept: ADMINISTRATIVE | Age: 61
End: 2021-06-11

## 2021-06-11 ENCOUNTER — APPOINTMENT (OUTPATIENT)
Dept: LAB | Age: 61
End: 2021-06-11
Payer: COMMERCIAL

## 2021-06-11 DIAGNOSIS — Z79.899 ENCOUNTER FOR LONG-TERM (CURRENT) USE OF OTHER MEDICATIONS: Primary | ICD-10-CM

## 2021-06-11 DIAGNOSIS — Z79.899 ENCOUNTER FOR LONG-TERM (CURRENT) USE OF OTHER MEDICATIONS: ICD-10-CM

## 2021-06-11 LAB
ALBUMIN SERPL BCP-MCNC: 4.2 G/DL (ref 3.5–5)
ALP SERPL-CCNC: 84 U/L (ref 46–116)
ALT SERPL W P-5'-P-CCNC: 31 U/L (ref 12–78)
ANION GAP SERPL CALCULATED.3IONS-SCNC: 2 MMOL/L (ref 4–13)
AST SERPL W P-5'-P-CCNC: 18 U/L (ref 5–45)
BASOPHILS # BLD AUTO: 0.03 THOUSANDS/ΜL (ref 0–0.1)
BASOPHILS NFR BLD AUTO: 1 % (ref 0–1)
BILIRUB SERPL-MCNC: 0.3 MG/DL (ref 0.2–1)
BUN SERPL-MCNC: 18 MG/DL (ref 5–25)
CALCIUM SERPL-MCNC: 8.9 MG/DL (ref 8.3–10.1)
CHLORIDE SERPL-SCNC: 108 MMOL/L (ref 100–108)
CO2 SERPL-SCNC: 31 MMOL/L (ref 21–32)
CREAT SERPL-MCNC: 0.67 MG/DL (ref 0.6–1.3)
EOSINOPHIL # BLD AUTO: 0.11 THOUSAND/ΜL (ref 0–0.61)
EOSINOPHIL NFR BLD AUTO: 2 % (ref 0–6)
ERYTHROCYTE [DISTWIDTH] IN BLOOD BY AUTOMATED COUNT: 14.2 % (ref 11.6–15.1)
GFR SERPL CREATININE-BSD FRML MDRD: 97 ML/MIN/1.73SQ M
GLUCOSE P FAST SERPL-MCNC: 67 MG/DL (ref 65–99)
HCT VFR BLD AUTO: 41.9 % (ref 34.8–46.1)
HGB BLD-MCNC: 13.3 G/DL (ref 11.5–15.4)
IMM GRANULOCYTES # BLD AUTO: 0.02 THOUSAND/UL (ref 0–0.2)
IMM GRANULOCYTES NFR BLD AUTO: 0 % (ref 0–2)
LYMPHOCYTES # BLD AUTO: 1.4 THOUSANDS/ΜL (ref 0.6–4.47)
LYMPHOCYTES NFR BLD AUTO: 22 % (ref 14–44)
MCH RBC QN AUTO: 30.4 PG (ref 26.8–34.3)
MCHC RBC AUTO-ENTMCNC: 31.7 G/DL (ref 31.4–37.4)
MCV RBC AUTO: 96 FL (ref 82–98)
MONOCYTES # BLD AUTO: 0.69 THOUSAND/ΜL (ref 0.17–1.22)
MONOCYTES NFR BLD AUTO: 11 % (ref 4–12)
NEUTROPHILS # BLD AUTO: 4.12 THOUSANDS/ΜL (ref 1.85–7.62)
NEUTS SEG NFR BLD AUTO: 64 % (ref 43–75)
NRBC BLD AUTO-RTO: 0 /100 WBCS
PLATELET # BLD AUTO: 260 THOUSANDS/UL (ref 149–390)
PMV BLD AUTO: 10.1 FL (ref 8.9–12.7)
POTASSIUM SERPL-SCNC: 4 MMOL/L (ref 3.5–5.3)
PROT SERPL-MCNC: 7.4 G/DL (ref 6.4–8.2)
RBC # BLD AUTO: 4.38 MILLION/UL (ref 3.81–5.12)
SODIUM SERPL-SCNC: 141 MMOL/L (ref 136–145)
WBC # BLD AUTO: 6.37 THOUSAND/UL (ref 4.31–10.16)

## 2021-06-11 PROCEDURE — 85025 COMPLETE CBC W/AUTO DIFF WBC: CPT

## 2021-06-11 PROCEDURE — 80053 COMPREHEN METABOLIC PANEL: CPT

## 2021-06-11 PROCEDURE — 36415 COLL VENOUS BLD VENIPUNCTURE: CPT

## 2021-07-07 ENCOUNTER — OFFICE VISIT (OUTPATIENT)
Dept: INTERNAL MEDICINE CLINIC | Facility: CLINIC | Age: 61
End: 2021-07-07
Payer: COMMERCIAL

## 2021-07-07 VITALS
WEIGHT: 168.6 LBS | OXYGEN SATURATION: 99 % | DIASTOLIC BLOOD PRESSURE: 84 MMHG | RESPIRATION RATE: 16 BRPM | HEIGHT: 65 IN | HEART RATE: 59 BPM | SYSTOLIC BLOOD PRESSURE: 124 MMHG | BODY MASS INDEX: 28.09 KG/M2

## 2021-07-07 DIAGNOSIS — Z11.4 SCREENING FOR HIV (HUMAN IMMUNODEFICIENCY VIRUS): Primary | ICD-10-CM

## 2021-07-07 DIAGNOSIS — Z13.6 SCREENING FOR CARDIOVASCULAR CONDITION: ICD-10-CM

## 2021-07-07 DIAGNOSIS — R59.0 CERVICAL ADENOPATHY: ICD-10-CM

## 2021-07-07 DIAGNOSIS — F41.9 ANXIETY: ICD-10-CM

## 2021-07-07 PROCEDURE — 99396 PREV VISIT EST AGE 40-64: CPT | Performed by: INTERNAL MEDICINE

## 2021-07-07 PROCEDURE — 1036F TOBACCO NON-USER: CPT | Performed by: INTERNAL MEDICINE

## 2021-07-07 NOTE — PROGRESS NOTES
Assessment/Plan:    Wellness examination    Assessment and plan 1  Health maintenance annual wellness examination overall the patient is clinically stable and doing well, we encouraged the patient to follow a healthy and balanced diet  We recommend that the patient exercise routinely approximately 30 minutes 5 times per week   We have reviewed the patient's vaccines and have made recommendations for updates if necessary      Annual flu shot, she has had the COVID vaccine    We will be ordering screening laboratories which are age appropriate  Return to the office in    6 months   call if any problems  Anxiety   Overall symptom improvement no SI at this point time she would like to try to wean off medication she is currently under some stress with her 's medical condition when this dissipate she may try to reduce the Zoloft to 25 mg half tablet once a day for 2 weeks and then try to discontinue medicine if there is any recurrent symptoms please notify me and restart the medication      Cervical adenopathy    Normal size soft mobile left anterior cervical lymph node tenderness intermittent over last 6 months no dental disease no sinus disease will check a CT of the neck for further evaluation         Problem List Items Addressed This Visit        Immune and Lymphatic    Cervical adenopathy       Normal size soft mobile left anterior cervical lymph node tenderness intermittent over last 6 months no dental disease no sinus disease will check a CT of the neck for further evaluation         Relevant Orders    CT neck and chest wo contrast       Other    Anxiety      Overall symptom improvement no SI at this point time she would like to try to wean off medication she is currently under some stress with her 's medical condition when this dissipate she may try to reduce the Zoloft to 25 mg half tablet once a day for 2 weeks and then try to discontinue medicine if there is any recurrent symptoms please notify me and restart the medication  Screening for cardiovascular condition    Relevant Orders    Comprehensive metabolic panel    Lipid Panel with Direct LDL reflex      Other Visit Diagnoses     Screening for HIV (human immunodeficiency virus)    -  Primary            Subjective:      Patient ID: Chelsea Fox is a 64 y o  female  HPI annual physical- drives a car safely, wears a seatbelt sees a dentist routinely brushes and flosses her teeth; has a carbon monoxide monitor, fire alarm  And fire extinguisher; following healthy/ balance diet and exercises routinely  , flu  cov does report me that her anxiety levels have improved with the Zoloft have been stable for some time but reports me she has noted over last year some weight gain about 9 lb associated with the Zoloft and also a increase in her tinnitus with the Zoloft  She reports me that there is some stress currently with her 's medical condition and therefore we will consider weaning down on the Zoloft eventually weaning her off this medication as her 's condition improves  patient reports me a persistently enlarged lymph node left neck area this tender but not enlarged no dental disease no sinus disease no infection no fever no chills she has a history of breast cancer and dermatomyositis  The tenderness will come in go  The following portions of the patient's history were reviewed and updated as appropriate: allergies, current medications, past family history, past medical history, past social history, past surgical history and problem list    no night sweats no unexplained weight loss  Review of Systems   Constitutional: Negative for activity change, appetite change and unexpected weight change  Eyes: Negative for visual disturbance  Respiratory: Negative for cough and shortness of breath  Cardiovascular: Negative for chest pain  Gastrointestinal: Negative for abdominal pain, diarrhea, nausea and vomiting  Neurological: Negative for dizziness, light-headedness and headaches  Hematological: Positive for adenopathy  Psychiatric/Behavioral: Negative for suicidal ideas  The patient is not nervous/anxious  Objective:    No follow-ups on file  No results found  No Known Allergies    Past Medical History:   Diagnosis Date    Benign neoplasm of lung     Right lung lobe benign  Removed 2010   [facility] [address] [facility]     Breathing difficulty 2011    no breathing difficulties but patient reported drop in BP during anesthesia for appendectomy and was advised to have cardiologist consult    Depression     [facility]atomy[facility] [address] [facility]     Lung nodule     Wears glasses      Past Surgical History:   Procedure Laterality Date    APPENDECTOMY      2011    BREAST SURGERY      Bilateral Mastectomy    COLONOSCOPY N/A 1/26/2016    Procedure: COLONOSCOPY;  Surgeon: Maria Del Rosario Montes MD;  [facility]; Service:     HYSTERECTOMY      2007    LUNG BIOPSY      MASS EXCISION Left 7/18/2019    Procedure: MID BACK MASS EXCISION BIOPSY;  Surgeon: Dez Jauregui MD;  [facility];  Service: General    MASTECTOMY      reconstruction 2008    OOPHORECTOMY       Current Outpatient Medications on File Prior to Visit   Medication Sig Dispense Refill    Calcium-Magnesium-Vitamin D (CALCIUM 500 PO) Take 1,000 mg by mouth daily   Cholecalciferol (VITAMIN D) 2000 UNITS CAPS Take 2,000 Units/day by mouth   Cholecalciferol (Vitamin D3 Super Strength) 50 MCG (2000 UT) CAPS Take by mouth      clobetasol (TEMOVATE) 0 05 % cream PLEASE SEE ATTACHED FOR DETAILED DIRECTIONS      folic acid (FOLVITE) 1 mg tablet       methotrexate 2 5 mg tablet Take 10 mg by mouth once a week       multivitamin (THERAGRAN) TABS Take 1 tablet by mouth daily   sertraline (ZOLOFT) 25 mg tablet Take 1 tablet (25 mg total) by mouth daily 90 tablet 1     No current facility-administered medications on file prior to visit  Family History   Problem Relation Age of Onset    No Known Problems Mother     No Known Problems Father      Social History     Socioeconomic History    Marital status: /Civil Union     Spouse name: Not on file    Number of children: Not on file    Years of education: Not on file    Highest education level: Not on file   Occupational History    Not on file   Tobacco Use    Smoking status: Never Smoker    Smokeless tobacco: Never Used   Substance and Sexual Activity    Alcohol use: No    Drug use: No    Sexual activity: Yes   Other Topics Concern    Not on file   Social History Narrative    Not on file     Social Determinants of Health     Financial Resource Strain:     Difficulty of Paying Living Expenses:    Food Insecurity:     Worried About Running Out of Food in the Last Year:     Ran Out of Food in the Last Year:    Transportation Needs:     Lack of Transportation (Medical):      Lack of Transportation (Non-Medical):    Physical Activity:     Days of Exercise per Week:     Minutes of Exercise per Session:    Stress:     Feeling of Stress :    Social Connections:     Frequency of Communication with Friends and Family:     Frequency of Social Gatherings with Friends and Family:     Attends Hindu Services:     Active Member of Clubs or Organizations:     Attends Club or Organization Meetings:     Marital Status:    Intimate Partner Violence:     Fear of Current or Ex-Partner:     Emotionally Abused:     Physically Abused:     Sexually Abused:      Vitals:    07/07/21 1325   BP: 124/84   Pulse: 59   Resp: 16   SpO2: 99%   Weight: 76 5 kg (168 lb 9 6 oz)   Height: 5' 5" (1 651 m)     Results for orders placed or performed in visit on 06/11/21   CBC and differential   Result Value Ref Range    WBC 6 37 4 31 - 10 16 Thousand/uL    RBC 4 38 3 81 - 5 12 Million/uL    Hemoglobin 13 3 11 5 - 15 4 g/dL    Hematocrit 41 9 34 8 - 46 1 %    MCV 96 82 - 98 fL    MCH 30 4 26 8 - 34 3 pg    MCHC 31 7 31 4 - 37 4 g/dL    RDW 14 2 11 6 - 15 1 %    MPV 10 1 8 9 - 12 7 fL    Platelets 358 545 - 563 Thousands/uL    nRBC 0 /100 WBCs    Neutrophils Relative 64 43 - 75 %    Immat GRANS % 0 0 - 2 %    Lymphocytes Relative 22 14 - 44 %    Monocytes Relative 11 4 - 12 %    Eosinophils Relative 2 0 - 6 %    Basophils Relative 1 0 - 1 %    Neutrophils Absolute 4 12 1 85 - 7 62 Thousands/µL    Immature Grans Absolute 0 02 0 00 - 0 20 Thousand/uL    Lymphocytes Absolute 1 40 0 60 - 4 47 Thousands/µL    Monocytes Absolute 0 69 0 17 - 1 22 Thousand/µL    Eosinophils Absolute 0 11 0 00 - 0 61 Thousand/µL    Basophils Absolute 0 03 0 00 - 0 10 Thousands/µL   Comprehensive metabolic panel   Result Value Ref Range    Sodium 141 136 - 145 mmol/L    Potassium 4 0 3 5 - 5 3 mmol/L    Chloride 108 100 - 108 mmol/L    CO2 31 21 - 32 mmol/L    ANION GAP 2 (L) 4 - 13 mmol/L    BUN 18 5 - 25 mg/dL    Creatinine 0 67 0 60 - 1 30 mg/dL    Glucose, Fasting 67 65 - 99 mg/dL    Calcium 8 9 8 3 - 10 1 mg/dL    AST 18 5 - 45 U/L    ALT 31 12 - 78 U/L    Alkaline Phosphatase 84 46 - 116 U/L    Total Protein 7 4 6 4 - 8 2 g/dL    Albumin 4 2 3 5 - 5 0 g/dL    Total Bilirubin 0 30 0 20 - 1 00 mg/dL    eGFR 97 ml/min/1 73sq m     Weight (last 2 days)     Date/Time   Weight    07/07/21 1325   76 5 (168 6)            Body mass index is 28 06 kg/m²  BP      Temp      Pulse     Resp      SpO2        Vitals:    07/07/21 1325   Weight: 76 5 kg (168 lb 9 6 oz)     Vitals:    07/07/21 1325   Weight: 76 5 kg (168 lb 9 6 oz)       /84   Pulse 59   Resp 16   Ht 5' 5" (1 651 m)   Wt 76 5 kg (168 lb 9 6 oz)   SpO2 99%   BMI 28 06 kg/m²          Physical Exam  Constitutional:       Appearance: She is well-developed  HENT:      Head: Normocephalic  Eyes:      General: No scleral icterus  Right eye: No discharge  Left eye: No discharge        Conjunctiva/sclera: Conjunctivae normal       Pupils: Pupils are equal, round, and reactive to light  Cardiovascular:      Rate and Rhythm: Normal rate and regular rhythm  Heart sounds: Normal heart sounds  No murmur heard  No friction rub  No gallop  Pulmonary:      Effort: No respiratory distress  Breath sounds: Normal breath sounds  No wheezing or rales  Abdominal:      General: Bowel sounds are normal  There is no distension  Palpations: Abdomen is soft  There is no mass  Tenderness: There is no abdominal tenderness  There is no guarding or rebound  Musculoskeletal:         General: No deformity  Cervical back: Neck supple  Lymphadenopathy:      Cervical: Cervical adenopathy ( left anterior cervical lymph node tenderness approximately 1 cm mobile and soft) present  Neurological:      Mental Status: She is alert  Coordination: Coordination normal    Psychiatric:         Mood and Affect: Mood is not anxious or depressed  Thought Content: Thought content does not include suicidal ideation

## 2021-07-08 NOTE — ASSESSMENT & PLAN NOTE
Overall symptom improvement no SI at this point time she would like to try to wean off medication she is currently under some stress with her 's medical condition when this dissipate she may try to reduce the Zoloft to 25 mg half tablet once a day for 2 weeks and then try to discontinue medicine if there is any recurrent symptoms please notify me and restart the medication

## 2021-07-08 NOTE — ASSESSMENT & PLAN NOTE
Assessment and plan 1  Health maintenance annual wellness examination overall the patient is clinically stable and doing well, we encouraged the patient to follow a healthy and balanced diet  We recommend that the patient exercise routinely approximately 30 minutes 5 times per week   We have reviewed the patient's vaccines and have made recommendations for updates if necessary      Annual flu shot, she has had the COVID vaccine    We will be ordering screening laboratories which are age appropriate  Return to the office in    6 months   call if any problems

## 2021-07-08 NOTE — ASSESSMENT & PLAN NOTE
Normal size soft mobile left anterior cervical lymph node tenderness intermittent over last 6 months no dental disease no sinus disease will check a CT of the neck for further evaluation

## 2021-07-16 ENCOUNTER — TELEPHONE (OUTPATIENT)
Dept: HEMATOLOGY ONCOLOGY | Facility: CLINIC | Age: 61
End: 2021-07-16

## 2021-08-16 ENCOUNTER — TELEPHONE (OUTPATIENT)
Dept: INTERNAL MEDICINE CLINIC | Facility: CLINIC | Age: 61
End: 2021-08-16

## 2021-08-16 NOTE — TELEPHONE ENCOUNTER
Patient is asking if you can take over managing her dermatomyositis and the medication prescribed for it  The dermatologist she was going to see is no longer covered under her insurance plan            Please advise

## 2021-08-31 ENCOUNTER — IMMUNIZATIONS (OUTPATIENT)
Dept: FAMILY MEDICINE CLINIC | Facility: HOSPITAL | Age: 61
End: 2021-08-31

## 2021-08-31 DIAGNOSIS — Z23 ENCOUNTER FOR IMMUNIZATION: Primary | ICD-10-CM

## 2021-08-31 PROCEDURE — 91301 SARS-COV-2 / COVID-19 MRNA VACCINE (MODERNA) 100 MCG: CPT

## 2021-08-31 PROCEDURE — 0011A SARS-COV-2 / COVID-19 MRNA VACCINE (MODERNA) 100 MCG: CPT

## 2021-09-08 ENCOUNTER — APPOINTMENT (OUTPATIENT)
Dept: LAB | Age: 61
End: 2021-09-08
Payer: COMMERCIAL

## 2021-09-08 DIAGNOSIS — Z79.899 ENCOUNTER FOR LONG-TERM (CURRENT) USE OF OTHER MEDICATIONS: ICD-10-CM

## 2021-09-08 DIAGNOSIS — C50.012 MALIGNANT NEOPLASM OF NIPPLE OF BOTH BREASTS IN FEMALE, ESTROGEN RECEPTOR POSITIVE (HCC): ICD-10-CM

## 2021-09-08 DIAGNOSIS — Z14.8 CARRIER OF HIGH RISK CANCER GENE MUTATION: ICD-10-CM

## 2021-09-08 DIAGNOSIS — Z17.0 MALIGNANT NEOPLASM OF NIPPLE OF BOTH BREASTS IN FEMALE, ESTROGEN RECEPTOR POSITIVE (HCC): ICD-10-CM

## 2021-09-08 DIAGNOSIS — M33.19 OTHER DERMATOMYOSITIS WITH OTHER ORGAN INVOLVEMENT (HCC): ICD-10-CM

## 2021-09-08 DIAGNOSIS — Z15.09 BRCA2 GENE MUTATION POSITIVE: ICD-10-CM

## 2021-09-08 DIAGNOSIS — Z15.01 BRCA2 GENE MUTATION POSITIVE: ICD-10-CM

## 2021-09-08 DIAGNOSIS — M33.90 DERMATOMYOSITIS (HCC): ICD-10-CM

## 2021-09-08 DIAGNOSIS — C50.011 MALIGNANT NEOPLASM OF NIPPLE OF BOTH BREASTS IN FEMALE, ESTROGEN RECEPTOR POSITIVE (HCC): ICD-10-CM

## 2021-09-08 LAB
ALBUMIN SERPL BCP-MCNC: 4 G/DL (ref 3.5–5)
ALP SERPL-CCNC: 84 U/L (ref 46–116)
ALT SERPL W P-5'-P-CCNC: 28 U/L (ref 12–78)
ANION GAP SERPL CALCULATED.3IONS-SCNC: 7 MMOL/L (ref 4–13)
AST SERPL W P-5'-P-CCNC: 18 U/L (ref 5–45)
BASOPHILS # BLD AUTO: 0.03 THOUSANDS/ΜL (ref 0–0.1)
BASOPHILS NFR BLD AUTO: 1 % (ref 0–1)
BILIRUB SERPL-MCNC: 0.42 MG/DL (ref 0.2–1)
BUN SERPL-MCNC: 16 MG/DL (ref 5–25)
CALCIUM SERPL-MCNC: 9.1 MG/DL (ref 8.3–10.1)
CANCER AG125 SERPL-ACNC: 9.4 U/ML (ref 0–30)
CANCER AG27-29 SERPL-ACNC: 28.6 U/ML (ref 0–42.3)
CHLORIDE SERPL-SCNC: 107 MMOL/L (ref 100–108)
CO2 SERPL-SCNC: 25 MMOL/L (ref 21–32)
CREAT SERPL-MCNC: 0.79 MG/DL (ref 0.6–1.3)
EOSINOPHIL # BLD AUTO: 0.11 THOUSAND/ΜL (ref 0–0.61)
EOSINOPHIL NFR BLD AUTO: 2 % (ref 0–6)
ERYTHROCYTE [DISTWIDTH] IN BLOOD BY AUTOMATED COUNT: 14 % (ref 11.6–15.1)
GFR SERPL CREATININE-BSD FRML MDRD: 83 ML/MIN/1.73SQ M
GLUCOSE P FAST SERPL-MCNC: 81 MG/DL (ref 65–99)
HCT VFR BLD AUTO: 41.8 % (ref 34.8–46.1)
HGB BLD-MCNC: 13.6 G/DL (ref 11.5–15.4)
IMM GRANULOCYTES # BLD AUTO: 0.01 THOUSAND/UL (ref 0–0.2)
IMM GRANULOCYTES NFR BLD AUTO: 0 % (ref 0–2)
LYMPHOCYTES # BLD AUTO: 1.36 THOUSANDS/ΜL (ref 0.6–4.47)
LYMPHOCYTES NFR BLD AUTO: 26 % (ref 14–44)
MCH RBC QN AUTO: 31.1 PG (ref 26.8–34.3)
MCHC RBC AUTO-ENTMCNC: 32.5 G/DL (ref 31.4–37.4)
MCV RBC AUTO: 95 FL (ref 82–98)
MONOCYTES # BLD AUTO: 0.42 THOUSAND/ΜL (ref 0.17–1.22)
MONOCYTES NFR BLD AUTO: 8 % (ref 4–12)
NEUTROPHILS # BLD AUTO: 3.35 THOUSANDS/ΜL (ref 1.85–7.62)
NEUTS SEG NFR BLD AUTO: 63 % (ref 43–75)
NRBC BLD AUTO-RTO: 0 /100 WBCS
PLATELET # BLD AUTO: 273 THOUSANDS/UL (ref 149–390)
PMV BLD AUTO: 9.7 FL (ref 8.9–12.7)
POTASSIUM SERPL-SCNC: 4 MMOL/L (ref 3.5–5.3)
PROT SERPL-MCNC: 7.6 G/DL (ref 6.4–8.2)
RBC # BLD AUTO: 4.38 MILLION/UL (ref 3.81–5.12)
SODIUM SERPL-SCNC: 139 MMOL/L (ref 136–145)
WBC # BLD AUTO: 5.28 THOUSAND/UL (ref 4.31–10.16)

## 2021-09-08 PROCEDURE — 86300 IMMUNOASSAY TUMOR CA 15-3: CPT

## 2021-09-08 PROCEDURE — 36415 COLL VENOUS BLD VENIPUNCTURE: CPT

## 2021-09-08 PROCEDURE — 80053 COMPREHEN METABOLIC PANEL: CPT

## 2021-09-08 PROCEDURE — 85025 COMPLETE CBC W/AUTO DIFF WBC: CPT

## 2021-09-08 PROCEDURE — 86304 IMMUNOASSAY TUMOR CA 125: CPT

## 2021-09-15 ENCOUNTER — HOSPITAL ENCOUNTER (OUTPATIENT)
Dept: RADIOLOGY | Age: 61
Discharge: HOME | End: 2021-09-15
Payer: COMMERCIAL

## 2021-09-15 ENCOUNTER — HOSPITAL ENCOUNTER (OUTPATIENT)
Dept: RADIOLOGY | Age: 61
End: 2021-09-15
Payer: COMMERCIAL

## 2021-09-15 DIAGNOSIS — R91.1 LUNG NODULE: ICD-10-CM

## 2021-09-15 DIAGNOSIS — Z15.09 BRCA2 GENE MUTATION POSITIVE: ICD-10-CM

## 2021-09-15 DIAGNOSIS — Z15.01 BRCA2 GENE MUTATION POSITIVE: ICD-10-CM

## 2021-09-15 DIAGNOSIS — Z14.8 CARRIER OF HIGH RISK CANCER GENE MUTATION: ICD-10-CM

## 2021-09-15 DIAGNOSIS — C50.011 MALIGNANT NEOPLASM OF NIPPLE OF BOTH BREASTS IN FEMALE, ESTROGEN RECEPTOR POSITIVE (HCC): ICD-10-CM

## 2021-09-15 DIAGNOSIS — R59.0 CERVICAL ADENOPATHY: ICD-10-CM

## 2021-09-15 DIAGNOSIS — C50.012 MALIGNANT NEOPLASM OF NIPPLE OF BOTH BREASTS IN FEMALE, ESTROGEN RECEPTOR POSITIVE (HCC): ICD-10-CM

## 2021-09-15 DIAGNOSIS — Z17.0 MALIGNANT NEOPLASM OF NIPPLE OF BOTH BREASTS IN FEMALE, ESTROGEN RECEPTOR POSITIVE (HCC): ICD-10-CM

## 2021-09-15 PROCEDURE — 74177 CT ABD & PELVIS W/CONTRAST: CPT

## 2021-09-15 PROCEDURE — G1004 CDSM NDSC: HCPCS

## 2021-09-15 PROCEDURE — 71260 CT THORAX DX C+: CPT

## 2021-09-15 PROCEDURE — 70491 CT SOFT TISSUE NECK W/DYE: CPT

## 2021-09-15 RX ADMIN — IOHEXOL 100 ML: 350 INJECTION, SOLUTION INTRAVENOUS at 13:08

## 2021-09-16 ENCOUNTER — DOCUMENTATION (OUTPATIENT)
Dept: HEMATOLOGY ONCOLOGY | Facility: CLINIC | Age: 61
End: 2021-09-16

## 2021-09-16 ENCOUNTER — TELEPHONE (OUTPATIENT)
Dept: HEMATOLOGY ONCOLOGY | Facility: CLINIC | Age: 61
End: 2021-09-16

## 2021-09-16 ENCOUNTER — TELEPHONE (OUTPATIENT)
Dept: OTHER | Facility: OTHER | Age: 61
End: 2021-09-16

## 2021-09-16 NOTE — TELEPHONE ENCOUNTER
Pt called regarding a missed call form provider   Pt requested a call back from provider at their best covenience to review CT scans

## 2021-09-16 NOTE — TELEPHONE ENCOUNTER
Significant Findings     Call Received From Kierra Alberta    Radiology Department Location  [facility]  CT scan   Date of Study  09/15/2021   Relevant Information  in epic

## 2021-09-16 NOTE — PROGRESS NOTES
I called patient and left message on her voicemail/ answering machine and requested a phone call back to discuss findings on her CT scan  I left my name and office phone number

## 2021-09-16 NOTE — TELEPHONE ENCOUNTER
Patient would like a call back to go over her lab and CT scan results, she could best be reached at [phone]

## 2021-09-17 ENCOUNTER — TELEPHONE (OUTPATIENT)
Dept: HEMATOLOGY ONCOLOGY | Facility: CLINIC | Age: 61
End: 2021-09-17

## 2021-09-17 ENCOUNTER — TELEPHONE (OUTPATIENT)
Dept: OTHER | Facility: OTHER | Age: 61
End: 2021-09-17

## 2021-09-17 NOTE — TELEPHONE ENCOUNTER
Patient called back and I gave her results of CT chest that showed ground glass opacities/infiltrate  She said she has slight shortness of breath and no other lung symptoms and no fever  She said she had 3rd dose of COVID shot 2 weeks ago    She is going to call her primary physician and he could manage  or he could refer her to a lung specialist and if she cannot get in touch with her primary physician  she can call me back and I could refer her to lung specialist or to the emergency room

## 2021-09-17 NOTE — TELEPHONE ENCOUNTER
This is not out patient   This is medical [facility]  [facility]   Dr Haven Owens patient

## 2021-09-17 NOTE — TELEPHONE ENCOUNTER
Patient was told to follow up with her PCP due to her recent CT results by Dr Esme Butterfield  CT was routine  Patient is scheduled for an appointment on 9/20/21  Patient reports mild occasional shortness of breath within the past week  Denies any other symptoms  Patient is asking if she needs to be on any medication before the appointment since Dr Esme Butterfield told her she may have an infection  Please have Dr Silvia Foster review the results and Dr Arturo Mccain note

## 2021-09-20 ENCOUNTER — TELEMEDICINE (OUTPATIENT)
Dept: INTERNAL MEDICINE CLINIC | Facility: CLINIC | Age: 61
End: 2021-09-20
Payer: COMMERCIAL

## 2021-09-20 DIAGNOSIS — R93.89 ABNORMAL CT SCAN: Primary | ICD-10-CM

## 2021-09-20 DIAGNOSIS — Z11.52 ENCOUNTER FOR SCREENING FOR COVID-19: ICD-10-CM

## 2021-09-20 PROCEDURE — U0003 INFECTIOUS AGENT DETECTION BY NUCLEIC ACID (DNA OR RNA); SEVERE ACUTE RESPIRATORY SYNDROME CORONAVIRUS 2 (SARS-COV-2) (CORONAVIRUS DISEASE [COVID-19]), AMPLIFIED PROBE TECHNIQUE, MAKING USE OF HIGH THROUGHPUT TECHNOLOGIES AS DESCRIBED BY CMS-2020-01-R: HCPCS | Performed by: INTERNAL MEDICINE

## 2021-09-20 PROCEDURE — 99213 OFFICE O/P EST LOW 20 MIN: CPT | Performed by: INTERNAL MEDICINE

## 2021-09-20 PROCEDURE — 1036F TOBACCO NON-USER: CPT | Performed by: INTERNAL MEDICINE

## 2021-09-20 PROCEDURE — U0005 INFEC AGEN DETEC AMPLI PROBE: HCPCS | Performed by: INTERNAL MEDICINE

## 2021-09-20 RX ORDER — AZITHROMYCIN 250 MG/1
TABLET, FILM COATED ORAL
Qty: 6 TABLET | Refills: 0 | Status: SHIPPED | OUTPATIENT
Start: 2021-09-20 | End: 2021-09-25

## 2021-09-20 NOTE — PROGRESS NOTES
Virtual Regular Visit    Verification of patient location:    Patient is located in the following state in which I hold an active license [place]      Assessment/Plan:    Problem List Items Addressed This Visit        Other    Abnormal CT scan - Primary      Rule out mild pneumonia rule out inflammatory process rule out COVID-19 Rx for Z-Kyler 1  As directed, order for COVID-19 nasal swab PCR and Pulmonary consultation  We have discussed the lymph node left side neck seen on CT scan patient reports me clinically her symptoms resolved within a month she is not feel a lymph node at this point time if decided to treat with antibiotic and recheck ultrasound of neck in 1 month  RTO in 1 week for a hybrid examination call if any change, worsen or symptoms not saroj  Relevant Medications    azithromycin (ZITHROMAX) 250 mg tablet    Other Relevant Orders    Novel Coronavirus (Covid-19),PCR [facility]    Ambulatory referral to Pulmonology    US head neck soft tissue               Reason for visit is   Chief Complaint   Patient presents with    Virtual Awv    Virtual Regular Visit        Encounter provider Mei Gottlieb DO    Provider located at [address] [address] [id number]      Recent Visits  No visits were found meeting these conditions  Showing recent visits within past 7 days and meeting all other requirements  Today's Visits  Date Type Provider Dept   09/20/21 Telemedicine Mei Gottlieb [address] today's visits and meeting all other requirements  Future Appointments  No visits were found meeting these conditions  Showing future appointments within next 150 days and meeting all other requirements       The patient was identified by name and date of birth   Cristina Dennis was informed that this is a telemedicine visit and that the visit is being conducted through [facility] and patient was informed that this is a secure, HIPAA-compliant platform  She agrees to proceed     My office door was closed  No one else was in the room  She acknowledged consent and understanding of privacy and security of the video platform  The patient has agreed to participate and understands they can discontinue the visit at any time  Patient is aware this is a billable service  Antoinette Andrews is a 64 y o  female  Abnormal CT scan of the chest and neck   HPI  65-year-old female coming in for a video visit regarding recent CT scan of the neck and chest; the CT scan did show ground-glass opacities in her concerning for infectious versus inflammatory process; patient reports me over last month she has noticed being a little bit more winded when hiking but no trouble with the steps  Mild cough secondary to postnasal drip and low-grade temperature higher than the usual T-max 98°  No productive cough no exposure she did go to pharmacy for rapid COVID test that was negative on Friday  She does not report any recent travel and does not report any recent exposures  Today she is here to review the CT scans in detail  She had been in touch with her oncologist to recommended to get in touch with primary care doctor  Last chemotherapy 2008  No radiation exposure no toxin exposure    Past Medical History:   Diagnosis Date    Benign neoplasm of lung     Right lung lobe benign  Removed 2010   Breast cancer ([address] )     Breathing difficulty 2011    no breathing difficulties but patient reported drop in BP during anesthesia for appendectomy and was advised to have cardiologist consult    Depression     Dermatomyositis ([address] )     Lung nodule     Wears glasses        Past Surgical History:   Procedure Laterality Date    APPENDECTOMY      2011    BREAST SURGERY      Bilateral Mastectomy    COLONOSCOPY N/A 1/26/2016    Procedure: COLONOSCOPY;  Surgeon: David Ch MD;  [facility];   Service:    Nito Mcpherson 2007    LUNG BIOPSY      MASS EXCISION Left 7/18/2019    Procedure: MID BACK MASS EXCISION BIOPSY;  Surgeon: Debbi Springer MD;  [facility];  Service: General    MASTECTOMY      reconstruction 2008    OOPHORECTOMY         Current Outpatient Medications   Medication Sig Dispense Refill    azithromycin (ZITHROMAX) 250 mg tablet 2 tablets on day 1 and then 1 tablet daily for 4 more days 6 tablet 0    Calcium-Magnesium-Vitamin D (CALCIUM 500 PO) Take 1,000 mg by mouth daily   Cholecalciferol (VITAMIN D) 2000 UNITS CAPS Take 2,000 Units/day by mouth   Cholecalciferol (Vitamin D3 Super Strength) 50 MCG (2000 UT) CAPS Take by mouth      clobetasol (TEMOVATE) 0 05 % cream PLEASE SEE ATTACHED FOR DETAILED DIRECTIONS      folic acid (FOLVITE) 1 mg tablet       methotrexate 2 5 mg tablet Take 10 mg by mouth once a week       multivitamin (THERAGRAN) TABS Take 1 tablet by mouth daily   sertraline (ZOLOFT) 25 mg tablet Take 1 tablet (25 mg total) by mouth daily 90 tablet 1     No current facility-administered medications for this visit  No Known Allergies    Review of Systems   Constitutional: Positive for fever  Negative for chills  HENT: Positive for congestion  Respiratory: Positive for cough and shortness of breath ( slight exertional shortness of breath over last month)  Video Exam    There were no vitals filed for this visit  Physical Exam    month visit 20 minutes time of counseling greater than 50% regarding the CT scan findings and treatment approach differential diagnosis  VIRTUAL VISIT [address] verbally agrees to participate in [employer]   Pt is aware that [employer] could be limited without vital signs or the ability to perform a full hands-on physical Antony Cava understands she or the provider may request at any time to terminate the video visit and request the patient to seek care or treatment in person

## 2021-09-20 NOTE — PATIENT INSTRUCTIONS
Medicare Preventive Visit Patient Instructions  Thank you for completing your Welcome to Medicare Visit or Medicare Annual Wellness Visit today  Your next wellness visit will be due in one year (9/21/2022)  The screening/preventive services that you may require over the next 5-10 years are detailed below  Some tests may not apply to you based off risk factors and/or age  Screening tests ordered at today's visit but not completed yet may show as past due  Also, please note that scanned in results may not display below  Preventive Screenings:  Service Recommendations Previous Testing/Comments   Colorectal Cancer Screening  * Colonoscopy    * Fecal Occult Blood Test (FOBT)/Fecal Immunochemical Test (FIT)  * Fecal DNA/Cologuard Test  * Flexible Sigmoidoscopy Age: 54-65 years old   Colonoscopy: every 10 years (may be performed more frequently if at higher risk)  OR  FOBT/FIT: every 1 year  OR  Cologuard: every 3 years  OR  Sigmoidoscopy: every 5 years  Screening may be recommended earlier than age 48 if at higher risk for colorectal cancer  Also, an individualized decision between you and your healthcare provider will decide whether screening between the ages of 74-80 would be appropriate  Colonoscopy: 03/09/2021  FOBT/FIT: Not on file  Cologuard: Not on file  Sigmoidoscopy: Not on file          Breast Cancer Screening Age: 36 years old  Frequency: every 1-2 years  Not required if history of left and right mastectomy Mammogram: Not on file        Cervical Cancer Screening Between the ages of 21-29, pap smear recommended once every 3 years  Between the ages of 33-67, can perform pap smear with HPV co-testing every 5 years     Recommendations may differ for women with a history of total hysterectomy, cervical cancer, or abnormal pap smears in past  Pap Smear: Not on file        Hepatitis C Screening Once for adults [place] [facility]  More frequently in patients at high risk for Hepatitis C Hep C Antibody: 12/14/2018        Diabetes Screening 1-2 times per year if you're at risk for diabetes or have pre-diabetes Fasting glucose: 81 mg/dL   A1C: 5 1 %        Cholesterol Screening Once every 5 years if you don't have a lipid disorder  May order more often based on risk factors  Lipid panel: 12/28/2020          Other Preventive Screenings Covered by Medicare:  1  Abdominal Aortic Aneurysm (AAA) Screening: covered once if your at risk  You're considered to be at risk if you have a family history of AAA  2  Lung Cancer Screening: covers low dose CT scan once per year if you meet all of the following conditions: (1) Age 50-69; (2) No signs or symptoms of lung cancer; (3) Current smoker or have quit smoking within the last 15 years; (4) You have a tobacco smoking history of at least 30 pack years (packs per day multiplied by number of years you smoked); (5) You get a written order from a healthcare provider  3  Glaucoma Screening: covered annually if you're considered high risk: (1) You have diabetes OR (2) Family history of glaucoma OR (3)  aged 48 and older OR (3)  American aged 72 and older  3  Osteoporosis Screening: covered every 2 years if you meet one of the following conditions: (1) You're estrogen deficient and at risk for osteoporosis based off medical history and other findings; (2) Have a vertebral abnormality; (3) On glucocorticoid therapy for more than 3 months; (4) Have primary hyperparathyroidism; (5) On osteoporosis medications and need to assess response to drug therapy  · Last bone density test (DXA Scan): 05/11/2021   5  HIV Screening: covered annually if you're between the age of 15-65  Also covered annually if you are younger than 13 and older than 72 with risk factors for HIV infection  For pregnant patients, it is covered up to 3 times per pregnancy      Immunizations:  Immunization Recommendations   Influenza Vaccine Annual influenza vaccination during flu season is recommended for all persons aged >= 6 months who do not have contraindications   Pneumococcal Vaccine (Prevnar and Pneumovax)  * Prevnar = PCV13  * Pneumovax = PPSV23   Adults 25-60 years old: 1-3 doses may be recommended based on certain risk factors  Adults 72 years old: Prevnar (PCV13) vaccine recommended followed by Pneumovax (PPSV23) vaccine  If already received PPSV23 since turning 65, then PCV13 recommended at least one year after PPSV23 dose  Hepatitis B Vaccine 3 dose series if at intermediate or high risk (ex: diabetes, end stage renal disease, liver disease)   Tetanus (Td) Vaccine - COST NOT COVERED BY MEDICARE PART B Following completion of primary series, a booster dose should be given every 10 years to maintain immunity against tetanus  Td may also be given as tetanus wound prophylaxis  Tdap Vaccine - COST NOT COVERED BY MEDICARE PART B Recommended at least once for all adults  For pregnant patients, recommended with each pregnancy  Shingles Vaccine (Shingrix) - COST NOT COVERED BY MEDICARE PART B  2 shot series recommended in those aged 48 and above     Health Maintenance Due:      Topic Date Due    HIV Screening  07/08/2023 (Originally 12/11/1975)    Colorectal Cancer Screening  03/09/2026    Hepatitis C Screening  Completed     Immunizations Due:      Topic Date Due    Influenza Vaccine (1) 09/01/2021     Advance Directives   What are advance directives? Advance directives are legal documents that state your wishes and plans for medical care  These plans are made ahead of time in case you lose your ability to make decisions for yourself  Advance directives can apply to any medical decision, such as the treatments you want, and if you want to donate organs  What are the types of advance directives? There are many types of advance directives, and each state has rules about how to use them  You may choose a combination of any of the following:  · Living will:   This is a written record of the treatment you want  You can also choose which treatments you do not want, which to limit, and which to stop at a certain time  This includes surgery, medicine, IV fluid, and tube feedings  · Durable power of  for healthcare [facility]): This is a written record that states who you want to make healthcare choices for you when you are unable to make them for yourself  This person, called a proxy, is usually a family member or a friend  You may choose more than 1 proxy  · Do not resuscitate (DNR) order:  A DNR order is used in case your heart stops beating or you stop breathing  It is a request not to have certain forms of treatment, such as CPR  A DNR order may be included in other types of advance directives  · Medical directive: This covers the care that you want if you are in a coma, near death, or unable to make decisions for yourself  You can list the treatments you want for each condition  Treatment may include pain medicine, surgery, blood transfusions, dialysis, IV or tube feedings, and a ventilator (breathing machine)  · Values history: This document has questions about your views, beliefs, and how you feel and think about life  This information can help others choose the care that you would choose  Why are advance directives important? An advance directive helps you control your care  Although spoken wishes may be used, it is better to have your wishes written down  Spoken wishes can be misunderstood, or not followed  Treatments may be given even if you do not want them  An advance directive may make it easier for your family to make difficult choices about your care  Weight Management   Why it is important to manage your weight:  Being overweight increases your risk of health conditions such as heart disease, high blood pressure, type 2 diabetes, and certain types of cancer  It can also increase your risk for osteoarthritis, sleep apnea, and other respiratory problems   Aim for a slow, steady weight loss  Even a small amount of weight loss can lower your risk of health problems  How to lose weight safely:  A safe and healthy way to lose weight is to eat fewer calories and get regular exercise  You can lose up about 1 pound a week by decreasing the number of calories you eat by 500 calories each day  Healthy meal plan for weight management:  A healthy meal plan includes a variety of foods, contains fewer calories, and helps you stay healthy  A healthy meal plan includes the following:  · Eat whole-grain foods more often  A healthy meal plan should contain fiber  Fiber is the part of grains, fruits, and vegetables that is not broken down by your body  Whole-grain foods are healthy and provide extra fiber in your diet  Some examples of whole-grain foods are whole-wheat breads and pastas, oatmeal, brown rice, and bulgur  · Eat a variety of vegetables every day  Include dark, leafy greens such as spinach, kale, yuridia greens, and mustard greens  Eat yellow and orange vegetables such as carrots, sweet potatoes, and winter squash  · Eat a variety of fruits every day  Choose fresh or canned fruit (canned in its own juice or light syrup) instead of juice  Fruit juice has very little or no fiber  · Eat low-fat dairy foods  Drink fat-free (skim) milk or 1% milk  Eat fat-free yogurt and low-fat cottage cheese  Try low-fat cheeses such as mozzarella and other reduced-fat cheeses  · Choose meat and other protein foods that are low in fat  Choose beans or other legumes such as split peas or lentils  Choose fish, skinless poultry (chicken or turkey), or lean cuts of red meat (beef or pork)  Before you cook meat or poultry, cut off any visible fat  · Use less fat and oil  Try baking foods instead of frying them  Add less fat, such as margarine, sour cream, regular salad dressing and mayonnaise to foods  Eat fewer high-fat foods   Some examples of high-fat foods include french fries, doughnuts, ice cream, and cakes  · Eat fewer sweets  Limit foods and drinks that are high in sugar  This includes candy, cookies, regular soda, and sweetened drinks  Exercise:  Exercise at least 30 minutes per day on most days of the week  Some examples of exercise include walking, biking, dancing, and swimming  You can also fit in more physical activity by taking the stairs instead of the elevator or parking farther away from stores  Ask your healthcare provider about the best exercise plan for you  © Copyright [employer] 2018 Information is for End User's use only and may not be sold, redistributed or otherwise used for commercial purposes   All illustrations and images included in CareNotes® are the copyrighted property of [employer]  or [address] [place] [address]

## 2021-09-20 NOTE — ASSESSMENT & PLAN NOTE
Rule out mild pneumonia rule out inflammatory process rule out COVID-19 Rx for Z-Kyler 1  As directed, order for COVID-19 nasal swab PCR and Pulmonary consultation  We have discussed the lymph node left side neck seen on CT scan patient reports me clinically her symptoms resolved within a month she is not feel a lymph node at this point time if decided to treat with antibiotic and recheck ultrasound of neck in 1 month  RTO in 1 week for a hybrid examination call if any change, worsen or symptoms not saroj

## 2021-09-21 LAB — SARS-COV-2 RNA RESP QL NAA+PROBE: NEGATIVE

## 2021-09-24 ENCOUNTER — HOSPITAL ENCOUNTER (OUTPATIENT)
Dept: RADIOLOGY | Age: 61
Discharge: HOME | End: 2021-09-24
Payer: COMMERCIAL

## 2021-09-24 DIAGNOSIS — R93.89 ABNORMAL CT SCAN: ICD-10-CM

## 2021-09-24 PROCEDURE — 76536 US EXAM OF HEAD AND NECK: CPT

## 2021-09-30 ENCOUNTER — OFFICE VISIT (OUTPATIENT)
Dept: INTERNAL MEDICINE CLINIC | Facility: CLINIC | Age: 61
End: 2021-09-30
Payer: COMMERCIAL

## 2021-09-30 VITALS
DIASTOLIC BLOOD PRESSURE: 78 MMHG | SYSTOLIC BLOOD PRESSURE: 112 MMHG | TEMPERATURE: 98.5 F | HEART RATE: 60 BPM | OXYGEN SATURATION: 98 %

## 2021-09-30 DIAGNOSIS — Z91.89 AT INCREASED RISK OF EXPOSURE TO COVID-19 VIRUS: Primary | ICD-10-CM

## 2021-09-30 DIAGNOSIS — Z20.822 SUSPECTED COVID-19 VIRUS INFECTION: ICD-10-CM

## 2021-09-30 DIAGNOSIS — R93.89 ABNORMAL CT OF THE CHEST: ICD-10-CM

## 2021-09-30 DIAGNOSIS — M33.90 DERMATOMYOSITIS (HCC): ICD-10-CM

## 2021-09-30 PROCEDURE — U0003 INFECTIOUS AGENT DETECTION BY NUCLEIC ACID (DNA OR RNA); SEVERE ACUTE RESPIRATORY SYNDROME CORONAVIRUS 2 (SARS-COV-2) (CORONAVIRUS DISEASE [COVID-19]), AMPLIFIED PROBE TECHNIQUE, MAKING USE OF HIGH THROUGHPUT TECHNOLOGIES AS DESCRIBED BY CMS-2020-01-R: HCPCS | Performed by: INTERNAL MEDICINE

## 2021-09-30 PROCEDURE — U0005 INFEC AGEN DETEC AMPLI PROBE: HCPCS | Performed by: INTERNAL MEDICINE

## 2021-09-30 PROCEDURE — 99212 OFFICE O/P EST SF 10 MIN: CPT | Performed by: INTERNAL MEDICINE

## 2021-09-30 NOTE — PROGRESS NOTES
COVID-19 Outpatient Progress Note    Assessment/Plan:    Problem List Items Addressed This Visit        Musculoskeletal and Integument    Dermatomyositis ([facility] [address] )    Relevant Orders    Ambulatory referral to Rheumatology       Other    At increased risk of exposure to COVID-19 virus - Primary    Relevant Orders    Novel Coronavirus (Covid-19),PCR [facility] - Collected in Office    Suspected COVID-19 virus infection     Patient has bilateral lower lobe opacity noted on CT  Covid was negative on 9/20  Will order another COVID test  Order PFTs, anti Radha antibody and refer to rheumatology due to hx of dematomyositis may contribute to interstitial lung disease  Other Visit Diagnoses     Abnormal CT of the chest        Relevant Orders    Complete PFT without post bronchodilator    Anti-Radha 1 Antibody         Disposition:     After clarifying the patient's history, my suspicion for COVID-19 infection is very low  I recommended the patient to come to our office to perform PCR testing for COVID-19  I have spent 15 minutes directly with the patient  Greater than 50% of this time was spent in counseling/coordination of care regarding: instructions for management  Verification of patient location:    Patient is located in the following state in which I hold an active license [place]    Encounter provider Caitlin Velasquez MD    Provider located at [address] [place] [id number]    Recent Visits  No visits were found meeting these conditions  Showing recent visits within past 7 days and meeting all other requirements  Today's Visits  Date Type Provider Dept   09/30/21 Office Visit Panda Trent MD [id number] today's visits and meeting all other requirements  Future Appointments  No visits were found meeting these conditions    Showing future appointments within next 150 days and meeting all other requirements       Patient agrees to participate in a virtual check in via telephone or video visit instead of presenting to the office to address urgent/immediate medical needs  Patient is aware this is a billable service  After connecting through [facility], the patient was identified by name and date of birth  Chelsea Fox was informed that this was a telemedicine visit and that the exam was being conducted confidentially over secure lines  My office door was closed  Chelsea Fox acknowledged consent and understanding of privacy and security of the telemedicine visit  I informed the patient that I have reviewed her record in Epic and presented the opportunity for her to ask any questions regarding the visit today  The patient agreed to participate  Subjective:   Chelsea Fox is a 64 y o  female who is concerned about COVID-19  Patient is currently asymptomatic  Patient denies fever, chills, fatigue, malaise, congestion, rhinorrhea, sore throat, anosmia, loss of taste, cough, shortness of breath, chest tightness, abdominal pain, nausea, vomiting, diarrhea, myalgias and headaches       COVID-19 vaccination status: Fully vaccinated    Exposure:   Contact with a person who is under investigation (PUI) for or who is positive for COVID-19 within the last 14 days?: No    Hospitalized recently for fever and/or lower respiratory symptoms?: No      Currently a healthcare worker that is involved in direct patient care?: No      Works in a special setting where the risk of COVID-19 transmission may be high? (this may include long-term care, correctional and long-term facilities; homeless shelters; assisted-living facilities and group homes ): No      Resident in a special setting where the risk of COVID-19 transmission may be high? (this may include long-term care, correctional and long-term facilities; homeless shelters; assisted-living facilities and group homes ): No      Lab Results   Component Value Date    SARSCOV2 Negative 09/20/2021     Past Medical History:   Diagnosis Date    Benign neoplasm of lung     Right lung lobe benign  Removed 2010   Breast cancer ([facility] [address] )     Breathing difficulty 2011    no breathing difficulties but patient reported drop in BP during anesthesia for appendectomy and was advised to have cardiologist consult    Depression     Dermatomyositis ([facility] [address] )     Lung nodule     Wears glasses      Past Surgical History:   Procedure Laterality Date    APPENDECTOMY      2011    BREAST SURGERY      Bilateral Mastectomy    COLONOSCOPY N/A 1/26/2016    Procedure: COLONOSCOPY;  Surgeon: Radha Guerrier MD;  [facility]: [facility]; Service:     HYSTERECTOMY      2007    LUNG BIOPSY      MASS EXCISION Left 7/18/2019    Procedure: MID BACK MASS EXCISION BIOPSY;  Surgeon: Chon Jordan MD;  [facility];  Service: General    MASTECTOMY      reconstruction 2008    OOPHORECTOMY       Current Outpatient Medications   Medication Sig Dispense Refill    Calcium-Magnesium-Vitamin D (CALCIUM 500 PO) Take 1,000 mg by mouth daily   Cholecalciferol (VITAMIN D) 2000 UNITS CAPS Take 2,000 Units/day by mouth   Cholecalciferol (Vitamin D3 Super Strength) 50 MCG (2000 UT) CAPS Take by mouth      clobetasol (TEMOVATE) 0 05 % cream PLEASE SEE ATTACHED FOR DETAILED DIRECTIONS      folic acid (FOLVITE) 1 mg tablet       methotrexate 2 5 mg tablet Take 10 mg by mouth once a week       multivitamin (THERAGRAN) TABS Take 1 tablet by mouth daily   sertraline (ZOLOFT) 25 mg tablet Take 1 tablet (25 mg total) by mouth daily 90 tablet 1     No current facility-administered medications for this visit  No Known Allergies    Review of Systems   Constitutional: Negative for chills, fatigue and fever  HENT: Negative for congestion, rhinorrhea and sore throat  Respiratory: Negative for cough, chest tightness and shortness of breath  Gastrointestinal: Negative for abdominal pain, diarrhea, nausea and vomiting  Musculoskeletal: Negative for myalgias  Neurological: Negative for headaches  Objective: There were no vitals filed for this visit  Physical Exam  Constitutional:       Appearance: Normal appearance  HENT:      Head: Atraumatic  Nose: Nose normal       Mouth/Throat:      Pharynx: Oropharynx is clear  Cardiovascular:      Rate and Rhythm: Normal rate  Pulmonary:      Effort: Pulmonary effort is normal       Breath sounds: Normal breath sounds  Skin:     Findings: No erythema or rash  Neurological:      Mental Status: She is alert and oriented to person, place, and time  Psychiatric:         Mood and Affect: Mood normal          VIRTUAL VISIT DISCLAIMER    Aleyda Santamaria verbally agrees to participate in [facility]  Pt is aware that [facility] could be limited without vital signs or the ability to perform a full hands-on physical Betty Belts understands she or the provider may request at any time to terminate the video visit and request the patient to seek care or treatment in person

## 2021-10-01 ENCOUNTER — TELEPHONE (OUTPATIENT)
Dept: INTERNAL MEDICINE CLINIC | Facility: CLINIC | Age: 61
End: 2021-10-01

## 2021-10-01 LAB — SARS-COV-2 RNA RESP QL NAA+PROBE: NEGATIVE

## 2021-10-02 ENCOUNTER — IMMUNIZATIONS (OUTPATIENT)
Dept: INTERNAL MEDICINE CLINIC | Facility: CLINIC | Age: 61
End: 2021-10-02
Payer: COMMERCIAL

## 2021-10-02 DIAGNOSIS — Z23 ENCOUNTER FOR IMMUNIZATION: Primary | ICD-10-CM

## 2021-10-02 PROCEDURE — 90682 RIV4 VACC RECOMBINANT DNA IM: CPT

## 2021-10-02 PROCEDURE — 90471 IMMUNIZATION ADMIN: CPT

## 2021-10-08 ENCOUNTER — HOSPITAL ENCOUNTER (OUTPATIENT)
Dept: PULMONOLOGY | Facility: HOSPITAL | Age: 61
Discharge: HOME | End: 2021-10-08
Payer: COMMERCIAL

## 2021-10-08 DIAGNOSIS — R93.89 ABNORMAL CT OF THE CHEST: ICD-10-CM

## 2021-10-08 PROCEDURE — 94010 BREATHING CAPACITY TEST: CPT | Performed by: INTERNAL MEDICINE

## 2021-10-08 PROCEDURE — 94726 PLETHYSMOGRAPHY LUNG VOLUMES: CPT

## 2021-10-08 PROCEDURE — 94760 N-INVAS EAR/PLS OXIMETRY 1: CPT

## 2021-10-08 PROCEDURE — 94726 PLETHYSMOGRAPHY LUNG VOLUMES: CPT | Performed by: INTERNAL MEDICINE

## 2021-10-08 PROCEDURE — 94729 DIFFUSING CAPACITY: CPT | Performed by: INTERNAL MEDICINE

## 2021-10-08 PROCEDURE — 94010 BREATHING CAPACITY TEST: CPT

## 2021-10-08 PROCEDURE — 94729 DIFFUSING CAPACITY: CPT

## 2021-10-11 ENCOUNTER — APPOINTMENT (OUTPATIENT)
Dept: LAB | Age: 61
End: 2021-10-11
Payer: COMMERCIAL

## 2021-10-11 DIAGNOSIS — Z15.01 BRCA2 GENE MUTATION POSITIVE: ICD-10-CM

## 2021-10-11 DIAGNOSIS — Z15.09 BRCA2 GENE MUTATION POSITIVE: ICD-10-CM

## 2021-10-11 DIAGNOSIS — R93.89 ABNORMAL CT OF THE CHEST: ICD-10-CM

## 2021-10-11 LAB — CANCER AG125 SERPL-ACNC: 9.2 U/ML (ref 0–30)

## 2021-10-11 PROCEDURE — 86235 NUCLEAR ANTIGEN ANTIBODY: CPT

## 2021-10-11 PROCEDURE — 86304 IMMUNOASSAY TUMOR CA 125: CPT

## 2021-10-11 PROCEDURE — 36415 COLL VENOUS BLD VENIPUNCTURE: CPT

## 2021-10-12 LAB — ENA JO1 AB SER-ACNC: <0.2 AI (ref 0–0.9)

## 2021-10-18 ENCOUNTER — OFFICE VISIT (OUTPATIENT)
Dept: HEMATOLOGY ONCOLOGY | Facility: CLINIC | Age: 61
End: 2021-10-18
Payer: COMMERCIAL

## 2021-10-18 VITALS
RESPIRATION RATE: 18 BRPM | HEART RATE: 60 BPM | TEMPERATURE: 99.2 F | WEIGHT: 171 LBS | HEIGHT: 65 IN | DIASTOLIC BLOOD PRESSURE: 70 MMHG | BODY MASS INDEX: 28.49 KG/M2 | SYSTOLIC BLOOD PRESSURE: 118 MMHG

## 2021-10-18 DIAGNOSIS — R22.2 CHEST WALL MASS: ICD-10-CM

## 2021-10-18 DIAGNOSIS — Z15.01 BRCA2 GENE MUTATION POSITIVE: ICD-10-CM

## 2021-10-18 DIAGNOSIS — Z17.0 MALIGNANT NEOPLASM OF NIPPLE OF BOTH BREASTS IN FEMALE, ESTROGEN RECEPTOR POSITIVE (HCC): Primary | ICD-10-CM

## 2021-10-18 DIAGNOSIS — C50.011 MALIGNANT NEOPLASM OF NIPPLE OF BOTH BREASTS IN FEMALE, ESTROGEN RECEPTOR POSITIVE (HCC): Primary | ICD-10-CM

## 2021-10-18 DIAGNOSIS — Z15.09 BRCA2 GENE MUTATION POSITIVE: ICD-10-CM

## 2021-10-18 DIAGNOSIS — M33.90 DERMATOMYOSITIS (HCC): ICD-10-CM

## 2021-10-18 DIAGNOSIS — R59.0 CERVICAL ADENOPATHY: ICD-10-CM

## 2021-10-18 DIAGNOSIS — C50.012 MALIGNANT NEOPLASM OF NIPPLE OF BOTH BREASTS IN FEMALE, ESTROGEN RECEPTOR POSITIVE (HCC): Primary | ICD-10-CM

## 2021-10-18 DIAGNOSIS — Z85.820 HISTORY OF MELANOMA: ICD-10-CM

## 2021-10-18 DIAGNOSIS — R91.8 GROUND GLASS OPACITY PRESENT ON IMAGING OF LUNG: ICD-10-CM

## 2021-10-18 PROBLEM — Z14.8 CARRIER OF HIGH RISK CANCER GENE MUTATION: Status: ACTIVE | Noted: 2021-10-18

## 2021-10-18 PROCEDURE — 99214 OFFICE O/P EST MOD 30 MIN: CPT | Performed by: INTERNAL MEDICINE

## 2021-10-19 ENCOUNTER — OFFICE VISIT (OUTPATIENT)
Dept: GYNECOLOGIC ONCOLOGY | Facility: CLINIC | Age: 61
End: 2021-10-19
Payer: COMMERCIAL

## 2021-10-19 VITALS
DIASTOLIC BLOOD PRESSURE: 86 MMHG | TEMPERATURE: 98.5 F | BODY MASS INDEX: 28.41 KG/M2 | RESPIRATION RATE: 18 BRPM | WEIGHT: 170.5 LBS | SYSTOLIC BLOOD PRESSURE: 110 MMHG | HEART RATE: 55 BPM | HEIGHT: 65 IN

## 2021-10-19 DIAGNOSIS — Z15.09 BRCA2 GENE MUTATION POSITIVE: Primary | ICD-10-CM

## 2021-10-19 DIAGNOSIS — Z15.01 BRCA2 GENE MUTATION POSITIVE: Primary | ICD-10-CM

## 2021-10-19 PROCEDURE — 99212 OFFICE O/P EST SF 10 MIN: CPT | Performed by: PHYSICIAN ASSISTANT

## 2021-10-22 ENCOUNTER — OFFICE VISIT (OUTPATIENT)
Dept: PULMONOLOGY | Facility: CLINIC | Age: 61
End: 2021-10-22
Payer: COMMERCIAL

## 2021-10-22 VITALS
DIASTOLIC BLOOD PRESSURE: 68 MMHG | OXYGEN SATURATION: 98 % | SYSTOLIC BLOOD PRESSURE: 102 MMHG | HEIGHT: 65 IN | BODY MASS INDEX: 28.32 KG/M2 | WEIGHT: 170 LBS | HEART RATE: 65 BPM | TEMPERATURE: 97.7 F

## 2021-10-22 DIAGNOSIS — R93.89 ABNORMAL CT SCAN: ICD-10-CM

## 2021-10-22 DIAGNOSIS — R91.8 GROUND GLASS OPACITY PRESENT ON IMAGING OF LUNG: Primary | ICD-10-CM

## 2021-10-22 DIAGNOSIS — R91.1 LUNG NODULE: ICD-10-CM

## 2021-10-22 DIAGNOSIS — R06.00 DYSPNEA ON EXERTION: ICD-10-CM

## 2021-10-22 PROBLEM — R22.2 CHEST WALL MASS: Status: RESOLVED | Noted: 2018-03-02 | Resolved: 2021-10-22

## 2021-10-22 PROBLEM — R06.09 DYSPNEA ON EXERTION: Status: ACTIVE | Noted: 2021-10-22

## 2021-10-22 PROCEDURE — 99204 OFFICE O/P NEW MOD 45 MIN: CPT | Performed by: INTERNAL MEDICINE

## 2021-10-26 ENCOUNTER — CONSULT (OUTPATIENT)
Dept: SURGERY | Facility: CLINIC | Age: 61
End: 2021-10-26
Payer: COMMERCIAL

## 2021-10-26 VITALS — HEIGHT: 65 IN | BODY MASS INDEX: 28.32 KG/M2 | WEIGHT: 170 LBS

## 2021-10-26 DIAGNOSIS — R22.2 CHEST WALL MASS: ICD-10-CM

## 2021-10-26 PROCEDURE — 1036F TOBACCO NON-USER: CPT | Performed by: SURGERY

## 2021-10-26 PROCEDURE — 11401 EXC TR-EXT B9+MARG 0.6-1 CM: CPT | Performed by: SURGERY

## 2021-10-26 PROCEDURE — 88304 TISSUE EXAM BY PATHOLOGIST: CPT | Performed by: PATHOLOGY

## 2021-10-26 PROCEDURE — 99212 OFFICE O/P EST SF 10 MIN: CPT | Performed by: SURGERY

## 2021-10-26 PROCEDURE — 3008F BODY MASS INDEX DOCD: CPT | Performed by: SURGERY

## 2021-12-09 ENCOUNTER — NEW PATIENT (OUTPATIENT)
Dept: URBAN - METROPOLITAN AREA CLINIC 6 | Facility: CLINIC | Age: 61
End: 2021-12-09

## 2021-12-09 DIAGNOSIS — H52.13: ICD-10-CM

## 2021-12-09 DIAGNOSIS — Z01.00: ICD-10-CM

## 2021-12-09 PROCEDURE — G8427 DOCREV CUR MEDS BY ELIG CLIN: HCPCS

## 2021-12-09 PROCEDURE — 92015 DETERMINE REFRACTIVE STATE: CPT

## 2021-12-09 PROCEDURE — 92004 COMPRE OPH EXAM NEW PT 1/>: CPT

## 2021-12-09 ASSESSMENT — VISUAL ACUITY
OS_CC: 20/25-1
OD_CC: J1+
OS_CC: J1+
OD_CC: 20/25-1

## 2021-12-09 ASSESSMENT — TONOMETRY
OD_IOP_MMHG: 14
OS_IOP_MMHG: 14

## 2021-12-13 ENCOUNTER — HOSPITAL ENCOUNTER (OUTPATIENT)
Dept: RADIOLOGY | Age: 61
Discharge: HOME | End: 2021-12-13
Payer: COMMERCIAL

## 2021-12-13 DIAGNOSIS — R93.89 ABNORMAL CT SCAN: ICD-10-CM

## 2021-12-13 DIAGNOSIS — R91.8 GROUND GLASS OPACITY PRESENT ON IMAGING OF LUNG: ICD-10-CM

## 2021-12-13 PROCEDURE — G1004 CDSM NDSC: HCPCS

## 2021-12-13 PROCEDURE — 71250 CT THORAX DX C-: CPT

## 2021-12-20 DIAGNOSIS — R91.1 LUNG NODULE: Primary | ICD-10-CM

## 2021-12-20 PROBLEM — F32.A DEPRESSION: Status: RESOLVED | Noted: 2018-12-25 | Resolved: 2021-12-20

## 2021-12-20 PROBLEM — F41.9 ANXIETY: Status: RESOLVED | Noted: 2021-01-10 | Resolved: 2021-12-20

## 2021-12-20 PROBLEM — F32.5 MAJOR DEPRESSIVE DISORDER IN FULL REMISSION (HCC): Status: RESOLVED | Noted: 2018-06-17 | Resolved: 2021-12-20

## 2022-01-05 ENCOUNTER — APPOINTMENT (OUTPATIENT)
Dept: LAB | Age: 62
End: 2022-01-05
Payer: COMMERCIAL

## 2022-01-05 LAB
ALBUMIN SERPL BCP-MCNC: 4.1 G/DL (ref 3.5–5)
ALP SERPL-CCNC: 73 U/L (ref 46–116)
ALT SERPL W P-5'-P-CCNC: 29 U/L (ref 12–78)
ANION GAP SERPL CALCULATED.3IONS-SCNC: 4 MMOL/L (ref 4–13)
AST SERPL W P-5'-P-CCNC: 20 U/L (ref 5–45)
BILIRUB SERPL-MCNC: 0.9 MG/DL (ref 0.2–1)
BUN SERPL-MCNC: 19 MG/DL (ref 5–25)
CALCIUM SERPL-MCNC: 9.7 MG/DL (ref 8.3–10.1)
CHLORIDE SERPL-SCNC: 105 MMOL/L (ref 100–108)
CHOLEST SERPL-MCNC: 190 MG/DL
CO2 SERPL-SCNC: 32 MMOL/L (ref 21–32)
CREAT SERPL-MCNC: 0.77 MG/DL (ref 0.6–1.3)
GFR SERPL CREATININE-BSD FRML MDRD: 84 ML/MIN/1.73SQ M
GLUCOSE P FAST SERPL-MCNC: 84 MG/DL (ref 65–99)
HDLC SERPL-MCNC: 66 MG/DL
LDLC SERPL CALC-MCNC: 109 MG/DL (ref 0–100)
POTASSIUM SERPL-SCNC: 4.5 MMOL/L (ref 3.5–5.3)
PROT SERPL-MCNC: 7.5 G/DL (ref 6.4–8.2)
SODIUM SERPL-SCNC: 141 MMOL/L (ref 136–145)
TRIGL SERPL-MCNC: 73 MG/DL

## 2022-01-05 PROCEDURE — 80053 COMPREHEN METABOLIC PANEL: CPT

## 2022-01-05 PROCEDURE — 36415 COLL VENOUS BLD VENIPUNCTURE: CPT

## 2022-01-05 PROCEDURE — 80061 LIPID PANEL: CPT

## 2022-01-14 ENCOUNTER — OFFICE VISIT (OUTPATIENT)
Dept: INTERNAL MEDICINE CLINIC | Facility: CLINIC | Age: 62
End: 2022-01-14
Payer: COMMERCIAL

## 2022-01-14 VITALS
WEIGHT: 172.4 LBS | OXYGEN SATURATION: 99 % | SYSTOLIC BLOOD PRESSURE: 112 MMHG | RESPIRATION RATE: 16 BRPM | HEART RATE: 59 BPM | DIASTOLIC BLOOD PRESSURE: 78 MMHG | HEIGHT: 65 IN | BODY MASS INDEX: 28.72 KG/M2

## 2022-01-14 DIAGNOSIS — E78.5 HYPERLIPIDEMIA, UNSPECIFIED HYPERLIPIDEMIA TYPE: Primary | ICD-10-CM

## 2022-01-14 DIAGNOSIS — M33.90 DERMATOMYOSITIS (HCC): ICD-10-CM

## 2022-01-14 DIAGNOSIS — R59.0 CERVICAL ADENOPATHY: ICD-10-CM

## 2022-01-14 DIAGNOSIS — R91.8 GROUND GLASS OPACITY PRESENT ON IMAGING OF LUNG: ICD-10-CM

## 2022-01-14 DIAGNOSIS — E66.3 OVERWEIGHT (BMI 25.0-29.9): ICD-10-CM

## 2022-01-14 PROCEDURE — 99214 OFFICE O/P EST MOD 30 MIN: CPT | Performed by: INTERNAL MEDICINE

## 2022-01-14 PROCEDURE — 3725F SCREEN DEPRESSION PERFORMED: CPT | Performed by: INTERNAL MEDICINE

## 2022-01-14 NOTE — PATIENT INSTRUCTIONS
Heart Healthy Diet   WHAT YOU NEED TO KNOW:   A heart healthy diet is an eating plan low in unhealthy fats and sodium (salt)  The plan is high in healthy fats and fiber  A heart healthy diet helps improve your cholesterol levels and lowers your risk for heart disease and stroke  A dietitian will teach you how to read and understand food labels  DISCHARGE INSTRUCTIONS:   Heart healthy diet guidelines to follow:   · Choose foods that contain healthy fats  ? Unsaturated fats  include monounsaturated and polyunsaturated fats  Unsaturated fat is found in foods such as soybean, canola, olive, corn, and safflower oils  It is also found in soft tub margarine that is made with liquid vegetable oil  ? Omega-3 fat  is found in certain fish, such as salmon, tuna, and trout, and in walnuts and flaxseed  Eat fish high in omega-3 fats at least 2 times a week  · Get 20 to 30 grams of fiber each day  Fruits, vegetables, whole-grain foods, and legumes (cooked beans) are good sources of fiber  · Limit or do not have unhealthy fats  ? Cholesterol  is found in animal foods, such as eggs and lobster, and in dairy products made from whole milk  Limit cholesterol to less than 200 mg each day  ? Saturated fat  is found in meats, such as jones and hamburger  It is also found in chicken or turkey skin, whole milk, and butter  Limit saturated fat to less than 7% of your total daily calories  ? Trans fat  is found in packaged foods, such as potato chips and cookies  It is also in hard margarine, some fried foods, and shortening  Do not eat foods that contain trans fats  · Limit sodium as directed  You may be told to limit sodium to 2,000 to 2,300 mg each day  Choose low-sodium or no-salt-added foods  Add little or no salt to food you prepare  Use herbs and spices in place of salt         Include the following in your heart healthy plan:  Ask your dietitian or healthcare provider how many servings to have from each of the following food groups:  · Grains:      ? Whole-wheat breads, cereals, and pastas, and brown rice    ? Low-fat, low-sodium crackers and chips    · Vegetables:      ? Broccoli, green beans, green peas, and spinach    ? Collards, kale, and lima beans    ? Carrots, sweet potatoes, tomatoes, and peppers    ? Canned vegetables with no salt added    · Fruits:      ? Bananas, peaches, pears, and pineapple    ? Grapes, raisins, and dates    ? Oranges, tangerines, grapefruit, orange juice, and grapefruit juice    ? Apricots, mangoes, melons, and papaya    ? Raspberries and strawberries    ? Canned fruit with no added sugar    · Low-fat dairy:      ? Nonfat (skim) milk, 1% milk, and low-fat almond, cashew, or soy milks fortified with calcium    ? Low-fat cheese, regular or frozen yogurt, and cottage cheese    · Meats and proteins:      ? Lean cuts of beef and pork (loin, leg, round), skinless chicken and turkey    ? Legumes, soy products, egg whites, or nuts    Limit or do not include the following in your heart healthy plan:   · Unhealthy fats and oils:      ? Whole or 2% milk, cream cheese, sour cream, or cheese    ? High-fat cuts of beef (T-bone steaks, ribs), chicken or turkey with skin, and organ meats such as liver    ? Butter, stick margarine, shortening, and cooking oils such as coconut or palm oil    · Foods and liquids high in sodium:      ? Packaged foods, such as frozen dinners, cookies, macaroni and cheese, and cereals with more than 300 mg of sodium per serving    ? Vegetables with added sodium, such as instant potatoes, vegetables with added sauces, or regular canned vegetables    ? Cured or smoked meats, such as hot dogs, jones, and sausage    ? High-sodium ketchup, barbecue sauce, salad dressing, pickles, olives, soy sauce, or miso    · Foods and liquids high in sugar:      ? Candy, cake, cookies, pies, or doughnuts    ? Soft drinks (soda), sports drinks, or sweetened tea    ?  Canned or dry mixes for cakes, soups, sauces, or gravies    Other healthy heart guidelines:   · Do not smoke  Nicotine and other chemicals in cigarettes and cigars can cause lung and heart damage  Ask your healthcare provider for information if you currently smoke and need help to quit  E-cigarettes or smokeless tobacco still contain nicotine  Talk to your healthcare provider before you use these products  · Limit or do not drink alcohol as directed  Alcohol can damage your heart and raise your blood pressure  Your healthcare provider may give you specific daily and weekly limits  The general recommended limit is 1 drink a day for women 21 or older and for men 72 or older  Do not have more than 3 drinks in a day or 7 in a week  The recommended limit is 2 drinks a day for men 24to 59years of age  Do not have more than 4 drinks in a day or 14 in a week  A drink of alcohol is 12 ounces of beer, 5 ounces of wine, or 1½ ounces of liquor  · Exercise regularly  Exercise can help you maintain a healthy weight and improve your blood pressure and cholesterol levels  Regular exercise can also decrease your risk for heart problems  Ask your healthcare provider about the best exercise plan for you  Do not start an exercise program without asking your healthcare provider  Follow up with your doctor or cardiologist as directed:  Write down your questions so you remember to ask them during your visits  © Copyright [employer] 2021 Information is for End User's use only and may not be sold, redistributed or otherwise used for commercial purposes  All illustrations and images included in CareNotes® are the copyrighted property of A [employer] A M , Inc  or [facility] Fabian Patel   The above information is an  only  It is not intended as medical advice for individual conditions or treatments  Talk to your doctor, nurse or pharmacist before following any medical regimen to see if it is safe and effective for you

## 2022-01-14 NOTE — PROGRESS NOTES
Assessment/Plan:    Dermatomyositis (HCC)  Chronic on methotrexate 10 mg once weekly appears to be clinically stable? If the findings on the CT scan were related to a virus verses dermatomyositis patient has an appointment with rheumatologist in the near future would like their opinion regarding this    Ground glass opacity present on imaging of lung  Reviewed the recent CT scan which shows improvement but not resolution patient is working with pulmonology will also have her see Rheumatology patient does report me minimal dyspnea on exertion which is stable    Cervical adenopathy  Patient will go for ultrasound neck for follow-up    Overweight (BMI 25 0-29  9)  I have counselled the pt to follow a healthy and balanced diet ,and recommend routine exercise  Problem List Items Addressed This Visit        Musculoskeletal and Integument    Dermatomyositis ([address] )     Chronic on methotrexate 10 mg once weekly appears to be clinically stable? If the findings on the CT scan were related to a virus verses dermatomyositis patient has an appointment with rheumatologist in the near future would like their opinion regarding this            Immune and Lymphatic    Cervical adenopathy     Patient will go for ultrasound neck for follow-up            Other    Ground glass opacity present on imaging of lung     Reviewed the recent CT scan which shows improvement but not resolution patient is working with pulmonology will also have her see Rheumatology patient does report me minimal dyspnea on exertion which is stable         Overweight (BMI 25 0-29  9)     I have counselled the pt to follow a healthy and balanced diet ,and recommend routine exercise             Other Visit Diagnoses     Hyperlipidemia, unspecified hyperlipidemia type    -  Primary    Relevant Orders    Comprehensive metabolic panel    Lipid Panel with Direct LDL reflex          RTO in 6 months call if any problems Subjective:      Patient ID: Liz Erazo is a 63 y o  female  HPI 66-year old female coming in for a follow up office visit regarding hyperlipidemia, dermatomyositis, ground-glass opacity on the CT scan, cervical adenopathy and overweight; The patient reports me compliant taking medications without untoward side effects the  The patient is here to review his medical condition, update me on the medical condition and the patient reports me no hospitalizations and no ER visits  She is trying to follow healthy imbalance diet she will be starting a program in the near future  She continues remain active with routine exercise  She has had a recent CT scan  No injuries no illnesses no major problems  still a little sob but less    The following portions of the patient's history were reviewed and updated as appropriate: allergies, current medications, past family history, past medical history, past social history, past surgical history and problem list     Review of Systems   Constitutional: Negative for activity change, appetite change and unexpected weight change  HENT: Negative for congestion and postnasal drip  Eyes: Negative for visual disturbance  Respiratory: Positive for shortness of breath (Minimal dyspnea on exertion chronic)  Negative for cough  Cardiovascular: Negative for chest pain  Gastrointestinal: Negative for abdominal pain, diarrhea, nausea and vomiting  Neurological: Negative for dizziness, light-headedness and headaches  Hematological: Negative for adenopathy  Objective:    No follow-ups on file  No results found  No Known Allergies    Past Medical History:   Diagnosis Date    Benign neoplasm of lung     Right lung lobe benign  Removed 2010      [facility] [address] [facility]     Breathing difficulty 2011    no breathing difficulties but patient reported drop in BP during anesthesia for appendectomy and was advised to have cardiologist consult    Cancer [facility]) 2008    Depression     Dermatomy[facility] [address] )     Lung nodule     Wears glasses      Past Surgical History:   Procedure Laterality Date    APPENDECTOMY      2011    BREAST SURGERY      Bilateral Mastectomy    COLONOSCOPY N/A 1/26/2016    Procedure: COLONOSCOPY;  Surgeon: Georgina Orozco MD;  Location: [facility]; Service:     HYSTERECTOMY      2007    LUNG BIOPSY      MASS EXCISION Left 7/18/2019    Procedure: MID BACK MASS EXCISION BIOPSY;  Surgeon: Gulshan Ardon MD;  Location[facility];  Service: General    MASTECTOMY      reconstruction 2008    OOPHORECTOMY       Current Outpatient Medications on File Prior to Visit   Medication Sig Dispense Refill    Calcium-Magnesium-Vitamin D (CALCIUM 500 PO) Take 1,000 mg by mouth daily   Cholecalciferol (VITAMIN D) 2000 UNITS CAPS Take 2,000 Units/day by mouth       Cholecalciferol (Vitamin D3 Super Strength) 50 MCG (2000 UT) CAPS Take by mouth      clobetasol (TEMOVATE) 0 05 % cream PLEASE SEE ATTACHED FOR DETAILED DIRECTIONS      folic acid (FOLVITE) 1 mg tablet       methotrexate 2 5 mg tablet Take 10 mg by mouth once a week       multivitamin (THERAGRAN) TABS Take 1 tablet by mouth daily  No current facility-administered medications on file prior to visit       Family History   Problem Relation Age of Onset   Ardeth Needs Cancer Mother         Ovarian    Ovarian cancer Mother     No Known Problems Father     BRCA2 Positive Sister     Heart disease Sister         post partum cardiomyopathy, pacer    Ovarian cancer Paternal Aunt     Breast cancer Paternal Aunt     Cancer Paternal Aunt         Breast/Ovarian     Social History     Socioeconomic History    Marital status: /Civil Union     Spouse name: Not on file    Number of children: Not on file    Years of education: Not on file    Highest education level: Not on file   Occupational History    Not on file   Tobacco Use    Smoking status: Never Smoker    Smokeless tobacco: Never Used   Substance and Sexual Activity    Alcohol use: No    Drug use: No    Sexual activity: Yes     Partners: Male     Birth control/protection: None   Other Topics Concern    Not on file   Social History Narrative    Not on file     Social Determinants of Health     Financial Resource Strain: Not on file   Food Insecurity: Not on file   Transportation Needs: Not on file   Physical Activity: Not on file   Stress: Not on file   Social Connections: Not on file   Intimate Partner Violence: Not on file   Housing Stability: Not on file     Vitals:    01/14/22 1303   BP: 112/78   Pulse: 59   Resp: 16   SpO2: 99%   Weight: 78 2 kg (172 lb 6 4 oz)   Height: 5' 5" (1 651 m)     Results for orders placed or performed in visit on 10/26/21   Tissue Exam   Result Value Ref Range    Case Report       Surgical Pathology Report                         Case: [id number]                                   Authorizing Provider:  Dangelo Donahue MD          Collected:           10/26/2021 5719              Ordering Location:     [facility]  Received:            10/26/2021 [address]                                                                     Pathologist:           Anisa Mace MD                                                           Specimen:    Skin, Cyst/Tag/Debridement, right Chest Wall                                               Final Diagnosis       A  Skin, right chest wall, excision:  Follicular cyst, infundibular type (epidermal inclusion cyst), inflamed        Additional Information       All reported additional testing was performed with appropriately reactive controls  These tests were developed and their performance characteristics determined by [facility] or appropriate performing facility, though some tests may be performed on tissues which have not been validated for performance characteristics (such as staining performed on alcohol exposed cell blocks and decalcified tissues)  Results should be interpreted with caution and in the context of the patients clinical condition  These tests may not be cleared or approved by the [place]  Food and Drug Administration, though the FDA has determined that such clearance or approval is not necessary  These tests are used for clinical purposes and they should not be regarded as investigational or for research  This laboratory has been approved by [facility], designated as a high-complexity laboratory and is qualified to perform these tests  Jennye Babinski Description          A  The specimen is received in formalin, labeled with the patient's name and hospital number, and is designated "right chest wall  The specimen consists of a 1 5 x 1 0 x 0 8 cm portion of tan-yellow cystic soft tissue containing a tan grossly unremarkable skin ellipse measuring 1 1 x 0 6 cm  The specimen is serially sectioned revealing a disrupted cystic cavity measuring 1 2 cm in greatest dimension that is filled with tan-brown soft material   Representative sections are submitted in 1 cassette  Note: The estimated total formalin fixation time based upon information provided by the submitting clinician and the standard processing schedule is under 72 hours  RRavotti        Clinical Information       A chest wall nodule  She has had the nodule for one year  The nodule has increased in size  Denies pain  CT chest abdomen pelvis 9/15/2021: CHEST WALL AND LOWER NECK: New 6 mm superficial subcutaneous right lateral chest wall hypodense nodule presumably a sebaceous cyst image 35 series 4  Post bilateral mastectomy with breast implants in place  Bilateral axillary surgical clips  Weight (last 2 days)     Date/Time Weight    01/14/22 1303 78 2 (172 4)        Body mass index is 28 69 kg/m²    BP      Temp      Pulse     Resp      SpO2        Vitals:    01/14/22 1303   Weight: 78 2 kg (172 lb 6 4 oz)     Vitals:    01/14/22 1303   Weight: 78 2 kg (172 lb 6 4 oz)       /78 Pulse 59   Resp 16   Ht 5' 5" (1 651 m)   Wt 78 2 kg (172 lb 6 4 oz)   SpO2 99%   BMI 28 69 kg/m²          Physical Exam  Constitutional:       Appearance: She is well-developed  HENT:      Head: Normocephalic  Eyes:      General: No scleral icterus  Right eye: No discharge  Left eye: No discharge  Conjunctiva/sclera: Conjunctivae normal       Pupils: Pupils are equal, round, and reactive to light  Cardiovascular:      Rate and Rhythm: Normal rate and regular rhythm  Heart sounds: Normal heart sounds  No murmur heard  No friction rub  No gallop  Pulmonary:      Effort: No respiratory distress  Breath sounds: Normal breath sounds  No wheezing or rales  Abdominal:      General: Bowel sounds are normal  There is no distension  Palpations: Abdomen is soft  There is no mass  Tenderness: There is no abdominal tenderness  There is no guarding or rebound  Musculoskeletal:         General: No deformity  Cervical back: Neck supple  Lymphadenopathy:      Cervical: No cervical adenopathy  Neurological:      Mental Status: She is alert        Coordination: Coordination normal

## 2022-01-16 PROBLEM — E66.3 OVERWEIGHT (BMI 25.0-29.9): Status: ACTIVE | Noted: 2022-01-16

## 2022-01-16 PROBLEM — Z20.822 SUSPECTED COVID-19 VIRUS INFECTION: Status: RESOLVED | Noted: 2021-09-30 | Resolved: 2022-01-16

## 2022-01-16 NOTE — ASSESSMENT & PLAN NOTE
Chronic on methotrexate 10 mg once weekly appears to be clinically stable?   If the findings on the CT scan were related to a virus verses dermatomyositis patient has an appointment with rheumatologist in the near future would like their opinion regarding this

## 2022-01-16 NOTE — ASSESSMENT & PLAN NOTE
Reviewed the recent CT scan which shows improvement but not resolution patient is working with pulmonology will also have her see Rheumatology patient does report me minimal dyspnea on exertion which is stable

## 2022-01-18 ENCOUNTER — HOSPITAL ENCOUNTER (OUTPATIENT)
Dept: RADIOLOGY | Age: 62
Discharge: HOME | End: 2022-01-18
Payer: COMMERCIAL

## 2022-01-18 DIAGNOSIS — R59.0 CERVICAL ADENOPATHY: ICD-10-CM

## 2022-01-18 PROCEDURE — 76536 US EXAM OF HEAD AND NECK: CPT

## 2022-01-23 NOTE — PROGRESS NOTES
Assessment and Plan:   Patient is a 51-year-old female who presents for rheumatology consult regarding history of dermatomyositis  Previous rheumatology records were reviewed and we also discussed her history  She developed an anterior chest erythematous skin rash with a burning sensation along with a rash on her knuckles, which was ultimately diagnosed as dermatomyositis on a skin biopsy around 2015  At that point she does not recall having any muscle weakness or involvement and she has never had a muscle biopsy  She initially tried treatment with Plaquenil but then over time was developing darkening skin changes as a side effect and so this was stopped  She then started on methotrexate a few years ago and remains on this at 10 mg weekly with folic acid daily  With this she seems to have good control of her skin rash as she has not had any major recurrence or issues  She does have a burning sensation that comes and goes in the pattern of the rash which is on her chest and across her upper back and neck but again has not had a rash come out  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 and so she is cautious when she is in the sun  At this point she also denies any symptoms of muscle weakness and her exam today is unremarkable  We discussed that we will get an updated rheumatologic workup since I do not have any of her prior rheumatologic labs for review  Since she is stable though we will continue with the methotrexate at current dose and not make any treatment changes  We will plan a follow-up in about 6 months but she will let us know of any  sooner issues      Plan:  Diagnoses and all orders for this visit:    Dermatomyositis ([facility] [address] )  -     Chronic Hepatitis Panel  -     THELMA Screen w/ Reflex to Titer/Pattern  -     Anti-DNA antibody, double-stranded  -     Anti-scleroderma antibody  -     C4 complement  -     C3 complement  -     Centromere Antibody  -     CK  -     Aldolase  -     C-reactive protein  - Sjogren's Antibodies  -     Sedimentation rate, automated  -     RF Screen w/ Reflex to Titer  -     Nuclear antigen antibody  -     MyoMarker 3 Plus Profile (RDL)  -     Ambulatory referral to Rheumatology  -     Cyclic citrul peptide antibody, IgG    Cervical adenopathy    Ground glass opacity present on imaging of lung  -     Chronic Hepatitis Panel  -     ALONDRA Screen w/ Reflex to Titer/Pattern  -     Anti-DNA antibody, double-stranded  -     Anti-scleroderma antibody  -     C4 complement  -     C3 complement  -     Centromere Antibody  -     CK  -     Aldolase  -     C-reactive protein  -     Sjogren's Antibodies  -     Sedimentation rate, automated  -     RF Screen w/ Reflex to Titer  -     Nuclear antigen antibody  -     MyoMarker 3 Plus Profile (RDL)  -     Cyclic citrul peptide antibody, IgG        Follow-up plan: 6 months      HPI  Wava Sacks is a 63 y o   female with BRCA mutation, h/o B/L breast cancer 2008 s/p mastectomies/chemo/femara, s/p ITZEL/BSO, anxiety, h/o benign lung nodule resection, osteopenia who presents for rheumatology consult by request of Dr Malorie Aguilar for dermatomyositis  Alondra dsdna sm rnp ssa ssb c34 hep aldolase ck, myomarker3    Patient previously saw Dr Ana Arce, records reviewed   -Onset of burning erythematous anterior chest rash around 2011, ultimately had skin biopsy in 2015 which was apparently suggestive of DM vs mucinous eruption; rheum labs at that time negative for ALONDRA with panel, myositis panel, normal CK 52, also had negative EMG at that time; never had muscle biopsy?  -Initial visit: ongoing eval for submandibular LAD, also had mild GGO on CT chest which is mostly resolving; w/u for dx of DM    Recently she has undergone evaluation for submandibular lymphadenopathy which is being monitored  She also was having dyspnea and had CT of the chest showing nonspecific ground-glass opacity  Upon repeat CT this has mostly resolved    She also had PFTs done which were grossly normal and is being monitored by pulmonology  They are planning repeat CT of the chest in 6 months  Patient reports she is presenting to establish care with Rheumatology since she is still on the methotrexate and has been a few years  Recalls that she developed a burning sensation on the skin with a recurrent erythematous rash on her anterior chest an overlying her knuckles  This developed after she had already completed treatment for breast cancer at that point  She ultimately had a skin biopsy of 1 of her knuckles of the rash and this is when the diagnosis of dermatomyositis was suggested  At that point she does not recall having any muscle weakness or involvement  Never had a muscle biopsy  She initially tried Plaquenil but reports that she was developing darkening skin changes so this was stopped and she start the methotrexate which she has been on now for the past few years  She is on 10 mg weekly and daily folic acid  On the methotrexate she has not really had recurrence of the rash but still reports getting an occasional burning sensation that feels similar to when the rash initially developed  This has been more of a chronic issue though and is not a recent change for her  She also reports she gets the sensation and had the rash without sun exposure and does not feel necessarily it was associated with sun exposure  She is aware it is a photosensitive disease and is cautious in the sunlight  She does notice some myalgia in her proximal arms and across her upper back and neck sometimes but has not noted any particular muscle weakness  She has not noted difficulty with standing from seated position or doing overhead activities  No dysphagia  Noticed SOB developing last summer which is what prompted the additional workup with the CT of the chest and PFTs      Still feel she gets winded sometimes with certain things like bending over to pick something up but generally feels the shortness of breath is a little better  The plan will pulmonology is to repeat a CT in 6 months and just monitor for now  No joint pain or stiffness on a regular basis that limits her in the morning  No Raynaud's or sicca symptoms  Review of Systems  Review of Systems   Constitutional: Negative for fatigue, fever and unexpected weight change  HENT: Negative for mouth sores  Respiratory: Positive for shortness of breath  Negative for cough  Cardiovascular: Negative for chest pain and leg swelling  Gastrointestinal: Negative for abdominal pain, constipation and diarrhea  Musculoskeletal: Negative for arthralgias, back pain, joint swelling and myalgias  Skin: Positive for rash  Negative for color change  +burning sensation   Neurological: Negative for weakness  Hematological: Negative for adenopathy  Psychiatric/Behavioral: Negative for sleep disturbance  Allergies  No Known Allergies    Home Medications    Current Outpatient Medications:     Calcium-Magnesium-Vitamin D (CALCIUM 500 PO), Take 1,000 mg by mouth daily  , Disp: , Rfl:     Cholecalciferol (VITAMIN D) 2000 UNITS CAPS, Take 2,000 Units/day by mouth , Disp: , Rfl:     Cholecalciferol (Vitamin D3 Super Strength) 50 MCG (2000 UT) CAPS, Take by mouth, Disp: , Rfl:     clobetasol (TEMOVATE) 0 05 % cream, PLEASE SEE ATTACHED FOR DETAILED DIRECTIONS, Disp: , Rfl:     folic acid (FOLVITE) 1 mg tablet, , Disp: , Rfl:     methotrexate 2 5 mg tablet, Take 10 mg by mouth once a week , Disp: , Rfl:     multivitamin (THERAGRAN) TABS, Take 1 tablet by mouth daily  , Disp: , Rfl:     Past Medical History  Past Medical History:   Diagnosis Date    Benign neoplasm of lung     Right lung lobe benign  Removed 2010      Breast cancer ([facility] [place] [address] )     Breathing difficulty 2011    no breathing difficulties but patient reported drop in BP during anesthesia for appendectomy and was advised to have cardiologist consult   [address] [facility]) 2008  Depression     Dermatomyositis ([facility] [address] )     Lung nodule     Wears glasses        Past Surgical History   Past Surgical History:   Procedure Laterality Date    APPENDECTOMY      2011    BREAST SURGERY      Bilateral Mastectomy    COLONOSCOPY N/A 1/26/2016    Procedure: COLONOSCOPY;  Surgeon: Mary Kate Mar MD;  Location: [facility]; Service:     HYSTERECTOMY      2007    LUNG BIOPSY      MASS EXCISION Left 7/18/2019    Procedure: MID BACK MASS EXCISION BIOPSY;  Surgeon: Talon Hernández MD;  Location: [facility];  Service: General    MASTECTOMY      reconstruction 2008    OOPHORECTOMY         Family History  No known family history of autoimmune or inflammatory diseases  Family History   Problem Relation Age of Onset   Ernesto Harlan Cancer Mother         Ovarian    Ovarian cancer Mother     No Known Problems Father     BRCA2 Positive Sister     Heart disease Sister         post partum cardiomyopathy, pacer    Ovarian cancer Paternal Aunt     Breast cancer Paternal Aunt     Cancer Paternal Aunt         Breast/Ovarian       Social History  Occupation:    Social History     Substance and Sexual Activity   Alcohol Use No     Social History     Substance and Sexual Activity   Drug Use Never     Social History     Tobacco Use   Smoking Status Never Smoker   Smokeless Tobacco Never Used       Objective:    Vitals:    01/26/22 0830   BP: 118/74   Weight: 78 kg (172 lb)   Height: 5' 5" (1 651 m)       Physical Exam  Constitutional:       General: She is not in acute distress  HENT:      Head: Normocephalic and atraumatic  Eyes:      Conjunctiva/sclera: Conjunctivae normal    Pulmonary:      Effort: Pulmonary effort is normal  No respiratory distress  Musculoskeletal:      Cervical back: Neck supple  Comments: No joint swelling or synovitis anywhere  No reproducible soft tissue or joint tenderness  Skin:     Coloration: Skin is not pale  Findings: No rash     Neurological:      Mental Status: She is alert  Mental status is at baseline  Comments: 5/5 motor strength in bilateral upper and lower extremities     Psychiatric:         Mood and Affect: Mood normal          Behavior: Behavior normal          Imaging:   US neck 1/18/22: IMPRESSION:     Typical-appearing lymph nodes in the area of palpable concern left submandibular region  Close clinical follow-up is recommended      CT chest 12/13/21:  IMPRESSION:     1  Mild residual groundglass opacity in the right lower lobe in the area of previously seen consolidation, likely postinflammatory  Previously seen left lower lobe consolidation has resolved with mild residual scarring      2   Stable pulmonary nodules  DEXA 5/11/21:  COMPARISON:  Several, most recent July 18, 2016     RESULTS:   LUMBAR SPINE:  L1-L4:  BMD 0 833 gm/cm2  T-score -1 9  Z-score -0 6     LEFT TOTAL HIP:  BMD 0 879 gm/cm2  T-score -0 5  Z-score 0 3     LEFT FEMORAL NECK:  BMD 0 611 gm/cm2  T-score -2 1  Z-score -0 9           IMPRESSION:  1  Based on the [facility] classification, the T-score of -2 1 in the left hip is consistent with LOW BONE MINERAL DENSITY (OSTEOPENIA)        2  When compared to the prior examination, there has been NO SIGNIFICANT CHANGE in the total bone mineral density of the lumbar spine, when allowing for the least significant change      There has however been a 5% DECREASE in the total bone mineral density of the left hip      3  Any secondary causes of low bone mineral density should be excluded prior to treatment, if clinically indicated  4   A daily intake of at least 1200 mg calcium and 800 to 1000 IU of Vitamin D, as well as weight bearing and muscle strengthening exercise, fall prevention and avoidance of tobacco and excessive alcohol intake as basic preventive measures are suggested  5   Repeat DXA  in 18 - 24 months, on the same machine, as clinically indicated      The 10 year risk of hip fracture is 1%, with the 10 year risk of major osteoporotic fracture being 9%, as calculated by the WHO fracture risk assessment tool (FRAX)  The current NOF guidelines recommend treating patients with FRAX 10 year risk score of >3% for hip fracture and >20% for major osteoporotic fracture       PFTs 10/8/21:     Results:  FEV1/FVC Ratio: 78 %  Forced Vital Capacity: 3 50 L    108 % predicted  FEV1: 2 73 L     104 % predicted     Lung volumes by body plethysmography:   Total Lung Capacity 105 % predicted   Residual volume 95 % predicted     DLCO corrected for patients hemoglobin level: 76 %     Interpretation:     · Normal Spirometry     · Normal Lung volumes     · Mild reduction in diffusion capacity     · Flow-volume loop appears normal      Labs:   Component      Latest Ref Rng & Units 1/5/2022   Sodium      136 - 145 mmol/L 141   Potassium      3 5 - 5 3 mmol/L 4 5   Chloride      100 - 108 mmol/L 105   CO2      21 - 32 mmol/L 32   Anion Gap      4 - 13 mmol/L 4   BUN      5 - 25 mg/dL 19   Creatinine      0 60 - 1 30 mg/dL 0 77   GLUCOSE FASTING      65 - 99 mg/dL 84   Calcium      8 3 - 10 1 mg/dL 9 7   AST      5 - 45 U/L 20   ALT      12 - 78 U/L 29   Alkaline Phosphatase      46 - 116 U/L 73   Total Protein      6 4 - 8 2 g/dL 7 5   Albumin      3 5 - 5 0 g/dL 4 1   TOTAL BILIRUBIN      0 20 - 1 00 mg/dL 0 90   eGFR      ml/min/1 73sq m 84     Component      Latest Ref Rng & Units 10/11/2021   ANTI DANILO-1 IGG      0 0 - 0 9 AI <0 2     Component      Latest Ref Rng & Units 9/8/2021   WBC      4 31 - 10 16 Thousand/uL 5 28   Red Blood Cell Count      3 81 - 5 12 Million/uL 4 38   Hemoglobin      11 5 - 15 4 g/dL 13 6   HCT      34 8 - 46 1 % 41 8   MCV      82 - 98 fL 95   MCH      26 8 - 34 3 pg 31 1   MCHC      31 4 - 37 4 g/dL 32 5   RDW      11 6 - 15 1 % 14 0   MPV      8 9 - 12 7 fL 9 7   Platelet Count      730 - 390 Thousands/uL 273   nRBC      /100 WBCs 0   Neutrophils %      43 - 75 % 63   Immat GRANS %      0 - 2 % 0   Lymphocytes Relative 14 - 44 % 26   Monocytes Relative      4 - 12 % 8   Eosinophils      0 - 6 % 2   Basophils Relative      0 - 1 % 1   Absolute Neutrophils      1 85 - 7 62 Thousands/µL 3 35   Immature Grans Absolute      0 00 - 0 20 Thousand/uL 0 01   Lymphocytes Absolute      0 60 - 4 47 Thousands/µL 1 36   Absolute Monocytes      0 17 - 1 22 Thousand/µL 0 42   Absolute Eosinophils      0 00 - 0 61 Thousand/µL 0 11   Basophils Absolute      0 00 - 0 10 Thousands/µL 0 03

## 2022-01-26 ENCOUNTER — OFFICE VISIT (OUTPATIENT)
Dept: RHEUMATOLOGY | Facility: CLINIC | Age: 62
End: 2022-01-26
Payer: COMMERCIAL

## 2022-01-26 ENCOUNTER — APPOINTMENT (OUTPATIENT)
Dept: LAB | Facility: CLINIC | Age: 62
End: 2022-01-26
Payer: COMMERCIAL

## 2022-01-26 VITALS
SYSTOLIC BLOOD PRESSURE: 118 MMHG | BODY MASS INDEX: 28.66 KG/M2 | HEIGHT: 65 IN | DIASTOLIC BLOOD PRESSURE: 74 MMHG | WEIGHT: 172 LBS

## 2022-01-26 DIAGNOSIS — M33.90 DERMATOMYOSITIS (HCC): Primary | ICD-10-CM

## 2022-01-26 DIAGNOSIS — C50.011 MALIGNANT NEOPLASM OF NIPPLE OF BOTH BREASTS IN FEMALE, ESTROGEN RECEPTOR POSITIVE (HCC): ICD-10-CM

## 2022-01-26 DIAGNOSIS — C50.012 MALIGNANT NEOPLASM OF NIPPLE OF BOTH BREASTS IN FEMALE, ESTROGEN RECEPTOR POSITIVE (HCC): ICD-10-CM

## 2022-01-26 DIAGNOSIS — R59.0 CERVICAL ADENOPATHY: ICD-10-CM

## 2022-01-26 DIAGNOSIS — Z17.0 MALIGNANT NEOPLASM OF NIPPLE OF BOTH BREASTS IN FEMALE, ESTROGEN RECEPTOR POSITIVE (HCC): ICD-10-CM

## 2022-01-26 DIAGNOSIS — Z15.09 BRCA2 GENE MUTATION POSITIVE: ICD-10-CM

## 2022-01-26 DIAGNOSIS — Z15.01 BRCA2 GENE MUTATION POSITIVE: ICD-10-CM

## 2022-01-26 DIAGNOSIS — R91.8 GROUND GLASS OPACITY PRESENT ON IMAGING OF LUNG: ICD-10-CM

## 2022-01-26 LAB
ALBUMIN SERPL BCP-MCNC: 4.1 G/DL (ref 3.5–5)
ALP SERPL-CCNC: 76 U/L (ref 46–116)
ALT SERPL W P-5'-P-CCNC: 20 U/L (ref 12–78)
ANION GAP SERPL CALCULATED.3IONS-SCNC: 8 MMOL/L (ref 4–13)
AST SERPL W P-5'-P-CCNC: 17 U/L (ref 5–45)
BASOPHILS # BLD AUTO: 0.02 THOUSANDS/ΜL (ref 0–0.1)
BASOPHILS NFR BLD AUTO: 0 % (ref 0–1)
BILIRUB SERPL-MCNC: 0.37 MG/DL (ref 0.2–1)
BUN SERPL-MCNC: 18 MG/DL (ref 5–25)
C3 SERPL-MCNC: 111 MG/DL (ref 90–180)
C4 SERPL-MCNC: 44 MG/DL (ref 10–40)
CALCIUM SERPL-MCNC: 9.7 MG/DL (ref 8.3–10.1)
CANCER AG27-29 SERPL-ACNC: 25.9 U/ML (ref 0–42.3)
CHLORIDE SERPL-SCNC: 103 MMOL/L (ref 100–108)
CK SERPL-CCNC: 46 U/L (ref 26–192)
CO2 SERPL-SCNC: 29 MMOL/L (ref 21–32)
CREAT SERPL-MCNC: 0.75 MG/DL (ref 0.6–1.3)
CRP SERPL QL: 5.9 MG/L
EOSINOPHIL # BLD AUTO: 0.05 THOUSAND/ΜL (ref 0–0.61)
EOSINOPHIL NFR BLD AUTO: 1 % (ref 0–6)
ERYTHROCYTE [DISTWIDTH] IN BLOOD BY AUTOMATED COUNT: 13.5 % (ref 11.6–15.1)
ERYTHROCYTE [SEDIMENTATION RATE] IN BLOOD: 21 MM/HOUR (ref 0–29)
GFR SERPL CREATININE-BSD FRML MDRD: 87 ML/MIN/1.73SQ M
GLUCOSE SERPL-MCNC: 92 MG/DL (ref 65–140)
HBV CORE AB SER QL: NORMAL
HBV CORE IGM SER QL: NORMAL
HBV SURFACE AG SER QL: NORMAL
HCT VFR BLD AUTO: 42 % (ref 34.8–46.1)
HCV AB SER QL: NORMAL
HGB BLD-MCNC: 13.7 G/DL (ref 11.5–15.4)
IMM GRANULOCYTES # BLD AUTO: 0.02 THOUSAND/UL (ref 0–0.2)
IMM GRANULOCYTES NFR BLD AUTO: 0 % (ref 0–2)
LYMPHOCYTES # BLD AUTO: 1.21 THOUSANDS/ΜL (ref 0.6–4.47)
LYMPHOCYTES NFR BLD AUTO: 24 % (ref 14–44)
MCH RBC QN AUTO: 30.8 PG (ref 26.8–34.3)
MCHC RBC AUTO-ENTMCNC: 32.6 G/DL (ref 31.4–37.4)
MCV RBC AUTO: 94 FL (ref 82–98)
MONOCYTES # BLD AUTO: 0.41 THOUSAND/ΜL (ref 0.17–1.22)
MONOCYTES NFR BLD AUTO: 8 % (ref 4–12)
NEUTROPHILS # BLD AUTO: 3.41 THOUSANDS/ΜL (ref 1.85–7.62)
NEUTS SEG NFR BLD AUTO: 67 % (ref 43–75)
NRBC BLD AUTO-RTO: 0 /100 WBCS
PLATELET # BLD AUTO: 258 THOUSANDS/UL (ref 149–390)
PMV BLD AUTO: 9.3 FL (ref 8.9–12.7)
POTASSIUM SERPL-SCNC: 4.3 MMOL/L (ref 3.5–5.3)
PROT SERPL-MCNC: 7.6 G/DL (ref 6.4–8.2)
RBC # BLD AUTO: 4.45 MILLION/UL (ref 3.81–5.12)
SODIUM SERPL-SCNC: 140 MMOL/L (ref 136–145)
WBC # BLD AUTO: 5.12 THOUSAND/UL (ref 4.31–10.16)

## 2022-01-26 PROCEDURE — 86430 RHEUMATOID FACTOR TEST QUAL: CPT | Performed by: INTERNAL MEDICINE

## 2022-01-26 PROCEDURE — 83516 IMMUNOASSAY NONANTIBODY: CPT | Performed by: INTERNAL MEDICINE

## 2022-01-26 PROCEDURE — 86200 CCP ANTIBODY: CPT | Performed by: INTERNAL MEDICINE

## 2022-01-26 PROCEDURE — 86225 DNA ANTIBODY NATIVE: CPT | Performed by: INTERNAL MEDICINE

## 2022-01-26 PROCEDURE — 86300 IMMUNOASSAY TUMOR CA 15-3: CPT

## 2022-01-26 PROCEDURE — 86705 HEP B CORE ANTIBODY IGM: CPT | Performed by: INTERNAL MEDICINE

## 2022-01-26 PROCEDURE — 83520 IMMUNOASSAY QUANT NOS NONAB: CPT | Performed by: INTERNAL MEDICINE

## 2022-01-26 PROCEDURE — 1036F TOBACCO NON-USER: CPT | Performed by: INTERNAL MEDICINE

## 2022-01-26 PROCEDURE — 82085 ASSAY OF ALDOLASE: CPT | Performed by: INTERNAL MEDICINE

## 2022-01-26 PROCEDURE — 82550 ASSAY OF CK (CPK): CPT | Performed by: INTERNAL MEDICINE

## 2022-01-26 PROCEDURE — 3008F BODY MASS INDEX DOCD: CPT | Performed by: INTERNAL MEDICINE

## 2022-01-26 PROCEDURE — 80053 COMPREHEN METABOLIC PANEL: CPT

## 2022-01-26 PROCEDURE — 86235 NUCLEAR ANTIGEN ANTIBODY: CPT | Performed by: INTERNAL MEDICINE

## 2022-01-26 PROCEDURE — 87340 HEPATITIS B SURFACE AG IA: CPT | Performed by: INTERNAL MEDICINE

## 2022-01-26 PROCEDURE — 86803 HEPATITIS C AB TEST: CPT | Performed by: INTERNAL MEDICINE

## 2022-01-26 PROCEDURE — 86704 HEP B CORE ANTIBODY TOTAL: CPT | Performed by: INTERNAL MEDICINE

## 2022-01-26 PROCEDURE — 85652 RBC SED RATE AUTOMATED: CPT | Performed by: INTERNAL MEDICINE

## 2022-01-26 PROCEDURE — 86140 C-REACTIVE PROTEIN: CPT | Performed by: INTERNAL MEDICINE

## 2022-01-26 PROCEDURE — 85025 COMPLETE CBC W/AUTO DIFF WBC: CPT

## 2022-01-26 PROCEDURE — 86038 ANTINUCLEAR ANTIBODIES: CPT | Performed by: INTERNAL MEDICINE

## 2022-01-26 PROCEDURE — 99205 OFFICE O/P NEW HI 60 MIN: CPT | Performed by: INTERNAL MEDICINE

## 2022-01-26 PROCEDURE — 86160 COMPLEMENT ANTIGEN: CPT | Performed by: INTERNAL MEDICINE

## 2022-01-26 PROCEDURE — 36415 COLL VENOUS BLD VENIPUNCTURE: CPT | Performed by: INTERNAL MEDICINE

## 2022-01-27 LAB
ALDOLASE SERPL-CCNC: 4.9 U/L (ref 3.3–10.3)
CENTROMERE B AB SER-ACNC: <0.2 AI (ref 0–0.9)
DSDNA AB SER-ACNC: <1 IU/ML (ref 0–9)
ENA RNP AB SER-ACNC: <0.2 AI (ref 0–0.9)
ENA SCL70 AB SER-ACNC: <0.2 AI (ref 0–0.9)
ENA SM AB SER-ACNC: <0.2 AI (ref 0–0.9)
ENA SS-A AB SER-ACNC: <0.2 AI (ref 0–0.9)
ENA SS-B AB SER-ACNC: <0.2 AI (ref 0–0.9)
RHEUMATOID FACT SER QL LA: NEGATIVE

## 2022-01-28 LAB
CCP AB SER IA-ACNC: 1.4
RYE IGE QN: NEGATIVE

## 2022-01-31 DIAGNOSIS — M33.90 DERMATOMYOSITIS (HCC): Primary | ICD-10-CM

## 2022-01-31 DIAGNOSIS — Z79.899 HIGH RISK MEDICATION USE: ICD-10-CM

## 2022-02-09 LAB
EJ AB SER QL: NEGATIVE
ENA JO1 AB SER IA-ACNC: <20 UNITS
ENA PM/SCL AB SER-ACNC: <20 UNITS
ENA SS-A 52KD IGG SER IA-ACNC: 44 UNITS
FIBRILLARIN AB SER QL: NEGATIVE
KU AB SER QL: NEGATIVE
MDA5 AB SER LINE BLOT-ACNC: <20 UNITS
MI2 AB SER QL: NEGATIVE
MJ AB SER LINE BLOT-ACNC: <20 UNITS
OJ AB SER QL: NEGATIVE
PL12 AB SER QL: NEGATIVE
PL7 AB SER QL: NEGATIVE
SAE1 IGG SER QL LINE BLOT: <20 UNITS
SRP AB SERPL QL: NEGATIVE
TIF1-GAMMA AB SER LINE BLOT-ACNC: <20 UNITS
U1 SNRNP AB SER IA-ACNC: <20 UNITS
U2 SNRNP AB SER QL: NEGATIVE

## 2022-02-14 ENCOUNTER — OFFICE VISIT (OUTPATIENT)
Dept: DERMATOLOGY | Facility: CLINIC | Age: 62
End: 2022-02-14
Payer: COMMERCIAL

## 2022-02-14 VITALS — HEIGHT: 64 IN | TEMPERATURE: 97 F | WEIGHT: 179.1 LBS | BODY MASS INDEX: 30.58 KG/M2

## 2022-02-14 DIAGNOSIS — D23.9 DERMATOFIBROMA: ICD-10-CM

## 2022-02-14 DIAGNOSIS — L81.4 SOLAR LENTIGO: ICD-10-CM

## 2022-02-14 DIAGNOSIS — D22.60 MULTIPLE BENIGN MELANOCYTIC NEVI OF UPPER EXTREMITY, LOWER EXTREMITY, AND TRUNK: ICD-10-CM

## 2022-02-14 DIAGNOSIS — D22.70 MULTIPLE BENIGN MELANOCYTIC NEVI OF UPPER EXTREMITY, LOWER EXTREMITY, AND TRUNK: ICD-10-CM

## 2022-02-14 DIAGNOSIS — Z85.820 HISTORY OF MELANOMA: Primary | ICD-10-CM

## 2022-02-14 DIAGNOSIS — M33.90 DERMATOMYOSITIS (HCC): ICD-10-CM

## 2022-02-14 DIAGNOSIS — D22.5 MULTIPLE BENIGN MELANOCYTIC NEVI OF UPPER EXTREMITY, LOWER EXTREMITY, AND TRUNK: ICD-10-CM

## 2022-02-14 PROCEDURE — 99203 OFFICE O/P NEW LOW 30 MIN: CPT | Performed by: DERMATOLOGY

## 2022-02-14 NOTE — PROGRESS NOTES
[facility] Note     Patient Name: Italo Will  Encounter Date: 2/14/22     Have you been cared for by a [facility] in the last 3 years and, if so, which one? No    · Have you traveled outside of the [address] [place] [address] in the past 3 months or outside of the [facility] in the last 2 weeks? No     May we call your Preferred Phone number to discuss your specific medical information? Yes     May we leave a detailed message that includes your specific medical information? Yes      Today's Chief Concerns:   Concern #1:  Skin exam   Concern #2:      Past Medical History:  Have you personally ever had or currently have any of the following? · Skin cancer (such as Melanoma, Basal Cell Carcinoma, Squamous Cell Carcinoma? (If Yes, please provide more detail)- YES, Melanoma  · Eczema: No  · Psoriasis: No  · HIV/AIDS: No  · Hepatitis B or C: No  · Tuberculosis: No  · Systemic Immunosuppression such as Diabetes, Biologic or Immunotherapy, Chemotherapy, Organ Transplantation, Bone Marrow Transplantation (If YES, please provide more detail): YES, methotrexate, for dermatomyositis, chemotherapy breast cancer  · Radiation Treatment (If YES, please provide more detail): No  · Any other major medical conditions/concerns? (If Yes, which types)- No    Social History:     What is/was your primary occupation? [employer] What are your hobbies/past-times? Family History:  Have any of your "first degree relatives" (parent, brother, sister, or child) had any of the following       · Skin cancer such as Melanoma or Merkel Cell Carcinoma or Pancreatic Cancer? No  · Eczema, Asthma, Hay Fever or Seasonal Allergies: YES, siblings, seasonal allergies  · Psoriasis or Psoriatic Arthritis: No  · Do any other medical conditions seem to run in your family? If Yes, what condition and which relatives?   No    Current Medications:   (please update all dermatological medications before printing patient's AVS!)      Current Outpatient Medications:     Calcium-Magnesium-Vitamin D (CALCIUM 500 PO), Take 1,000 mg by mouth daily  , Disp: , Rfl:     Cholecalciferol (VITAMIN D) 2000 UNITS CAPS, Take 2,000 Units/day by mouth , Disp: , Rfl:     Cholecalciferol (Vitamin D3 Super Strength) 50 MCG (2000 UT) CAPS, Take by mouth, Disp: , Rfl:     clobetasol (TEMOVATE) 0 05 % cream, as needed  , Disp: , Rfl:     folic acid (FOLVITE) 1 mg tablet, , Disp: , Rfl:     methotrexate 2 5 mg tablet, Take 10 mg by mouth once a week , Disp: , Rfl:     multivitamin (THERAGRAN) TABS, Take 1 tablet by mouth daily  , Disp: , Rfl:       Review of Systems:  Have you recently had or currently have any of the following? If YES, what are you doing for the problem? · Fever, chills or unintended weight loss: No  · Sudden loss or change in your vision: No  · Nausea, vomiting or blood in your stool: No  · Painful or swollen joints: No  · Wheezing or cough: No  · Changing mole or non-healing wound: YES, changing mole  · Nosebleeds: No  · Excessive sweating: No  · Easy or prolonged bleeding? No  · Over the last 2 weeks, how often have you been bothered by the following problems? · Taking little interest or pleasure in doing things: 1 - Not at All  · Feeling down, depressed, or hopeless: 1 - Not at All  · Rapid heartbeat with epinephrine:  No    · FEMALES ONLY:    · Are you pregnant or planning to become pregnant? No  · Are you currently or planning to be nursing or breast feeding? No    · Any known allergies? · No Known Allergies      Physical Exam:     Was a chaperone (Derm Clinical Assistant) present throughout the entire Physical Exam? Yes     Did the Dermatology Team specifically  the patient on the importance of a Full Skin Exam to be sure that nothing is missed clinically?  Yes}  o Did the patient ultimately request or accept a Full Skin Exam?  Yes  o Did the patient specifically refuse to have the areas "under-the-bra" examined by the Dermatologist? No  o Did the patient specifically refuse to have the areas "under-the-underwear" examined by the Dermatologist? No    CONSTITUTIONAL:   Vitals:    02/14/22 1253   Temp: (!) 97 °F (36 1 °C)   TempSrc: Temporal   Weight: 81 2 kg (179 lb 1 6 oz)   Height: 5' 4 25" (1 632 m)           PSYCH: Normal mood and affect  EYES: Normal conjunctiva  ENT: Normal lips and oral mucosa  CARDIOVASCULAR: No edema  RESPIRATORY: Normal respirations  HEME/LYMPH/IMMUNO:  No regional lymphadenopathy except as noted below in "ASSESSMENT AND PLAN BY DIAGNOSIS"    SKIN:  FULL ORGAN SYSTEM EXAM  Hair, Scalp, Ears, Face Normal except as noted below in Assessment   Neck, Cervical Chain Nodes Normal except as noted below in Assessment   Right Arm/Hand/Fingers Normal except as noted below in Assessment   Left Arm/Hand/Fingers Normal except as noted below in Assessment   Chest/Breasts/Axillae Viewed areas Normal except as noted below in Assessment   Abdomen, Umbilicus Normal except as noted below in Assessment   Back/Spine Normal except as noted below in Assessment   Groin/Genitalia/Buttocks Normal except as noted below in Assessment   Right Leg, Foot, Toes Normal except as noted below in Assessment   Left Leg, Foot, Toes Normal except as noted below in Assessment        Assessment and Plan by Diagnosis:    History of Present Condition:     Duration:  How long has this been an issue for you? o  preventative skin cancer screening, history of skin cancer, dermatomyositis on methotrexate  brca-2 positive noticed mole that's unusual on the right thigh 2 months ago   Location Affected:  Where on the body is this affecting you? o  see above   Quality:  Is there any bleeding, pain, itch, burning/irritation, or redness associated with the skin lesion?     o  denies   Severity:  Describe any bleeding, pain, itch, burning/irritation, or redness on a scale of 1 to 10 (with 10 being the worst)  o  n/a   Timing:  Does this condition seem to be there pretty constantly or do you notice it more at specific times throughout the day? o  conisitent   Context:  Have you ever noticed that this condition seems to be associated with specific activities you do?    o  n/a   Modifying Factors:    o Anything that seems to make the condition worse?    -  n/a  o What have you tried to do to make the condition better?    -  n/a   Associated Signs and Symptoms:  Does this skin lesion seem to be associated with any of the following:  o     HISTORY OF MELANOMA    Physical Exam:   Anatomic Location Affected:  Mid upper left chest   Morphological Description of Scar:  Well healed   Year Treated: 2014   TNM Classification: N0T0   Suspected Recurrence: yes   Regional adenopathy: no    Additional History of Present Condition:  Excised 2014  Assessment and Plan:  Based on a thorough discussion of this condition and the management approach to it (including a comprehensive discussion of the known risks, side effects and potential benefits of treatment), the patient (family) agrees to implement the following specific plan:   When outside we recommend using a wide brim hat, sunglasses, long sleeve and pants, sunscreen with SPF 18+ with reapplication every 2 hours, or SPF specific clothing     What happens at follow-up? The main purpose of follow-up is to detect recurrences early (metastatic melanoma), but it also offers an opportunity to diagnose a new primary melanoma at the first possible opportunity  A second invasive melanoma occurs in 5-10% of melanoma patients and a new melanoma in situ is diagnosed in more than 20% of melanoma patients  Our practice makes the following recommendations for follow-up for patients with invasive melanoma     At-least "monthly" self-skin examinations    Routine skin checks by a board certified dermatologist   Follow-up intervals are "every 3 months" within 2 years of a new melanoma diagnosis; "every 6 months" between 2-4 years of a new melanoma diagnosis; and "annually" after 4 years of a new melanoma diagnosis   Individual patient's needs should be considered before an appropriate follow-up is offered  Mindy Francisco Provide education and support to help the patient adjust to their illness    Follow-up appointments should include:   A check of the scar where the primary melanoma was removed   Checking the regional lymph nodes   A general skin examination   A full physical examination at least annually by your primary care physician    In those with more advanced primary disease, follow-up may include:   Blood tests   Imaging: ultrasound, X-ray, CT, MRI and PET scan  Most tests are not worthwhile for patients with stage 1 or 2 melanoma unless there are signs or symptoms of disease recurrence or metastasis  No tests are necessary for healthy patients who have remained well for five years or longer after removal of their melanoma  What is the outlook for patients with melanoma?  Melanoma in situ is cured by excision because it has no potential to spread around the body   The risk of spread and ultimate death from invasive melanoma depends on several factors, but the main one is the Breslow thickness of the melanoma at the time it was surgically removed   Metastases are rare for melanomas < 0 75 mm and the risk for tumours 0 75-1 mm thick is about 5%  The risk steadily increases with thickness so that melanomas > 4 mm have a risk of metastasis of about 40%  Melanoma is a potentially serious type of skin cancer, in which there is uncontrolled growth of melanocytes (pigment cells)  Melanoma is sometimes called malignant melanoma  Normal melanocytes are found in the basal layer of the epidermis (the outer layer of skin)  Melanocytes produce a protein called melanin, which protects skin cells by absorbing ultraviolet (UV) radiation   Melanocytes are found in equal numbers in black and white skin, but melanocytes in black skin produce much more melanin  People with dark brown or black skin are very much less likely to be damaged by UV radiation than those with white skin  MELANOCYTIC NEVI ("Moles")    Physical Exam:   Anatomic Location Affected:   Mostly on sun-exposed areas of the trunk and extremities   Morphological Description:  Scattered, 1-4mm round to ovoid, symmetrical-appearing, even bordered, skin colored to dark brown macules/papules, mostly in sun-exposed areas   Pertinent Positives:   Pertinent Negatives: Additional History of Present Condition:      Assessment and Plan:  Based on a thorough discussion of this condition and the management approach to it (including a comprehensive discussion of the known risks, side effects and potential benefits of treatment), the patient (family) agrees to implement the following specific plan:   When outside we recommend using a wide brim hat, sunglasses, long sleeve and pants, sunscreen with SPF 16+ with reapplication every 2 hours, or SPF specific clothing    Benign, reassured   Recommend skin check every 6 months  Melanocytic Nevi  Melanocytic nevi ("moles") are tan or brown, raised or flat areas of the skin which have an increased number of melanocytes  Melanocytes are the cells in our body which make pigment and account for skin color  Some moles are present at birth (I e , "congenital nevi"), while others come up later in life (i e , "acquired nevi")  The sun can stimulate the body to make more moles  Sunburns are not the only thing that triggers more moles  Chronic sun exposure can do it too  Clinically distinguishing a healthy mole from melanoma may be difficult, even for experienced dermatologists  The "ABCDE's" of moles have been suggested as a means of helping to alert a person to a suspicious mole and the possible increased risk of melanoma    The suggestions for raising alert are as follows:    Asymmetry: Healthy moles tend to be symmetric, while melanomas are often asymmetric  Asymmetry means if you draw a line through the mole, the two halves do not match in color, size, shape, or surface texture  Asymmetry can be a result of rapid enlargement of a mole, the development of a raised area on a previously flat lesion, scaling, ulceration, bleeding or scabbing within the mole  Any mole that starts to demonstrate "asymmetry" should be examined promptly by a board certified dermatologist      Border: Healthy moles tend to have discrete, even borders  The border of a melanoma often blends into the normal skin and does not sharply delineate the mole from normal skin  Any mole that starts to demonstrate "uneven borders" should be examined promptly by a board certified dermatologist      Color: Healthy moles tend to be one color throughout  Melanomas tend to be made up of different colors ranging from dark black, blue, white, or red  Any mole that demonstrates a color change should be examined promptly by a board certified dermatologist      Diameter: Healthy moles tend to be smaller than 0 6 cm in size; an exception are "congenital nevi" that can be larger  Melanomas tend to grow and can often be greater than 0 6 cm (1/4 of an inch, or the size of a pencil eraser)  This is only a guideline, and many normal moles may be larger than 0 6 cm without being unhealthy  Any mole that starts to change in size (small to bigger or bigger to smaller) should be examined promptly by a board certified dermatologist      Evolving: Healthy moles tend to "stay the same "  Melanomas may often show signs of change or evolution such as a change in size, shape, color, or elevation    Any mole that starts to itch, bleed, crust, burn, hurt, or ulcerate or demonstrate a change or evolution should be examined promptly by a board certified dermatologist         LENTIGO    Physical Exam:   Anatomic Location Affected: Mostly sun exposed areas   Morphological Description:  Light brown macules   Pertinent Positives:   Pertinent Negatives: Additional History of Present Condition:      Assessment and Plan:  Based on a thorough discussion of this condition and the management approach to it (including a comprehensive discussion of the known risks, side effects and potential benefits of treatment), the patient (family) agrees to implement the following specific plan:   When outside we recommend using a wide brim hat, sunglasses, long sleeve and pants, sunscreen with SPF 53+ with reapplication every 2 hours, or SPF specific clothing       What is a lentigo? A lentigo is a pigmented flat or slightly raised lesion with a clearly defined edge  Unlike an ephelis (freckle), it does not fade in the winter months  There are several kinds of lentigo  The name lentigo originally referred to its appearance resembling a small lentil  The plural of lentigo is lentigines, although lentigos is also in common use  Who gets lentigines? Lentigines can affect males and females of all ages and races  Solar lentigines are especially prevalent in fair skinned adults  Lentigines associated with syndromes are present at birth or arise during childhood  What causes lentigines? Common forms of lentigo are due to exposure to ultraviolet radiation:   Sun damage including sunburn    Indoor tanning    Phototherapy, especially photochemotherapy (PUVA)    Ionizing radiation, eg radiation therapy, can also cause lentigines  Several familial syndromes associated with widespread lentigines originate from mutations in Derrell-MAP kinase, mTOR signaling and PTEN pathways  What is the treatment for lentigines? Most lentigines are left alone  Attempts to lighten them may not be successful   The following approaches are used:   SPF 50+ broad-spectrum sunscreen    Hydroquinone bleaching cream    Alpha hydroxy acids    Vitamin C    Retinoids  Azelaic acid    Chemical peels  Individual lesions can be permanently removed using:   Cryotherapy    Intense pulsed light    Pigment lasers    How can lentigines be prevented? Lentigines associated with exposure ultraviolet radiation can be prevented by very careful sun protection  Clothing is more successful at preventing new lentigines than are sunscreens  What is the outlook for lentigines? Lentigines usually persist  They may increase in number with age and sun exposure  Some in sun-protected sites may fade and disappear  DERMATOFIBROMA    Physical Exam:   Anatomic Location Affected:  Right leg   Morphological Description:  3 firm dermal nodules   Pertinent Positives:   Pertinent Negatives: Additional History of Present Condition:      Assessment and Plan:  Based on a thorough discussion of this condition and the management approach to it (including a comprehensive discussion of the known risks, side effects and potential benefits of treatment), the patient (family) agrees to implement the following specific plan:   Reassured benign  Assessment and Plan:  A dermatofibroma is a common benign fibrous nodule that most often arises on the skin of the lower legs  A dermatofibroma is also called a "cutaneous fibrous histiocytoma "  Dermatofibromas occur at all ages and in people of every ethnicity  They are more common in women than in men  It is not clear if dermatofibroma is a reactive process or if it is a neoplasm  The lesions are made up of proliferating fibroblasts  Histiocytes may also be involved  They are sometimes attributed to an insect bite or ingrownhair or local trauma, but not consistently  They may be more numerous in patients with altered immunity  Dermatofibromas most often occur on the legs and arms, but may also arise on the trunk or any site of the body  Typical clinical features include the following:   People may have 1 or up to 15 lesions     Size varies from 0 5-1 5 cm diameter; most lesions are 7-10 mm diameter   They are firm nodules tethered to the skin surface and mobile over subcutaneous tissue   The skin "dimples" on pinching the lesion   Color may be pink to light brown in white skin, and dark brown to black in dark skin; some appear paler in the center   They do not usually cause symptoms, but they are sometimes painful or itchy   Because they are often raised lesions, they may be traumatized, for example by a razor   Occasionally dozens may erupt within a few months, usually in the setting of immunosuppression (for example autoimmune disease, cancer or certain medications)   Dermatofibroma does not give rise to cancer  However, occasionally, it may be mistaken for dermatofibrosarcoma or desmoplastic melanoma  A dermatofibroma is harmless and seldom causes any symptoms  Usually, only reassurance is needed  If it is nuisance or causing concern, the lesion can be removed surgically, resulting in a scar that is, by definition, usually longer in diameter than the widest portion of the dermatofibroma  Cryotherapy, shave biopsy and laser surgery are rarely completely successful  Skin punch biopsy or incisional biopsy may be undertaken if there is an atypical feature such as recent enlargement, ulceration, or asymmetrical structures and colours on dermatoscopy  DERMATOMYOSITIS    Physical Exam:   Anatomic Location Affected:  Trunk and face   Morphological Description:  Currently clear   Pertinent Positives:   Pertinent Negatives: Additional History of Present Condition:  initiallly treated with plaquenil well controlled with methotrexate      Assessment and Plan:  Based on a thorough discussion of this condition and the management approach to it (including a comprehensive discussion of the known risks, side effects and potential benefits of treatment), the patient (family) agrees to implement the following specific plan:   Continue with current treatment plan  What causes dermatomyositis? Dermatomyositis is a rare acquired muscle disease that is accompanied by a rash  It is one of a group of muscle diseases called inflammatory myopathies  Dermatomyositis may affect people of any race, age or sex, although it is twice as common in women than in men  The onset of the disease is most common in those aged 50-70 years  Dermatomyositis is considered one of the connective tissue diseases, like systemic sclerosis and lupus erythematosus  Dermatomyositis is thought to be caused by small vessel damage which in turn affects skin and muscle  Factors that may contribute to its development are listed below   Genetic predisposition   Underlying cancer (more likely in older people)   Autoimmune (immune reaction against self)   Infectious or toxic agents acting as triggers   Certain drugs, which include hydroxyurea, penicillamine, statins, quinidine, and phenylbutazone     What are the symptoms of dermatomyositis? The two main groups of symptoms affect the skin and muscles  In many patients, the first sign of dermatomyositis is the presence of a symptomless, itchy or burning rash  The rash often, but not always, develops before the muscle weakness   Reddish or bluish-purple patches mostly affect sun-exposed areas   A violaceous (purple) rash may also affect cheeks, nose, shoulders, upper chest, elbows, and outer thighs   Purple eyelids, which are described as heliotrope, as they resemble the heliotrope flower, Heliotropium peruvianum, which has small purple petals   A scaly scalp and thinned out hair may occur   Less commonly, there is poikiloderma, in which the skin is atrophic (pale, thin skin), red(dilated blood vessels) and brown (post-inflammatory pigmentation)   Purple papules or plaques are found on bony prominences, especially the knuckles (Gottron papules)     Ragged cuticles and prominent blood vessels on nail folds are best seen by capillaroscopy or dermoscopy  In severe cases, calcinosis can occur   It presents as hard yellow or white lumps under the skin   These usually appear on fingers or over joints   Sometimes these nodules may poke through the skin and ulcerate   The ulcers may become infected  Muscle weakness may arise at the same time as the dermatomyositis rash, or it may occur weeks, months or years later  Proximal muscles are affected, that is, those closest to the trunk (upper arms, thighs)  The first indication of myositis is when the following everyday movements become difficult   Climbing stairs or walking   Rising from a sitting or crouching position   Lifting objects   Raising arms above the shoulders, e g  combing hair   Difficulty swallowing (dysphagia)  Occasionally the affected muscles ache and become tender to touch  How do we diagnose dermatomyositis? The diagnosis of dermatomyositis is usually confirmed by the following tests   Blood test to detect raised circulating muscle enzymes: creatine kinase (CK) and sometimes aldolase, aspartate aminotransferase (AST) and lactic dehydrogenase (LDH)   Blood test to detect autoantibodies: non-specific antinuclear antibody (THELMA) is found in most patients, specific Anti-Mi-2 is found in one quarter and Anti-Radha-1 in a few, usually those who have lung disease, and are diagnostic of antisynthetase syndrome   Skin biopsy of the rash: the microscopic appearance of an interface dermatitis is similar to systemic (acute) lupus erythematosus   Biopsy of an affected muscle   Electromyography (EMG) testing   Magnetic resonance imaging (MRI) scan of muscles  In those over 60, full body examination and testing are recommended to look for underlying cancer  Cancer screenings are recommended for up to five years after the initial onset of dermatomyositis in these patients  How do we treat dermatomyositis?     The primary aim of treatment is to control the skin disease and muscle disease  An oral corticosteroid such as prednisone in moderate to high dose is the mainstay of medical therapy and is given to slow down the rate of disease progression  Immunosuppressive or cytotoxic drugs may also be used including methotrexate, azathioprine, cyclophosphamide, ciclosporin, mycophenolate, high dose intravenous immunoglobulin and experimentally, biologics such as rituximab  Other important measures in the management of dermatomyositis include:   Diltiazem, a calcium channel blocker usually prescribed for high blood pressure, may reduce calcinosis   Colchicine has also been reported to reduce calcinosis   Hydroxychloroquine may reduce the photosensitive rash   Avoid excessive sun exposure and use sun protection measures, including sunscreens, to minimize the harmful effects of the sun on already damaged and photosensitive skin   Bedrest for those with severe inflammation of muscles   Physical therapy and activity to keep the muscles and joints moving   Avoid eating food before bedtime and raise the bed head for those with difficulty swallowing    Most patients will require treatment throughout their lifetime, but dermatomyositis completely resolves in about one-in-five patients   Patients who have a disease affecting their heart or lungs, or who also have underlying cancer often require more help from a multidisciplinary team    Scribe Attestation    I,:  Jenny Kennedy am acting as a scribe while in the presence of the attending physician :       I,:  Kamran Street MD personally performed the services described in this documentation    as scribed in my presence :

## 2022-02-14 NOTE — PATIENT INSTRUCTIONS
HISTORY OF MELANOMA    Assessment and Plan:  Based on a thorough discussion of this condition and the management approach to it (including a comprehensive discussion of the known risks, side effects and potential benefits of treatment), the patient (family) agrees to implement the following specific plan:   When outside we recommend using a wide brim hat, sunglasses, long sleeve and pants, sunscreen with SPF 39+ with reapplication every 2 hours, or SPF specific clothing     What happens at follow-up? The main purpose of follow-up is to detect recurrences early (metastatic melanoma), but it also offers an opportunity to diagnose a new primary melanoma at the first possible opportunity  A second invasive melanoma occurs in 5-10% of melanoma patients and a new melanoma in situ is diagnosed in more than 20% of melanoma patients  Our practice makes the following recommendations for follow-up for patients with invasive melanoma   At-least "monthly" self-skin examinations    Routine skin checks by a board certified dermatologist   Follow-up intervals are "every 3 months" within 2 years of a new melanoma diagnosis; "every 6 months" between 2-4 years of a new melanoma diagnosis; and "annually" after 4 years of a new melanoma diagnosis   Individual patient's needs should be considered before an appropriate follow-up is offered   Provide education and support to help the patient adjust to their illness    Follow-up appointments should include:   A check of the scar where the primary melanoma was removed   Checking the regional lymph nodes   A general skin examination   A full physical examination at least annually by your primary care physician    In those with more advanced primary disease, follow-up may include:   Blood tests   Imaging: ultrasound, X-ray, CT, MRI and PET scan      Most tests are not worthwhile for patients with stage 1 or 2 melanoma unless there are signs or symptoms of disease recurrence or metastasis  No tests are necessary for healthy patients who have remained well for five years or longer after removal of their melanoma  What is the outlook for patients with melanoma?  Melanoma in situ is cured by excision because it has no potential to spread around the body   The risk of spread and ultimate death from invasive melanoma depends on several factors, but the main one is the Breslow thickness of the melanoma at the time it was surgically removed   Metastases are rare for melanomas < 0 75 mm and the risk for tumours 0 75-1 mm thick is about 5%  The risk steadily increases with thickness so that melanomas > 4 mm have a risk of metastasis of about 40%  Melanoma is a potentially serious type of skin cancer, in which there is uncontrolled growth of melanocytes (pigment cells)  Melanoma is sometimes called malignant melanoma  Normal melanocytes are found in the basal layer of the epidermis (the outer layer of skin)  Melanocytes produce a protein called melanin, which protects skin cells by absorbing ultraviolet (UV) radiation  Melanocytes are found in equal numbers in black and white skin, but melanocytes in black skin produce much more melanin  People with dark brown or black skin are very much less likely to be damaged by UV radiation than those with white skin  MELANOCYTIC NEVI ("Moles")    Assessment and Plan:  Based on a thorough discussion of this condition and the management approach to it (including a comprehensive discussion of the known risks, side effects and potential benefits of treatment), the patient (family) agrees to implement the following specific plan:   When outside we recommend using a wide brim hat, sunglasses, long sleeve and pants, sunscreen with SPF 87+ with reapplication every 2 hours, or SPF specific clothing    Benign, reassured   Recommend skin check every 6 months       Melanocytic Nevi  Melanocytic nevi ("moles") are tan or brown, raised or flat areas of the skin which have an increased number of melanocytes  Melanocytes are the cells in our body which make pigment and account for skin color  Some moles are present at birth (I e , "congenital nevi"), while others come up later in life (i e , "acquired nevi")  The sun can stimulate the body to make more moles  Sunburns are not the only thing that triggers more moles  Chronic sun exposure can do it too  Clinically distinguishing a healthy mole from melanoma may be difficult, even for experienced dermatologists  The "ABCDE's" of moles have been suggested as a means of helping to alert a person to a suspicious mole and the possible increased risk of melanoma  The suggestions for raising alert are as follows:    Asymmetry: Healthy moles tend to be symmetric, while melanomas are often asymmetric  Asymmetry means if you draw a line through the mole, the two halves do not match in color, size, shape, or surface texture  Asymmetry can be a result of rapid enlargement of a mole, the development of a raised area on a previously flat lesion, scaling, ulceration, bleeding or scabbing within the mole  Any mole that starts to demonstrate "asymmetry" should be examined promptly by a board certified dermatologist      Border: Healthy moles tend to have discrete, even borders  The border of a melanoma often blends into the normal skin and does not sharply delineate the mole from normal skin  Any mole that starts to demonstrate "uneven borders" should be examined promptly by a board certified dermatologist      Color: Healthy moles tend to be one color throughout  Melanomas tend to be made up of different colors ranging from dark black, blue, white, or red  Any mole that demonstrates a color change should be examined promptly by a board certified dermatologist      Diameter: Healthy moles tend to be smaller than 0 6 cm in size; an exception are "congenital nevi" that can be larger    Melanomas tend to grow and can often be greater than 0 6 cm (1/4 of an inch, or the size of a pencil eraser)  This is only a guideline, and many normal moles may be larger than 0 6 cm without being unhealthy  Any mole that starts to change in size (small to bigger or bigger to smaller) should be examined promptly by a board certified dermatologist      Evolving: Healthy moles tend to "stay the same "  Melanomas may often show signs of change or evolution such as a change in size, shape, color, or elevation  Any mole that starts to itch, bleed, crust, burn, hurt, or ulcerate or demonstrate a change or evolution should be examined promptly by a board certified dermatologist         Roberto Franco and Plan:  Based on a thorough discussion of this condition and the management approach to it (including a comprehensive discussion of the known risks, side effects and potential benefits of treatment), the patient (family) agrees to implement the following specific plan:   When outside we recommend using a wide brim hat, sunglasses, long sleeve and pants, sunscreen with SPF 62+ with reapplication every 2 hours, or SPF specific clothing       What is a lentigo? A lentigo is a pigmented flat or slightly raised lesion with a clearly defined edge  Unlike an ephelis (freckle), it does not fade in the winter months  There are several kinds of lentigo  The name lentigo originally referred to its appearance resembling a small lentil  The plural of lentigo is lentigines, although lentigos is also in common use  Who gets lentigines? Lentigines can affect males and females of all ages and races  Solar lentigines are especially prevalent in fair skinned adults  Lentigines associated with syndromes are present at birth or arise during childhood  What causes lentigines?   Common forms of lentigo are due to exposure to ultraviolet radiation:   Sun damage including sunburn    Indoor tanning    Phototherapy, especially photochemotherapy (PUVA)    Ionizing radiation, eg radiation therapy, can also cause lentigines  Several familial syndromes associated with widespread lentigines originate from mutations in Derrell-MAP kinase, mTOR signaling and PTEN pathways  What is the treatment for lentigines? Most lentigines are left alone  Attempts to lighten them may not be successful  The following approaches are used:   SPF 50+ broad-spectrum sunscreen    Hydroquinone bleaching cream    Alpha hydroxy acids    Vitamin C    Retinoids    Azelaic acid    Chemical peels  Individual lesions can be permanently removed using:   Cryotherapy    Intense pulsed light    Pigment lasers    How can lentigines be prevented? Lentigines associated with exposure ultraviolet radiation can be prevented by very careful sun protection  Clothing is more successful at preventing new lentigines than are sunscreens  What is the outlook for lentigines? Lentigines usually persist  They may increase in number with age and sun exposure  Some in sun-protected sites may fade and disappear  DERMATOFIBROMA    Assessment and Plan:  Based on a thorough discussion of this condition and the management approach to it (including a comprehensive discussion of the known risks, side effects and potential benefits of treatment), the patient (family) agrees to implement the following specific plan:   Reassured benign  Assessment and Plan:  A dermatofibroma is a common benign fibrous nodule that most often arises on the skin of the lower legs  A dermatofibroma is also called a "cutaneous fibrous histiocytoma "  Dermatofibromas occur at all ages and in people of every ethnicity  They are more common in women than in men  It is not clear if dermatofibroma is a reactive process or if it is a neoplasm  The lesions are made up of proliferating fibroblasts  Histiocytes may also be involved    They are sometimes attributed to an insect bite or ingrownhair or local trauma, but not consistently  They may be more numerous in patients with altered immunity  Dermatofibromas most often occur on the legs and arms, but may also arise on the trunk or any site of the body  Typical clinical features include the following:   People may have 1 or up to 15 lesions   Size varies from 0 5-1 5 cm diameter; most lesions are 7-10 mm diameter   They are firm nodules tethered to the skin surface and mobile over subcutaneous tissue   The skin "dimples" on pinching the lesion   Color may be pink to light brown in white skin, and dark brown to black in dark skin; some appear paler in the center   They do not usually cause symptoms, but they are sometimes painful or itchy   Because they are often raised lesions, they may be traumatized, for example by a razor   Occasionally dozens may erupt within a few months, usually in the setting of immunosuppression (for example autoimmune disease, cancer or certain medications)   Dermatofibroma does not give rise to cancer  However, occasionally, it may be mistaken for dermatofibrosarcoma or desmoplastic melanoma  A dermatofibroma is harmless and seldom causes any symptoms  Usually, only reassurance is needed  If it is nuisance or causing concern, the lesion can be removed surgically, resulting in a scar that is, by definition, usually longer in diameter than the widest portion of the dermatofibroma  Cryotherapy, shave biopsy and laser surgery are rarely completely successful  Skin punch biopsy or incisional biopsy may be undertaken if there is an atypical feature such as recent enlargement, ulceration, or asymmetrical structures and colours on dermatoscopy      DERMATOMYOSITIS    Assessment and Plan:  Based on a thorough discussion of this condition and the management approach to it (including a comprehensive discussion of the known risks, side effects and potential benefits of treatment), the patient (family) agrees to implement the following specific plan:   Continue with current treatment plan  What causes dermatomyositis? Dermatomyositis is a rare acquired muscle disease that is accompanied by a rash  It is one of a group of muscle diseases called inflammatory myopathies  Dermatomyositis may affect people of any race, age or sex, although it is twice as common in women than in men  The onset of the disease is most common in those aged 50-70 years  Dermatomyositis is considered one of the connective tissue diseases, like systemic sclerosis and lupus erythematosus  Dermatomyositis is thought to be caused by small vessel damage which in turn affects skin and muscle  Factors that may contribute to its development are listed below   Genetic predisposition   Underlying cancer (more likely in older people)   Autoimmune (immune reaction against self)   Infectious or toxic agents acting as triggers   Certain drugs, which include hydroxyurea, penicillamine, statins, quinidine, and phenylbutazone     What are the symptoms of dermatomyositis? The two main groups of symptoms affect the skin and muscles  In many patients, the first sign of dermatomyositis is the presence of a symptomless, itchy or burning rash  The rash often, but not always, develops before the muscle weakness   Reddish or bluish-purple patches mostly affect sun-exposed areas   A violaceous (purple) rash may also affect cheeks, nose, shoulders, upper chest, elbows, and outer thighs   Purple eyelids, which are described as heliotrope, as they resemble the heliotrope flower, Heliotropium peruvianum, which has small purple petals   A scaly scalp and thinned out hair may occur   Less commonly, there is poikiloderma, in which the skin is atrophic (pale, thin skin), red(dilated blood vessels) and brown (post-inflammatory pigmentation)   Purple papules or plaques are found on bony prominences, especially the knuckles (Gottron papules)     Ragged cuticles and prominent blood vessels on nail folds are best seen by capillaroscopy or dermoscopy  In severe cases, calcinosis can occur   It presents as hard yellow or white lumps under the skin   These usually appear on fingers or over joints   Sometimes these nodules may poke through the skin and ulcerate   The ulcers may become infected  Muscle weakness may arise at the same time as the dermatomyositis rash, or it may occur weeks, months or years later  Proximal muscles are affected, that is, those closest to the trunk (upper arms, thighs)  The first indication of myositis is when the following everyday movements become difficult   Climbing stairs or walking   Rising from a sitting or crouching position   Lifting objects   Raising arms above the shoulders, e g  combing hair   Difficulty swallowing (dysphagia)  Occasionally the affected muscles ache and become tender to touch  How do we diagnose dermatomyositis? The diagnosis of dermatomyositis is usually confirmed by the following tests   Blood test to detect raised circulating muscle enzymes: creatine kinase (CK) and sometimes aldolase, aspartate aminotransferase (AST) and lactic dehydrogenase (LDH)   Blood test to detect autoantibodies: non-specific antinuclear antibody (THELMA) is found in most patients, specific Anti-Mi-2 is found in one quarter and Anti-Radha-1 in a few, usually those who have lung disease, and are diagnostic of antisynthetase syndrome   Skin biopsy of the rash: the microscopic appearance of an interface dermatitis is similar to systemic (acute) lupus erythematosus   Biopsy of an affected muscle   Electromyography (EMG) testing   Magnetic resonance imaging (MRI) scan of muscles  In those over 60, full body examination and testing are recommended to look for underlying cancer  Cancer screenings are recommended for up to five years after the initial onset of dermatomyositis in these patients  How do we treat dermatomyositis?     The primary aim of treatment is to control the skin disease and muscle disease  An oral corticosteroid such as prednisone in moderate to high dose is the mainstay of medical therapy and is given to slow down the rate of disease progression  Immunosuppressive or cytotoxic drugs may also be used including methotrexate, azathioprine, cyclophosphamide, ciclosporin, mycophenolate, high dose intravenous immunoglobulin and experimentally, biologics such as rituximab  Other important measures in the management of dermatomyositis include:   Diltiazem, a calcium channel blocker usually prescribed for high blood pressure, may reduce calcinosis   Colchicine has also been reported to reduce calcinosis   Hydroxychloroquine may reduce the photosensitive rash   Avoid excessive sun exposure and use sun protection measures, including sunscreens, to minimize the harmful effects of the sun on already damaged and photosensitive skin   Bedrest for those with severe inflammation of muscles   Physical therapy and activity to keep the muscles and joints moving   Avoid eating food before bedtime and raise the bed head for those with difficulty swallowing    Most patients will require treatment throughout their lifetime, but dermatomyositis completely resolves in about one-in-five patients   Patients who have a disease affecting their heart or lungs, or who also have underlying cancer often require more help from a multidisciplinary team

## 2022-02-15 ENCOUNTER — TELEPHONE (OUTPATIENT)
Dept: DERMATOLOGY | Facility: CLINIC | Age: 62
End: 2022-02-15

## 2022-02-15 NOTE — TELEPHONE ENCOUNTER
Pt left message to schedule 6 mo f/u with Dr Rogers Ex[facility]  Provider's schedule is not open that far out currently  Returned call and LVM to advise pt that she was placed on the waitlist and that someone from our office will call her once the provider's schedule has been opened

## 2022-02-18 ENCOUNTER — OFFICE VISIT (OUTPATIENT)
Dept: HEMATOLOGY ONCOLOGY | Facility: CLINIC | Age: 62
End: 2022-02-18
Payer: COMMERCIAL

## 2022-02-18 VITALS
BODY MASS INDEX: 29.19 KG/M2 | RESPIRATION RATE: 18 BRPM | OXYGEN SATURATION: 99 % | DIASTOLIC BLOOD PRESSURE: 68 MMHG | WEIGHT: 171 LBS | SYSTOLIC BLOOD PRESSURE: 122 MMHG | HEIGHT: 64 IN | TEMPERATURE: 98.3 F | HEART RATE: 61 BPM

## 2022-02-18 DIAGNOSIS — C50.011 MALIGNANT NEOPLASM OF NIPPLE OF BOTH BREASTS IN FEMALE, ESTROGEN RECEPTOR POSITIVE (HCC): Primary | ICD-10-CM

## 2022-02-18 DIAGNOSIS — Z14.8 CARRIER OF HIGH RISK CANCER GENE MUTATION: ICD-10-CM

## 2022-02-18 DIAGNOSIS — Z15.09 BRCA2 GENE MUTATION POSITIVE: ICD-10-CM

## 2022-02-18 DIAGNOSIS — Z85.820 HISTORY OF MELANOMA: ICD-10-CM

## 2022-02-18 DIAGNOSIS — Z17.0 MALIGNANT NEOPLASM OF NIPPLE OF BOTH BREASTS IN FEMALE, ESTROGEN RECEPTOR POSITIVE (HCC): Primary | ICD-10-CM

## 2022-02-18 DIAGNOSIS — C50.012 MALIGNANT NEOPLASM OF NIPPLE OF BOTH BREASTS IN FEMALE, ESTROGEN RECEPTOR POSITIVE (HCC): Primary | ICD-10-CM

## 2022-02-18 DIAGNOSIS — M33.90 DERMATOMYOSITIS (HCC): ICD-10-CM

## 2022-02-18 DIAGNOSIS — Z15.01 BRCA2 GENE MUTATION POSITIVE: ICD-10-CM

## 2022-02-18 PROCEDURE — 99214 OFFICE O/P EST MOD 30 MIN: CPT | Performed by: INTERNAL MEDICINE

## 2022-02-18 PROCEDURE — 3008F BODY MASS INDEX DOCD: CPT | Performed by: INTERNAL MEDICINE

## 2022-02-18 PROCEDURE — 1036F TOBACCO NON-USER: CPT | Performed by: INTERNAL MEDICINE

## 2022-02-18 NOTE — PROGRESS NOTES
HPI:    Oncological of follow-up for bilateral breast cancers and BRCA 2 gene mutation  Also history of melanoma in Situ  In 2008 patient had bilateral mastectomies for hormone receptor positive HER2 negative stage II A cancer in the right breast and stage I A cancer in the left breast and 1 positive lymph node in right axilla  Patient had reconstruction surgeries, 4 cycles of Taxotere plus Cytoxan followed by 5 years of Femara that she finished in September 2013  She had ITZEL and BSO  She has been aware of cancer risks because of BRCA2 mutation  She goes for pancreatic checkup and also goes to her dermatologist for screening of skin for melanoma and ophthalmologist for ocular melanoma     Patient states her family has been aware of her being positive for BRCA 2  She has  family history of breast and ovarian cancer  Patient has history of  dermatomyositis   She has been on methotrexate and folic acid for the last 3 years  She follows with her rheumatologist        , Patient had a 9 mm nodule in the right lower lung and she had wedge resection and that came back negative for malignancy     Patient follows with the her lung specialist for mild residual ground-glass opacity in right lower lobe   Karrie Nissen She has renal and liver cysts and also tiny  polyp in gallbladder     She had colonoscopy in February 2021 and she states that was clear     She has small lymph nodes in left submandibular area and they are being monitored by her primary physician by  ultrasound of the neck  She has some tiredness                   Current Outpatient Medications:     Calcium-Magnesium-Vitamin D (CALCIUM 500 PO), Take 1,000 mg by mouth daily  , Disp: , Rfl:     Cholecalciferol (VITAMIN D) 2000 UNITS CAPS, Take 2,000 Units/day by mouth , Disp: , Rfl:     Cholecalciferol (Vitamin D3 Super Strength) 50 MCG (2000 UT) CAPS, Take by mouth, Disp: , Rfl:     clobetasol (TEMOVATE) 0 05 % cream, as needed  , Disp: , Rfl:     folic acid (FOLVITE) 1 mg tablet, , Disp: , Rfl:     methotrexate 2 5 mg tablet, Take 10 mg by mouth once a week , Disp: , Rfl:     multivitamin (THERAGRAN) TABS, Take 1 tablet by mouth daily  , Disp: , Rfl:     No Known Allergies    Oncology History   Bilateral malignant neoplasm of breast in female, estrogen receptor positive ([facility] [address] )    Initial Diagnosis    Bilateral malignant neoplasm of breast in female, estrogen receptor positive [facility])      Surgery    Double mastectomy, reconstructive surgery      Chemotherapy    Chemotherapy with Taxotere and Cytoxan for 4 cycles followed by Femara from 2008 thru 2013  ROS:  02/18/22 Reviewed 13 systems: See symptoms in HPI:    Presently   No other neurological, cardiac, pulmonary, GI and  symptoms other than listed in HPI     Other symptoms are in HPI   No fever, chills, bleeding, bone pains, skin rash at present,  weight loss, night sweats, arthritic symptoms at present,   weakness, numbness,  claudication and gait problem  No frequent infections  Not unusually sensitive to heat or cold  No swelling of the ankles  Patient is anxious  /68 (BP Location: Left arm, Patient Position: Sitting, Cuff Size: Adult)   Pulse 61   Temp 98 3 °F (36 8 °C)   Resp 18   Ht 5' 4 25" (1 632 m)   Wt 77 6 kg (171 lb)   SpO2 99%   BMI 29 12 kg/m²     Physical Exam:  My medical assistant Daisy Maria was in the room with me during examination  Alert and oriented and not in distress  Vital signs are stable  No icterus, no oral thrush, no palpable neck mass, clear lung fields to percussion and auscultation, regular heart rate, abdomen  soft and non tender, no palpable abdominal mass, no ascites, no edema of ankles, no calf tenderness, no focal neurological deficit, no skin rash, no palpable lymphadenopathy in the neck and axillary areas,   Patient is anxious  Performance status 0  IMAGING:     IMPRESSION:     1    Mild residual groundglass opacity in the right lower lobe in the area of previously seen consolidation, likely postinflammatory  Previously seen left lower lobe consolidation has resolved with mild residual scarring      2   Stable pulmonary nodules               Workstation performed: [id number]          Imaging    CT chest without contrast (Order: [id number]) - 12/13/2021    IMPRESSION:     Typical-appearing lymph nodes in the area of palpable concern left submandibular region    Close clinical follow-up is recommended         Workstation performed: [id number]          Imaging    US head neck soft tissue (Order: [id number]) - 1/18/2022      LABS:    Results for orders placed or performed in visit on 01/26/22   CBC and differential   Result Value Ref Range    WBC 5 12 4 31 - 10 16 Thousand/uL    RBC 4 45 3 81 - 5 12 Million/uL    Hemoglobin 13 7 11 5 - 15 4 g/dL    Hematocrit 42 0 34 8 - 46 1 %    MCV 94 82 - 98 fL    MCH 30 8 26 8 - 34 3 pg    MCHC 32 6 31 4 - 37 4 g/dL    RDW 13 5 11 6 - 15 1 %    MPV 9 3 8 9 - 12 7 fL    Platelets 793 652 - 462 Thousands/uL    nRBC 0 /100 WBCs    Neutrophils Relative 67 43 - 75 %    Immat GRANS % 0 0 - 2 %    Lymphocytes Relative 24 14 - 44 %    Monocytes Relative 8 4 - 12 %    Eosinophils Relative 1 0 - 6 %    Basophils Relative 0 0 - 1 %    Neutrophils Absolute 3 41 1 85 - 7 62 Thousands/µL    Immature Grans Absolute 0 02 0 00 - 0 20 Thousand/uL    Lymphocytes Absolute 1 21 0 60 - 4 47 Thousands/µL    Monocytes Absolute 0 41 0 17 - 1 22 Thousand/µL    Eosinophils Absolute 0 05 0 00 - 0 61 Thousand/µL    Basophils Absolute 0 02 0 00 - 0 10 Thousands/µL   Comprehensive metabolic panel   Result Value Ref Range    Sodium 140 136 - 145 mmol/L    Potassium 4 3 3 5 - 5 3 mmol/L    Chloride 103 100 - 108 mmol/L    CO2 29 21 - 32 mmol/L    ANION GAP 8 4 - 13 mmol/L    BUN 18 5 - 25 mg/dL    Creatinine 0 75 0 60 - 1 30 mg/dL    Glucose 92 65 - 140 mg/dL    Calcium 9 7 8 3 - 10 1 mg/dL    AST 17 5 - 45 U/L    ALT 20 12 - 78 U/L    Alkaline Phosphatase 76 46 - 116 U/L    Total Protein 7 6 6 4 - 8 2 g/dL    Albumin 4 1 3 5 - 5 0 g/dL    Total Bilirubin 0 37 0 20 - 1 00 mg/dL    eGFR 87 ml/min/1 73sq m   Cancer antigen 27 29   Result Value Ref Range    CA 27 29 25 9 0 0 - 42 3 U/mL     Labs, Imaging, & Other studies:   All pertinent labs and imaging studies were personally reviewed      Reviewed test results and discussed with patient  Assessment and plan: Oncological of follow-up for bilateral breast cancers and BRCA 2 gene mutation  Also history of melanoma in Situ  In 2008 patient had bilateral mastectomies for hormone receptor positive HER2 negative stage II A cancer in the right breast and stage I A cancer in the left breast and 1 positive lymph node in right axilla  Patient had reconstruction surgeries, 4 cycles of Taxotere plus Cytoxan followed by 5 years of Femara that she finished in September 2013  She had ITZEL and BSO  She has been aware of cancer risks because of BRCA2 mutation  She goes for pancreatic checkup and also goes to her dermatologist for screening of skin for melanoma and ophthalmologist for ocular melanoma     Patient states her family has been aware of her being positive for BRCA 2  She has  family history of breast and ovarian cancer  Patient has history of  dermatomyositis   She has been on methotrexate and folic acid for the last 3 years  She follows with her rheumatologist        , Patient had a 9 mm nodule in the right lower lung and she had wedge resection and that came back negative for malignancy     Patient follows with the her lung specialist for mild residual ground-glass opacity in right lower lobe   Landy Lockett She has renal and liver cysts and also tiny  polyp in gallbladder     She had colonoscopy in February 2021 and she states that was clear     She has small lymph nodes in left submandibular area and they are being monitored by her primary physician by  ultrasound of the neck  She has some tiredness  Landy Lockett         Physical examination and test results are as recorded and discussed  Breast cancers  remain in remission  She gets checked for other cancers like melanoma, pancreatic cancer and peritoneal cancer and others  She had ITZEL and BSO  She has history of melanoma in situ  She follows with her primary physician and other consultants     All discussed in detail  Questions answered  Discussed the importance of eating healthy foods, staying active and health screening test   Patient is capable of self-care  Goal is cure from breast cancers and early detection  of other cancers at risk  Discussed precautions against coronavirus     Goal is cure from breast cancers and melanoma and early  Detection of other cancers mentioned above     See diagnoses, instructions and orders below  1  Malignant neoplasm of nipple of both breasts in female, estrogen receptor positive ([address] )    - MRI abdomen w wo contrast; Future  - CBC and differential; Future  - Comprehensive metabolic panel; Future  - Cancer antigen 27 29; Future    2  BRCA2 gene mutation positive    - MRI abdomen w wo contrast; Future    3  Carrier of high risk cancer gene mutation    - MRI abdomen w wo contrast; Future    4  Dermatomyositis ([address] )    5  History of melanoma      Ordered MRI of the abdomen within a week  Blood work prior to next visit in 6 months                    Patient voiced understanding and agreed       Counseling / Coordination of Care     Juliocesar Ramirez   Provided counseling and support

## 2022-03-04 ENCOUNTER — IMMUNIZATIONS (OUTPATIENT)
Dept: FAMILY MEDICINE CLINIC | Facility: HOSPITAL | Age: 62
End: 2022-03-04

## 2022-03-04 DIAGNOSIS — Z23 ENCOUNTER FOR IMMUNIZATION: Primary | ICD-10-CM

## 2022-03-04 PROCEDURE — 91306 COVID-19 MODERNA VACC 0.25 ML BOOSTER: CPT

## 2022-03-04 PROCEDURE — 0064A COVID-19 MODERNA VACC 0.25 ML BOOSTER: CPT

## 2022-03-20 ENCOUNTER — APPOINTMENT (OUTPATIENT)
Dept: LAB | Age: 62
End: 2022-03-20
Payer: COMMERCIAL

## 2022-03-20 DIAGNOSIS — C50.012 MALIGNANT NEOPLASM OF NIPPLE OF BOTH BREASTS IN FEMALE, ESTROGEN RECEPTOR POSITIVE (HCC): ICD-10-CM

## 2022-03-20 DIAGNOSIS — Z17.0 MALIGNANT NEOPLASM OF NIPPLE OF BOTH BREASTS IN FEMALE, ESTROGEN RECEPTOR POSITIVE (HCC): ICD-10-CM

## 2022-03-20 DIAGNOSIS — C50.011 MALIGNANT NEOPLASM OF NIPPLE OF BOTH BREASTS IN FEMALE, ESTROGEN RECEPTOR POSITIVE (HCC): ICD-10-CM

## 2022-03-20 LAB
ALBUMIN SERPL BCP-MCNC: 3.9 G/DL (ref 3.5–5)
ALP SERPL-CCNC: 69 U/L (ref 46–116)
ALT SERPL W P-5'-P-CCNC: 19 U/L (ref 12–78)
ANION GAP SERPL CALCULATED.3IONS-SCNC: 3 MMOL/L (ref 4–13)
AST SERPL W P-5'-P-CCNC: 16 U/L (ref 5–45)
BASOPHILS # BLD AUTO: 0.02 THOUSANDS/ΜL (ref 0–0.1)
BASOPHILS NFR BLD AUTO: 0 % (ref 0–1)
BILIRUB SERPL-MCNC: 0.32 MG/DL (ref 0.2–1)
BUN SERPL-MCNC: 18 MG/DL (ref 5–25)
CALCIUM SERPL-MCNC: 9.5 MG/DL (ref 8.3–10.1)
CHLORIDE SERPL-SCNC: 107 MMOL/L (ref 100–108)
CO2 SERPL-SCNC: 32 MMOL/L (ref 21–32)
CREAT SERPL-MCNC: 0.84 MG/DL (ref 0.6–1.3)
EOSINOPHIL # BLD AUTO: 0.08 THOUSAND/ΜL (ref 0–0.61)
EOSINOPHIL NFR BLD AUTO: 2 % (ref 0–6)
ERYTHROCYTE [DISTWIDTH] IN BLOOD BY AUTOMATED COUNT: 13.9 % (ref 11.6–15.1)
GFR SERPL CREATININE-BSD FRML MDRD: 76 ML/MIN/1.73SQ M
GLUCOSE SERPL-MCNC: 83 MG/DL (ref 65–140)
HCT VFR BLD AUTO: 39.3 % (ref 34.8–46.1)
HGB BLD-MCNC: 13 G/DL (ref 11.5–15.4)
IMM GRANULOCYTES # BLD AUTO: 0 THOUSAND/UL (ref 0–0.2)
IMM GRANULOCYTES NFR BLD AUTO: 0 % (ref 0–2)
LYMPHOCYTES # BLD AUTO: 1.03 THOUSANDS/ΜL (ref 0.6–4.47)
LYMPHOCYTES NFR BLD AUTO: 22 % (ref 14–44)
MCH RBC QN AUTO: 30.6 PG (ref 26.8–34.3)
MCHC RBC AUTO-ENTMCNC: 33.1 G/DL (ref 31.4–37.4)
MCV RBC AUTO: 93 FL (ref 82–98)
MONOCYTES # BLD AUTO: 0.4 THOUSAND/ΜL (ref 0.17–1.22)
MONOCYTES NFR BLD AUTO: 9 % (ref 4–12)
NEUTROPHILS # BLD AUTO: 3.13 THOUSANDS/ΜL (ref 1.85–7.62)
NEUTS SEG NFR BLD AUTO: 67 % (ref 43–75)
NRBC BLD AUTO-RTO: 0 /100 WBCS
PLATELET # BLD AUTO: 221 THOUSANDS/UL (ref 149–390)
PMV BLD AUTO: 9.9 FL (ref 8.9–12.7)
POTASSIUM SERPL-SCNC: 4.5 MMOL/L (ref 3.5–5.3)
PROT SERPL-MCNC: 7.2 G/DL (ref 6.4–8.2)
RBC # BLD AUTO: 4.25 MILLION/UL (ref 3.81–5.12)
SODIUM SERPL-SCNC: 142 MMOL/L (ref 136–145)
WBC # BLD AUTO: 4.66 THOUSAND/UL (ref 4.31–10.16)

## 2022-03-20 PROCEDURE — 80053 COMPREHEN METABOLIC PANEL: CPT

## 2022-03-20 PROCEDURE — 36415 COLL VENOUS BLD VENIPUNCTURE: CPT

## 2022-03-20 PROCEDURE — 85025 COMPLETE CBC W/AUTO DIFF WBC: CPT

## 2022-03-25 ENCOUNTER — HOSPITAL ENCOUNTER (OUTPATIENT)
Dept: MRI IMAGING | Facility: HOSPITAL | Age: 62
Discharge: HOME | End: 2022-03-25
Attending: INTERNAL MEDICINE
Payer: COMMERCIAL

## 2022-03-25 DIAGNOSIS — Z15.01 BRCA2 GENE MUTATION POSITIVE: ICD-10-CM

## 2022-03-25 DIAGNOSIS — Z17.0 MALIGNANT NEOPLASM OF NIPPLE OF BOTH BREASTS IN FEMALE, ESTROGEN RECEPTOR POSITIVE (HCC): ICD-10-CM

## 2022-03-25 DIAGNOSIS — Z15.09 BRCA2 GENE MUTATION POSITIVE: ICD-10-CM

## 2022-03-25 DIAGNOSIS — C50.012 MALIGNANT NEOPLASM OF NIPPLE OF BOTH BREASTS IN FEMALE, ESTROGEN RECEPTOR POSITIVE (HCC): ICD-10-CM

## 2022-03-25 DIAGNOSIS — C50.011 MALIGNANT NEOPLASM OF NIPPLE OF BOTH BREASTS IN FEMALE, ESTROGEN RECEPTOR POSITIVE (HCC): ICD-10-CM

## 2022-03-25 DIAGNOSIS — Z14.8 CARRIER OF HIGH RISK CANCER GENE MUTATION: ICD-10-CM

## 2022-03-25 PROCEDURE — 74183 MRI ABD W/O CNTR FLWD CNTR: CPT

## 2022-03-25 PROCEDURE — A9585 GADOBUTROL INJECTION: HCPCS | Performed by: INTERNAL MEDICINE

## 2022-03-25 PROCEDURE — G1004 CDSM NDSC: HCPCS

## 2022-03-25 RX ADMIN — GADOBUTROL 7 ML: 604.72 INJECTION INTRAVENOUS at 20:06

## 2022-04-11 ENCOUNTER — TELEPHONE (OUTPATIENT)
Dept: OBGYN CLINIC | Facility: MEDICAL CENTER | Age: 62
End: 2022-04-11

## 2022-04-11 ENCOUNTER — APPOINTMENT (OUTPATIENT)
Dept: LAB | Age: 62
End: 2022-04-11
Payer: COMMERCIAL

## 2022-04-11 DIAGNOSIS — M33.90 DERMATOMYOSITIS (HCC): ICD-10-CM

## 2022-04-11 DIAGNOSIS — Z15.01 BRCA2 GENE MUTATION POSITIVE: ICD-10-CM

## 2022-04-11 DIAGNOSIS — Z79.899 METHOTREXATE, LONG TERM, CURRENT USE: Primary | ICD-10-CM

## 2022-04-11 DIAGNOSIS — Z15.09 BRCA2 GENE MUTATION POSITIVE: ICD-10-CM

## 2022-04-11 LAB — CANCER AG125 SERPL-ACNC: 9.4 U/ML (ref 0–30)

## 2022-04-11 PROCEDURE — 86304 IMMUNOASSAY TUMOR CA 125: CPT

## 2022-04-11 PROCEDURE — 36415 COLL VENOUS BLD VENIPUNCTURE: CPT

## 2022-04-11 NOTE — TELEPHONE ENCOUNTER
Patient sees Dr Avani Patel  Patient is calling about her script for blood work due prior her appointment on 2022, she is concerned it may , will she need a new script? She wants to go in   Patient is looking for a call back       # [phone]

## 2022-04-12 ENCOUNTER — TELEPHONE (OUTPATIENT)
Dept: HEMATOLOGY ONCOLOGY | Facility: CLINIC | Age: 62
End: 2022-04-12

## 2022-04-12 DIAGNOSIS — Z17.0 MALIGNANT NEOPLASM OF NIPPLE OF BOTH BREASTS IN FEMALE, ESTROGEN RECEPTOR POSITIVE (HCC): Primary | ICD-10-CM

## 2022-04-12 DIAGNOSIS — C50.012 MALIGNANT NEOPLASM OF NIPPLE OF BOTH BREASTS IN FEMALE, ESTROGEN RECEPTOR POSITIVE (HCC): Primary | ICD-10-CM

## 2022-04-12 DIAGNOSIS — C50.011 MALIGNANT NEOPLASM OF NIPPLE OF BOTH BREASTS IN FEMALE, ESTROGEN RECEPTOR POSITIVE (HCC): Primary | ICD-10-CM

## 2022-04-12 NOTE — TELEPHONE ENCOUNTER
CALL TRANSFER   Reason for patient call? Patient returning call to speak with [facility]   Patient's primary oncologist? Dr Shine Dickinson    RN call was transferred to and time it was transferred? [place] @ 8:17am   Informed patient that the message will be forwarded to the team and someone will get back to them as soon as possible    Did you relay this information to the patient?  yes

## 2022-04-12 NOTE — TELEPHONE ENCOUNTER
----- Message from [facility] sent at 4/11/2022  2:24 PM EDT -----  Regarding: Expected date for lab work  Please review     ----- Message -----  From: Aileen Schilder  Sent: 4/11/2022   2:12 PM EDT  To: [facility]  Subject: Expected date for lab work                       Hello, I was just at [facility] getting lab work for a different provider and was told by the phlebotomist that your office needed to enter an expected completion date for the labs that Dr Kalpana Garay needs before my July appointment with him  Thank you!

## 2022-04-12 NOTE — TELEPHONE ENCOUNTER
Patient returned my call  I entered labs with an approximate date of 8/1/11  Patient will go prior to her scheduled appointment 8/22/22

## 2022-04-19 ENCOUNTER — OFFICE VISIT (OUTPATIENT)
Dept: GYNECOLOGIC ONCOLOGY | Facility: CLINIC | Age: 62
End: 2022-04-19
Payer: COMMERCIAL

## 2022-04-19 VITALS
OXYGEN SATURATION: 100 % | HEART RATE: 60 BPM | DIASTOLIC BLOOD PRESSURE: 80 MMHG | RESPIRATION RATE: 16 BRPM | TEMPERATURE: 96.9 F | HEIGHT: 64 IN | WEIGHT: 164 LBS | BODY MASS INDEX: 28 KG/M2 | SYSTOLIC BLOOD PRESSURE: 124 MMHG

## 2022-04-19 DIAGNOSIS — Z15.01 BRCA2 GENE MUTATION POSITIVE: Primary | ICD-10-CM

## 2022-04-19 DIAGNOSIS — Z85.3 HISTORY OF BREAST CANCER: ICD-10-CM

## 2022-04-19 DIAGNOSIS — Z15.09 BRCA2 GENE MUTATION POSITIVE: Primary | ICD-10-CM

## 2022-04-19 PROCEDURE — 1036F TOBACCO NON-USER: CPT | Performed by: PHYSICIAN ASSISTANT

## 2022-04-19 PROCEDURE — 99213 OFFICE O/P EST LOW 20 MIN: CPT | Performed by: PHYSICIAN ASSISTANT

## 2022-04-19 PROCEDURE — 3008F BODY MASS INDEX DOCD: CPT | Performed by: PHYSICIAN ASSISTANT

## 2022-04-19 NOTE — PROGRESS NOTES
Assessment/Plan:    Problem List Items Addressed This Visit        Other    BRCA2 gene mutation positive - Primary     61-year-old with pathogenic BRCA2 mutation with personal history of b/l breast cancer and melanoma in situ  She is s/p prophylactic TLH, BSO  Her clinical exam is without evidence of peritoneal cancer   normal       Return to the office in 6 months for continued BRCA surveillance with a pre-visit   Relevant Orders        History of breast cancer     Continue follow-up with medical-oncology  CHIEF COMPLAINT:   BRCA surveillance    Problem:  1  BRCA 2 mutation  2  Bilateral breast cancer, stage IIA on left and stage IA on right, ER/IA positive, HER2 negative  3  Melanoma in situ of the chest wall   4  Dermatomyositis    Previous therapy:  Oncology History   Bilateral malignant neoplasm of breast in female, estrogen receptor positive ([facility] [address] )    Initial Diagnosis    Bilateral malignant neoplasm of breast in female, estrogen receptor positive [facility])      Surgery    Double mastectomy, reconstructive surgery      Chemotherapy    Chemotherapy with Taxotere and Cytoxan for 4 cycles followed by Femara from 2008 thru 2013  Patient ID: Shelly Martinez is a 63 y o  female  who has no new complaints today  No vaginal bleeding, abdominal/pelvic pain  Normal bowel and bladder function  She notes nighttime hot flashes   from 4/11/22 reviewed  No interval change in medical history since last visit  Quality of life is good  The following portions of the patient's history were reviewed and updated as appropriate: allergies, current medications, past medical history, past surgical history and problem list     Review of Systems   Constitutional: Negative  HENT: Negative  Eyes: Negative  Respiratory: Negative  Cardiovascular: Negative  Gastrointestinal: Negative  Endocrine: Positive for heat intolerance (nighttime hot flashes)     Genitourinary: Negative  Musculoskeletal: Negative  Skin: Negative  Neurological: Negative  Psychiatric/Behavioral: Negative  Current Outpatient Medications   Medication Sig Dispense Refill    Calcium-Magnesium-Vitamin D (CALCIUM 500 PO) Take 1,000 mg by mouth daily   Cholecalciferol (VITAMIN D) 2000 UNITS CAPS Take 2,000 Units/day by mouth       Cholecalciferol (Vitamin D3 Super Strength) 50 MCG (2000 UT) CAPS Take by mouth      clobetasol (TEMOVATE) 0 05 % cream as needed        folic acid (FOLVITE) 1 mg tablet       methotrexate 2 5 mg tablet Take 10 mg by mouth once a week       multivitamin (THERAGRAN) TABS Take 1 tablet by mouth daily  No current facility-administered medications for this visit  Objective:    Blood pressure 124/80, pulse 60, temperature (!) 96 9 °F (36 1 °C), temperature source Temporal, resp  rate 16, height 5' 4" (1 626 m), weight 74 4 kg (164 lb), SpO2 100 %  Body mass index is 28 15 kg/m²  Body surface area is 1 8 meters squared  Physical Exam  Vitals reviewed  Exam conducted with a chaperone present  Constitutional:       General: She is not in acute distress  Appearance: Normal appearance  She is not ill-appearing  HENT:      Head: Normocephalic and atraumatic  Mouth/Throat:      Mouth: Mucous membranes are moist    Eyes:      General:         Right eye: No discharge  Left eye: No discharge  Conjunctiva/sclera: Conjunctivae normal    Pulmonary:      Effort: Pulmonary effort is normal    Abdominal:      Palpations: Abdomen is soft  There is no mass  Tenderness: There is no abdominal tenderness  Hernia: No hernia is present  Genitourinary:     Comments: The external female genitalia is normal  The bartholin's, uretheral and skenes glands are normal  The urethral meatus is normal (midline with no lesions)  Anus without fissure or lesion  Speculum exam reveals a grossly normal vagina   No masses, lesions,discharge or bleeding  No significant cystocele or rectocele noted  Bimanual exam notes a surgical absent cervix, uterus and adnexal structures  No masses or fullness  Bladder is without fullness, mass or tenderness  Musculoskeletal:      Right lower leg: No edema  Left lower leg: No edema  Skin:     General: Skin is warm and dry  Coloration: Skin is not jaundiced  Findings: No rash  Neurological:      General: No focal deficit present  Mental Status: She is alert and oriented to person, place, and time  Cranial Nerves: No cranial nerve deficit  Sensory: No sensory deficit  Motor: No weakness  Gait: Gait normal    Psychiatric:         Mood and Affect: Mood normal          Behavior: Behavior normal          Thought Content:  Thought content normal          Judgment: Judgment normal             Trend:  Lab Results   Component Value Date     9 4 04/11/2022     9 2 10/11/2021     9 4 09/08/2021     6 4 04/07/2021     6 6 03/18/2021     8 2 10/05/2020     7 6 09/09/2020     8 2 03/02/2020     6 9 09/03/2019     7 1 03/04/2019     5 9 09/17/2018     6 0 02/12/2018     6 7 09/11/2017     7 2 02/27/2017     7 3 08/31/2016     6 9 03/08/2016     8 1 09/15/2015     8 0 04/20/2015     8 5 09/29/2014     8 2 04/09/2014

## 2022-04-19 NOTE — ASSESSMENT & PLAN NOTE
65-year-old with pathogenic BRCA2 mutation with personal history of b/l breast cancer and melanoma in situ  She is s/p prophylactic TLH, BSO  Her clinical exam is without evidence of peritoneal cancer   normal       Return to the office in 6 months for continued BRCA surveillance with a pre-visit   no rashes, no suspicious lesions, no areas of discoloration, no jaundice present, good turgor, no masses, no tenderness on palpation

## 2022-05-09 ENCOUNTER — TELEPHONE (OUTPATIENT)
Dept: GYNECOLOGIC ONCOLOGY | Facility: CLINIC | Age: 62
End: 2022-05-09

## 2022-05-09 ENCOUNTER — TELEPHONE (OUTPATIENT)
Dept: CARDIAC SURGERY | Facility: CLINIC | Age: 62
End: 2022-05-09

## 2022-05-09 NOTE — TELEPHONE ENCOUNTER
Called patient to reschedule  New appointment set up for her at this time  Has no further questions

## 2022-05-09 NOTE — TELEPHONE ENCOUNTER
Phone call to patient to reschedule appointment on 10/24 with Caden Noriega for another day due to provider being out of the office that week  Left message to return my call

## 2022-06-06 ENCOUNTER — HOSPITAL ENCOUNTER (OUTPATIENT)
Dept: RADIOLOGY | Age: 62
Discharge: HOME | End: 2022-06-06
Payer: COMMERCIAL

## 2022-06-06 DIAGNOSIS — R91.1 LUNG NODULE: ICD-10-CM

## 2022-06-06 PROCEDURE — 71250 CT THORAX DX C-: CPT

## 2022-06-06 PROCEDURE — G1004 CDSM NDSC: HCPCS

## 2022-07-22 ENCOUNTER — TELEPHONE (OUTPATIENT)
Dept: OBGYN CLINIC | Facility: HOSPITAL | Age: 62
End: 2022-07-22

## 2022-07-22 ENCOUNTER — APPOINTMENT (OUTPATIENT)
Dept: LAB | Age: 62
End: 2022-07-22
Payer: COMMERCIAL

## 2022-07-22 DIAGNOSIS — Z79.631 METHOTREXATE, LONG TERM, CURRENT USE: ICD-10-CM

## 2022-07-22 DIAGNOSIS — M33.90 DERMATOMYOSITIS (HCC): ICD-10-CM

## 2022-07-22 DIAGNOSIS — E78.5 HYPERLIPIDEMIA, UNSPECIFIED HYPERLIPIDEMIA TYPE: ICD-10-CM

## 2022-07-22 LAB
ALBUMIN SERPL BCP-MCNC: 3.8 G/DL (ref 3.5–5)
ALP SERPL-CCNC: 73 U/L (ref 46–116)
ALT SERPL W P-5'-P-CCNC: 22 U/L (ref 12–78)
ANION GAP SERPL CALCULATED.3IONS-SCNC: 2 MMOL/L (ref 4–13)
AST SERPL W P-5'-P-CCNC: 18 U/L (ref 5–45)
BASOPHILS # BLD AUTO: 0.03 THOUSANDS/ΜL (ref 0–0.1)
BASOPHILS NFR BLD AUTO: 1 % (ref 0–1)
BILIRUB SERPL-MCNC: 0.51 MG/DL (ref 0.2–1)
BUN SERPL-MCNC: 18 MG/DL (ref 5–25)
CALCIUM SERPL-MCNC: 9.2 MG/DL (ref 8.3–10.1)
CHLORIDE SERPL-SCNC: 109 MMOL/L (ref 96–108)
CHOLEST SERPL-MCNC: 162 MG/DL
CK SERPL-CCNC: 73 U/L (ref 26–192)
CO2 SERPL-SCNC: 30 MMOL/L (ref 21–32)
CREAT SERPL-MCNC: 0.74 MG/DL (ref 0.6–1.3)
CRP SERPL QL: <3 MG/L
EOSINOPHIL # BLD AUTO: 0.08 THOUSAND/ΜL (ref 0–0.61)
EOSINOPHIL NFR BLD AUTO: 2 % (ref 0–6)
ERYTHROCYTE [DISTWIDTH] IN BLOOD BY AUTOMATED COUNT: 14 % (ref 11.6–15.1)
ERYTHROCYTE [SEDIMENTATION RATE] IN BLOOD: 9 MM/HOUR (ref 0–29)
GFR SERPL CREATININE-BSD FRML MDRD: 88 ML/MIN/1.73SQ M
GLUCOSE P FAST SERPL-MCNC: 89 MG/DL (ref 65–99)
HCT VFR BLD AUTO: 40.6 % (ref 34.8–46.1)
HDLC SERPL-MCNC: 57 MG/DL
HGB BLD-MCNC: 13 G/DL (ref 11.5–15.4)
IMM GRANULOCYTES # BLD AUTO: 0.02 THOUSAND/UL (ref 0–0.2)
IMM GRANULOCYTES NFR BLD AUTO: 0 % (ref 0–2)
LDLC SERPL CALC-MCNC: 90 MG/DL (ref 0–100)
LYMPHOCYTES # BLD AUTO: 1.46 THOUSANDS/ΜL (ref 0.6–4.47)
LYMPHOCYTES NFR BLD AUTO: 27 % (ref 14–44)
MCH RBC QN AUTO: 30.9 PG (ref 26.8–34.3)
MCHC RBC AUTO-ENTMCNC: 32 G/DL (ref 31.4–37.4)
MCV RBC AUTO: 96 FL (ref 82–98)
MONOCYTES # BLD AUTO: 0.52 THOUSAND/ΜL (ref 0.17–1.22)
MONOCYTES NFR BLD AUTO: 10 % (ref 4–12)
NEUTROPHILS # BLD AUTO: 3.29 THOUSANDS/ΜL (ref 1.85–7.62)
NEUTS SEG NFR BLD AUTO: 60 % (ref 43–75)
NRBC BLD AUTO-RTO: 0 /100 WBCS
PLATELET # BLD AUTO: 255 THOUSANDS/UL (ref 149–390)
PMV BLD AUTO: 10 FL (ref 8.9–12.7)
POTASSIUM SERPL-SCNC: 4.1 MMOL/L (ref 3.5–5.3)
PROT SERPL-MCNC: 7.2 G/DL (ref 6.4–8.4)
RBC # BLD AUTO: 4.21 MILLION/UL (ref 3.81–5.12)
SODIUM SERPL-SCNC: 141 MMOL/L (ref 135–147)
TRIGL SERPL-MCNC: 74 MG/DL
WBC # BLD AUTO: 5.4 THOUSAND/UL (ref 4.31–10.16)

## 2022-07-22 PROCEDURE — 80061 LIPID PANEL: CPT

## 2022-07-22 PROCEDURE — 85652 RBC SED RATE AUTOMATED: CPT

## 2022-07-22 PROCEDURE — 85025 COMPLETE CBC W/AUTO DIFF WBC: CPT | Performed by: INTERNAL MEDICINE

## 2022-07-22 PROCEDURE — 36415 COLL VENOUS BLD VENIPUNCTURE: CPT | Performed by: INTERNAL MEDICINE

## 2022-07-22 PROCEDURE — 82550 ASSAY OF CK (CPK): CPT

## 2022-07-22 PROCEDURE — 80053 COMPREHEN METABOLIC PANEL: CPT | Performed by: INTERNAL MEDICINE

## 2022-07-22 PROCEDURE — 86140 C-REACTIVE PROTEIN: CPT

## 2022-07-25 NOTE — PROGRESS NOTES
Assessment and Plan:   Patient is a 42-year-old female who presents for rheumatology follow-up for history of dermatomyositis with positive skin biopsy  She presented last visit to [facility] and previously followed with Dr Kecia Horner   She ultimately had a skin biopsy after having a photosensitive rash that she reports was consistent with dermatomyositis  She has never had any signs or symptoms of muscle involvement and has never had a muscle biopsy  Workup after our visit showed positive SSA antibody which is typically associated with Sjogren's but can be seen in dermatomyositis  I question whether she has underlying Sjogren's rather than dermatomyositis as the biopsy for the photosensitive rash can have nonspecific findings  However she has done well on methotrexate and we will continue current treatment as it does not  at this point  She continues to have no signs or symptoms of muscle involvement and her CPK has been normal     Plan:  Diagnoses and all orders for this visit:    Dermatomyositis ([facility] [address] )  -     Comprehensive metabolic panel  -     CBC  -     methotrexate 2 5 mg tablet; Take 4 tablets (10 mg total) by mouth once a week  -     folic acid (FOLVITE) 1 mg tablet; Take 1 tablet (1 mg total) by mouth daily    High risk medication use  -     Comprehensive metabolic panel  -     CBC  -     folic acid (FOLVITE) 1 mg tablet; Take 1 tablet (1 mg total) by mouth daily    SS-A antibody positive        Follow-up plan: 6 months        Rheumatic Disease Summary  1  H/o Dermatomyositis (?amyopathic, +SSA, ? 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; cont MTX 10mg/week   -Labs 1/26/22: +SSA 44, other serologies neg, normal CPK  -CT chest 6/6/22: resolution of previously see GGO in RLL  2  Osteopenia   -DEXA 5/11/21: T -2 1 L FN, -1 9 lumbar, stable at lumbar and decreased at L hip, FRAX 1/9% for hip/major OP fracture   -DEXA due 5/2023  3  Comorbidities: BRCA mutation, h/o B/L breast cancer 2008 s/p mastectomies/chemo/femara, s/p ITZEL/BSO, anxiety, h/o benign lung nodule resection     JADIEL Jo is a 63 y o   female who presents for rheumatology follow-up for history of dermatomyositis  At last visit she presented to establish care for a history of dermatomyositis  This was diagnosed on a skin biopsy after she developed a photosensitive rash, however has apparently never had any muscle involvement and never required a muscle biopsy  We did repeat rheumatologic workup which revealed a +SSA antibody but otherwise negative serologies and normal CPK  We continued with her methotrexate  She has new scaly patches on her elbows  She has her normal burning sensation with erythema on her anterior chest which she reports is her baseline there  She has felt that more at times during the summer  She is very cautious and wear sunblock and protective clothing  She was on HCQ for 3 years before MTX, but came off because of a dark staining on the skin from her neck down her shoulders  This resolved after she stopped  She then got improvement with methotrexate and has been on it ever since then  She reports no significant weakness in her proximal arms or legs  Sometimes notices weakness during yoga and squatting but has no issue with activities of daily living      The following portions of the patient's history were reviewed and updated as appropriate: allergies, current medications, past family history, past medical history, past social history, past surgical history and problem list     Review of Systems:   Review of Systems   Musculoskeletal: Negative for arthralgias and joint swelling  Skin: Positive for rash  Home Medications:    Current Outpatient Medications:     folic acid (FOLVITE) 1 mg tablet, Take 1 tablet (1 mg total) by mouth daily, Disp: 90 tablet, Rfl: 1    methotrexate 2 5 mg tablet, Take 4 tablets (10 mg total) by mouth once a week, Disp: 60 tablet, Rfl: 1    Calcium-Magnesium-Vitamin D (CALCIUM 500 PO), Take 1,000 mg by mouth daily  , Disp: , Rfl:     Cholecalciferol (VITAMIN D) 2000 UNITS CAPS, Take 2,000 Units/day by mouth , Disp: , Rfl:     Cholecalciferol (Vitamin D3 Super Strength) 50 MCG (2000 UT) CAPS, Take by mouth, Disp: , Rfl:     clobetasol (TEMOVATE) 0 05 % cream, as needed  , Disp: , Rfl:     multivitamin (THERAGRAN) TABS, Take 1 tablet by mouth daily  , Disp: , Rfl:     Objective:    Vitals:    07/26/22 1454   BP: 166/66   Pulse: 57   SpO2: 97%   Weight: 70 9 kg (156 lb 6 4 oz)   Height: 5' 4" (1 626 m)       Physical Exam  Constitutional:       General: She is not in acute distress  HENT:      Head: Normocephalic and atraumatic  Eyes:      Conjunctiva/sclera: Conjunctivae normal    Pulmonary:      Effort: Pulmonary effort is normal  No respiratory distress  Musculoskeletal:      Cervical back: Neck supple  Skin:     Coloration: Skin is not pale  Neurological:      Mental Status: She is alert  Mental status is at baseline  Motor: Motor function is intact        Comments: 5/5 motor strength in hip flexors    Psychiatric:         Mood and Affect: Mood normal          Behavior: Behavior normal          Labs:   Component      Latest Ref Rng & Units 7/22/2022   WBC      4 31 - 10 16 Thousand/uL 5 40   Red Blood Cell Count      3 81 - 5 12 Million/uL 4 21   Hemoglobin      11 5 - 15 4 g/dL 13 0   HCT      34 8 - 46 1 % 40 6   MCV      82 - 98 fL 96   MCH      26 8 - 34 3 pg 30 9   MCHC      31 4 - 37 4 g/dL 32 0   RDW      11 6 - 15 1 % 14 0   MPV      8 9 - 12 7 fL 10 0   Platelet Count      241 - 390 Thousands/uL 255   nRBC      /100 WBCs 0   Neutrophils %      43 - 75 % 60   Immat GRANS %      0 - 2 % 0   Lymphocytes Relative      14 - 44 % 27   Monocytes Relative      4 - 12 % 10   Eosinophils      0 - 6 % 2   Basophils Relative      0 - 1 % 1   Absolute Neutrophils      1 85 - 7 62 Thousands/µL 3 29   Immature Grans Absolute      0 00 - 0 20 Thousand/uL 0 02   Lymphocytes Absolute      0 60 - 4 47 Thousands/µL 1 46   Absolute Monocytes      0 17 - 1 22 Thousand/µL 0 52   Absolute Eosinophils      0 00 - 0 61 Thousand/µL 0 08   Basophils Absolute      0 00 - 0 10 Thousands/µL 0 03   Sodium      135 - 147 mmol/L 141   Potassium      3 5 - 5 3 mmol/L 4 1   Chloride      96 - 108 mmol/L 109 (H)   CO2      21 - 32 mmol/L 30   Anion Gap      4 - 13 mmol/L 2 (L)   BUN      5 - 25 mg/dL 18   Creatinine      0 60 - 1 30 mg/dL 0 74   GLUCOSE FASTING      65 - 99 mg/dL 89   Calcium      8 3 - 10 1 mg/dL 9 2   AST      5 - 45 U/L 18   ALT      12 - 78 U/L 22   Alkaline Phosphatase      46 - 116 U/L 73   Total Protein      6 4 - 8 4 g/dL 7 2   Albumin      3 5 - 5 0 g/dL 3 8   TOTAL BILIRUBIN      0 20 - 1 00 mg/dL 0 51   eGFR      ml/min/1 73sq m 88   C-REACTIVE PROTEIN      <3 0 mg/L <3 0   Sed Rate      0 - 29 mm/hour 9   Total CK      26 - 192 U/L 73     Component      Latest Ref Rng & Units 1/26/2022   DANILO-1 Antibody      <20 Units <20   PL 7 AutoAbs      Negative Negative   PL 12 AutoAbs      Negative Negative   EJ AutoAbs      Negative Negative   OJ AutoAbs      Negative Negative   SRP AutoAbs      Negative Negative   MI-2 Autoab      Negative Negative   Anti-TIF-1 gamma Ab (RDL)      <20 Units <20   Anti-MDA-5-Ab       <20 Units <20   ANTI-NXP-2*      <20 Units <20   ANTI-SAE1 AB, IGG (RDL)      <20 Units <20   PM-SCL Ab      <20 Units <20   KU AutoAbs      Negative Negative   ANTI-SS-A 52KD AB, IGG (RDL)      <20 Units 44 (H)   Anti-U1 RNP Ab (RDL)      <20 Units <20   ANTI-U2 RNP AB (RDL)      Negative Negative   U3 RNP       Negative Negative   HEPATITIS B SURFACE ANTIGEN      Non-reactive, NonReactive - Confirmed Non-reactive   HEPATITIS C ANTIBODY      Non-reactive Non-reactive   HEPATITIS B CORE IGM ANTIBODY      Non-reactive Non-reactive   HEPATITIS B CORE TOTAL ANTIBODY      Non-reactive Non-reactive   SSA (RO) ANTIBODY      0 0 - 0 9 AI <0 2   SSB (LA) ANTIBODY      0 0 - 0 9 AI <0 2   GO TO SMITH (SM) ANTIBODY      0 0 - 0 9 AI <0 2   GO TO RNP ANTIBODY      0 0 - 0 9 AI <0 2   ANTI-NUCLEAR ANTIBODY (THELMA)      Negative Negative   ANTI DNA DOUBLE STRANDED      0 - 9 IU/mL <1   SCLERODERMA (SCL-70) AB      0 0 - 0 9 AI <0 2   C4, COMPLEMENT      10 0 - 40 0 mg/dL 44 0 (H)   C3 Complement      90 0 - 180 0 mg/dL 111 0   Anti-Centromere B Antibodies      0 0 - 0 9 AI <0 2   Total CK      26 - 192 U/L 46   ALDOLASE      3 3 - 10 3 U/L 4 9   C-REACTIVE PROTEIN      <3 0 mg/L 5 9 (H)   Sed Rate      0 - 29 mm/hour 21   RHEUMATOID FACTOR      Negative Negative   CYCLIC CITRULLINATED PEPTIDE ANTIBODY      See comment 1 4

## 2022-07-26 ENCOUNTER — OFFICE VISIT (OUTPATIENT)
Dept: RHEUMATOLOGY | Facility: CLINIC | Age: 62
End: 2022-07-26
Payer: COMMERCIAL

## 2022-07-26 VITALS
SYSTOLIC BLOOD PRESSURE: 166 MMHG | OXYGEN SATURATION: 97 % | HEART RATE: 57 BPM | BODY MASS INDEX: 26.7 KG/M2 | DIASTOLIC BLOOD PRESSURE: 66 MMHG | HEIGHT: 64 IN | WEIGHT: 156.4 LBS

## 2022-07-26 DIAGNOSIS — Z79.899 HIGH RISK MEDICATION USE: ICD-10-CM

## 2022-07-26 DIAGNOSIS — M33.90 DERMATOMYOSITIS (HCC): Primary | ICD-10-CM

## 2022-07-26 DIAGNOSIS — R76.8 SS-A ANTIBODY POSITIVE: ICD-10-CM

## 2022-07-26 PROCEDURE — 99214 OFFICE O/P EST MOD 30 MIN: CPT | Performed by: INTERNAL MEDICINE

## 2022-07-26 RX ORDER — FOLIC ACID 1 MG/1
1 TABLET ORAL DAILY
Qty: 90 TABLET | Refills: 1 | Status: SHIPPED | OUTPATIENT
Start: 2022-07-26 | End: 2022-10-19 | Stop reason: SDUPTHER

## 2022-08-03 ENCOUNTER — OFFICE VISIT (OUTPATIENT)
Dept: INTERNAL MEDICINE CLINIC | Facility: CLINIC | Age: 62
End: 2022-08-03
Payer: COMMERCIAL

## 2022-08-03 VITALS
DIASTOLIC BLOOD PRESSURE: 70 MMHG | BODY MASS INDEX: 25.69 KG/M2 | SYSTOLIC BLOOD PRESSURE: 122 MMHG | RESPIRATION RATE: 16 BRPM | WEIGHT: 154.2 LBS | HEIGHT: 65 IN | OXYGEN SATURATION: 96 % | HEART RATE: 64 BPM

## 2022-08-03 DIAGNOSIS — R91.8 GROUND GLASS OPACITY PRESENT ON IMAGING OF LUNG: ICD-10-CM

## 2022-08-03 DIAGNOSIS — Z00.00 ANNUAL PHYSICAL EXAM: Primary | ICD-10-CM

## 2022-08-03 DIAGNOSIS — Z13.1 SCREENING FOR DIABETES MELLITUS: ICD-10-CM

## 2022-08-03 DIAGNOSIS — G43.109 OCULAR MIGRAINE: ICD-10-CM

## 2022-08-03 DIAGNOSIS — Z13.6 SCREENING FOR CARDIOVASCULAR CONDITION: ICD-10-CM

## 2022-08-03 DIAGNOSIS — E66.3 OVERWEIGHT (BMI 25.0-29.9): ICD-10-CM

## 2022-08-03 DIAGNOSIS — M33.90 DERMATOMYOSITIS (HCC): ICD-10-CM

## 2022-08-03 PROCEDURE — 99396 PREV VISIT EST AGE 40-64: CPT | Performed by: INTERNAL MEDICINE

## 2022-08-03 RX ORDER — MELATONIN
1000 DAILY
COMMUNITY

## 2022-08-03 NOTE — PROGRESS NOTES
[address] [facility]    NAME: Ilya Khan  AGE: 63 y o  SEX: female  : 1960     DATE: 2022     Assessment and Plan:     Problem List Items Addressed This Visit        Cardiovascular and Mediastinum    Ocular migraine     Change in pattern of ocular migraine patient does a his have a history of breast  cancer will check MRI of the brain         Relevant Orders    MRI brain wo contrast    CBC (Includes Diff/Plt) (Refl)       Musculoskeletal and Integument    Dermatomyositis ([address] )     Clinically stable and doing well continue the current medical regiment will continue monitor  Markers of inflammation are well controlled on low-dose of methotrexate she will continue to be monitored via Rheumatology of interest her SSA is positive she will discuss further with Rheumatology if a lip biopsy would be indicated            Other    Screening for cardiovascular condition     I have counselled the pt to follow a healthy and balanced diet ,and recommend routine exercise  Will check lipid profile         Relevant Orders    Lipid Panel with Direct LDL reflex    Ground glass opacity present on imaging of lung     Patient reports me resolution and symptom improvement         Overweight (BMI 25 0-29  9)     Significantly improving continue to optimize the weight following healthy/balance diet excellent progress         Annual physical exam - Primary     Assessment and plan 1  Health maintenance annual wellness examination overall the patient is clinically stable and doing well, we encouraged the patient to follow a healthy and balanced diet  We recommend that the patient exercise routinely approximately 30 minutes 5 times per week   We have reviewed the patient's vaccines and have made recommendations for updates if necessary   annual flu shot     We will be ordering screening laboratories which are age appropriate    Return to the office in  6 months    call if any problems  Other Visit Diagnoses     Screening for diabetes mellitus        Relevant Orders    Comprehensive metabolic panel    Hemoglobin A1C        Last 30 yrs ocular migaine tylenol and goes away lately she has noticed increased number of ocular migraine  Immunizations and preventive care screenings were discussed with patient today  Appropriate education was printed on patient's after visit summary  Counseling:  Exercise: the importance of regular exercise/physical activity was discussed  Recommend exercise 3-5 times per week for at least 30 minutes  BMI Counseling: Body mass index is 25 66 kg/m²  The BMI is above normal  Nutrition recommendations include decreasing portion sizes and moderation in carbohydrate intake  Exercise recommendations include exercising 3-5 times per week  Rationale for BMI follow-up plan is due to patient being overweight or obese  Return in about 6 months (around 2/3/2023)  Chief Complaint:     Chief Complaint   Patient presents with    Annual Exam      History of Present Illness:     Adult Annual Physical   Patient here for a comprehensive physical exam  The patient reports no problems  Diet and Physical Activity  Diet/Nutrition: well balanced diet  Exercise: moderate cardiovascular exercise  Depression Screening  PHQ-2/9 Depression Screening         General Health  Sleep: sleeps well  Hearing: normal - bilateral   Vision: no vision problems and goes for regular eye exams  Dental: regular dental visits  /GYN Health  Patient is: postmenopausal  Last menstrual period:   Contraceptive method:      Review of Systems:     Review of Systems   Constitutional: Negative for activity change, appetite change and unexpected weight change  HENT: Negative for congestion and postnasal drip  Eyes: Negative for visual disturbance  Respiratory: Negative for cough and shortness of breath      Cardiovascular: Negative for chest pain  Gastrointestinal: Negative for abdominal pain, diarrhea, nausea and vomiting  Neurological: Negative for dizziness, light-headedness and headaches  Past Medical History:     Past Medical History:   Diagnosis Date    Benign neoplasm of lung     Right lung lobe benign  Removed 2010   Breast cancer ([facility] [address] )     Breathing difficulty 2011    no breathing difficulties but patient reported drop in BP during anesthesia for appendectomy and was advised to have cardiologist consult    Cancer [facility]) 2008    Depression     Dermatomyositis ([facility] [address] )     Lung nodule     Wears glasses       Past Surgical History:     Past Surgical History:   Procedure Laterality Date    APPENDECTOMY      2011    BREAST SURGERY      Bilateral Mastectomy    COLONOSCOPY N/A 1/26/2016    Procedure: COLONOSCOPY;  Surgeon: Burt Jacobsen MD;  Location: [facility];   Service:     HYSTERECTOMY      2007    LUNG BIOPSY      MASS EXCISION Left 7/18/2019    Procedure: MID BACK MASS EXCISION BIOPSY;  Surgeon: Amalia Mcneill MD;  Location: [facility];  Service: General    MASTECTOMY      reconstruction 2008    OOPHORECTOMY        Social History:     Social History     Socioeconomic History    Marital status: /Civil Union     Spouse name: None    Number of children: None    Years of education: None    Highest education level: None   Occupational History    None   Tobacco Use    Smoking status: Never Smoker    Smokeless tobacco: Never Used   Substance and Sexual Activity    Alcohol use: No    Drug use: Never    Sexual activity: Yes     Partners: Male     Birth control/protection: None   Other Topics Concern    None   Social History Narrative    None     Social Determinants of Health     Financial Resource Strain: Not on file   Food Insecurity: Not on file   Transportation Needs: Not on file   Physical Activity: Not on file   Stress: Not on file   Social Connections: Not on file   Intimate Partner Violence: Not on file   Housing Stability: Not on file      Family History:     Family History   Problem Relation Age of Onset   Kiah Paul Cancer Mother         Ovarian    Ovarian cancer Mother     No Known Problems Father     BRCA2 Positive Sister     Heart disease Sister         post partum cardiomyopathy, pacer    Ovarian cancer Paternal Aunt     Breast cancer Paternal Aunt     Cancer Paternal Aunt         Breast/Ovarian      Current Medications:     Current Outpatient Medications   Medication Sig Dispense Refill    Calcium-Magnesium-Vitamin D (CALCIUM 500 PO) Take 1,000 mg by mouth daily   cholecalciferol (VITAMIN D3) 1,000 units tablet Take 1,000 Units by mouth daily      clobetasol (TEMOVATE) 0 05 % cream as needed        folic acid (FOLVITE) 1 mg tablet Take 1 tablet (1 mg total) by mouth daily 90 tablet 1    methotrexate 2 5 mg tablet Take 4 tablets (10 mg total) by mouth once a week 60 tablet 1    multivitamin (THERAGRAN) TABS Take 1 tablet by mouth daily   Cholecalciferol (VITAMIN D) 2000 UNITS CAPS Take 2,000 Units/day by mouth  (Patient not taking: Reported on 8/3/2022)      Cholecalciferol (Vitamin D3 Super Strength) 50 MCG (2000 UT) CAPS Take by mouth (Patient not taking: Reported on 8/3/2022)       No current facility-administered medications for this visit  Allergies:     No Known Allergies   Physical Exam:     /70   Pulse 64   Resp 16   Ht 5' 5" (1 651 m)   Wt 69 9 kg (154 lb 3 2 oz)   SpO2 96%   BMI 25 66 kg/m²     Physical Exam  Constitutional:       Appearance: Normal appearance  She is well-developed and normal weight  HENT:      Head: Normocephalic and atraumatic  Right Ear: External ear normal       Left Ear: External ear normal       Nose: Nose normal    Eyes:      Pupils: Pupils are equal, round, and reactive to light  Cardiovascular:      Rate and Rhythm: Normal rate and regular rhythm  Heart sounds: Normal heart sounds   No murmur heard   Pulmonary:      Effort: Pulmonary effort is normal       Breath sounds: Normal breath sounds  Abdominal:      General: There is no distension  Palpations: Abdomen is soft  Tenderness: There is no abdominal tenderness  There is no guarding  Neurological:      Mental Status: She is alert            Citlali Palacios DO  [facility]

## 2022-08-03 NOTE — PATIENT INSTRUCTIONS

## 2022-08-04 PROBLEM — G43.109 OCULAR MIGRAINE: Status: ACTIVE | Noted: 2022-08-04

## 2022-08-04 PROBLEM — Z00.00 ANNUAL PHYSICAL EXAM: Status: ACTIVE | Noted: 2022-08-04

## 2022-08-04 NOTE — ASSESSMENT & PLAN NOTE
Change in pattern of ocular migraine patient does a his have a history of breast  cancer will check MRI of the brain

## 2022-08-04 NOTE — ASSESSMENT & PLAN NOTE
Significantly improving continue to optimize the weight following healthy/balance diet excellent progress

## 2022-08-04 NOTE — ASSESSMENT & PLAN NOTE
Clinically stable and doing well continue the current medical regiment will continue monitor    Markers of inflammation are well controlled on low-dose of methotrexate she will continue to be monitored via Rheumatology of interest her SSA is positive she will discuss further with Rheumatology if a lip biopsy would be indicated

## 2022-08-04 NOTE — ASSESSMENT & PLAN NOTE
I have counselled the pt to follow a healthy and balanced diet ,and recommend routine exercise    Will check lipid profile

## 2022-08-12 ENCOUNTER — APPOINTMENT (OUTPATIENT)
Dept: LAB | Age: 62
End: 2022-08-12
Payer: COMMERCIAL

## 2022-08-12 DIAGNOSIS — C50.012 MALIGNANT NEOPLASM OF NIPPLE OF BOTH BREASTS IN FEMALE, ESTROGEN RECEPTOR POSITIVE (HCC): ICD-10-CM

## 2022-08-12 DIAGNOSIS — C50.011 MALIGNANT NEOPLASM OF NIPPLE OF BOTH BREASTS IN FEMALE, ESTROGEN RECEPTOR POSITIVE (HCC): ICD-10-CM

## 2022-08-12 DIAGNOSIS — Z17.0 MALIGNANT NEOPLASM OF NIPPLE OF BOTH BREASTS IN FEMALE, ESTROGEN RECEPTOR POSITIVE (HCC): ICD-10-CM

## 2022-08-12 LAB
ALBUMIN SERPL BCP-MCNC: 4 G/DL (ref 3.5–5)
ALP SERPL-CCNC: 79 U/L (ref 46–116)
ALT SERPL W P-5'-P-CCNC: 20 U/L (ref 12–78)
ANION GAP SERPL CALCULATED.3IONS-SCNC: 4 MMOL/L (ref 4–13)
AST SERPL W P-5'-P-CCNC: 21 U/L (ref 5–45)
BASOPHILS # BLD AUTO: 0.04 THOUSANDS/ΜL (ref 0–0.1)
BASOPHILS NFR BLD AUTO: 1 % (ref 0–1)
BILIRUB SERPL-MCNC: 0.44 MG/DL (ref 0.2–1)
BUN SERPL-MCNC: 16 MG/DL (ref 5–25)
CALCIUM SERPL-MCNC: 9.6 MG/DL (ref 8.3–10.1)
CANCER AG27-29 SERPL-ACNC: 22.4 U/ML (ref 0–42.3)
CHLORIDE SERPL-SCNC: 107 MMOL/L (ref 96–108)
CO2 SERPL-SCNC: 30 MMOL/L (ref 21–32)
CREAT SERPL-MCNC: 0.86 MG/DL (ref 0.6–1.3)
EOSINOPHIL # BLD AUTO: 0.11 THOUSAND/ΜL (ref 0–0.61)
EOSINOPHIL NFR BLD AUTO: 2 % (ref 0–6)
ERYTHROCYTE [DISTWIDTH] IN BLOOD BY AUTOMATED COUNT: 13.7 % (ref 11.6–15.1)
GFR SERPL CREATININE-BSD FRML MDRD: 74 ML/MIN/1.73SQ M
GLUCOSE P FAST SERPL-MCNC: 84 MG/DL (ref 65–99)
HCT VFR BLD AUTO: 43.9 % (ref 34.8–46.1)
HGB BLD-MCNC: 14.1 G/DL (ref 11.5–15.4)
IMM GRANULOCYTES # BLD AUTO: 0.01 THOUSAND/UL (ref 0–0.2)
IMM GRANULOCYTES NFR BLD AUTO: 0 % (ref 0–2)
LYMPHOCYTES # BLD AUTO: 1.63 THOUSANDS/ΜL (ref 0.6–4.47)
LYMPHOCYTES NFR BLD AUTO: 31 % (ref 14–44)
MCH RBC QN AUTO: 31 PG (ref 26.8–34.3)
MCHC RBC AUTO-ENTMCNC: 32.1 G/DL (ref 31.4–37.4)
MCV RBC AUTO: 97 FL (ref 82–98)
MONOCYTES # BLD AUTO: 0.43 THOUSAND/ΜL (ref 0.17–1.22)
MONOCYTES NFR BLD AUTO: 8 % (ref 4–12)
NEUTROPHILS # BLD AUTO: 3.05 THOUSANDS/ΜL (ref 1.85–7.62)
NEUTS SEG NFR BLD AUTO: 58 % (ref 43–75)
NRBC BLD AUTO-RTO: 0 /100 WBCS
PLATELET # BLD AUTO: 258 THOUSANDS/UL (ref 149–390)
PMV BLD AUTO: 10.1 FL (ref 8.9–12.7)
POTASSIUM SERPL-SCNC: 3.9 MMOL/L (ref 3.5–5.3)
PROT SERPL-MCNC: 7.4 G/DL (ref 6.4–8.4)
RBC # BLD AUTO: 4.55 MILLION/UL (ref 3.81–5.12)
SODIUM SERPL-SCNC: 141 MMOL/L (ref 135–147)
WBC # BLD AUTO: 5.27 THOUSAND/UL (ref 4.31–10.16)

## 2022-08-12 PROCEDURE — 80053 COMPREHEN METABOLIC PANEL: CPT

## 2022-08-12 PROCEDURE — 85025 COMPLETE CBC W/AUTO DIFF WBC: CPT

## 2022-08-22 ENCOUNTER — OFFICE VISIT (OUTPATIENT)
Dept: HEMATOLOGY ONCOLOGY | Facility: CLINIC | Age: 62
End: 2022-08-22
Payer: COMMERCIAL

## 2022-08-22 VITALS
BODY MASS INDEX: 25.49 KG/M2 | RESPIRATION RATE: 16 BRPM | DIASTOLIC BLOOD PRESSURE: 76 MMHG | WEIGHT: 153 LBS | SYSTOLIC BLOOD PRESSURE: 118 MMHG | HEART RATE: 60 BPM | HEIGHT: 65 IN | OXYGEN SATURATION: 99 % | TEMPERATURE: 97 F

## 2022-08-22 DIAGNOSIS — C50.011 MALIGNANT NEOPLASM OF NIPPLE OF BOTH BREASTS IN FEMALE, ESTROGEN RECEPTOR POSITIVE (HCC): Primary | ICD-10-CM

## 2022-08-22 DIAGNOSIS — Z15.01 BRCA2 GENE MUTATION POSITIVE: ICD-10-CM

## 2022-08-22 DIAGNOSIS — Z15.09 BRCA2 GENE MUTATION POSITIVE: ICD-10-CM

## 2022-08-22 DIAGNOSIS — Z14.8 CARRIER OF HIGH RISK CANCER GENE MUTATION: ICD-10-CM

## 2022-08-22 DIAGNOSIS — C50.012 MALIGNANT NEOPLASM OF NIPPLE OF BOTH BREASTS IN FEMALE, ESTROGEN RECEPTOR POSITIVE (HCC): Primary | ICD-10-CM

## 2022-08-22 DIAGNOSIS — M33.90 DERMATOMYOSITIS (HCC): ICD-10-CM

## 2022-08-22 DIAGNOSIS — Z17.0 MALIGNANT NEOPLASM OF NIPPLE OF BOTH BREASTS IN FEMALE, ESTROGEN RECEPTOR POSITIVE (HCC): Primary | ICD-10-CM

## 2022-08-22 DIAGNOSIS — Z85.820 HISTORY OF MELANOMA: ICD-10-CM

## 2022-08-22 PROCEDURE — 99214 OFFICE O/P EST MOD 30 MIN: CPT | Performed by: INTERNAL MEDICINE

## 2022-08-22 NOTE — PROGRESS NOTES
HPI:    Patient has history of bilateral breast cancers and positive BRCA 2 gene mutation  Also history of melanoma in Situ  She had bilateral mastectomies in 2008 for hormone receptor positive HER2 negative stage II A cancer in the right breast and stage I A cancer in the left breast and 1 positive lymph node in right axilla  Patient had reconstruction surgeries, 4 cycles of Taxotere plus Cytoxan followed by 5 years of Femara that she finished in September 2013  She had ITZEL and BSO  She has been aware of cancer risks because of BRCA2 mutation  She goes for pancreatic checkup and also goes to her dermatologist for screening of skin for melanoma and ophthalmologist for ocular melanoma screening     Patient states her family has been aware of her  positive  BRCA 2  She has  family history of breast and ovarian cancer  Patient has history of  dermatomyositis   She has been on methotrexate and folic acid for the last 3 years  She follows with her rheumatologist        , Patient had a 9 mm nodule in the right lower lung and she had wedge resection and that was negative for malignancy  Patient follows with the her lung specialist for mild residual ground-glass opacity in right lower lobe and that has improved   Gerhardt Sep She has renal and liver cysts and also tiny  polyp in gallbladder     She had colonoscopy in February 2021 and she states that was clear     She has small lymph nodes in left submandibular area and they are being monitored by her primary physician by  ultrasound of the neck  She has some tiredness                   Current Outpatient Medications:     Calcium-Magnesium-Vitamin D (CALCIUM 500 PO), Take 1,000 mg by mouth daily  , Disp: , Rfl:     cholecalciferol (VITAMIN D3) 1,000 units tablet, Take 1,000 Units by mouth daily, Disp: , Rfl:     clobetasol (TEMOVATE) 0 05 % cream, as needed  , Disp: , Rfl:     folic acid (FOLVITE) 1 mg tablet, Take 1 tablet (1 mg total) by mouth daily, Disp: 90 tablet, Rfl: 1    methotrexate 2 5 mg tablet, Take 4 tablets (10 mg total) by mouth once a week, Disp: 60 tablet, Rfl: 1    multivitamin (THERAGRAN) TABS, Take 1 tablet by mouth daily  , Disp: , Rfl:     Cholecalciferol (VITAMIN D) 2000 UNITS CAPS, Take 2,000 Units/day by mouth  (Patient not taking: No sig reported), Disp: , Rfl:     Cholecalciferol (Vitamin D3 Super Strength) 50 MCG (2000 UT) CAPS, Take by mouth (Patient not taking: No sig reported), Disp: , Rfl:     No Known Allergies    Oncology History   Bilateral malignant neoplasm of breast in female, estrogen receptor positive ([facility] [address] )    Initial Diagnosis    Bilateral malignant neoplasm of breast in female, estrogen receptor positive [facility])      Surgery    Double mastectomy, reconstructive surgery      Chemotherapy    Chemotherapy with Taxotere and Cytoxan for 4 cycles followed by Femara from 2008 thru 2013  ROS:  08/22/22 Reviewed 13 systems: See symptoms in HPI:    Presently   No other neurological, cardiac, pulmonary, GI and  symptoms other than listed in HPI     Other symptoms are in HPI   No symptoms like fever, chills, bleeding, bone pains, skin rash at present,  weight loss, night sweats, arthritic symptoms at present,   weakness, numbness,  claudication and gait problem  No frequent infections  Not unusually sensitive to heat or cold  No swelling of the ankles  Patient is less anxious  /76 (BP Location: Left arm, Patient Position: Sitting, Cuff Size: Adult)   Pulse 60   Temp (!) 97 °F (36 1 °C)   Resp 16   Ht 5' 5" (1 651 m)   Wt 69 4 kg (153 lb)   SpO2 99%   BMI 25 46 kg/m²     Physical Exam:      Patient is alert and oriented  Patient is not in distress  Vital signs are stable    There is no icterus  , no oral thrush, no palpable neck mass,  lung fields clear to percussion and auscultation, regular heart rate,   soft and non tender abdomen, no palpable abdominal mass, no ascites, no edema of ankles, no calf tenderness, no focal neurological deficit, no skin rash, no palpable lymphadenopathy in the neck and axillary areas,   Patient is less anxious  Performance status 0  IMAGING:     IMPRESSION:     1  Mild residual groundglass opacity in the right lower lobe in the area of previously seen consolidation, likely postinflammatory  Previously seen left lower lobe consolidation has resolved with mild residual scarring      2   Stable pulmonary nodules               Workstation performed: [id number]          Imaging    OSSEOUS STRUCTURES:  No acute fracture or destructive osseous lesion      IMPRESSION:     1  Resolution of previously seen peripheral right lower lobe groundglass opacification, likely infectious or inflammatory in retrospect    2   No new concerning pulmonary nodules               Workstation performed: [id number]          Imaging    CT chest without contrast (Order: [id number]) - 6/6/2022    OSSEOUS STRUCTURES:  No suspicious osseous lesion         IMPRESSION:     No findings of metastatic disease in the abdomen         Workstation performed: [id number]          Imaging    MRI abdomen w wo contrast (Order: [id number]) - 3/25/2022      LABS:    Results for orders placed or performed in visit on 08/12/22   CBC and differential   Result Value Ref Range    WBC 5 27 4 31 - 10 16 Thousand/uL    RBC 4 55 3 81 - 5 12 Million/uL    Hemoglobin 14 1 11 5 - 15 4 g/dL    Hematocrit 43 9 34 8 - 46 1 %    MCV 97 82 - 98 fL    MCH 31 0 26 8 - 34 3 pg    MCHC 32 1 31 4 - 37 4 g/dL    RDW 13 7 11 6 - 15 1 %    MPV 10 1 8 9 - 12 7 fL    Platelets 212 706 - 143 Thousands/uL    nRBC 0 /100 WBCs    Neutrophils Relative 58 43 - 75 %    Immat GRANS % 0 0 - 2 %    Lymphocytes Relative 31 14 - 44 %    Monocytes Relative 8 4 - 12 %    Eosinophils Relative 2 0 - 6 %    Basophils Relative 1 0 - 1 %    Neutrophils Absolute 3 05 1 85 - 7 62 Thousands/µL    Immature Grans Absolute 0 01 0 00 - 0 20 Thousand/uL    Lymphocytes Absolute 1 63 0 60 - 4 47 Thousands/µL    Monocytes Absolute 0 43 0 17 - 1 22 Thousand/µL    Eosinophils Absolute 0 11 0 00 - 0 61 Thousand/µL    Basophils Absolute 0 04 0 00 - 0 10 Thousands/µL   Comprehensive metabolic panel   Result Value Ref Range    Sodium 141 135 - 147 mmol/L    Potassium 3 9 3 5 - 5 3 mmol/L    Chloride 107 96 - 108 mmol/L    CO2 30 21 - 32 mmol/L    ANION GAP 4 4 - 13 mmol/L    BUN 16 5 - 25 mg/dL    Creatinine 0 86 0 60 - 1 30 mg/dL    Glucose, Fasting 84 65 - 99 mg/dL    Calcium 9 6 8 3 - 10 1 mg/dL    AST 21 5 - 45 U/L    ALT 20 12 - 78 U/L    Alkaline Phosphatase 79 46 - 116 U/L    Total Protein 7 4 6 4 - 8 4 g/dL    Albumin 4 0 3 5 - 5 0 g/dL    Total Bilirubin 0 44 0 20 - 1 00 mg/dL    eGFR 74 ml/min/1 73sq m     Labs, Imaging, & Other studies:   All pertinent labs and imaging studies were personally reviewed      Reviewed test results and discussed with patient  Assessment and plan:      Patient has history of bilateral breast cancers and positive BRCA 2 gene mutation  Also history of melanoma in Situ  She had bilateral mastectomies in 2008 for hormone receptor positive HER2 negative stage II A cancer in the right breast and stage I A cancer in the left breast and 1 positive lymph node in right axilla  Patient had reconstruction surgeries, 4 cycles of Taxotere plus Cytoxan followed by 5 years of Femara that she finished in September 2013  She had ITZEL and BSO  She has been aware of cancer risks because of BRCA2 mutation  She goes for pancreatic checkup and also goes to her dermatologist for screening of skin for melanoma and ophthalmologist for ocular melanoma screening     Patient states her family has been aware of her  positive  BRCA 2  She has  family history of breast and ovarian cancer  Patient has history of  dermatomyositis   She has been on methotrexate and folic acid for the last 3 years    She follows with her rheumatologist        , Patient had a 9 mm nodule in the right lower lung and she had wedge resection and that was negative for malignancy  Patient follows with the her lung specialist for mild residual ground-glass opacity in right lower lobe and that has improved   Huang Andersen She has renal and liver cysts and also tiny  polyp in gallbladder     She had colonoscopy in February 2021 and she states that was clear     She has small lymph nodes in left submandibular area and they are being monitored by her primary physician by  ultrasound of the neck  She has some tiredness  Carmen Leal       Physical examination and test results are as recorded and discussed  Breast cancers  remain in remission  She gets checked for other cancers like melanoma, pancreatic cancer and peritoneal cancer and others  She had ITZEL and BSO  She has history of melanoma in situ  She follows with her primary physician and other consultants     All discussed in detail  Questions answered  Discussed the importance of eating healthy foods, staying active and health screening test   Patient is capable of self-care  Discussed precautions against coronavirus     Goal is cure from breast cancers and melanoma and early  detection of other cancers mentioned above     See diagnoses, instructions and orders below    1  Malignant neoplasm of nipple of both breasts in female, estrogen receptor positive ([facility] [address] )    - CBC and differential; Future  - Comprehensive metabolic panel; Future  - Cancer antigen 27 29; Future    2  BRCA2 gene mutation positive      3  Carrier of high risk cancer gene mutation      4  History of melanoma      5  Dermatomyositis ([facility] [address] )      Blood work prior to next visit in 6 months                   Patient voiced understanding and agreed       Counseling / Coordination of Care     Huang Andersen   Provided counseling and support

## 2022-08-25 ENCOUNTER — HOSPITAL ENCOUNTER (OUTPATIENT)
Dept: RADIOLOGY | Age: 62
Discharge: HOME | End: 2022-08-25
Payer: COMMERCIAL

## 2022-08-25 DIAGNOSIS — G43.109 OCULAR MIGRAINE: ICD-10-CM

## 2022-08-25 PROCEDURE — G1004 CDSM NDSC: HCPCS

## 2022-08-25 PROCEDURE — 70551 MRI BRAIN STEM W/O DYE: CPT

## 2022-08-26 ENCOUNTER — PROBLEM (OUTPATIENT)
Dept: URBAN - METROPOLITAN AREA CLINIC 6 | Facility: CLINIC | Age: 62
End: 2022-08-26

## 2022-08-26 DIAGNOSIS — H25.043: ICD-10-CM

## 2022-08-26 DIAGNOSIS — H04.123: ICD-10-CM

## 2022-08-26 PROCEDURE — 92012 INTRM OPH EXAM EST PATIENT: CPT

## 2022-08-26 ASSESSMENT — VISUAL ACUITY
OD_CC: 20/20-2
OS_CC: 20/25
OU_CC: J1+

## 2022-08-26 ASSESSMENT — TONOMETRY
OS_IOP_MMHG: 13
OD_IOP_MMHG: 14

## 2022-09-12 ENCOUNTER — TELEPHONE (OUTPATIENT)
Dept: INTERNAL MEDICINE CLINIC | Facility: CLINIC | Age: 62
End: 2022-09-12

## 2022-09-12 DIAGNOSIS — Z23 NEED FOR COVID-19 VACCINE: Primary | ICD-10-CM

## 2022-09-12 NOTE — TELEPHONE ENCOUNTER
Pt's  said pt is immunocompromised and is asking if Dr Peña can enter orders for her to get the Covid booster

## 2022-09-13 NOTE — TELEPHONE ENCOUNTER
Spoke with pt and advised - verbalized understanding  She states she was able to make an appointment yesterday

## 2022-09-22 ENCOUNTER — IMMUNIZATIONS (OUTPATIENT)
Dept: FAMILY MEDICINE CLINIC | Facility: CLINIC | Age: 62
End: 2022-09-22
Payer: COMMERCIAL

## 2022-09-22 DIAGNOSIS — Z23 NEED FOR COVID-19 VACCINE: Primary | ICD-10-CM

## 2022-09-22 PROCEDURE — 91312 SARSCOV2 VAC BVL 30MCG/0.3ML: CPT

## 2022-09-22 PROCEDURE — 96372 THER/PROPH/DIAG INJ SC/IM: CPT

## 2022-10-01 ENCOUNTER — IMMUNIZATIONS (OUTPATIENT)
Dept: INTERNAL MEDICINE CLINIC | Facility: CLINIC | Age: 62
End: 2022-10-01
Payer: COMMERCIAL

## 2022-10-01 DIAGNOSIS — Z23 ENCOUNTER FOR IMMUNIZATION: Primary | ICD-10-CM

## 2022-10-01 PROCEDURE — 90682 RIV4 VACC RECOMBINANT DNA IM: CPT

## 2022-10-01 PROCEDURE — 90471 IMMUNIZATION ADMIN: CPT

## 2022-10-06 ENCOUNTER — OFFICE VISIT (OUTPATIENT)
Dept: DERMATOLOGY | Facility: CLINIC | Age: 62
End: 2022-10-06
Payer: COMMERCIAL

## 2022-10-06 VITALS — WEIGHT: 154 LBS | BODY MASS INDEX: 25.66 KG/M2 | HEIGHT: 65 IN | TEMPERATURE: 98.3 F

## 2022-10-06 DIAGNOSIS — Z86.006 HISTORY OF MELANOMA IN SITU: Primary | ICD-10-CM

## 2022-10-06 DIAGNOSIS — Z12.83 SCREENING FOR MALIGNANT NEOPLASM OF SKIN: ICD-10-CM

## 2022-10-06 PROCEDURE — 99214 OFFICE O/P EST MOD 30 MIN: CPT | Performed by: DERMATOLOGY

## 2022-10-06 NOTE — PATIENT INSTRUCTIONS
HISTORY OF MELANOMA    Assessment and Plan:  Based on a thorough discussion of this condition and the management approach to it (including a comprehensive discussion of the known risks, side effects and potential benefits of treatment), the patient (family) agrees to implement the following specific plan: Follow up 6 month skin checks  The nature of sun-induced photo-aging and skin cancers is discussed  Sun avoidance, protective clothing, and the use of 30-SPF sunscreens is advised  Observe closely for skin damage/changes, and call if such occurs  Monitor for changes     What happens at follow-up? The main purpose of follow-up is to detect recurrences early (metastatic melanoma), but it also offers an opportunity to diagnose a new primary melanoma at the first possible opportunity  A second invasive melanoma occurs in 5-10% of melanoma patients and a new melanoma in situ is diagnosed in more than 20% of melanoma patients  Our practice makes the following recommendations for follow-up for patients with invasive melanoma  At-least "monthly" self-skin examinations   Routine skin checks by a board certified dermatologist  Follow-up intervals are "every 3 months" within 2 years of a new melanoma diagnosis; "every 6 months" between 2-4 years of a new melanoma diagnosis; and "annually" after 4 years of a new melanoma diagnosis  Individual patient's needs should be considered before an appropriate follow-up is offered  Provide education and support to help the patient adjust to their illness    Follow-up appointments should include:  A check of the scar where the primary melanoma was removed  Checking the regional lymph nodes  A general skin examination  A full physical examination at least annually by your primary care physician    In those with more advanced primary disease, follow-up may include:  Blood tests  Imaging: ultrasound, X-ray, CT, MRI and PET scan      Most tests are not worthwhile for patients with stage 1 or 2 melanoma unless there are signs or symptoms of disease recurrence or metastasis  No tests are necessary for healthy patients who have remained well for five years or longer after removal of their melanoma  What is the outlook for patients with melanoma? Melanoma in situ is cured by excision because it has no potential to spread around the body  The risk of spread and ultimate death from invasive melanoma depends on several factors, but the main one is the Breslow thickness of the melanoma at the time it was surgically removed  Metastases are rare for melanomas < 0 75 mm and the risk for tumours 0 75-1 mm thick is about 5%  The risk steadily increases with thickness so that melanomas > 4 mm have a risk of metastasis of about 40%  Melanoma is a potentially serious type of skin cancer, in which there is uncontrolled growth of melanocytes (pigment cells)  Melanoma is sometimes called malignant melanoma  Normal melanocytes are found in the basal layer of the epidermis (the outer layer of skin)  Melanocytes produce a protein called melanin, which protects skin cells by absorbing ultraviolet (UV) radiation  Melanocytes are found in equal numbers in black and white skin, but melanocytes in black skin produce much more melanin  People with dark brown or black skin are very much less likely to be damaged by UV radiation than those with white skin  DERMATOMYOSITIS    Assessment and Plan:  Based on a thorough discussion of this condition and the management approach to it (including a comprehensive discussion of the known risks, side effects and potential benefits of treatment), the patient (family) agrees to implement the following specific plan:  Continue to follow Rheumatology   Continue Methotrexate daily and Clobetasol for flares    What causes dermatomyositis? Dermatomyositis is a rare acquired muscle disease that is accompanied by a rash   It is one of a group of muscle diseases called inflammatory myopathies  Dermatomyositis may affect people of any race, age or sex, although it is twice as common in women than in men  The onset of the disease is most common in those aged 50-70 years  Dermatomyositis is considered one of the connective tissue diseases, like systemic sclerosis and lupus erythematosus  Dermatomyositis is thought to be caused by small vessel damage which in turn affects skin and muscle  Factors that may contribute to its development are listed below  Genetic predisposition  Underlying cancer (more likely in older people)  Autoimmune (immune reaction against self)  Infectious or toxic agents acting as triggers  Certain drugs, which include hydroxyurea, penicillamine, statins, quinidine, and phenylbutazone     What are the symptoms of dermatomyositis? The two main groups of symptoms affect the skin and muscles  In many patients, the first sign of dermatomyositis is the presence of a symptomless, itchy or burning rash  The rash often, but not always, develops before the muscle weakness  Reddish or bluish-purple patches mostly affect sun-exposed areas  A violaceous (purple) rash may also affect cheeks, nose, shoulders, upper chest, elbows, and outer thighs  Purple eyelids, which are described as heliotrope, as they resemble the heliotrope flower, Heliotropium peruvianum, which has small purple petals  A scaly scalp and thinned out hair may occur  Less commonly, there is poikiloderma, in which the skin is atrophic (pale, thin skin), red(dilated blood vessels) and brown (post-inflammatory pigmentation)  Purple papules or plaques are found on bony prominences, especially the knuckles (Gottron papules)  Ragged cuticles and prominent blood vessels on nail folds are best seen by capillaroscopy or dermoscopy  In severe cases, calcinosis can occur  It presents as hard yellow or white lumps under the skin  These usually appear on fingers or over joints    Sometimes these nodules may poke through the skin and ulcerate  The ulcers may become infected  Muscle weakness may arise at the same time as the dermatomyositis rash, or it may occur weeks, months or years later  Proximal muscles are affected, that is, those closest to the trunk (upper arms, thighs)  The first indication of myositis is when the following everyday movements become difficult  Climbing stairs or walking  Rising from a sitting or crouching position  Lifting objects  Raising arms above the shoulders, e g  combing hair  Difficulty swallowing (dysphagia)  Occasionally the affected muscles ache and become tender to touch  How do we diagnose dermatomyositis? The diagnosis of dermatomyositis is usually confirmed by the following tests  Blood test to detect raised circulating muscle enzymes: creatine kinase (CK) and sometimes aldolase, aspartate aminotransferase (AST) and lactic dehydrogenase (LDH)  Blood test to detect autoantibodies: non-specific antinuclear antibody (THELMA) is found in most patients, specific Anti-Mi-2 is found in one quarter and Anti-Radha-1 in a few, usually those who have lung disease, and are diagnostic of antisynthetase syndrome  Skin biopsy of the rash: the microscopic appearance of an interface dermatitis is similar to systemic (acute) lupus erythematosus  Biopsy of an affected muscle  Electromyography (EMG) testing  Magnetic resonance imaging (MRI) scan of muscles  In those over 60, full body examination and testing are recommended to look for underlying cancer  Cancer screenings are recommended for up to five years after the initial onset of dermatomyositis in these patients  How do we treat dermatomyositis? The primary aim of treatment is to control the skin disease and muscle disease  An oral corticosteroid such as prednisone in moderate to high dose is the mainstay of medical therapy and is given to slow down the rate of disease progression   Immunosuppressive or cytotoxic drugs may also be used including methotrexate, azathioprine, cyclophosphamide, ciclosporin, mycophenolate, high dose intravenous immunoglobulin and experimentally, biologics such as rituximab  Other important measures in the management of dermatomyositis include:  Diltiazem, a calcium channel blocker usually prescribed for high blood pressure, may reduce calcinosis  Colchicine has also been reported to reduce calcinosis  Hydroxychloroquine may reduce the photosensitive rash  Avoid excessive sun exposure and use sun protection measures, including sunscreens, to minimize the harmful effects of the sun on already damaged and photosensitive skin  Bedrest for those with severe inflammation of muscles  Physical therapy and activity to keep the muscles and joints moving  Avoid eating food before bedtime and raise the bed head for those with difficulty swallowing    Most patients will require treatment throughout their lifetime, but dermatomyositis completely resolves in about one-in-five patients  Patients who have a disease affecting their heart or lungs, or who also have underlying cancer often require more help from a multidisciplinary team      DERMATOFIBROMA    Assessment and Plan:  Based on a thorough discussion of this condition and the management approach to it (including a comprehensive discussion of the known risks, side effects and potential benefits of treatment), the patient (family) agrees to implement the following specific plan:  Reassured benign    Assessment and Plan:  A dermatofibroma is a common benign fibrous nodule that most often arises on the skin of the lower legs  A dermatofibroma is also called a "cutaneous fibrous histiocytoma "  Dermatofibromas occur at all ages and in people of every ethnicity  They are more common in women than in men  It is not clear if dermatofibroma is a reactive process or if it is a neoplasm  The lesions are made up of proliferating fibroblasts   Histiocytes may also be involved  They are sometimes attributed to an insect bite or ingrownhair or local trauma, but not consistently  They may be more numerous in patients with altered immunity  Dermatofibromas most often occur on the legs and arms, but may also arise on the trunk or any site of the body  Typical clinical features include the following:  People may have 1 or up to 15 lesions  Size varies from 0 5-1 5 cm diameter; most lesions are 7-10 mm diameter  They are firm nodules tethered to the skin surface and mobile over subcutaneous tissue  The skin "dimples" on pinching the lesion  Color may be pink to light brown in white skin, and dark brown to black in dark skin; some appear paler in the center  They do not usually cause symptoms, but they are sometimes painful or itchy  Because they are often raised lesions, they may be traumatized, for example by a razor  Occasionally dozens may erupt within a few months, usually in the setting of immunosuppression (for example autoimmune disease, cancer or certain medications)  Dermatofibroma does not give rise to cancer  However, occasionally, it may be mistaken for dermatofibrosarcoma or desmoplastic melanoma  A dermatofibroma is harmless and seldom causes any symptoms  Usually, only reassurance is needed  If it is nuisance or causing concern, the lesion can be removed surgically, resulting in a scar that is, by definition, usually longer in diameter than the widest portion of the dermatofibroma  Cryotherapy, shave biopsy and laser surgery are rarely completely successful  Skin punch biopsy or incisional biopsy may be undertaken if there is an atypical feature such as recent enlargement, ulceration, or asymmetrical structures and colours on dermatoscopy

## 2022-10-06 NOTE — PROGRESS NOTES
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:  6 month full skin check -hx breast ca brca-2 positive    Concern #2:  Flare of dermatomyositis     Current Medications:    Current Outpatient Medications:     Calcium-Magnesium-Vitamin D (CALCIUM 500 PO), Take 1,000 mg by mouth daily  , Disp: , Rfl:     cholecalciferol (VITAMIN D3) 1,000 units tablet, Take 1,000 Units by mouth daily, Disp: , Rfl:     clobetasol (TEMOVATE) 0 05 % cream, as needed  , Disp: , Rfl:     folic acid (FOLVITE) 1 mg tablet, Take 1 tablet (1 mg total) by mouth daily, Disp: 90 tablet, Rfl: 1    Glycerin-Hypromellose- (DRY EYE RELIEF DROPS OP), Apply to eye, Disp: , Rfl:     methotrexate 2 5 mg tablet, Take 4 tablets (10 mg total) by mouth once a week, Disp: 60 tablet, Rfl: 1    multivitamin (THERAGRAN) TABS, Take 1 tablet by mouth daily  , Disp: , Rfl:     Cholecalciferol (VITAMIN D) 2000 UNITS CAPS, Take 2,000 Units/day by mouth  (Patient not taking: No sig reported), Disp: , Rfl:     Cholecalciferol (Vitamin D3 Super Strength) 50 MCG (2000 UT) CAPS, Take by mouth (Patient not taking: No sig reported), Disp: , Rfl:     CONSTITUTIONAL:   Vitals:    10/06/22 1302   Temp: 98 3 °F (36 8 °C)   TempSrc: Temporal   Weight: 69 9 kg (154 lb)   Height: 5' 5" (1 651 m)           Specific Alerts:    Have you been seen by a [facility]ologist in the last 3 years? YES    Are you pregnant or planning to become pregnant? No    Are you currently or planning to be nursing or breast feeding? No    No Known Allergies    May we call your Preferred Phone number to discuss your specific medical information? YES    May we leave a detailed message that includes your specific medical information? YES    Have you traveled outside of the [facility] in the past 3 months? No    Do you currently have a pacemaker or defibrillator?  No    Do you have any artificial heart valves, joints, plates, screws, rods, stents, pins, etc? No   - If Yes, were any placed within the last 2 years? Do you require any medications prior to a surgical procedure? No   - If Yes, for which procedure? - If Yes, what medications to you require? Are you taking any medications that cause you to bleed more easily ("blood thinners") No    Have you ever experienced a rapid heartbeat with epinephrine? No    Have you ever been treated with "gold" (gold sodium thiomalate) therapy? No    [facility] can help with wrinkles, "laugh lines," facial volume loss, "double chin," "love handles," age spots, and more  Are you interested in learning today about some of the skin enhancement procedures that we offer? (If Yes, please provide more detail) No    Review of Systems:  Have you recently had or currently have any of the following?     · Fever or chills: No  · Night Sweats: No  · Headaches: No  · Weight Gain: No  · Weight Loss: No  · Blurry Vision: No  · Nausea: No  · Vomiting: No  · Diarrhea: No  · Blood in Stool: No  · Abdominal Pain: No  · Itchy Skin: YES  · Painful Joints: No  · Swollen Joints: No  · Muscle Pain: No  · Irregular Mole: No  · Sun Burn: No  · Dry Skin: No  · Skin Color Changes: No  · Scar or Keloid: No  · Cold Sores/Fever Blisters: No  · Bacterial Infections/MRSA: No  · Anxiety: No  · Depression: No  · Suicidal or Homicidal Thoughts: No      PSYCH: Normal mood and affect  EYES: Normal conjunctiva  ENT: Normal lips and oral mucosa  CARDIOVASCULAR: No edema  RESPIRATORY: Normal respirations  HEME/LYMPH/IMMUNO:  No regional lymphadenopathy except as noted below in ASSESSMENT AND PLAN BY DIAGNOSIS    FULL ORGAN SYSTEM SKIN EXAM (SKIN)  Hair, Scalp, Ears, Face Normal except as noted below in Assessment   Neck, Cervical Chain Nodes Normal except as noted below in Assessment   Right Arm/Hand/Fingers Normal except as noted below in Assessment   Left Arm/Hand/Fingers Normal except as noted below in Assessment   Chest/Breasts/Axillae Viewed areas Normal except as noted below in Assessment   Abdomen, Umbilicus Normal except as noted below in Assessment   Back/Spine Normal except as noted below in Assessment   Groin/Genitalia/Buttocks Viewed areas Normal except as noted below in Assessment   Right Leg, Foot, Toes Normal except as noted below in Assessment   Left Leg, Foot, Toes Normal except as noted below in Assessment       HISTORY OF MELANOMA    Physical Exam:   Anatomic Location Affected:  Mid upper left chest    Morphological Description of Scar:  Well healed    Year Treated: 2014   TNM Classification: N0T0   Suspected Recurrence: no   Regional adenopathy: no    Patient with history of breast cancer       Assessment and Plan:  Based on a thorough discussion of this condition and the management approach to it (including a comprehensive discussion of the known risks, side effects and potential benefits of treatment), the patient (family) agrees to implement the following specific plan:   Follow up 6 month skin checks   The nature of sun-induced photo-aging and skin cancers is discussed  Sun avoidance, protective clothing, and the use of 30-SPF sunscreens is advised  Observe closely for skin damage/changes, and call if such occurs   Monitor for changes     What happens at follow-up? The main purpose of follow-up is to detect recurrences early (metastatic melanoma), but it also offers an opportunity to diagnose a new primary melanoma at the first possible opportunity  A second invasive melanoma occurs in 5-10% of melanoma patients and a new melanoma in situ is diagnosed in more than 20% of melanoma patients  Our practice makes the following recommendations for follow-up for patients with invasive melanoma     At-least "monthly" self-skin examinations    Routine skin checks by a board certified dermatologist   Follow-up intervals are "every 3 months" within 2 years of a new melanoma diagnosis; "every 6 months" between 2-4 years of a new melanoma diagnosis; and "annually" after 4 years of a new melanoma diagnosis   Individual patient's needs should be considered before an appropriate follow-up is offered  Brenda Courser Provide education and support to help the patient adjust to their illness    Follow-up appointments should include:   A check of the scar where the primary melanoma was removed   Checking the regional lymph nodes   A general skin examination   A full physical examination at least annually by your primary care physician    In those with more advanced primary disease, follow-up may include:   Blood tests   Imaging: ultrasound, X-ray, CT, MRI and PET scan  Most tests are not worthwhile for patients with stage 1 or 2 melanoma unless there are signs or symptoms of disease recurrence or metastasis  No tests are necessary for healthy patients who have remained well for five years or longer after removal of their melanoma  What is the outlook for patients with melanoma?  Melanoma in situ is cured by excision because it has no potential to spread around the body   The risk of spread and ultimate death from invasive melanoma depends on several factors, but the main one is the Breslow thickness of the melanoma at the time it was surgically removed   Metastases are rare for melanomas < 0 75 mm and the risk for tumours 0 75-1 mm thick is about 5%  The risk steadily increases with thickness so that melanomas > 4 mm have a risk of metastasis of about 40%  Melanoma is a potentially serious type of skin cancer, in which there is uncontrolled growth of melanocytes (pigment cells)  Melanoma is sometimes called malignant melanoma  Normal melanocytes are found in the basal layer of the epidermis (the outer layer of skin)  Melanocytes produce a protein called melanin, which protects skin cells by absorbing ultraviolet (UV) radiation   Melanocytes are found in equal numbers in black and white skin, but melanocytes in black skin produce much more melanin  People with dark brown or black skin are very much less likely to be damaged by UV radiation than those with white skin  DERMATOMYOSITIS    Physical Exam:   Anatomic Location Affected:  Trunk and face   Morphological Description:     Pertinent Positives:   Pertinent Negatives: Additional History of Present Condition:  Patient states having a flare up  Taking methotrexate and when flares applying clobetasol cream   Was on plaquenil and stopped due to discoloration  Patient states recent lab showed antibody for Sjogren    Has been on hydroxychloroquine  Assessment and Plan:  Based on a thorough discussion of this condition and the management approach to it (including a comprehensive discussion of the known risks, side effects and potential benefits of treatment), the patient (family) agrees to implement the following specific plan:   Continue to follow Rheumatology    Continue Methotrexate daily and Clobetasol for flares as recommended by kinsey [place] [facility] precautions    What causes dermatomyositis? Dermatomyositis is a rare acquired muscle disease that is accompanied by a rash  It is one of a group of muscle diseases called inflammatory myopathies  Dermatomyositis may affect people of any race, age or sex, although it is twice as common in women than in men  The onset of the disease is most common in those aged 50-70 years  Dermatomyositis is considered one of the connective tissue diseases, like systemic sclerosis and lupus erythematosus  Dermatomyositis is thought to be caused by small vessel damage which in turn affects skin and muscle  Factors that may contribute to its development are listed below     Genetic predisposition   Underlying cancer (more likely in older people)   Autoimmune (immune reaction against self)   Infectious or toxic agents acting as triggers   Certain drugs, which include hydroxyurea, penicillamine, statins, quinidine, and phenylbutazone     What are the symptoms of dermatomyositis? The two main groups of symptoms affect the skin and muscles  In many patients, the first sign of dermatomyositis is the presence of a symptomless, itchy or burning rash  The rash often, but not always, develops before the muscle weakness   Reddish or bluish-purple patches mostly affect sun-exposed areas   A violaceous (purple) rash may also affect cheeks, nose, shoulders, upper chest, elbows, and outer thighs   Purple eyelids, which are described as heliotrope, as they resemble the heliotrope flower, Heliotropium peruvianum, which has small purple petals   A scaly scalp and thinned out hair may occur   Less commonly, there is poikiloderma, in which the skin is atrophic (pale, thin skin), red(dilated blood vessels) and brown (post-inflammatory pigmentation)   Purple papules or plaques are found on bony prominences, especially the knuckles (Gottron papules)   Ragged cuticles and prominent blood vessels on nail folds are best seen by capillaroscopy or dermoscopy  In severe cases, calcinosis can occur   It presents as hard yellow or white lumps under the skin   These usually appear on fingers or over joints   Sometimes these nodules may poke through the skin and ulcerate   The ulcers may become infected  Muscle weakness may arise at the same time as the dermatomyositis rash, or it may occur weeks, months or years later  Proximal muscles are affected, that is, those closest to the trunk (upper arms, thighs)  The first indication of myositis is when the following everyday movements become difficult   Climbing stairs or walking   Rising from a sitting or crouching position   Lifting objects   Raising arms above the shoulders, e g  combing hair   Difficulty swallowing (dysphagia)  Occasionally the affected muscles ache and become tender to touch      How do we diagnose dermatomyositis? The diagnosis of dermatomyositis is usually confirmed by the following tests   Blood test to detect raised circulating muscle enzymes: creatine kinase (CK) and sometimes aldolase, aspartate aminotransferase (AST) and lactic dehydrogenase (LDH)   Blood test to detect autoantibodies: non-specific antinuclear antibody (THELMA) is found in most patients, specific Anti-Mi-2 is found in one quarter and Anti-Radha-1 in a few, usually those who have lung disease, and are diagnostic of antisynthetase syndrome   Skin biopsy of the rash: the microscopic appearance of an interface dermatitis is similar to systemic (acute) lupus erythematosus   Biopsy of an affected muscle   Electromyography (EMG) testing   Magnetic resonance imaging (MRI) scan of muscles  In those over 60, full body examination and testing are recommended to look for underlying cancer  Cancer screenings are recommended for up to five years after the initial onset of dermatomyositis in these patients  How do we treat dermatomyositis? The primary aim of treatment is to control the skin disease and muscle disease  An oral corticosteroid such as prednisone in moderate to high dose is the mainstay of medical therapy and is given to slow down the rate of disease progression  Immunosuppressive or cytotoxic drugs may also be used including methotrexate, azathioprine, cyclophosphamide, ciclosporin, mycophenolate, high dose intravenous immunoglobulin and experimentally, biologics such as rituximab   Other important measures in the management of dermatomyositis include:   Diltiazem, a calcium channel blocker usually prescribed for high blood pressure, may reduce calcinosis   Colchicine has also been reported to reduce calcinosis   Hydroxychloroquine may reduce the photosensitive rash   Avoid excessive sun exposure and use sun protection measures, including sunscreens, to minimize the harmful effects of the sun on already damaged and photosensitive skin   Bedrest for those with severe inflammation of muscles   Physical therapy and activity to keep the muscles and joints moving   Avoid eating food before bedtime and raise the bed head for those with difficulty swallowing    Most patients will require treatment throughout their lifetime, but dermatomyositis completely resolves in about one-in-five patients  Patients who have a disease affecting their heart or lungs, or who also have underlying cancer often require more help from a multidisciplinary team      DERMATOFIBROMA    Physical Exam:   Anatomic Location Affected:  Right leg    Morphological Description:  3 firm dermal nodules    Pertinent Positives:   Pertinent Negatives:    Assessment and Plan:  Based on a thorough discussion of this condition and the management approach to it (including a comprehensive discussion of the known risks, side effects and potential benefits of treatment), the patient (family) agrees to implement the following specific plan:   Reassured benign    Assessment and Plan:  A dermatofibroma is a common benign fibrous nodule that most often arises on the skin of the lower legs  A dermatofibroma is also called a "cutaneous fibrous histiocytoma "  Dermatofibromas occur at all ages and in people of every ethnicity  They are more common in women than in men  It is not clear if dermatofibroma is a reactive process or if it is a neoplasm  The lesions are made up of proliferating fibroblasts  Histiocytes may also be involved  They are sometimes attributed to an insect bite or ingrownhair or local trauma, but not consistently  They may be more numerous in patients with altered immunity  Dermatofibromas most often occur on the legs and arms, but may also arise on the trunk or any site of the body  Typical clinical features include the following:   People may have 1 or up to 15 lesions     Size varies from 0 5-1 5 cm diameter; most lesions are 7-10 mm diameter   They are firm nodules tethered to the skin surface and mobile over subcutaneous tissue   The skin "dimples" on pinching the lesion   Color may be pink to light brown in white skin, and dark brown to black in dark skin; some appear paler in the center   They do not usually cause symptoms, but they are sometimes painful or itchy   Because they are often raised lesions, they may be traumatized, for example by a razor   Occasionally dozens may erupt within a few months, usually in the setting of immunosuppression (for example autoimmune disease, cancer or certain medications)   Dermatofibroma does not give rise to cancer  However, occasionally, it may be mistaken for dermatofibrosarcoma or desmoplastic melanoma  A dermatofibroma is harmless and seldom causes any symptoms  Usually, only reassurance is needed  If it is nuisance or causing concern, the lesion can be removed surgically, resulting in a scar that is, by definition, usually longer in diameter than the widest portion of the dermatofibroma  Cryotherapy, shave biopsy and laser surgery are rarely completely successful  Skin punch biopsy or incisional biopsy may be undertaken if there is an atypical feature such as recent enlargement, ulceration, or asymmetrical structures and colours on dermatoscopy      Scribe Attestation    I,:  Shelton Cruz am acting as a scribe while in the presence of the attending physician :       I,:  Shashank Oropeza MD personally performed the services described in this documentation    as scribed in my presence :

## 2022-10-10 ENCOUNTER — APPOINTMENT (OUTPATIENT)
Dept: LAB | Age: 62
End: 2022-10-10
Payer: COMMERCIAL

## 2022-10-10 DIAGNOSIS — Z15.09 BRCA2 GENE MUTATION POSITIVE: ICD-10-CM

## 2022-10-10 DIAGNOSIS — Z15.01 BRCA2 GENE MUTATION POSITIVE: ICD-10-CM

## 2022-10-10 PROCEDURE — 86304 IMMUNOASSAY TUMOR CA 125: CPT

## 2022-10-10 PROCEDURE — 36415 COLL VENOUS BLD VENIPUNCTURE: CPT

## 2022-10-11 LAB — CANCER AG125 SERPL-ACNC: 8.1 U/ML (ref 0–30)

## 2022-10-12 PROBLEM — Z13.6 SCREENING FOR CARDIOVASCULAR CONDITION: Status: RESOLVED | Noted: 2021-01-10 | Resolved: 2022-10-12

## 2022-10-12 PROBLEM — Z12.11 SCREENING FOR MALIGNANT NEOPLASM OF COLON: Status: RESOLVED | Noted: 2021-01-10 | Resolved: 2022-10-12

## 2022-10-19 ENCOUNTER — TELEPHONE (OUTPATIENT)
Dept: OBGYN CLINIC | Facility: HOSPITAL | Age: 62
End: 2022-10-19

## 2022-10-19 DIAGNOSIS — Z79.899 HIGH RISK MEDICATION USE: ICD-10-CM

## 2022-10-19 DIAGNOSIS — M33.90 DERMATOMYOSITIS (HCC): ICD-10-CM

## 2022-10-19 RX ORDER — FOLIC ACID 1 MG/1
1 TABLET ORAL DAILY
Qty: 90 TABLET | Refills: 1 | Status: SHIPPED | OUTPATIENT
Start: 2022-10-19

## 2022-10-19 NOTE — TELEPHONE ENCOUNTER
Please let her know I placed lab orders for her to do in Jan and I refilled her meds; also I believe she is scheduled as a new patient with Dr Eddy Cerna in august, and can be seen likely much sooner as a f/u

## 2022-10-19 NOTE — TELEPHONE ENCOUNTER
Caller: Charisse Hernandez    Doctor: Minh Jones    Reason for call: New, recently seen Dr Chris Buck  Concern about Lab work for Jan 2023 and running low on prescriptions would like a refill   Would also like to be added to cancealltion list  Patient sees Dr Chris Buck every 6 months    Call back#: [phone]

## 2022-10-20 NOTE — TELEPHONE ENCOUNTER
Spoke with patient and relayed message, patient verbalized understanding  Pt was rescheduled for a follow up in May

## 2022-11-04 ENCOUNTER — OFFICE VISIT (OUTPATIENT)
Dept: GYNECOLOGIC ONCOLOGY | Facility: CLINIC | Age: 62
End: 2022-11-04

## 2022-11-04 ENCOUNTER — DOCUMENTATION (OUTPATIENT)
Dept: HEMATOLOGY ONCOLOGY | Facility: CLINIC | Age: 62
End: 2022-11-04

## 2022-11-04 VITALS
DIASTOLIC BLOOD PRESSURE: 74 MMHG | RESPIRATION RATE: 16 BRPM | OXYGEN SATURATION: 100 % | WEIGHT: 156 LBS | BODY MASS INDEX: 25.99 KG/M2 | HEART RATE: 56 BPM | HEIGHT: 65 IN | SYSTOLIC BLOOD PRESSURE: 122 MMHG | TEMPERATURE: 97 F

## 2022-11-04 DIAGNOSIS — Z85.820 HISTORY OF MELANOMA: ICD-10-CM

## 2022-11-04 DIAGNOSIS — Z15.01 BRCA2 GENE MUTATION POSITIVE: Primary | ICD-10-CM

## 2022-11-04 DIAGNOSIS — Z15.09 BRCA2 GENE MUTATION POSITIVE: Primary | ICD-10-CM

## 2022-11-04 DIAGNOSIS — Z85.3 HISTORY OF BREAST CANCER: ICD-10-CM

## 2022-11-04 NOTE — ASSESSMENT & PLAN NOTE
65-year-old with pathogenic BRCA2 mutation with personal history of b/l breast cancer and melanoma in situ  She is s/p prophylactic TLH, BSO  Her clinical exam is without evidence of peritoneal cancer   normal       Return to the office in 6 months for continued BRCA surveillance with a pre-visit   Referral to surgical oncology for patient's concern of left axillary mass  There is no appreciated mass on exam  Will defer to their expertise for additional work-up

## 2022-11-04 NOTE — PROGRESS NOTES
Assessment/Plan:    Problem List Items Addressed This Visit        Other    BRCA2 gene mutation positive - Primary     51-year-old with pathogenic BRCA2 mutation with personal history of b/l breast cancer and melanoma in situ  She is s/p prophylactic TLH, BSO  Her clinical exam is without evidence of peritoneal cancer   normal       Return to the office in 6 months for continued BRCA surveillance with a pre-visit   Referral to surgical oncology for patient's concern of left axillary mass  There is no appreciated mass on exam  Will defer to their expertise for additional work-up  Relevant Orders        Ambulatory referral to Surgical Oncology    History of melanoma    History of breast cancer            CHIEF COMPLAINT:   BRCA surveillance    Problem:  1  BRCA 2 mutation  2  Bilateral breast cancer, stage IIA on left and stage IA on right, ER/AK positive, HER2 negative  3  Melanoma in situ of the chest wall   4  Dermatomyositis      Previous therapy:  Oncology History   Bilateral malignant neoplasm of breast in female, estrogen receptor positive ([facility] [address] )    Initial Diagnosis    Bilateral malignant neoplasm of breast in female, estrogen receptor positive [facility])      Surgery    Double mastectomy, reconstructive surgery      Chemotherapy    Chemotherapy with Taxotere and Cytoxan for 4 cycles followed by [facility] from 2008 thru 2013  Patient ID: Cas Mar is a 63 y o  female  who presents to the office for BRCA surveillance  No vaginal bleeding, abdominal/pelvic pain  Normal bowel and bladder function  Patient notes persistent left axillary "mass" and tenderness  Quality of life is good  The following portions of the patient's history were reviewed and updated as appropriate: allergies, current medications, past medical history, past surgical history and problem list     Review of Systems   Constitutional: Negative  HENT: Negative  Eyes: Negative      Respiratory: Negative  Cardiovascular: Negative  Gastrointestinal: Negative  Genitourinary: Negative  Musculoskeletal: Negative  Skin: Negative  Neurological: Negative  Psychiatric/Behavioral: Negative  Current Outpatient Medications   Medication Sig Dispense Refill   • Calcium-Magnesium-Vitamin D (CALCIUM 500 PO) Take 1,000 mg by mouth daily  • cholecalciferol (VITAMIN D3) 1,000 units tablet Take 1,000 Units by mouth daily     • clobetasol (TEMOVATE) 0 05 % cream as needed       • folic acid (FOLVITE) 1 mg tablet Take 1 tablet (1 mg total) by mouth daily 90 tablet 1   • Glycerin-Hypromellose- (DRY EYE RELIEF DROPS OP) Apply to eye     • methotrexate 2 5 mg tablet Take 4 tablets (10 mg total) by mouth once a week 60 tablet 1   • multivitamin (THERAGRAN) TABS Take 1 tablet by mouth daily  • Cholecalciferol (VITAMIN D) 2000 UNITS CAPS Take 2,000 Units/day by mouth  (Patient not taking: Reported on 11/4/2022)     • Cholecalciferol (Vitamin D3 Super Strength) 50 MCG (2000 UT) CAPS Take by mouth (Patient not taking: No sig reported)       No current facility-administered medications for this visit  Objective:    Blood pressure 122/74, pulse 56, temperature (!) 97 °F (36 1 °C), temperature source Tympanic, resp  rate 16, height 5' 5" (1 651 m), weight 70 8 kg (156 lb), SpO2 100 %  Body mass index is 25 96 kg/m²  Body surface area is 1 78 meters squared  Physical Exam  Vitals reviewed  Exam conducted with a chaperone present  Constitutional:       General: She is not in acute distress  Appearance: Normal appearance  She is not ill-appearing  HENT:      Head: Normocephalic and atraumatic  Mouth/Throat:      Mouth: Mucous membranes are moist    Eyes:      General:         Right eye: No discharge  Left eye: No discharge        Conjunctiva/sclera: Conjunctivae normal    Pulmonary:      Effort: Pulmonary effort is normal    Chest:          Comments: Left axilla examined and palpated  No discrete mass  Area patient appreciates is palpable muscle  Abdominal:      Palpations: Abdomen is soft  There is no mass  Tenderness: There is no abdominal tenderness  Hernia: No hernia is present  Genitourinary:     Comments: The external female genitalia is normal  The bartholin's, uretheral and skenes glands are normal  The urethral meatus is normal (midline with no lesions)  Anus without fissure or lesion  Speculum exam reveals a grossly normal vagina  No masses, lesions,discharge or bleeding  No significant cystocele or rectocele noted  Bimanual exam notes a surgical absent cervix, uterus and adnexal structures  No masses or fullness  Bladder is without fullness, mass or tenderness  Musculoskeletal:      Right lower leg: No edema  Left lower leg: No edema  Skin:     General: Skin is warm and dry  Coloration: Skin is not jaundiced  Findings: No rash  Neurological:      General: No focal deficit present  Mental Status: She is alert and oriented to person, place, and time  Cranial Nerves: No cranial nerve deficit  Sensory: No sensory deficit  Motor: No weakness  Gait: Gait normal    Psychiatric:         Mood and Affect: Mood normal          Behavior: Behavior normal          Thought Content:  Thought content normal          Judgment: Judgment normal             Trend:  Lab Results   Component Value Date     8 1 10/10/2022     9 4 04/11/2022     9 2 10/11/2021     9 4 09/08/2021     6 4 04/07/2021     6 6 03/18/2021     8 2 10/05/2020     7 6 09/09/2020     8 2 03/02/2020     6 9 09/03/2019     7 1 03/04/2019     5 9 09/17/2018     6 0 02/12/2018     6 7 09/11/2017     7 2 02/27/2017     7 3 08/31/2016     6 9 03/08/2016     8 1 09/15/2015     8 0 04/20/2015     8 5 09/29/2014     8 2 04/09/2014

## 2022-11-15 ENCOUNTER — ESTABLISHED COMPREHENSIVE EXAM (OUTPATIENT)
Dept: URBAN - METROPOLITAN AREA CLINIC 6 | Facility: CLINIC | Age: 62
End: 2022-11-15

## 2022-11-15 DIAGNOSIS — Z01.00: ICD-10-CM

## 2022-11-15 PROCEDURE — 92014 COMPRE OPH EXAM EST PT 1/>: CPT

## 2022-11-15 PROCEDURE — 92015 DETERMINE REFRACTIVE STATE: CPT

## 2022-11-15 ASSESSMENT — TONOMETRY
OD_IOP_MMHG: 14
OS_IOP_MMHG: 13

## 2022-11-15 ASSESSMENT — VISUAL ACUITY
OS_CC: J1
OD_CC: J1
OD_CC: 20/20-1
OS_CC: 20/25-1

## 2022-12-20 ENCOUNTER — CONSULT (OUTPATIENT)
Dept: SURGICAL ONCOLOGY | Facility: CLINIC | Age: 62
End: 2022-12-20

## 2022-12-20 VITALS
DIASTOLIC BLOOD PRESSURE: 70 MMHG | SYSTOLIC BLOOD PRESSURE: 118 MMHG | TEMPERATURE: 97.9 F | BODY MASS INDEX: 25.33 KG/M2 | HEART RATE: 73 BPM | HEIGHT: 65 IN | OXYGEN SATURATION: 100 % | RESPIRATION RATE: 18 BRPM | WEIGHT: 152 LBS

## 2022-12-20 DIAGNOSIS — C50.012 MALIGNANT NEOPLASM OF NIPPLE OF BOTH BREASTS IN FEMALE, ESTROGEN RECEPTOR POSITIVE (HCC): ICD-10-CM

## 2022-12-20 DIAGNOSIS — R59.9 ENLARGED LYMPH NODE: Primary | ICD-10-CM

## 2022-12-20 DIAGNOSIS — Z85.3 HISTORY OF BREAST CANCER: ICD-10-CM

## 2022-12-20 DIAGNOSIS — Z15.09 BRCA2 GENE MUTATION POSITIVE: ICD-10-CM

## 2022-12-20 DIAGNOSIS — Z15.01 BRCA2 GENE MUTATION POSITIVE: ICD-10-CM

## 2022-12-20 DIAGNOSIS — Z17.0 MALIGNANT NEOPLASM OF NIPPLE OF BOTH BREASTS IN FEMALE, ESTROGEN RECEPTOR POSITIVE (HCC): ICD-10-CM

## 2022-12-20 DIAGNOSIS — C50.011 MALIGNANT NEOPLASM OF NIPPLE OF BOTH BREASTS IN FEMALE, ESTROGEN RECEPTOR POSITIVE (HCC): ICD-10-CM

## 2022-12-20 NOTE — PROGRESS NOTES
Surgical Oncology Follow Up       [address]  [facility] [place]  [address] [place] [address]    Ani Gone  1960  [id number]  [address] [facility] [facility]  [facility] [place]  [address] [facility] [place] [address]    Chief Complaint   Patient presents with   • Consult     BRCA2 Gene Mutation       Assessment/Plan:  1  Enlarged lymph node  - 1 month follow up  - US Breast Axilla Left; Future    2  BRCA2 gene mutation positive  - Ambulatory referral to Surgical Oncology  - US Breast Axilla Left; Future    3  History of breast cancer    4  Malignant neoplasm of nipple of both breasts in female, estrogen receptor positive ([facility] [address] )      Discussion/Summary: Patient is a 82-year-old female presenting today for a consult for a "ridge-like" lump under the left axilla  She has a history of bilateral breast cancer diagnosed in 2008, melanoma in situ of the chest wall, and pathogenic BRCA2 mutation  Pathology revealed invasive carcinoma ER/MS positive, HER2 negative and she underwent bilateral mastectomies with flap reconstruction, chemotherapy, 5 years of aromatase inhibitor therapy  She was seen by GYN ONC and earlier in November of this year and patient expressed concerns of a left axillary mass  She was then referred to our department  On clinical breast exam, area of concern in the left axilla was slightly campo and there is suspected lymphadenopathy present  I have ordered a diagnostic u/s of the left axilla at this time  I informed the patient I will call her and speak with her once we obtain the results  There were no further concerns on her CBE  I will tentatively plan to see her back in 1 month  She was instructed to call with any questions or concerns prior to this time  All questions were answered today          History of Present Illness:     Oncology History   Bilateral malignant neoplasm of breast in female, estrogen receptor positive ([facility] [address] )    Initial Diagnosis    Bilateral malignant neoplasm of breast in female, estrogen receptor positive [facility])      Surgery    Double mastectomy, reconstructive surgery      Chemotherapy    Chemotherapy with Taxotere and Cytoxan for 4 cycles followed by Femara from 2008 thru 2013           -Interval History: Patient is a 72-year-old female with a history of bilateral breast cancer and BRCA2 mutation presenting today for lump under left axilla  She was treated for bilateral breast [facility] in 2008  Menarche was 17years old  She has had 2 pregnancies and 2 live births  Age first child was born was 36years old  She is currently postmenopausal, age of menopause was 41years old when she had a hysterectomy  She is taken hormone replacement therapy back in 2007  Family history of ovarian cancer in her mother at the age of 61  Her sister and daughter have a history of BRCA2 mutation but no cancer  Her paternal aunt was diagnosed with breast and ovarian cancer in her [de-identified]  Her maternal grandmother has a history of melanoma in her [de-identified]  And 2 uncles were diagnosed with prostate cancer in their 62s  She is not of Ashkenazi Religion descent  Review of Systems:  Review of Systems   Constitutional: Negative for activity change, appetite change, fatigue and unexpected weight change  Respiratory: Negative for cough and shortness of breath  Cardiovascular: Negative for chest pain  Gastrointestinal: Negative for abdominal pain, diarrhea, nausea and vomiting  Endocrine: Negative for heat intolerance  Musculoskeletal: Negative for arthralgias, back pain and myalgias  Skin: Negative for rash  Neurological: Negative for weakness and headaches  Hematological: Positive for adenopathy         Patient Active Problem List   Diagnosis   • Bilateral malignant neoplasm of breast in female, estrogen receptor positive ([facility] [address] )   • Chest wall mass   • BRCA2 gene mutation positive   • [facility] )   • Encounter for hepatitis C screening test for low risk patient   • Vitamin D deficiency   • Abnormal laboratory test   • Leukopenia   • Lung nodule   • History of melanoma   • Flu-like symptoms   • History of breast cancer   • Wellness examination   • Cervical adenopathy   • Abnormal CT scan   • At increased risk of exposure to COVID-19 virus   • Ground glass opacity present on imaging of lung   • Carrier of high risk cancer gene mutation   • Dyspnea on exertion   • Overweight (BMI 25 0-29  9)   • Ocular migraine   • Annual physical exam     Past Medical History:   Diagnosis Date   • Benign neoplasm of lung     Right lung lobe benign  Removed 2010  • Breast cancer [facility])    • Breathing difficulty 2011    no breathing difficulties but patient reported drop in BP during anesthesia for appendectomy and was advised to have cardiologist consult   • Cancer [facility]) 2008   • Depression    • Dermatomyositis ([facility] [address] )    • Lung nodule    • Wears glasses      Past Surgical History:   Procedure Laterality Date   • APPENDECTOMY      2011   • BREAST SURGERY      Bilateral Mastectomy   • COLONOSCOPY N/A 1/26/2016    Procedure: COLONOSCOPY;  Surgeon: Nawaf Desir MD;  [facility];   Service:    • HYSTERECTOMY      2007   • LUNG BIOPSY     • MASS EXCISION Left 7/18/2019    Procedure: MID BACK MASS EXCISION BIOPSY;  Surgeon: Jen Purdy MD;  [facility];  Service: General   • MASTECTOMY      reconstruction 2008   • OOPHORECTOMY       Family History   Problem Relation Age of Onset   • Cancer Mother         Ovarian   • Ovarian cancer Mother    • No Known Problems Father    • BRCA2 Positive Sister    • Heart disease Sister         post partum cardiomyopathy, pacer   • Ovarian cancer Paternal Aunt    • Breast cancer Paternal Aunt    • Cancer Paternal Aunt         Breast/Ovarian     Social History     Socioeconomic History   • Marital status: /Civil Union     Spouse name: Not on file   • Number of children: Not on file   • Years of education: Not on file   • Highest education level: Not on file   Occupational History   • Not on file   Tobacco Use   • Smoking status: Never   • Smokeless tobacco: Never   Substance and Sexual Activity   • Alcohol use: No   • Drug use: No   • Sexual activity: Yes     Partners: Male     Birth control/protection: None   Other Topics Concern   • Not on file   Social History Narrative   • Not on file     Social Determinants of Health     Financial Resource Strain: Not on file   Food Insecurity: Not on file   Transportation Needs: Not on file   Physical Activity: Not on file   Stress: Not on file   Social Connections: Not on file   Intimate Partner Violence: Not on file   Housing Stability: Not on file       Current Outpatient Medications:   •  Calcium-Magnesium-Vitamin D (CALCIUM 500 PO), Take 1,000 mg by mouth daily  , Disp: , Rfl:   •  cholecalciferol (VITAMIN D3) 1,000 units tablet, Take 1,000 Units by mouth daily, Disp: , Rfl:   •  clobetasol (TEMOVATE) 0 05 % cream, as needed  , Disp: , Rfl:   •  folic acid (FOLVITE) 1 mg tablet, Take 1 tablet (1 mg total) by mouth daily, Disp: 90 tablet, Rfl: 1  •  Glycerin-Hypromellose- (DRY EYE RELIEF DROPS OP), Apply to eye, Disp: , Rfl:   •  methotrexate 2 5 mg tablet, Take 4 tablets (10 mg total) by mouth once a week, Disp: 60 tablet, Rfl: 1  •  multivitamin (THERAGRAN) TABS, Take 1 tablet by mouth daily  , Disp: , Rfl:   •  Cholecalciferol (VITAMIN D) 2000 UNITS CAPS, Take 2,000 Units/day by mouth  (Patient not taking: Reported on 11/4/2022), Disp: , Rfl:   •  Cholecalciferol (Vitamin D3 Super Strength) 50 MCG (2000 UT) CAPS, Take by mouth (Patient not taking: No sig reported), Disp: , Rfl:   No Known Allergies  Vitals:    12/20/22 1256   BP: 118/70   Pulse: 73   Resp: 18   Temp: 97 9 °F (36 6 °C)   SpO2: 100%       Physical Exam  Constitutional:       General: She is not in acute distress       Appearance: Normal appearance  Cardiovascular:      Rate and Rhythm: Normal rate and regular rhythm  Pulses: Normal pulses  Heart sounds: Normal heart sounds  Pulmonary:      Effort: Pulmonary effort is normal       Breath sounds: Normal breath sounds  Chest:      Chest wall: No mass  Breasts:     Right: No swelling, bleeding, inverted nipple, mass, nipple discharge, skin change or tenderness  Left: No swelling, bleeding, inverted nipple, mass, nipple discharge, skin change or tenderness  Comments: B/l mastectomy scars with latissimus dorsi flap reconstruction  Left axillary adenopathy present  No masses, nodularity, skin changes, nipple changes or discharge appreciated on physical exam    Abdominal:      General: Abdomen is flat  Palpations: Abdomen is soft  Lymphadenopathy:      Upper Body:      Right upper body: No supraclavicular, axillary or pectoral adenopathy  Left upper body: Axillary adenopathy present  No supraclavicular or pectoral adenopathy  Skin:     General: Skin is warm  Neurological:      General: No focal deficit present  Mental Status: She is alert and oriented to person, place, and time  Psychiatric:         Mood and Affect: Mood normal          Behavior: Behavior normal            Results:    Imaging  No results found  I reviewed the above imaging data  Advance Care Planning/Advance Directives:  Discussed disease status, cancer treatment plans and/or cancer treatment goals with the patient

## 2023-01-10 ENCOUNTER — HOSPITAL ENCOUNTER (OUTPATIENT)
Dept: ULTRASOUND IMAGING | Facility: CLINIC | Age: 63
Discharge: HOME | End: 2023-01-10

## 2023-01-10 VITALS — WEIGHT: 150 LBS | BODY MASS INDEX: 24.99 KG/M2 | HEIGHT: 65 IN

## 2023-01-10 DIAGNOSIS — Z15.01 BRCA2 GENE MUTATION POSITIVE: ICD-10-CM

## 2023-01-10 DIAGNOSIS — Z15.09 BRCA2 GENE MUTATION POSITIVE: ICD-10-CM

## 2023-01-10 DIAGNOSIS — R59.9 ENLARGED LYMPH NODE: ICD-10-CM

## 2023-01-18 ENCOUNTER — TELEPHONE (OUTPATIENT)
Dept: SURGICAL ONCOLOGY | Facility: CLINIC | Age: 63
End: 2023-01-18

## 2023-01-18 NOTE — TELEPHONE ENCOUNTER
Spoke to pt per SALOMÓN Lang request to cancel 1/26/23 and reschedule in Dec 23, pt happy with the change and I quoted SALOMÓN Lang message regarding results last test

## 2023-01-21 ENCOUNTER — APPOINTMENT (OUTPATIENT)
Dept: LAB | Age: 63
End: 2023-01-21

## 2023-01-21 DIAGNOSIS — G43.109 OCULAR MIGRAINE: ICD-10-CM

## 2023-01-21 DIAGNOSIS — Z13.6 SCREENING FOR CARDIOVASCULAR CONDITION: ICD-10-CM

## 2023-01-21 DIAGNOSIS — Z13.1 SCREENING FOR DIABETES MELLITUS: ICD-10-CM

## 2023-01-21 LAB
ALBUMIN SERPL BCP-MCNC: 3.9 G/DL (ref 3.5–5)
ALP SERPL-CCNC: 72 U/L (ref 46–116)
ALT SERPL W P-5'-P-CCNC: 20 U/L (ref 12–78)
ANION GAP SERPL CALCULATED.3IONS-SCNC: 3 MMOL/L (ref 4–13)
AST SERPL W P-5'-P-CCNC: 12 U/L (ref 5–45)
BASOPHILS # BLD AUTO: 0.03 THOUSANDS/ÂΜL (ref 0–0.1)
BASOPHILS NFR BLD AUTO: 1 % (ref 0–1)
BILIRUB SERPL-MCNC: 0.34 MG/DL (ref 0.2–1)
BUN SERPL-MCNC: 20 MG/DL (ref 5–25)
CALCIUM SERPL-MCNC: 9.1 MG/DL (ref 8.3–10.1)
CHLORIDE SERPL-SCNC: 107 MMOL/L (ref 96–108)
CHOLEST SERPL-MCNC: 171 MG/DL
CO2 SERPL-SCNC: 31 MMOL/L (ref 21–32)
CREAT SERPL-MCNC: 0.71 MG/DL (ref 0.6–1.3)
EOSINOPHIL # BLD AUTO: 0.05 THOUSAND/ÂΜL (ref 0–0.61)
EOSINOPHIL NFR BLD AUTO: 1 % (ref 0–6)
ERYTHROCYTE [DISTWIDTH] IN BLOOD BY AUTOMATED COUNT: 14 % (ref 11.6–15.1)
EST. AVERAGE GLUCOSE BLD GHB EST-MCNC: 97 MG/DL
GFR SERPL CREATININE-BSD FRML MDRD: 92 ML/MIN/1.73SQ M
GLUCOSE P FAST SERPL-MCNC: 90 MG/DL (ref 65–99)
HBA1C MFR BLD: 5 %
HCT VFR BLD AUTO: 40.8 % (ref 34.8–46.1)
HDLC SERPL-MCNC: 67 MG/DL
HGB BLD-MCNC: 12.8 G/DL (ref 11.5–15.4)
IMM GRANULOCYTES # BLD AUTO: 0.01 THOUSAND/UL (ref 0–0.2)
IMM GRANULOCYTES NFR BLD AUTO: 0 % (ref 0–2)
LDLC SERPL CALC-MCNC: 93 MG/DL (ref 0–100)
LYMPHOCYTES # BLD AUTO: 1.4 THOUSANDS/ÂΜL (ref 0.6–4.47)
LYMPHOCYTES NFR BLD AUTO: 28 % (ref 14–44)
MCH RBC QN AUTO: 30.5 PG (ref 26.8–34.3)
MCHC RBC AUTO-ENTMCNC: 31.4 G/DL (ref 31.4–37.4)
MCV RBC AUTO: 97 FL (ref 82–98)
MONOCYTES # BLD AUTO: 0.39 THOUSAND/ÂΜL (ref 0.17–1.22)
MONOCYTES NFR BLD AUTO: 8 % (ref 4–12)
NEUTROPHILS # BLD AUTO: 3.12 THOUSANDS/ÂΜL (ref 1.85–7.62)
NEUTS SEG NFR BLD AUTO: 62 % (ref 43–75)
NRBC BLD AUTO-RTO: 0 /100 WBCS
PLATELET # BLD AUTO: 248 THOUSANDS/UL (ref 149–390)
PMV BLD AUTO: 9.9 FL (ref 8.9–12.7)
POTASSIUM SERPL-SCNC: 4.3 MMOL/L (ref 3.5–5.3)
PROT SERPL-MCNC: 7.1 G/DL (ref 6.4–8.4)
RBC # BLD AUTO: 4.2 MILLION/UL (ref 3.81–5.12)
SODIUM SERPL-SCNC: 141 MMOL/L (ref 135–147)
TRIGL SERPL-MCNC: 53 MG/DL
WBC # BLD AUTO: 5 THOUSAND/UL (ref 4.31–10.16)

## 2023-01-31 ENCOUNTER — OFFICE VISIT (OUTPATIENT)
Dept: INTERNAL MEDICINE CLINIC | Facility: CLINIC | Age: 63
End: 2023-01-31

## 2023-01-31 VITALS
HEART RATE: 57 BPM | BODY MASS INDEX: 25.16 KG/M2 | OXYGEN SATURATION: 99 % | RESPIRATION RATE: 16 BRPM | HEIGHT: 65 IN | SYSTOLIC BLOOD PRESSURE: 110 MMHG | WEIGHT: 151 LBS | DIASTOLIC BLOOD PRESSURE: 72 MMHG

## 2023-01-31 DIAGNOSIS — C50.012 MALIGNANT NEOPLASM OF NIPPLE OF BOTH BREASTS IN FEMALE, ESTROGEN RECEPTOR POSITIVE (HCC): ICD-10-CM

## 2023-01-31 DIAGNOSIS — M33.90 DERMATOMYOSITIS (HCC): ICD-10-CM

## 2023-01-31 DIAGNOSIS — R76.8 SS-A ANTIBODY POSITIVE: Primary | ICD-10-CM

## 2023-01-31 DIAGNOSIS — E66.3 OVERWEIGHT (BMI 25.0-29.9): ICD-10-CM

## 2023-01-31 DIAGNOSIS — C50.011 MALIGNANT NEOPLASM OF NIPPLE OF BOTH BREASTS IN FEMALE, ESTROGEN RECEPTOR POSITIVE (HCC): ICD-10-CM

## 2023-01-31 DIAGNOSIS — Z17.0 MALIGNANT NEOPLASM OF NIPPLE OF BOTH BREASTS IN FEMALE, ESTROGEN RECEPTOR POSITIVE (HCC): ICD-10-CM

## 2023-01-31 DIAGNOSIS — Z13.6 SCREENING FOR CARDIOVASCULAR CONDITION: ICD-10-CM

## 2023-01-31 NOTE — PROGRESS NOTES
Assessment/Plan:    Dermatomyositis ([facility] )  Clinically stable and doing well continue the current medical regiment will continue monitor  Will be seeing a new rheumatologist currently on methotrexate patient does have intermittent roman sign    Overweight (BMI 25 0-29  9)  Significant improvements in weight she is making excellent progress with her insurance weight loss program will continue monitor we encouraged her to continue with this program routine exercise and a healthy and balanced diet excellent progress    SS-A antibody positive  Dry eye, positive SSA we will have patient see ENT for lip biopsy for confirmation of Sjogren's syndrome    Bilateral malignant neoplasm of breast in female, estrogen receptor positive ([facility] )  Recent possible enlargement of tissue under it axilla patient did see surgical oncology underwent ultrasound that was unrevealing         Problem List Items Addressed This Visit        Musculoskeletal and Integument    Dermatomyositis ([facility] )     Clinically stable and doing well continue the current medical regiment will continue monitor  Will be seeing a new rheumatologist currently on methotrexate patient does have intermittent roman sign            Other    Bilateral malignant neoplasm of breast in female, estrogen receptor positive ([facility] )     Recent possible enlargement of tissue under it axilla patient did see surgical oncology underwent ultrasound that was unrevealing         Overweight (BMI 25 0-29  9)     Significant improvements in weight she is making excellent progress with her insurance weight loss program will continue monitor we encouraged her to continue with this program routine exercise and a healthy and balanced diet excellent progress         SS-A antibody positive - Primary     Dry eye, positive SSA we will have patient see ENT for lip biopsy for confirmation of Sjogren's syndrome         Relevant Orders    Ambulatory Referral to Otolaryngology   Other Visit Diagnoses Screening for cardiovascular condition        Relevant Orders    Comprehensive metabolic panel    Lipid Panel with Direct LDL reflex          Return to office  6 months  call if any problems  Subjective:      Patient ID: Balwinder Loving is a 63 y o  female  Component Ref Range & Units 1/26/22  8:56 AM    DANILO-1 Antibody <20 Units <20    PL 7 AutoAbs Negative Negative    PL 12 AutoAbs Negative Negative    EJ AutoAbs Negative Negative    OJ AutoAbs Negative Negative    SRP AutoAbs Negative Negative    MI-2 Autoab Negative Negative    Anti-TIF-1 gamma Ab (RDL) <20 Units <20    Anti-MDA-5-Ab  <20 Units <20    ANTI-NXP-2 <20 Units <20    ANTI-SAE1 AB, IGG (RDL) <20 Units <20    PM-SCL Ab <20 Units <20    KU AutoAbs Negative Negative    ANTI-SS-A 52KD AB, IGG (RDL) <20 Units 44 High     Anti-U1 RNP Ab (RDL) <20 Units <20    ANTI-U2 RNP AB (RDL) Negative Negative    U3 RNP  Negative Negative    Comment:     Interpretation for Anti-Danilo-1, Anti-TIF-1gamma,        HPI 66-year old female coming in for a follow up office visit regarding positive SSA antibody, dry eye, dermatomyositis, overweight, breast cancer/BRCA 2 positive; the patient reports me compliant taking medications without untoward side effects the  The patient is here to review his medical condition, update me on the medical condition and the patient reports me no hospitalizations and no ER visits  No injuries no illnesses reports me significant improvements in weight following her insurance weight loss program patient is exercising routinely overall she feels well here to review laboratories sees hematology oncology routinely has recently found a possible abnormality under the axilla saw surgical oncology underwent ultrasound that was unrevealing    Oncology History   Bilateral malignant neoplasm of breast in female, estrogen receptor positive ([address] )     Initial Diagnosis     Bilateral malignant neoplasm of breast in female, estrogen receptor positive ([address] )      Surgery     Double mastectomy, reconstructive surgery        Chemotherapy     Chemotherapy with Taxotere and Cytoxan for 4 cycles followed by [facility] from 2008 thru 2013  The following portions of the patient's history were reviewed and updated as appropriate: allergies, current medications, past family history, past medical history, past social history, past surgical history and problem list     Review of Systems   Constitutional: Negative for activity change, appetite change and unexpected weight change  HENT: Negative for congestion and postnasal drip  Eyes: Negative for visual disturbance  Respiratory: Negative for cough and shortness of breath  Cardiovascular: Negative for chest pain  Gastrointestinal: Negative for abdominal pain, diarrhea, nausea and vomiting  Neurological: Negative for dizziness, light-headedness and headaches  Hematological: Negative for adenopathy  Objective:    Return in about 6 months (around 7/31/2023)  Procedure: US Breast Axilla Left    Result Date: 1/10/2023  Narrative: DIAGNOSIS: BRCA2 gene mutation positive; Enlarged lymph node TECHNIQUE: Ultrasound of the left breast(s) was performed  COMPARISONS: Multiple prior exams day between 3/26/2008 and 2/17/2004  RELEVANT HISTORY: Family Breast Cancer History: History of breast cancer in Paternal [de-identified]  Family Medical History: Family medical history includes BRCA2 Negative in paternal aunt, BRCA2 Positive in 2 relatives (daughter, sister), breast cancer in paternal aunt, and ovarian cancer in mother  Personal History: Hormone history includes hormone replacement therapy  Surgical history includes mastectomy, hysterectomy, and oophorectomy  Medical history includes breast cancer and BRCA 2 positive  RISK ASSESSMENT: [facility] risk assessment reporting was suppressed due to the patient's history and/or demographic factors  INDICATION: Gurdeep Gordillo is a 63 y o  female presenting for left axillary lump  FINDINGS: Targeted left axillary ultrasound performed using dedicated high-resolution probe, as directed by the patient  No discrete abnormality is identified in the area of palpable concern  Benign-appearing lymph nodes are noted in the axilla  Impression:  No evidence of malignancy  Clinical management of left axillary lump is recommended  ASSESSMENT/BI-RADS CATEGORY: Left: 2 - Benign Overall: 2 - Benign RECOMMENDATION:      - Clinical management for the left breast  Workstation ID: [id number]         No Known Allergies    Past Medical History:   Diagnosis Date   • Benign neoplasm of lung     Right lung lobe benign  Removed 2010  • BRCA2 positive    • Breast cancer ([facility] [address] )    • Breathing difficulty 2011    no breathing difficulties but patient reported drop in BP during anesthesia for appendectomy and was advised to have cardiologist consult   • Cancer [facility]) 2008   • Depression    • Dermatomyositis ([facility] [address] )    • Lung nodule    • Wears glasses      Past Surgical History:   Procedure Laterality Date   • APPENDECTOMY      2011   • BREAST SURGERY      Bilateral Mastectomy   • COLONOSCOPY N/A 1/26/2016    Procedure: COLONOSCOPY;  Surgeon: Nawaf Desir MD;  [facility]; Service:    • HYSTERECTOMY      2007   • LUNG BIOPSY     • MASS EXCISION Left 7/18/2019    Procedure: MID BACK MASS EXCISION BIOPSY;  Surgeon: Jen Purdy MD;  [facility];  Service: General   • MASTECTOMY      reconstruction 2008   • OOPHORECTOMY       Current Outpatient Medications on File Prior to Visit   Medication Sig Dispense Refill   • Calcium-Magnesium-Vitamin D (CALCIUM 500 PO) Take 1,000 mg by mouth daily       • cholecalciferol (VITAMIN D3) 1,000 units tablet Take 1,000 Units by mouth daily     • clobetasol (TEMOVATE) 0 05 % cream as needed       • folic acid (FOLVITE) 1 mg tablet Take 1 tablet (1 mg total) by mouth daily 90 tablet 1   • Glycerin-Hypromellose- (DRY EYE RELIEF DROPS OP) Apply to eye     • methotrexate 2 5 mg tablet Take 4 tablets (10 mg total) by mouth once a week 60 tablet 1   • multivitamin (THERAGRAN) TABS Take 1 tablet by mouth daily  • Cholecalciferol (VITAMIN D) 2000 UNITS CAPS Take 2,000 Units/day by mouth  (Patient not taking: Reported on 1/31/2023)       No current facility-administered medications on file prior to visit       Family History   Problem Relation Age of Onset   • Cancer Mother         Ovarian   • Ovarian cancer Mother    • No Known Problems Father    • BRCA2 Positive Sister    • Heart disease Sister         post partum cardiomyopathy, pacer   • BRCA2 Positive Daughter    • Cancer Maternal Grandmother 80        skin   • No Known Problems Paternal Grandmother    • No Known Problems Paternal Grandfather    • No Known Problems Brother    • BRCA2 Negative Paternal Aunt    • Breast cancer Paternal Aunt    • Prostate cancer Maternal Uncle    • Prostate cancer Maternal Uncle      Social History     Socioeconomic History   • Marital status: /Civil Union     Spouse name: Not on file   • Number of children: Not on file   • Years of education: Not on file   • Highest education level: Not on file   Occupational History   • Not on file   Tobacco Use   • Smoking status: Never   • Smokeless tobacco: Never   Substance and Sexual Activity   • Alcohol use: No   • Drug use: No   • Sexual activity: Yes     Partners: Male     Birth control/protection: None   Other Topics Concern   • Not on file   Social History Narrative   • Not on file     Social Determinants of Health     Financial Resource Strain: Not on file   Food Insecurity: Not on file   Transportation Needs: Not on file   Physical Activity: Not on file   Stress: Not on file   Social Connections: Not on file   Intimate Partner Violence: Not on file   Housing Stability: Not on file     Vitals:    01/31/23 1318   BP: 110/72   Pulse: 57   Resp: 16   SpO2: 99%   Weight: 68 5 kg (151 lb)   Height: 5' 5" (1 651 m)     Results for orders placed or performed in visit on 01/21/23   Hemoglobin A1C   Result Value Ref Range    Hemoglobin A1C 5 0 Normal 3 8-5 6%; PreDiabetic 5 7-6 4%; Diabetic >=6 5%; Glycemic control for adults with diabetes <7 0% %    EAG 97 mg/dl   Lipid Panel with Direct LDL reflex   Result Value Ref Range    Cholesterol 171 See Comment mg/dL    Triglycerides 53 See Comment mg/dL    HDL, Direct 67 >=50 mg/dL    LDL Calculated 93 0 - 100 mg/dL     Weight (last 2 days)     Date/Time Weight    01/31/23 1318 68 5 (151)        Body mass index is 25 13 kg/m²  BP      Temp      Pulse     Resp      SpO2        Vitals:    01/31/23 1318   Weight: 68 5 kg (151 lb)     Vitals:    01/31/23 1318   Weight: 68 5 kg (151 lb)       /72   Pulse 57   Resp 16   Ht 5' 5" (1 651 m)   Wt 68 5 kg (151 lb)   SpO2 99%   BMI 25 13 kg/m²          Physical Exam  Constitutional:       Appearance: She is well-developed  HENT:      Head: Normocephalic  Eyes:      General: No scleral icterus  Right eye: No discharge  Left eye: No discharge  Conjunctiva/sclera: Conjunctivae normal       Pupils: Pupils are equal, round, and reactive to light  Cardiovascular:      Rate and Rhythm: Normal rate and regular rhythm  Heart sounds: Normal heart sounds  No murmur heard  No friction rub  No gallop  Pulmonary:      Effort: No respiratory distress  Breath sounds: Normal breath sounds  No wheezing or rales  Abdominal:      General: Bowel sounds are normal  There is no distension  Palpations: Abdomen is soft  There is no mass  Tenderness: There is no abdominal tenderness  There is no guarding or rebound  Musculoskeletal:         General: No deformity  Cervical back: Neck supple  Lymphadenopathy:      Cervical: No cervical adenopathy  Neurological:      Mental Status: She is alert  Coordination: Coordination normal    Psychiatric:         Mood and Affect: Mood is not anxious or depressed

## 2023-02-01 NOTE — ASSESSMENT & PLAN NOTE
Significant improvements in weight she is making excellent progress with her insurance weight loss program will continue monitor we encouraged her to continue with this program routine exercise and a healthy and balanced diet excellent progress

## 2023-02-01 NOTE — ASSESSMENT & PLAN NOTE
Recent possible enlargement of tissue under it axilla patient did see surgical oncology underwent ultrasound that was unrevealing

## 2023-02-01 NOTE — ASSESSMENT & PLAN NOTE
Clinically stable and doing well continue the current medical regiment will continue monitor    Will be seeing a new rheumatologist currently on methotrexate patient does have intermittent roman sign

## 2023-02-01 NOTE — ASSESSMENT & PLAN NOTE
Dry eye, positive SSA we will have patient see ENT for lip biopsy for confirmation of Sjogren's syndrome

## 2023-02-18 ENCOUNTER — APPOINTMENT (OUTPATIENT)
Dept: LAB | Age: 63
End: 2023-02-18

## 2023-02-18 DIAGNOSIS — C50.011 MALIGNANT NEOPLASM OF NIPPLE OF BOTH BREASTS IN FEMALE, ESTROGEN RECEPTOR POSITIVE (HCC): ICD-10-CM

## 2023-02-18 DIAGNOSIS — Z17.0 MALIGNANT NEOPLASM OF NIPPLE OF BOTH BREASTS IN FEMALE, ESTROGEN RECEPTOR POSITIVE (HCC): ICD-10-CM

## 2023-02-18 DIAGNOSIS — C50.012 MALIGNANT NEOPLASM OF NIPPLE OF BOTH BREASTS IN FEMALE, ESTROGEN RECEPTOR POSITIVE (HCC): ICD-10-CM

## 2023-02-18 LAB
ALBUMIN SERPL BCP-MCNC: 3.9 G/DL (ref 3.5–5)
ALP SERPL-CCNC: 65 U/L (ref 46–116)
ALT SERPL W P-5'-P-CCNC: 21 U/L (ref 12–78)
ANION GAP SERPL CALCULATED.3IONS-SCNC: 2 MMOL/L (ref 4–13)
AST SERPL W P-5'-P-CCNC: 12 U/L (ref 5–45)
BASOPHILS # BLD AUTO: 0.02 THOUSANDS/ÂΜL (ref 0–0.1)
BASOPHILS NFR BLD AUTO: 0 % (ref 0–1)
BILIRUB SERPL-MCNC: 0.38 MG/DL (ref 0.2–1)
BUN SERPL-MCNC: 19 MG/DL (ref 5–25)
CALCIUM SERPL-MCNC: 9.3 MG/DL (ref 8.3–10.1)
CANCER AG27-29 SERPL-ACNC: 15.8 U/ML (ref 0–42.3)
CHLORIDE SERPL-SCNC: 106 MMOL/L (ref 96–108)
CO2 SERPL-SCNC: 30 MMOL/L (ref 21–32)
CREAT SERPL-MCNC: 0.71 MG/DL (ref 0.6–1.3)
EOSINOPHIL # BLD AUTO: 0.06 THOUSAND/ÂΜL (ref 0–0.61)
EOSINOPHIL NFR BLD AUTO: 1 % (ref 0–6)
ERYTHROCYTE [DISTWIDTH] IN BLOOD BY AUTOMATED COUNT: 13.9 % (ref 11.6–15.1)
GFR SERPL CREATININE-BSD FRML MDRD: 92 ML/MIN/1.73SQ M
GLUCOSE P FAST SERPL-MCNC: 83 MG/DL (ref 65–99)
HCT VFR BLD AUTO: 42.1 % (ref 34.8–46.1)
HGB BLD-MCNC: 13.3 G/DL (ref 11.5–15.4)
IMM GRANULOCYTES # BLD AUTO: 0.01 THOUSAND/UL (ref 0–0.2)
IMM GRANULOCYTES NFR BLD AUTO: 0 % (ref 0–2)
LYMPHOCYTES # BLD AUTO: 1.08 THOUSANDS/ÂΜL (ref 0.6–4.47)
LYMPHOCYTES NFR BLD AUTO: 22 % (ref 14–44)
MCH RBC QN AUTO: 30.3 PG (ref 26.8–34.3)
MCHC RBC AUTO-ENTMCNC: 31.6 G/DL (ref 31.4–37.4)
MCV RBC AUTO: 96 FL (ref 82–98)
MONOCYTES # BLD AUTO: 0.38 THOUSAND/ÂΜL (ref 0.17–1.22)
MONOCYTES NFR BLD AUTO: 8 % (ref 4–12)
NEUTROPHILS # BLD AUTO: 3.38 THOUSANDS/ÂΜL (ref 1.85–7.62)
NEUTS SEG NFR BLD AUTO: 69 % (ref 43–75)
NRBC BLD AUTO-RTO: 0 /100 WBCS
PLATELET # BLD AUTO: 259 THOUSANDS/UL (ref 149–390)
PMV BLD AUTO: 9.5 FL (ref 8.9–12.7)
POTASSIUM SERPL-SCNC: 4.3 MMOL/L (ref 3.5–5.3)
PROT SERPL-MCNC: 7.2 G/DL (ref 6.4–8.4)
RBC # BLD AUTO: 4.39 MILLION/UL (ref 3.81–5.12)
SODIUM SERPL-SCNC: 138 MMOL/L (ref 135–147)
WBC # BLD AUTO: 4.93 THOUSAND/UL (ref 4.31–10.16)

## 2023-02-27 ENCOUNTER — OFFICE VISIT (OUTPATIENT)
Dept: HEMATOLOGY ONCOLOGY | Facility: CLINIC | Age: 63
End: 2023-02-27

## 2023-02-27 VITALS
BODY MASS INDEX: 25.33 KG/M2 | SYSTOLIC BLOOD PRESSURE: 130 MMHG | HEART RATE: 64 BPM | OXYGEN SATURATION: 97 % | RESPIRATION RATE: 17 BRPM | WEIGHT: 152 LBS | DIASTOLIC BLOOD PRESSURE: 80 MMHG | HEIGHT: 65 IN

## 2023-02-27 DIAGNOSIS — Z15.09 BRCA2 GENE MUTATION POSITIVE: ICD-10-CM

## 2023-02-27 DIAGNOSIS — Z85.820 HISTORY OF MELANOMA: ICD-10-CM

## 2023-02-27 DIAGNOSIS — Z15.01 BRCA2 GENE MUTATION POSITIVE: ICD-10-CM

## 2023-02-27 DIAGNOSIS — C50.012 MALIGNANT NEOPLASM OF NIPPLE OF BOTH BREASTS IN FEMALE, ESTROGEN RECEPTOR POSITIVE (HCC): Primary | ICD-10-CM

## 2023-02-27 DIAGNOSIS — Z14.8 CARRIER OF HIGH RISK CANCER GENE MUTATION: ICD-10-CM

## 2023-02-27 DIAGNOSIS — C50.011 MALIGNANT NEOPLASM OF NIPPLE OF BOTH BREASTS IN FEMALE, ESTROGEN RECEPTOR POSITIVE (HCC): Primary | ICD-10-CM

## 2023-02-27 DIAGNOSIS — Z17.0 MALIGNANT NEOPLASM OF NIPPLE OF BOTH BREASTS IN FEMALE, ESTROGEN RECEPTOR POSITIVE (HCC): Primary | ICD-10-CM

## 2023-02-27 DIAGNOSIS — M33.90 DERMATOMYOSITIS (HCC): ICD-10-CM

## 2023-02-27 NOTE — PROGRESS NOTES
HPI:    Follow-up visit for positive BRCA2 gene mutation and history of bilateral breast cancers and also history of melanoma in situ  Patient had bilateral mastectomies in 2008 for hormone receptor positive HER2 negative stage II A cancer in the right breast and stage I A cancer in the left breast and 1 positive lymph node in right axilla  Patient had reconstruction surgeries, 4 cycles of Taxotere plus Cytoxan followed by 5 years of Femara that she finished in September 2013  She had ITZEL and BSO  She has been aware of cancer risks because of BRCA2 mutation  She goes for pancreatic checkup with MRI of abdomen and would also check peritoneal cancer  She  goes to her dermatologist for screening of skin for melanoma and ophthalmologist for ocular melanoma screening     Patient states her family has been aware of her  positive  BRCA 2  She has  family history of breast and ovarian cancer  Patient has history of  dermatomyositis   She has been on methotrexate and folic acid for the last 3 years  She follows with her rheumatologist        , Patient had a 9 mm nodule in the right lower lung and she had wedge resection and that was negative for malignancy  Patient follows with the her lung specialist for mild residual ground-glass opacity in right lower lobe and that has improved   Cheryl Estrella She has renal and liver cysts and also tiny  polyp in gallbladder again monitored with MRI scan     She had colonoscopy in February 2021 and she states that was clear     She has small lymph nodes in left submandibular area and they are being monitored by her primary physician by  ultrasound of the neck  She has some tiredness  She has dryness of the eyes and mouth and she is going for biopsy of salivary gland to rule out Sjogren's syndrome                   Current Outpatient Medications:   •  Calcium-Magnesium-Vitamin D (CALCIUM 500 PO), Take 1,000 mg by mouth daily  , Disp: , Rfl:   •  cholecalciferol (VITAMIN D3) 1,000 units tablet, Take 1,000 Units by mouth daily, Disp: , Rfl:   •  clobetasol (TEMOVATE) 0 05 % cream, as needed  , Disp: , Rfl:   •  folic acid (FOLVITE) 1 mg tablet, Take 1 tablet (1 mg total) by mouth daily, Disp: 90 tablet, Rfl: 1  •  Glycerin-Hypromellose- (DRY EYE RELIEF DROPS OP), Apply to eye, Disp: , Rfl:   •  methotrexate 2 5 mg tablet, Take 4 tablets (10 mg total) by mouth once a week, Disp: 60 tablet, Rfl: 1  •  multivitamin (THERAGRAN) TABS, Take 1 tablet by mouth daily  , Disp: , Rfl:   •  Cholecalciferol (VITAMIN D) 2000 UNITS CAPS, Take 2,000 Units/day by mouth  (Patient not taking: Reported on 1/31/2023), Disp: , Rfl:     No Known Allergies    Oncology History   Bilateral malignant neoplasm of breast in female, estrogen receptor positive ([facility] [address] )    Initial Diagnosis    Bilateral malignant neoplasm of breast in female, estrogen receptor positive [facility])      Surgery    Double mastectomy, reconstructive surgery      Chemotherapy    Chemotherapy with Taxotere and Cytoxan for 4 cycles followed by Femara from 2008 thru 2013  ROS:  02/27/23 Reviewed 13 systems: See symptoms in HPI:    Presently   No other neurological, cardiac, pulmonary, GI and  symptoms other than listed in HPI     Other symptoms are in HPI   No fever, chills, bleeding, bone pains, skin rash at present,  weight loss, night sweats, arthritic symptoms at present,   weakness, numbness,  claudication and gait problem  No frequent infections  Not unusually sensitive to heat or cold  No swelling of the ankles  Patient is less anxious  /80 (BP Location: Left arm, Patient Position: Sitting, Cuff Size: Adult)   Pulse 64   Resp 17   Ht 5' 5" (1 651 m)   Wt 68 9 kg (152 lb)   SpO2 97%   BMI 25 29 kg/m²     Physical Exam:    Alert and oriented and not in distress  Vital signs are above    No icterus  , no oral thrush, no palpable neck mass,  lung fields are clear to percussion and auscultation, regular heart rate, abdomen   soft and non tender, no palpable abdominal mass, no ascites, no edema of ankles, no calf tenderness, no focal neurological deficit, no skin rash, no palpable lymphadenopathy in the neck and axillary areas,   Patient is less anxious  Performance status 0  IMAGING:     IMPRESSION:     1  Mild residual groundglass opacity in the right lower lobe in the area of previously seen consolidation, likely postinflammatory  Previously seen left lower lobe consolidation has resolved with mild residual scarring      2   Stable pulmonary nodules               Workstation performed: [id number]          Imaging    OSSEOUS STRUCTURES:  No acute fracture or destructive osseous lesion      IMPRESSION:     1  Resolution of previously seen peripheral right lower lobe groundglass opacification, likely infectious or inflammatory in retrospect  2   No new concerning pulmonary nodules               Workstation performed: [id number]          Imaging    CT chest without contrast (Order: [id number]) - 6/6/2022    OSSEOUS STRUCTURES:  No suspicious osseous lesion         IMPRESSION:     No findings of metastatic disease in the abdomen         Workstation performed: [id number]          Imaging    MRI abdomen w wo contrast (Order: [id number]) - 3/25/2022    IMPRESSION:   No evidence of malignancy    Clinical management of left axillary lump is recommended            ASSESSMENT/BI-RADS CATEGORY:  Left: 2 - Benign  Overall: 2 - Benign     RECOMMENDATION:       - Clinical management for the left breast      Workstation ID: [id number]     Imaging    US Breast Axilla Left (Order: [id number]) - 1/10/2023    IMPRESSION:     Normal MRI of the brain      Workstation performed: [id number]        Imaging    MRI brain wo contrast (Order: [id number]) - 8/25/2022    LABS:    Results for orders placed or performed in visit on 02/18/23   CBC and differential   Result Value Ref Range    WBC 4 93 4 31 - 10 16 Thousand/uL    RBC 4 39 3 81 - 5 12 Million/uL    Hemoglobin 13 3 11 5 - 15 4 g/dL    Hematocrit 42 1 34 8 - 46 1 %    MCV 96 82 - 98 fL    MCH 30 3 26 8 - 34 3 pg    MCHC 31 6 31 4 - 37 4 g/dL    RDW 13 9 11 6 - 15 1 %    MPV 9 5 8 9 - 12 7 fL    Platelets 261 550 - 292 Thousands/uL    nRBC 0 /100 WBCs    Neutrophils Relative 69 43 - 75 %    Immat GRANS % 0 0 - 2 %    Lymphocytes Relative 22 14 - 44 %    Monocytes Relative 8 4 - 12 %    Eosinophils Relative 1 0 - 6 %    Basophils Relative 0 0 - 1 %    Neutrophils Absolute 3 38 1 85 - 7 62 Thousands/µL    Immature Grans Absolute 0 01 0 00 - 0 20 Thousand/uL    Lymphocytes Absolute 1 08 0 60 - 4 47 Thousands/µL    Monocytes Absolute 0 38 0 17 - 1 22 Thousand/µL    Eosinophils Absolute 0 06 0 00 - 0 61 Thousand/µL    Basophils Absolute 0 02 0 00 - 0 10 Thousands/µL   Comprehensive metabolic panel   Result Value Ref Range    Sodium 138 135 - 147 mmol/L    Potassium 4 3 3 5 - 5 3 mmol/L    Chloride 106 96 - 108 mmol/L    CO2 30 21 - 32 mmol/L    ANION GAP 2 (L) 4 - 13 mmol/L    BUN 19 5 - 25 mg/dL    Creatinine 0 71 0 60 - 1 30 mg/dL    Glucose, Fasting 83 65 - 99 mg/dL    Calcium 9 3 8 3 - 10 1 mg/dL    AST 12 5 - 45 U/L    ALT 21 12 - 78 U/L    Alkaline Phosphatase 65 46 - 116 U/L    Total Protein 7 2 6 4 - 8 4 g/dL    Albumin 3 9 3 5 - 5 0 g/dL    Total Bilirubin 0 38 0 20 - 1 00 mg/dL    eGFR 92 ml/min/1 73sq m   Cancer antigen 27 29   Result Value Ref Range    CA 27 29 15 8 0 0 - 42 3 U/mL     Labs, Imaging, & Other studies:   All pertinent labs and imaging studies were personally reviewed      Reviewed test results and discussed with patient  Assessment and plan: Follow-up visit for positive BRCA2 gene mutation and history of bilateral breast cancers and also history of melanoma in situ     Patient had bilateral mastectomies in 2008 for hormone receptor positive HER2 negative stage II A cancer in the right breast and stage I A cancer in the left breast and 1 positive lymph node in right axilla  Patient had reconstruction surgeries, 4 cycles of Taxotere plus Cytoxan followed by 5 years of Femara that she finished in September 2013  She had ITZEL and BSO  She has been aware of cancer risks because of BRCA2 mutation  She goes for pancreatic checkup with MRI of abdomen and would also check peritoneal cancer  She  goes to her dermatologist for screening of skin for melanoma and ophthalmologist for ocular melanoma screening     Patient states her family has been aware of her  positive  BRCA 2  She has  family history of breast and ovarian cancer  Patient has history of  dermatomyositis   She has been on methotrexate and folic acid for the last 3 years  She follows with her rheumatologist        , Patient had a 9 mm nodule in the right lower lung and she had wedge resection and that was negative for malignancy  Patient follows with the her lung specialist for mild residual ground-glass opacity in right lower lobe and that has improved   Lakisha Floyd She has renal and liver cysts and also tiny  polyp in gallbladder again monitored with MRI scan     She had colonoscopy in February 2021 and she states that was clear     She has small lymph nodes in left submandibular area and they are being monitored by her primary physician by  ultrasound of the neck  She has some tiredness  She has dryness of the eyes and mouth and she is going for biopsy of salivary gland to rule out Sjogren's syndrome            Physical examination and test results are as recorded and discussed  She has BRCA2 mutation  Breast cancers  remain in remission  She gets checked for other cancers like melanoma, pancreatic cancer and peritoneal cancer and others  She had ITZEL and BSO  She has history of melanoma in situ  She follows with her primary physician and other consultants     All discussed in detail  Questions answered    Discussed the importance of eating healthy foods, staying active and health screening test   Patient is capable of self-care  Discussed precautions against coronavirus and other infections     Goal is cure from breast cancers and melanoma and early  detection of other cancers mentioned above     See diagnoses, instructions and orders below    1  Malignant neoplasm of nipple of both breasts in female, estrogen receptor positive ([facility] [address] )    - MRI abdomen w wo contrast; Future  - CBC and differential; Future  - Comprehensive metabolic panel; Future  - Cancer antigen 27 29; Future    2  BRCA2 gene mutation positive    - MRI abdomen w wo contrast; Future    3  Carrier of high risk cancer gene mutation    - MRI abdomen w wo contrast; Future    4  History of melanoma      5  Dermatomyositis ([facility] [address] )      MRI abdomen after March 25, 2023  Blood work prior to next visit in 6 months            I used a dictation device to dictate this note and there could be mistakes in my note and for that she may call my office        Patient voiced understanding and agreed       Counseling / Coordination of Care     Bruno Lugo   Provided counseling and support

## 2023-04-06 ENCOUNTER — OFFICE VISIT (OUTPATIENT)
Dept: DERMATOLOGY | Facility: CLINIC | Age: 63
End: 2023-04-06

## 2023-04-06 VITALS — TEMPERATURE: 97 F | HEIGHT: 65 IN | WEIGHT: 149 LBS | BODY MASS INDEX: 24.83 KG/M2

## 2023-04-06 DIAGNOSIS — Z86.006 HISTORY OF MELANOMA IN SITU: Primary | ICD-10-CM

## 2023-04-06 DIAGNOSIS — Z12.83 SCREENING FOR MALIGNANT NEOPLASM OF SKIN: ICD-10-CM

## 2023-04-06 DIAGNOSIS — M33.90 DERMATOMYOSITIS (HCC): ICD-10-CM

## 2023-04-06 NOTE — PROGRESS NOTES
"[facility] Note     Patient Name: Katie Cabral  Encounter Date: 4/6/23     Have you been cared for by a [place] [id number] Dermatologist in the last 3 years and, if so, which description applies to you? Yes  I have been here within the last 3 years, and my medical history has NOT changed since that time  I am FEMALE/of child-bearing potential     REVIEW OF SYSTEMS:  Have you recently had or currently have any of the following? · No changes in my recent health  PAST MEDICAL HISTORY:  Have you personally ever had or currently have any of the following? If \"YES,\" then please provide more detail  · No changes in my medical history  FAMILY HISTORY:  Any \"first degree relatives\" (parent, brother, sister, or child) with the following? • No changes in my family's known health  PATIENT EXPERIENCE:    • Do you want the Dermatologist to perform a COMPLETE skin exam today including a clinical examination under the \"bra and underwear\" areas? NO  • If necessary, do we have your permission to call and leave a detailed message on your Preferred Phone number that includes your specific medical information? Yes      No Known Allergies   Current Outpatient Medications:   •  Calcium-Magnesium-Vitamin D (CALCIUM 500 PO), Take 1,000 mg by mouth daily  , Disp: , Rfl:   •  cholecalciferol (VITAMIN D3) 1,000 units tablet, Take 1,000 Units by mouth daily, Disp: , Rfl:   •  clobetasol (TEMOVATE) 0 05 % cream, as needed  , Disp: , Rfl:   •  folic acid (FOLVITE) 1 mg tablet, Take 1 tablet (1 mg total) by mouth daily, Disp: 90 tablet, Rfl: 1  •  Glycerin-Hypromellose- (DRY EYE RELIEF DROPS OP), Apply to eye, Disp: , Rfl:   •  methotrexate 2 5 mg tablet, Take 4 tablets (10 mg total) by mouth once a week, Disp: 60 tablet, Rfl: 1  •  multivitamin (THERAGRAN) TABS, Take 1 tablet by mouth daily  , Disp: , Rfl:   •  Cholecalciferol (VITAMIN D) 2000 UNITS CAPS, Take 2,000 Units/day by mouth (Patient not taking: " "Reported on 4/6/2023), Disp: , Rfl:           • Whom besides the patient is providing clinical information about today's encounter?   o NO ADDITIONAL HISTORIAN (patient alone provided history)    Physical Exam and Assessment/Plan by Diagnosis:    HISTORY OF MELANOMA (in situ)    Physical Exam:  • Anatomic Location Affected:  Mid chest  • Morphological Description of Scar:  Well healed  • Year Treated: 2014  • TNM Classification: N0T0  • Suspected Recurrence: no  • Regional adenopathy: no    Additional History of Present Condition:  Patient with history of breast cancer; patient has BRCA II gene    Assessment and Plan:  Based on a thorough discussion of this condition and the management approach to it (including a comprehensive discussion of the known risks, side effects and potential benefits of treatment), the patient (family) agrees to implement the following specific plan:  • Will monitor  • Follow up in 6 months    What happens at follow-up? The main purpose of follow-up is to detect recurrences early (metastatic melanoma), but it also offers an opportunity to diagnose a new primary melanoma at the first possible opportunity  A second invasive melanoma occurs in 5-10% of melanoma patients and a new melanoma in situ is diagnosed in more than 20% of melanoma patients  Our practice makes the following recommendations for follow-up for patients with invasive melanoma  • At-least \"monthly\" self-skin examinations   • Routine skin checks by a board certified dermatologist  • Follow-up intervals are \"every 3 months\" within 2 years of a new melanoma diagnosis;  \"every 6 months\" between 2-4 years of a new melanoma diagnosis; and \"annually\" after 4 years of a new melanoma diagnosis  • Individual patient's needs should be considered before an appropriate follow-up is offered  • Provide education and support to help the patient adjust to their illness    Follow-up appointments should include:  • A check of the scar where " the primary melanoma was removed  • Checking the regional lymph nodes  • A general skin examination  • A full physical examination at least annually by your primary care physician    In those with more advanced primary disease, follow-up may include:  • Blood tests  • Imaging: ultrasound, X-ray, CT, MRI and PET scan  Most tests are not worthwhile for patients with stage 1 or 2 melanoma unless there are signs or symptoms of disease recurrence or metastasis  No tests are necessary for healthy patients who have remained well for five years or longer after removal of their melanoma  What is the outlook for patients with melanoma? • Melanoma in situ is cured by excision because it has no potential to spread around the body  • The risk of spread and ultimate death from invasive melanoma depends on several factors, but the main one is the Breslow thickness of the melanoma at the time it was surgically removed  • Metastases are rare for melanomas < 0 75 mm and the risk for tumours 0 75-1 mm thick is about 5%  The risk steadily increases with thickness so that melanomas > 4 mm have a risk of metastasis of about 40%  Melanoma is a potentially serious type of skin cancer, in which there is uncontrolled growth of melanocytes (pigment cells)  Melanoma is sometimes called malignant melanoma  Normal melanocytes are found in the basal layer of the epidermis (the outer layer of skin)  Melanocytes produce a protein called melanin, which protects skin cells by absorbing ultraviolet (UV) radiation  Melanocytes are found in equal numbers in black and white skin, but melanocytes in black skin produce much more melanin  People with dark brown or black skin are very much less likely to be damaged by UV radiation than those with white skin      HISTORY OF DERMATOMYOSITIS     Physical Exam:  • Anatomic Location Affected:  Trunk, face; proximal nail folds with telangiectasia  • Morphological Description:    • Pertinent Positives:  • Pertinent Negatives:     Additional History of Present Condition:  patient notes that comes and goes  Taking methotrexate and when flares applying clobetasol cream   Was on plaquenil and stopped due to discoloration  Patient states recent lab showed antibody for Sjogren    Has been on hydroxychloroquine  Sun precautions discussed and recommended      Assessment and Plan:  Based on a thorough discussion of this condition and the management approach to it (including a comprehensive discussion of the known risks, side effects and potential benefits of treatment), the patient (family) agrees to implement the following specific plan:  • Continue to follow rheumatology   • Sun precautions     What causes dermatomyositis? Dermatomyositis is a rare acquired muscle disease that is accompanied by Shahnaz Dodd  It is one of a group of muscle diseases called inflammatory myopathies  Dermatomyositis may affect people of any race, age or sex, although it is twice as common in women than in men  The onset of the disease is most common in those aged 50-70 years  Dermatomyositis is considered one of the connective tissue diseases, like systemic sclerosis and lupus erythematosus  Dermatomyositis is thought to be caused by small vessel damage which in turn affects skin and muscle  Factors that may contribute to its development are listed below  • Genetic predisposition  • Underlying cancer (more likely in older people)  • Autoimmune (immune reaction against self)  • Infectious or toxic agents acting as triggers  • Certain drugs, which include hydroxyurea, penicillamine, statins, quinidine, and phenylbutazone      What are the symptoms of dermatomyositis?     The two main groups of symptoms affect the skin and muscles   In many patients, the first sign of dermatomyositis is the presence of a symptomless, itchy or burning rash  The rash often, but not always, develops before the muscle weakness    • Reddish or bluish-purple patches mostly affect sun-exposed areas  • A violaceous (purple) Edelmira Manus also affect cheeks, nose, shoulders, upper chest, elbows, and outer thighs  • Purple eyelids, which are described as heliotrope, as they resemble the heliotrope flower, Heliotropium peruvianum, which has small purple petals  • A scaly scalp and thinned out hair may occur  • Less commonly, there is poikiloderma, in which the skin is atrophic (pale, thin skin), red(dilated blood vessels) and brown (post-inflammatory pigmentation)  • Purple papules or plaques are found on bony prominences, especially the knuckles (Gottron papules)  • Ragged cuticles and prominent blood vessels on nail folds are best seen by capillaroscopy or dermoscopy  In severe cases, calcinosis can occur  • It presents as hard yellow or white lumps under the skin  • These usually appear on fingers or over joints  • Sometimes these nodules may poke through the skin and ulcerate  • The ulcers may become infected      Muscle weakness may arise at the same time as the dermatomyositis rash, or it may occur weeks, months or years later  Proximal muscles are affected, that is, those closest to the trunk (upper arms, thighs)  The first indication of myositis is when the following everyday movements become difficult  • Climbing stairs or walking  • Rising from a sitting or crouching position  • Lifting objects  • Raising arms above the shoulders, e g  combing hair  • Difficulty swallowing (dysphagia)  Occasionally the affected muscles ache and become tender to touch      How do we diagnose dermatomyositis?     The diagnosis of dermatomyositis is usually confirmed by the following tests    • Blood test to detect raised circulating muscle enzymes: creatine kinase (CK) and sometimes aldolase, aspartate aminotransferase (AST) and lactic dehydrogenase (LDH)  • Blood test to detect autoantibodies: non-specific antinuclear antibody (THELMA) is found in most patients, specific Anti-Mi-2 is found in one quarter and Anti-Radha-1 in a few, usually those who have lung disease, and are diagnostic of antisynthetase syndrome    • Skin biopsy of the rash: the microscopic appearance of an interface dermatitis is similar to systemic (acute) lupus erythematosus  • Biopsy of an affected muscle  • Electromyography (EMG) testing  • Magnetic resonance imaging (MRI) scan of muscles  In those over 60, full body examination and testing are recommended to look for underlying cancer  Cancer screenings are recommended for up to five years after the initial onset of dermatomyositis in these patients       How do we treat dermatomyositis?     The primary aim of treatment is to control the skin disease and muscle disease  An oral corticosteroid such as prednisone in moderate to high dose is the mainstay of medical therapy and is given to slow down the rate of disease progression  Immunosuppressive or cytotoxic drugs may also be used including methotrexate, azathioprine, cyclophosphamide, ciclosporin, mycophenolate, high dose intravenous immunoglobulin and experimentally, biologics such as rituximab  Other important measures in the management of dermatomyositis include:  • Diltiazem, a calcium channel blocker usually prescribed for high blood pressure, may reduce calcinosis  • Colchicine has also been reported to reduce calcinosis  • Hydroxychloroquine may reduce the photosensitive rash  • Avoid excessive sun exposure and use sun protection measures, including sunscreens, to minimize the harmful effects of the sun on already damaged and photosensitive skin  • Bedrest for those with severe inflammation of muscles  • Physical therapy and activity to keep the muscles and joints moving  • Avoid eating food before bedtime and raise the bed head for those with difficulty swallowing     Most patients will require treatment throughout their lifetime, but dermatomyositis completely resolves in about one-in-five patients  "Patients who have a disease affecting their heart or lungs, or who also have underlying cancer often require more help from a multidisciplinary team       MELANOCYTIC NEVI (\"Moles\")    Physical Exam:  • Anatomic Location Affected:   None noted  • Morphological Description:  None noted  • Pertinent Positives:  • Pertinent Negatives:     Melanocytic Nevi  Melanocytic nevi (\"moles\") are caused by collections of the color producing skin cells, or melanocytes, in 1 area in the skin  They can range in color from pink to dark brown and be either raised or flat  Some moles are present at birth (I e , \"congenital nevi\"), while others come up later in life (i e , \"acquired nevi\")  Emerita Nian exposure also stimulates the body to make more moles, ie the more sun you get the more moles you'll grow  Clinically distinguishing a healthy mole from melanoma may be difficult  The \"ABCDE's\" of moles have been suggested as a means of helping to alert a person to a suspicious mole and the possible increased risk of melanoma  Asymmetry: Healthy moles tend to be symmetric, while melanomas are often asymmetric  Asymmetry means if you draw a line through the mole, the two halves do not match in color, size, shape, or surface texture Any mole that starts to demonstrate \"asymmetry\" should be examined promptly by a board certified dermatologist      Border: Healthy moles tend to have discrete, even borders  The border of a melanoma often blends into the normal skin and does not sharply delineate the mole from normal skin  Any mole that starts to demonstrate \"uneven borders\" should be examined promptly     Color: Healthy moles tend to be one color throughout  Melanomas tend to be made up of different colors ranging from dark black, blue, white, or red    Any mole that demonstrates a color change should be examined promptly    Diameter: Healthy moles tend to be smaller than 0 6 cm in size; an exception are \"congenital nevi\" that can be " "larger  Melanomas tend to grow and can often be greater than 0 6 cm (1/4 of an inch, or the size of a pencil eraser)  This is only a guideline, and many normal moles may be larger than 0 6 cm without being unhealthy  Any mole that starts to change in size (small to bigger or bigger to smaller) should be examined promptly    Evolving: Healthy moles tend to [place] the same  \"  Melanomas may often show signs of change or evolution such as a change in size, shape, color, or elevation  Any mole that starts to itch, bleed, crust, burn, hurt, or ulcerate or demonstrate a change or evolution should be examined promptly by a board certified dermatologist       What are atypical moles or dysplastic nevi? Dysplastic moles are moles that have some of the ABCDE  changes listed above but  are not cancerous  Sometimes a biopsy and microscopic examination are needed to determine the difference  They may indicate an increased risk of melanoma in that person, especially if there is a family history of melanoma  What is a Melanoma? The main concern when looking at a new or changing mole it to evaluate whether it may be a melanoma  The appearance of a \"new mole\" remains one of the most reliable methods for identifying a malignant melanoma  A melanoma is a type of skin cancer that can be deadly if it spreads throughout the body  The prognosis of a Melanoma depends on how deep it has penetrated in the skin  If caught early, they generally will not have had time to grow into the deeper layers of the skin and they cure rate is then very high  Once the melanoma grows deeper into the skin, the cure rate drops dramatically  Therefore, early detection and removal of a malignant melanoma results in a much better chance of complete cure         Scribe Attestation    I,:  Linda Gonzalez am acting as a scribe while in the presence of the attending physician :       I,:  Gus Moore MD personally performed the services described in this " documentation    as scribed in my presence :

## 2023-04-06 NOTE — PATIENT INSTRUCTIONS
"HISTORY OF MELANOMA (in situ)    Assessment and Plan:  Based on a thorough discussion of this condition and the management approach to it (including a comprehensive discussion of the known risks, side effects and potential benefits of treatment), the patient (family) agrees to implement the following specific plan: Will monitor  Follow up in 6 months    What happens at follow-up? The main purpose of follow-up is to detect recurrences early (metastatic melanoma), but it also offers an opportunity to diagnose a new primary melanoma at the first possible opportunity  A second invasive melanoma occurs in 5-10% of melanoma patients and a new melanoma in situ is diagnosed in more than 20% of melanoma patients  Our practice makes the following recommendations for follow-up for patients with invasive melanoma  At-least \"monthly\" self-skin examinations   Routine skin checks by a board certified dermatologist  Follow-up intervals are \"every 3 months\" within 2 years of a new melanoma diagnosis; \"every 6 months\" between 2-4 years of a new melanoma diagnosis; and \"annually\" after 4 years of a new melanoma diagnosis  Individual patient's needs should be considered before an appropriate follow-up is offered  Provide education and support to help the patient adjust to their illness    Follow-up appointments should include:  A check of the scar where the primary melanoma was removed  Checking the regional lymph nodes  A general skin examination  A full physical examination at least annually by your primary care physician    In those with more advanced primary disease, follow-up may include:  Blood tests  Imaging: ultrasound, X-ray, CT, MRI and PET scan  Most tests are not worthwhile for patients with stage 1 or 2 melanoma unless there are signs or symptoms of disease recurrence or metastasis  No tests are necessary for healthy patients who have remained well for five years or longer after removal of their melanoma      What " is the outlook for patients with melanoma? Melanoma in situ is cured by excision because it has no potential to spread around the body  The risk of spread and ultimate death from invasive melanoma depends on several factors, but the main one is the Breslow thickness of the melanoma at the time it was surgically removed  Metastases are rare for melanomas < 0 75 mm and the risk for tumours 0 75-1 mm thick is about 5%  The risk steadily increases with thickness so that melanomas > 4 mm have a risk of metastasis of about 40%  Melanoma is a potentially serious type of skin cancer, in which there is uncontrolled growth of melanocytes (pigment cells)  Melanoma is sometimes called malignant melanoma  Normal melanocytes are found in the basal layer of the epidermis (the outer layer of skin)  Melanocytes produce a protein called melanin, which protects skin cells by absorbing ultraviolet (UV) radiation  Melanocytes are found in equal numbers in black and white skin, but melanocytes in black skin produce much more melanin  People with dark brown or black skin are very much less likely to be damaged by UV radiation than those with white skin  HISTORY OF DERMATOMYOSITIS     Assessment and Plan:  Based on a thorough discussion of this condition and the management approach to it (including a comprehensive discussion of the known risks, side effects and potential benefits of treatment), the patient (family) agrees to implement the following specific plan:  Continue to follow Rheumatology   Continue Methotrexate daily and Clobetasol for flares as recommended by rheum  Sun precautions     What causes dermatomyositis? Dermatomyositis is a rare acquired muscle disease that is accompanied by a rash  It is one of a group of muscle diseases called inflammatory myopathies  Dermatomyositis may affect people of any race, age or sex, although it is twice as common in women than in men   The onset of the disease is most common in those aged 50-70 years  Dermatomyositis is considered one of the connective tissue diseases, like systemic sclerosis and lupus erythematosus  Dermatomyositis is thought to be caused by small vessel damage which in turn affects skin and muscle  Factors that may contribute to its development are listed below  Genetic predisposition  Underlying cancer (more likely in older people)  Autoimmune (immune reaction against self)  Infectious or toxic agents acting as triggers  Certain drugs, which include hydroxyurea, penicillamine, statins, quinidine, and phenylbutazone      What are the symptoms of dermatomyositis? The two main groups of symptoms affect the skin and muscles  In many patients, the first sign of dermatomyositis is the presence of a symptomless, itchy or burning rash  The rash often, but not always, develops before the muscle weakness  Reddish or bluish-purple patches mostly affect sun-exposed areas  A violaceous (purple) rash may also affect cheeks, nose, shoulders, upper chest, elbows, and outer thighs  Purple eyelids, which are described as heliotrope, as they resemble the heliotrope flower, Heliotropium peruvianum, which has small purple petals  A scaly scalp and thinned out hair may occur  Less commonly, there is poikiloderma, in which the skin is atrophic (pale, thin skin), red(dilated blood vessels) and brown (post-inflammatory pigmentation)  Purple papules or plaques are found on bony prominences, especially the knuckles (Gottron papules)  Ragged cuticles and prominent blood vessels on nail folds are best seen by capillaroscopy or dermoscopy  In severe cases, calcinosis can occur  It presents as hard yellow or white lumps under the skin  These usually appear on fingers or over joints  Sometimes these nodules may poke through the skin and ulcerate  The ulcers may become infected       Muscle weakness may arise at the same time as the dermatomyositis rash, or it may occur weeks, months or years later  Proximal muscles are affected, that is, those closest to the trunk (upper arms, thighs)  The first indication of myositis is when the following everyday movements become difficult  Climbing stairs or walking  Rising from a sitting or crouching position  Lifting objects  Raising arms above the shoulders, e g  combing hair  Difficulty swallowing (dysphagia)  Occasionally the affected muscles ache and become tender to touch  How do we diagnose dermatomyositis? The diagnosis of dermatomyositis is usually confirmed by the following tests  Blood test to detect raised circulating muscle enzymes: creatine kinase (CK) and sometimes aldolase, aspartate aminotransferase (AST) and lactic dehydrogenase (LDH)  Blood test to detect autoantibodies: non-specific antinuclear antibody (THELMA) is found in most patients, specific Anti-Mi-2 is found in one quarter and Anti-Radha-1 in a few, usually those who have lung disease, and are diagnostic of antisynthetase syndrome  Skin biopsy of the rash: the microscopic appearance of an interface dermatitis is similar to systemic (acute) lupus erythematosus  Biopsy of an affected muscle  Electromyography (EMG) testing  Magnetic resonance imaging (MRI) scan of muscles  In those over 60, full body examination and testing are recommended to look for underlying cancer  Cancer screenings are recommended for up to five years after the initial onset of dermatomyositis in these patients  How do we treat dermatomyositis? The primary aim of treatment is to control the skin disease and muscle disease  An oral corticosteroid such as prednisone in moderate to high dose is the mainstay of medical therapy and is given to slow down the rate of disease progression   Immunosuppressive or cytotoxic drugs may also be used including methotrexate, azathioprine, cyclophosphamide, ciclosporin, mycophenolate, high dose intravenous immunoglobulin and experimentally, biologics such "as rituximab  Other important measures in the management of dermatomyositis include:  Diltiazem, a calcium channel blocker usually prescribed for high blood pressure, may reduce calcinosis  Colchicine has also been reported to reduce calcinosis  Hydroxychloroquine may reduce the photosensitive rash  Avoid excessive sun exposure and use sun protection measures, including sunscreens, to minimize the harmful effects of the sun on already damaged and photosensitive skin  Bedrest for those with severe inflammation of muscles  Physical therapy and activity to keep the muscles and joints moving  Avoid eating food before bedtime and raise the bed head for those with difficulty swallowing     Most patients will require treatment throughout their lifetime, but dermatomyositis completely resolves in about one-in-five patients  Patients who have a disease affecting their heart or lungs, or who also have underlying cancer often require more help from a multidisciplinary team       Melanocytic Nevi  Melanocytic nevi (\"moles\") are caused by collections of the color producing skin cells, or melanocytes, in 1 area in the skin  They can range in color from pink to dark brown and be either raised or flat  Some moles are present at birth (I e , \"congenital nevi\"), while others come up later in life (i e , \"acquired nevi\")  Ricky Narrow exposure also stimulates the body to make more moles, ie the more sun you get the more moles you'll grow  Clinically distinguishing a healthy mole from melanoma may be difficult  The \"ABCDE's\" of moles have been suggested as a means of helping to alert a person to a suspicious mole and the possible increased risk of melanoma  Asymmetry: Healthy moles tend to be symmetric, while melanomas are often asymmetric    Asymmetry means if you draw a line through the mole, the two halves do not match in color, size, shape, or surface texture Any mole that starts to demonstrate \"asymmetry\" should be examined " "promptly by a board certified dermatologist      Rush Moffett: Healthy moles tend to have discrete, even borders  The border of a melanoma often blends into the normal skin and does not sharply delineate the mole from normal skin  Any mole that starts to demonstrate \"uneven borders\" should be examined promptly     Color: Healthy moles tend to be one color throughout  Melanomas tend to be made up of different colors ranging from dark black, blue, white, or red  Any mole that demonstrates a color change should be examined promptly    Diameter: Healthy moles tend to be smaller than 0 6 cm in size; an exception are \"congenital nevi\" that can be larger  Melanomas tend to grow and can often be greater than 0 6 cm (1/4 of an inch, or the size of a pencil eraser)  This is only a guideline, and many normal moles may be larger than 0 6 cm without being unhealthy  Any mole that starts to change in size (small to bigger or bigger to smaller) should be examined promptly    Evolving: Healthy moles tend to [place] the same  \"  Melanomas may often show signs of change or evolution such as a change in size, shape, color, or elevation  Any mole that starts to itch, bleed, crust, burn, hurt, or ulcerate or demonstrate a change or evolution should be examined promptly by a board certified dermatologist       What are atypical moles or dysplastic nevi? Dysplastic moles are moles that have some of the ABCDE  changes listed above but  are not cancerous  Sometimes a biopsy and microscopic examination are needed to determine the difference  They may indicate an increased risk of melanoma in that person, especially if there is a family history of melanoma  What is a Melanoma? The main concern when looking at a new or changing mole it to evaluate whether it may be a melanoma  The appearance of a \"new mole\" remains one of the most reliable methods for identifying a malignant melanoma     A melanoma is a type of skin cancer that can be deadly " if it spreads throughout the body  The prognosis of a Melanoma depends on how deep it has penetrated in the skin  If caught early, they generally will not have had time to grow into the deeper layers of the skin and they cure rate is then very high  Once the melanoma grows deeper into the skin, the cure rate drops dramatically  Therefore, early detection and removal of a malignant melanoma results in a much better chance of complete cure

## 2023-04-07 ENCOUNTER — HOSPITAL ENCOUNTER (OUTPATIENT)
Dept: MRI IMAGING | Facility: HOSPITAL | Age: 63
Discharge: HOME | End: 2023-04-07
Attending: INTERNAL MEDICINE

## 2023-04-07 DIAGNOSIS — Z17.0 MALIGNANT NEOPLASM OF NIPPLE OF BOTH BREASTS IN FEMALE, ESTROGEN RECEPTOR POSITIVE (HCC): ICD-10-CM

## 2023-04-07 DIAGNOSIS — C50.012 MALIGNANT NEOPLASM OF NIPPLE OF BOTH BREASTS IN FEMALE, ESTROGEN RECEPTOR POSITIVE (HCC): ICD-10-CM

## 2023-04-07 DIAGNOSIS — C50.011 MALIGNANT NEOPLASM OF NIPPLE OF BOTH BREASTS IN FEMALE, ESTROGEN RECEPTOR POSITIVE (HCC): ICD-10-CM

## 2023-04-07 DIAGNOSIS — Z14.8 CARRIER OF HIGH RISK CANCER GENE MUTATION: ICD-10-CM

## 2023-04-07 DIAGNOSIS — Z15.09 BRCA2 GENE MUTATION POSITIVE: ICD-10-CM

## 2023-04-07 DIAGNOSIS — Z15.01 BRCA2 GENE MUTATION POSITIVE: ICD-10-CM

## 2023-04-07 RX ADMIN — GADOBUTROL 6 ML: 604.72 INJECTION INTRAVENOUS at 16:22

## 2023-04-25 ENCOUNTER — APPOINTMENT (OUTPATIENT)
Dept: LAB | Age: 63
End: 2023-04-25

## 2023-04-25 DIAGNOSIS — Z15.01 BRCA2 GENE MUTATION POSITIVE: ICD-10-CM

## 2023-04-25 DIAGNOSIS — Z15.09 BRCA2 GENE MUTATION POSITIVE: ICD-10-CM

## 2023-04-26 LAB — CANCER AG125 SERPL-ACNC: 6.3 U/ML (ref 0–30)

## 2023-05-05 ENCOUNTER — OFFICE VISIT (OUTPATIENT)
Dept: GYNECOLOGIC ONCOLOGY | Facility: CLINIC | Age: 63
End: 2023-05-05

## 2023-05-05 VITALS
OXYGEN SATURATION: 100 % | RESPIRATION RATE: 17 BRPM | DIASTOLIC BLOOD PRESSURE: 80 MMHG | TEMPERATURE: 97.4 F | HEIGHT: 65 IN | BODY MASS INDEX: 24.99 KG/M2 | HEART RATE: 62 BPM | WEIGHT: 150 LBS | SYSTOLIC BLOOD PRESSURE: 120 MMHG

## 2023-05-05 DIAGNOSIS — Z15.09 BRCA2 GENE MUTATION POSITIVE: Primary | ICD-10-CM

## 2023-05-05 DIAGNOSIS — Z15.01 BRCA2 GENE MUTATION POSITIVE: Primary | ICD-10-CM

## 2023-05-05 NOTE — PROGRESS NOTES
Assessment/Plan:    Problem List Items Addressed This Visit        Other    BRCA2 gene mutation positive - Primary     63-year-old with pathogenic BRCA2 mutation with personal history of b/l breast cancer and melanoma in situ  She is s/p prophylactic TLH, BSO  Her clinical exam is without evidence of peritoneal cancer   normal       Return to the office in 6 months for continued BRCA surveillance with a pre-visit   Relevant Orders             CHIEF COMPLAINT:   BRCA surveillance    Problem:  1  BRCA 2 mutation  2  Bilateral breast cancer, stage IIA on left and stage IA on right, ER/NH positive, HER2 negative  3  Melanoma in situ of the chest wall   4  Dermatomyositis    Previous therapy:  Oncology History   Bilateral malignant neoplasm of breast in female, estrogen receptor positive ([facility] [address] )    Initial Diagnosis    Bilateral malignant neoplasm of breast in female, estrogen receptor positive [facility])      Surgery    Double mastectomy, reconstructive surgery      Chemotherapy    Chemotherapy with Taxotere and Cytoxan for 4 cycles followed by [facility] from 2008 thru 2013  Patient ID: Floyd Mott is a 63 y o  female  who has no new complaints today  No vaginal bleeding, abdominal/pelvic pain  Normal bowel and bladder function  No interval change in medical history since last visit  Quality of life is good  Her  from 4/25/23 was reviewed and normal       The following portions of the patient's history were reviewed and updated as appropriate: allergies, current medications, past medical history, past surgical history and problem list     Review of Systems   Constitutional: Negative  HENT: Negative  Eyes: Negative  Respiratory: Negative  Cardiovascular: Negative  Gastrointestinal: Negative  Genitourinary: Negative  Musculoskeletal: Negative  Skin: Negative  Neurological: Negative  Psychiatric/Behavioral: Negative          Current Outpatient Medications "  Medication Sig Dispense Refill    Calcium-Magnesium-Vitamin D (CALCIUM 500 PO) Take 1,000 mg by mouth daily   cholecalciferol (VITAMIN D3) 1,000 units tablet Take 1,000 Units by mouth daily      clobetasol (TEMOVATE) 0 05 % cream as needed        folic acid (FOLVITE) 1 mg tablet Take 1 tablet (1 mg total) by mouth daily 90 tablet 1    Glycerin-Hypromellose- (DRY EYE RELIEF DROPS OP) Apply to eye      methotrexate 2 5 mg tablet Take 4 tablets (10 mg total) by mouth once a week 60 tablet 1    multivitamin (THERAGRAN) TABS Take 1 tablet by mouth daily   Cholecalciferol (VITAMIN D) 2000 UNITS CAPS Take 2,000 Units/day by mouth (Patient not taking: Reported on 4/6/2023)       No current facility-administered medications for this visit  Objective:    Blood pressure 120/80, pulse 62, temperature (!) 97 4 °F (36 3 °C), temperature source Temporal, resp  rate 17, height 5' 5\" (1 651 m), weight 68 kg (150 lb), SpO2 100 %  Body mass index is 24 96 kg/m²  Body surface area is 1 75 meters squared  Physical Exam  Vitals reviewed  Exam conducted with a chaperone present  Constitutional:       General: She is not in acute distress  Appearance: Normal appearance  She is not ill-appearing  HENT:      Head: Normocephalic and atraumatic  Mouth/Throat:      Mouth: Mucous membranes are moist    Eyes:      General:         Right eye: No discharge  Left eye: No discharge  Conjunctiva/sclera: Conjunctivae normal    Pulmonary:      Effort: Pulmonary effort is normal    Abdominal:      Palpations: Abdomen is soft  There is no mass  Tenderness: There is no abdominal tenderness  Hernia: No hernia is present  Genitourinary:     Comments: The external female genitalia is normal  The bartholin's, uretheral and skenes glands are normal  The urethral meatus is normal (midline with no lesions)  Anus without fissure or lesion  Speculum exam reveals a grossly normal vagina   " No masses, lesions,discharge or bleeding  No significant cystocele or rectocele noted  Bimanual exam notes a surgical absent cervix, uterus and adnexal structures  No masses or fullness  Bladder is without fullness, mass or tenderness  Musculoskeletal:      Right lower leg: No edema  Left lower leg: No edema  Skin:     General: Skin is warm and dry  Coloration: Skin is not jaundiced  Findings: No rash  Neurological:      General: No focal deficit present  Mental Status: She is alert and oriented to person, place, and time  Cranial Nerves: No cranial nerve deficit  Sensory: No sensory deficit  Motor: No weakness  Gait: Gait normal    Psychiatric:         Mood and Affect: Mood normal          Behavior: Behavior normal          Thought Content:  Thought content normal          Judgment: Judgment normal             Trend:  Lab Results   Component Value Date     6 3 04/25/2023     8 1 10/10/2022     9 4 04/11/2022     9 2 10/11/2021     9 4 09/08/2021     6 4 04/07/2021     6 6 03/18/2021     8 2 10/05/2020     7 6 09/09/2020     8 2 03/02/2020     6 9 09/03/2019     7 1 03/04/2019     5 9 09/17/2018     6 0 02/12/2018     6 7 09/11/2017     7 2 02/27/2017     7 3 08/31/2016     6 9 03/08/2016     8 1 09/15/2015     8 0 04/20/2015     8 5 09/29/2014     8 2 04/09/2014

## 2023-05-05 NOTE — ASSESSMENT & PLAN NOTE
71-year-old with pathogenic BRCA2 mutation with personal history of b/l breast cancer and melanoma in situ  She is s/p prophylactic TLH, BSO  Her clinical exam is without evidence of peritoneal cancer   normal       Return to the office in 6 months for continued BRCA surveillance with a pre-visit

## 2023-05-16 ENCOUNTER — OFFICE VISIT (OUTPATIENT)
Dept: RHEUMATOLOGY | Facility: CLINIC | Age: 63
End: 2023-05-16

## 2023-05-16 ENCOUNTER — APPOINTMENT (OUTPATIENT)
Dept: LAB | Facility: CLINIC | Age: 63
End: 2023-05-16

## 2023-05-16 VITALS
BODY MASS INDEX: 24.32 KG/M2 | WEIGHT: 146 LBS | DIASTOLIC BLOOD PRESSURE: 74 MMHG | HEIGHT: 65 IN | SYSTOLIC BLOOD PRESSURE: 118 MMHG

## 2023-05-16 DIAGNOSIS — M33.90 DERMATOMYOSITIS (HCC): Primary | ICD-10-CM

## 2023-05-16 DIAGNOSIS — R76.8 SS-A ANTIBODY POSITIVE: ICD-10-CM

## 2023-05-16 DIAGNOSIS — Z79.899 HIGH RISK MEDICATION USE: ICD-10-CM

## 2023-05-16 LAB
ALBUMIN SERPL BCP-MCNC: 4.6 G/DL (ref 3.5–5)
ALP SERPL-CCNC: 60 U/L (ref 34–104)
ALT SERPL W P-5'-P-CCNC: 14 U/L (ref 7–52)
ANION GAP SERPL CALCULATED.3IONS-SCNC: 5 MMOL/L (ref 4–13)
AST SERPL W P-5'-P-CCNC: 18 U/L (ref 13–39)
BASOPHILS # BLD AUTO: 0.03 THOUSANDS/ÂΜL (ref 0–0.1)
BASOPHILS NFR BLD AUTO: 0 % (ref 0–1)
BILIRUB SERPL-MCNC: 0.55 MG/DL (ref 0.2–1)
BUN SERPL-MCNC: 18 MG/DL (ref 5–25)
CALCIUM SERPL-MCNC: 9.4 MG/DL (ref 8.4–10.2)
CHLORIDE SERPL-SCNC: 104 MMOL/L (ref 96–108)
CK SERPL-CCNC: 58 U/L (ref 26–192)
CO2 SERPL-SCNC: 32 MMOL/L (ref 21–32)
CREAT SERPL-MCNC: 0.69 MG/DL (ref 0.6–1.3)
CRP SERPL QL: <1 MG/L
EOSINOPHIL # BLD AUTO: 0.04 THOUSAND/ÂΜL (ref 0–0.61)
EOSINOPHIL NFR BLD AUTO: 1 % (ref 0–6)
ERYTHROCYTE [DISTWIDTH] IN BLOOD BY AUTOMATED COUNT: 13.9 % (ref 11.6–15.1)
ERYTHROCYTE [SEDIMENTATION RATE] IN BLOOD: 4 MM/HOUR (ref 0–29)
GFR SERPL CREATININE-BSD FRML MDRD: 94 ML/MIN/1.73SQ M
GLUCOSE SERPL-MCNC: 87 MG/DL (ref 65–140)
HCT VFR BLD AUTO: 41.2 % (ref 34.8–46.1)
HGB BLD-MCNC: 13.4 G/DL (ref 11.5–15.4)
IMM GRANULOCYTES # BLD AUTO: 0.03 THOUSAND/UL (ref 0–0.2)
IMM GRANULOCYTES NFR BLD AUTO: 0 % (ref 0–2)
LYMPHOCYTES # BLD AUTO: 1.23 THOUSANDS/ÂΜL (ref 0.6–4.47)
LYMPHOCYTES NFR BLD AUTO: 16 % (ref 14–44)
MCH RBC QN AUTO: 31.1 PG (ref 26.8–34.3)
MCHC RBC AUTO-ENTMCNC: 32.5 G/DL (ref 31.4–37.4)
MCV RBC AUTO: 96 FL (ref 82–98)
MONOCYTES # BLD AUTO: 0.52 THOUSAND/ÂΜL (ref 0.17–1.22)
MONOCYTES NFR BLD AUTO: 7 % (ref 4–12)
NEUTROPHILS # BLD AUTO: 5.63 THOUSANDS/ÂΜL (ref 1.85–7.62)
NEUTS SEG NFR BLD AUTO: 76 % (ref 43–75)
NRBC BLD AUTO-RTO: 0 /100 WBCS
PLATELET # BLD AUTO: 237 THOUSANDS/UL (ref 149–390)
PMV BLD AUTO: 9 FL (ref 8.9–12.7)
POTASSIUM SERPL-SCNC: 3.8 MMOL/L (ref 3.5–5.3)
PROT SERPL-MCNC: 7.3 G/DL (ref 6.4–8.4)
RBC # BLD AUTO: 4.31 MILLION/UL (ref 3.81–5.12)
SODIUM SERPL-SCNC: 141 MMOL/L (ref 135–147)
WBC # BLD AUTO: 7.48 THOUSAND/UL (ref 4.31–10.16)

## 2023-05-16 RX ORDER — FOLIC ACID 1 MG/1
1 TABLET ORAL DAILY
Qty: 90 TABLET | Refills: 3 | Status: SHIPPED | OUTPATIENT
Start: 2023-05-16 | End: 2023-08-14

## 2023-05-16 NOTE — PROGRESS NOTES
Assessment and Plan:   Ms Linda Caballero is a 65yo female who presents for follow up of history of dermatomyositis with positive skin biopsy  Fortunately, she has never had any signs or symptoms of muscle involvement and has never had a muscle biopsy  Workup revealed +SSA antibody which is most likely from the dermatomyositis  Lip biopsy was done by ENT 3/3/23 which was negative for Sjogren's syndrome  Inflammation markers and CK level drawn after today's visit as well as CBC and CMP within normal limits  We will plan to cont MTX 48RW/WY and folic acid as well as B55 week high risk medication monitoring labs  Reminded the patient to wear sunscreen, especially during the summer months  We also discussed the bunions that she has on her bilateral great toes, but they are not bothersome at this time so we will hold off on podiatry referral unless she becomes symptomatic  Plan:  Diagnoses and all orders for this visit:    Dermatomyositis ([facility] [address] )  -     CBC and differential  -     C-reactive protein  -     Comprehensive metabolic panel  -     Sedimentation rate, automated  -     CK  -     CBC and differential; Future  -     Comprehensive metabolic panel; Future  -     C-reactive protein; Future  -     Sedimentation rate, automated; Future  -     CK; Future  -     methotrexate 2 5 mg tablet; Take 4 tablets (10 mg total) by mouth once a week  -     folic acid (FOLVITE) 1 mg tablet; Take 1 tablet (1 mg total) by mouth daily    High risk medication use  -     CBC and differential  -     Comprehensive metabolic panel  -     CBC and differential; Future  -     Comprehensive metabolic panel; Future  -     methotrexate 2 5 mg tablet; Take 4 tablets (10 mg total) by mouth once a week  -     folic acid (FOLVITE) 1 mg tablet; Take 1 tablet (1 mg total) by mouth daily    SS-A antibody positive        I have personally reviewed prior notes, recent laboratory results, and pertinent films in PACS  Activities as tolerated  Exercise: try to maintain a low impact exercise regimen as much as possible  Walk for 30 minutes a day for at least 3 days a week  Continue other medications as prescribed by PCP and other specialists  RTC in 6 months    Rheumatic Disease Summary:  1  H/o Dermatomyositis (?amyopathic, +SSA, ? Sjogren’s)  -Rheum eval 1/26/22 for h/o dermatomyositis, previously saw Dr Quentin Vazquez, records reviewed   -Onset of burning erythematous anterior chest rash around 2011, ultimately had skin biopsy in 2015 which was apparently suggestive of DM vs mucinous eruption; rheum labs at that time negative for THELMA with panel, myositis panel, normal CK 52, also had negative EMG at that time, never had muscle biopsy or sx of muscle involvement   -She was on HCQ for 3 years before MTX, but came off because of a dark staining on the skin from her neck down her shoulders  This resolved after she stopped  She then got improvement with methotrexate and has been on it ever since then    -Initial visit: ongoing eval for submandibular LAD, also had mild GGO on CT chest which is mostly resolving and being monitored by pulm; w/u for dx of DM; 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; cont MTX 10mg/week   -Labs 1/26/22: +SSA 44, other serologies neg, normal CPK  -CT chest 6/6/22: resolution of previously see GGO in [facility]  2  Osteopenia   -DEXA 5/11/21: T -2 1 L FN, -1 9 lumbar, stable at lumbar and decreased at L hip, FRAX 1/9% for hip/major OP fracture   -DEXA due 5/2023  3  Comorbidities: BRCA mutation, h/o B/L breast cancer 2008 s/p mastectomies/chemo/femara, s/p ITZEL/BSO, anxiety, h/o benign lung nodule resection       HPI  Ms Fernando Juan is a 67yo female who presents for follow up of history of dermatomyositis with positive skin biopsy  Pt reports that she continues with a normal burning sensation and erythema on her anterior chest and sometimes around the latissimus dorsi area on her back where she had surgery    She does sometimes experience erythema on her face as well  She denies any joint pain, swelling, stiffness  She also denies any muscle pain or weakness  The following portions of the patient's history were reviewed and updated as appropriate: allergies, current medications, past family history, past medical history, past social history, past surgical history and problem list       Review of Systems  Constitutional: Negative for weight change, fevers, chills, night sweats, fatigue  ENT/Mouth: Negative for hearing changes, ear pain, nasal congestion, sinus pain, hoarseness, sore throat, rhinorrhea, swallowing difficulty  Eyes: Negative for pain, redness, discharge, vision changes  Cardiovascular: Negative for chest pain, SOB, palpitations  Respiratory: Negative for cough, sputum, wheezing, dyspnea  Gastrointestinal: Negative for nausea, vomiting, diarrhea, constipation, pain, heartburn  Genitourinary: Negative for dysuria, urinary frequency, hematuria  Musculoskeletal: As per HPI  Skin: Negative for skin rash, color changes  Neuro: Negative for weakness, numbness, tingling, loss of consciousness  Psych: Negative for anxiety, depression  Heme/Lymph: Negative for easy bruising, bleeding, lymphadenopathy  Past Medical History:   Diagnosis Date   • Benign neoplasm of lung     Right lung lobe benign  Removed 2010     • BRCA2 positive    • [facility]    • Breathing difficulty 2011    no breathing difficulties but patient reported drop in BP during anesthesia for appendectomy and was advised to have cardiologist consult   • Cancer [facility]) 2008   • Depression    • Dermatomyositis [facility])    • Lung nodule    • Melanoma ([facility] [address] ) 2014    mid chest (insitu)   • Wears glasses        Past Surgical History:   Procedure Laterality Date   • APPENDECTOMY      2011   • BREAST SURGERY      Bilateral Mastectomy   • COLONOSCOPY N/A 01/26/2016    Procedure: COLONOSCOPY;  Surgeon: Katie Block MD;  Location:  GI LAB;   Service:    • HYSTERECTOMY      2007   • LUNG BIOPSY     • MASS EXCISION Left 07/18/2019    Procedure: MID BACK MASS EXCISION BIOPSY;  Surgeon: Ca Roberts MD;  Location: [facility];  Service: General   • MASTECTOMY      reconstruction 2008   • OOPHORECTOMY     • SKIN BIOPSY         Social History     Socioeconomic History   • Marital status: /Civil Union     Spouse name: Not on file   • Number of children: Not on file   • Years of education: Not on file   • Highest education level: Not on file   Occupational History   • Not on file   Tobacco Use   • Smoking status: Never   • Smokeless tobacco: Never   Vaping Use   • Vaping Use: Never used   Substance and Sexual Activity   • Alcohol use: No   • Drug use: No   • Sexual activity: Yes     Partners: Male     Birth control/protection: None   Other Topics Concern   • Not on file   Social History Narrative   • Not on file     Social Determinants of Health     Financial Resource Strain: Not on file   Food Insecurity: Not on file   Transportation Needs: Not on file   Physical Activity: Not on file   Stress: Not on file   Social Connections: Not on file   Intimate Partner Violence: Not on file   Housing Stability: Not on file       Family History   Problem Relation Age of Onset   • Cancer Mother         Ovarian   • Ovarian cancer Mother    • No Known Problems Father    • BRCA2 Positive Sister    • Heart disease Sister         post partum cardiomyopathy, pacer   • No Known Problems Brother    • Prostate cancer Maternal Uncle    • Prostate cancer Maternal Uncle    • BRCA2 Negative Paternal Aunt    • Breast cancer Paternal Aunt    • Cancer Maternal Grandmother 80        skin   • Skin cancer Maternal Grandmother    • No Known Problems Paternal Grandmother    • No Known Problems Paternal Grandfather    • BRCA2 Positive Daughter        No Known Allergies      Current Outpatient Medications:   •  Calcium-Magnesium-Vitamin D (CALCIUM 500 PO), Take 1,000 mg by mouth "daily  , Disp: , Rfl:   •  cholecalciferol (VITAMIN D3) 1,000 units tablet, Take 1,000 Units by mouth daily, Disp: , Rfl:   •  clobetasol (TEMOVATE) 0 05 % cream, as needed  , Disp: , Rfl:   •  folic acid (FOLVITE) 1 mg tablet, Take 1 tablet (1 mg total) by mouth daily, Disp: 90 tablet, Rfl: 3  •  Glycerin-Hypromellose- (DRY EYE RELIEF DROPS OP), Apply to eye, Disp: , Rfl:   •  methotrexate 2 5 mg tablet, Take 4 tablets (10 mg total) by mouth once a week, Disp: 48 tablet, Rfl: 3  •  multivitamin (THERAGRAN) TABS, Take 1 tablet by mouth daily  , Disp: , Rfl:   •  Cholecalciferol (VITAMIN D) 2000 UNITS CAPS, Take 2,000 Units/day by mouth (Patient not taking: Reported on 4/6/2023), Disp: , Rfl:       Objective:    Vitals:    05/16/23 1328   BP: 118/74   Weight: 66 2 kg (146 lb)   Height: 5' 5\" (1 651 m)       Physical Exam  General: Well appearing, well nourished, in no acute distress  Oriented x 3, normal mood and affect  Ambulating without difficulty  Skin: +mild erythema of anterior chest area  Hair: Normal texture and distribution  Nails: Normal color, no deformities  HEENT:  Head: Normocephalic, atraumatic  Eyes: Conjunctiva clear, sclera non-icteric, EOM intact  Neck: Supple  Heart: Regular rate and rhythm, no murmur or gallop  Lungs: Clear to auscultation, no crackles or wheezing  Extremities: No amputations or deformities, cyanosis, edema  Musculoskeletal:   No asymmetry or deformities noted of bilateral upper and lower extremities  No tenderness to palpation or swelling of joints bilaterally to include: shoulder, elbow, wrist, MCP I-V, PIP I-V, knee, ankle, MTP I-V   +bunion b/l great toes   Neurologic: Alert and oriented  No focal neurological deficits appreciated  Psychiatric: Normal mood and affect         Richelle President, PAPO  Rheumatology  "

## 2023-07-18 ENCOUNTER — OFFICE VISIT (OUTPATIENT)
Dept: INTERNAL MEDICINE CLINIC | Facility: CLINIC | Age: 63
End: 2023-07-18
Payer: COMMERCIAL

## 2023-07-18 VITALS
SYSTOLIC BLOOD PRESSURE: 102 MMHG | HEART RATE: 73 BPM | WEIGHT: 150.2 LBS | BODY MASS INDEX: 24.99 KG/M2 | OXYGEN SATURATION: 96 % | DIASTOLIC BLOOD PRESSURE: 62 MMHG

## 2023-07-18 DIAGNOSIS — M79.674 PAIN OF TOE OF RIGHT FOOT: Primary | ICD-10-CM

## 2023-07-18 PROCEDURE — 99213 OFFICE O/P EST LOW 20 MIN: CPT | Performed by: INTERNAL MEDICINE

## 2023-07-18 NOTE — ASSESSMENT & PLAN NOTE
Keep pressure off the area, will refer to podiatry for definitive treatment. No evidence of gout or infection. With patient having dermatomyositis, discussed the possibility of a Gottron's papule.

## 2023-07-18 NOTE — PROGRESS NOTES
Name: Radha Duncan      : 1960      MRN: [id number]  Encounter Provider: Tremayne Brewer MD  Encounter Date: 2023   [facility]     1. Pain of toe of right foot  Assessment & Plan:  Keep pressure off the area, will refer to podiatry for definitive treatment. No evidence of gout or infection. With patient having dermatomyositis, discussed the possibility of a Gottron's papule. Orders:  -     Ambulatory Referral to Podiatry; Future           Subjective     Pt has had a lump at base of big toe on right foot that has become painful. She has had a slight lump in the area for about a decade, but a lot worse over this past summer. She doesn't wear tight shoes. No fevers or chills. She also has dermatomyositis. Review of Systems   Constitutional: Negative for chills and fever. Skin:        Lump base of big toe right foot       Past Medical History:   Diagnosis Date   • Benign neoplasm of lung     Right lung lobe benign. Removed . • BRCA2 positive    • Breast cancer ([address])    • Breathing difficulty     no breathing difficulties but patient reported drop in BP during anesthesia for appendectomy and was advised to have cardiologist consult   • Cancer [facility])    • Depression    • Dermatomyositis [facility])    • Lung nodule    • Melanoma ([address])     mid chest (insitu)   • Wears glasses      Past Surgical History:   Procedure Laterality Date   • APPENDECTOMY         • BREAST SURGERY      Bilateral Mastectomy   • COLONOSCOPY N/A 2016    Procedure: COLONOSCOPY;  Surgeon: Tammy Johnston MD;  Location: [facility];   Service:    • HYSTERECTOMY         • KNEE SURGERY     • LUNG BIOPSY     • MASS EXCISION Left 2019    Procedure: MID BACK MASS EXCISION BIOPSY;  Surgeon: Jamie Vega MD;  Location: [facility];  Service: General   • MASTECTOMY      reconstruction    • OOPHORECTOMY     • SKIN BIOPSY Family History   Problem Relation Age of Onset   • Cancer Mother         Ovarian   • Ovarian cancer Mother    • No Known Problems Father    • BRCA2 Positive Sister    • Heart disease Sister         post partum cardiomyopathy, pacer   • No Known Problems Brother    • Prostate cancer Maternal Uncle    • Prostate cancer Maternal Uncle    • BRCA2 Negative Paternal Aunt    • Breast cancer Paternal Aunt    • Cancer Maternal Grandmother 80        skin   • Skin cancer Maternal Grandmother    • No Known Problems Paternal Grandmother    • No Known Problems Paternal Grandfather    • BRCA2 Positive Daughter      Social History     Socioeconomic History   • Marital status: /Civil Union     Spouse name: None   • Number of children: None   • Years of education: None   • Highest education level: None   Occupational History   • None   Tobacco Use   • Smoking status: Never   • Smokeless tobacco: Never   Vaping Use   • Vaping Use: Never used   Substance and Sexual Activity   • Alcohol use: No   • Drug use: No   • Sexual activity: Yes     Partners: Male     Birth control/protection: Post-menopausal, None     Comment: complete hysterectomy   Other Topics Concern   • None   Social History Narrative   • None     Social Determinants of Health     Financial Resource Strain: Not on file   Food Insecurity: Not on file   Transportation Needs: Not on file   Physical Activity: Not on file   Stress: Not on file   Social Connections: Not on file   Intimate Partner Violence: Not on file   Housing Stability: Not on file     Current Outpatient Medications on File Prior to Visit   Medication Sig   • Calcium-Magnesium-Vitamin D (CALCIUM 500 PO) Take 1,000 mg by mouth daily.    • cholecalciferol (VITAMIN D3) 1,000 units tablet Take 1,000 Units by mouth daily   • clobetasol (TEMOVATE) 0.05 % cream as needed     • folic acid (FOLVITE) 1 mg tablet Take 1 tablet (1 mg total) by mouth daily   • Glycerin-Hypromellose- (DRY EYE RELIEF DROPS OP) Apply to eye   • methotrexate 2.5 mg tablet Take 4 tablets (10 mg total) by mouth once a week   • multivitamin (THERAGRAN) TABS Take 1 tablet by mouth daily. • Cholecalciferol (VITAMIN D) 2000 UNITS CAPS Take 2,000 Units/day by mouth (Patient not taking: Reported on 7/18/2023)     No Known Allergies  Immunization History   Administered Date(s) Administered   • COVID-19 MODERNA VACC 0.25 ML IM BOOSTER 03/04/2022   • COVID-19 MODERNA VACC 0.5 ML IM 01/29/2021, 02/26/2021, 08/31/2021   • COVID-19 Pfizer Vac BIVALENT Cali-sucrose 12 Yr+ IM (BOOSTER ONLY) 09/22/2022   • H1N1, All Formulations 12/11/2009   • Hep A, adult 06/16/2000, 12/20/2000   • Hep B, adult 06/16/2000, 07/17/2000, 12/20/2000   • Influenza Quadrivalent Preservative Free 3 years and older IM 10/04/2014, 09/19/2016, 10/27/2017   • Influenza Quadrivalent, 6-35 Months IM 10/12/2015   • Influenza, recombinant, quadrivalent,injectable, preservative free 11/02/2018, 11/25/2019, 10/10/2020, 10/02/2021, 10/01/2022   • Influenza, seasonal, injectable 09/20/2012, 09/24/2013   • Pneumococcal Polysaccharide PPV23 04/01/2008   • Td (adult), adsorbed 08/26/2003   • Tdap 09/04/2014   • Zoster Vaccine Recombinant 06/27/2019, 12/19/2019       Objective     /62   Pulse 73   Wt 68.1 kg (150 lb 3.2 oz)   SpO2 96%   BMI 24.99 kg/m²     Physical Exam  Constitutional:       Appearance: Normal appearance. Skin:     Comments: Slightly tender large papule base of right big toe, does not appear to involve the actual joint   Neurological:      Mental Status: She is alert.        Eugene Mason MD

## 2023-07-18 NOTE — PATIENT INSTRUCTIONS
Problem List Items Addressed This Visit          Other    Pain of toe of right foot - Primary     Keep pressure off the area, will refer to podiatry for definitive treatment. No evidence of gout or infection. With patient having dermatomyositis, discussed the possibility of a Gottron's papule.          Relevant Orders    Ambulatory Referral to Podiatry

## 2023-08-04 ENCOUNTER — APPOINTMENT (OUTPATIENT)
Dept: LAB | Age: 63
End: 2023-08-04
Payer: COMMERCIAL

## 2023-08-04 DIAGNOSIS — Z17.0 MALIGNANT NEOPLASM OF NIPPLE OF BOTH BREASTS IN FEMALE, ESTROGEN RECEPTOR POSITIVE (HCC): ICD-10-CM

## 2023-08-04 DIAGNOSIS — Z13.6 SCREENING FOR CARDIOVASCULAR CONDITION: ICD-10-CM

## 2023-08-04 DIAGNOSIS — Z79.899 HIGH RISK MEDICATION USE: ICD-10-CM

## 2023-08-04 DIAGNOSIS — M33.90 DERMATOMYOSITIS (HCC): ICD-10-CM

## 2023-08-04 DIAGNOSIS — C50.011 MALIGNANT NEOPLASM OF NIPPLE OF BOTH BREASTS IN FEMALE, ESTROGEN RECEPTOR POSITIVE (HCC): ICD-10-CM

## 2023-08-04 DIAGNOSIS — C50.012 MALIGNANT NEOPLASM OF NIPPLE OF BOTH BREASTS IN FEMALE, ESTROGEN RECEPTOR POSITIVE (HCC): ICD-10-CM

## 2023-08-04 LAB
ALBUMIN SERPL BCP-MCNC: 3.8 G/DL (ref 3.5–5)
ALP SERPL-CCNC: 64 U/L (ref 46–116)
ALT SERPL W P-5'-P-CCNC: 22 U/L (ref 12–78)
ANION GAP SERPL CALCULATED.3IONS-SCNC: 1 MMOL/L
AST SERPL W P-5'-P-CCNC: 16 U/L (ref 5–45)
BASOPHILS # BLD AUTO: 0.04 THOUSANDS/ÂΜL (ref 0–0.1)
BASOPHILS NFR BLD AUTO: 1 % (ref 0–1)
BILIRUB SERPL-MCNC: 0.56 MG/DL (ref 0.2–1)
BUN SERPL-MCNC: 21 MG/DL (ref 5–25)
CALCIUM SERPL-MCNC: 9.2 MG/DL (ref 8.3–10.1)
CHLORIDE SERPL-SCNC: 107 MMOL/L (ref 96–108)
CHOLEST SERPL-MCNC: 156 MG/DL
CK SERPL-CCNC: 65 U/L (ref 26–192)
CO2 SERPL-SCNC: 32 MMOL/L (ref 21–32)
CREAT SERPL-MCNC: 0.85 MG/DL (ref 0.6–1.3)
CRP SERPL QL: <3 MG/L
EOSINOPHIL # BLD AUTO: 0.1 THOUSAND/ÂΜL (ref 0–0.61)
EOSINOPHIL NFR BLD AUTO: 2 % (ref 0–6)
ERYTHROCYTE [DISTWIDTH] IN BLOOD BY AUTOMATED COUNT: 14 % (ref 11.6–15.1)
ERYTHROCYTE [SEDIMENTATION RATE] IN BLOOD: 4 MM/HOUR (ref 0–29)
GFR SERPL CREATININE-BSD FRML MDRD: 74 ML/MIN/1.73SQ M
GLUCOSE P FAST SERPL-MCNC: 90 MG/DL (ref 65–99)
HCT VFR BLD AUTO: 40.5 % (ref 34.8–46.1)
HDLC SERPL-MCNC: 78 MG/DL
HGB BLD-MCNC: 13.3 G/DL (ref 11.5–15.4)
IMM GRANULOCYTES # BLD AUTO: 0.01 THOUSAND/UL (ref 0–0.2)
IMM GRANULOCYTES NFR BLD AUTO: 0 % (ref 0–2)
LDLC SERPL CALC-MCNC: 69 MG/DL (ref 0–100)
LYMPHOCYTES # BLD AUTO: 1.22 THOUSANDS/ÂΜL (ref 0.6–4.47)
LYMPHOCYTES NFR BLD AUTO: 28 % (ref 14–44)
MCH RBC QN AUTO: 31.1 PG (ref 26.8–34.3)
MCHC RBC AUTO-ENTMCNC: 32.8 G/DL (ref 31.4–37.4)
MCV RBC AUTO: 95 FL (ref 82–98)
MONOCYTES # BLD AUTO: 0.49 THOUSAND/ÂΜL (ref 0.17–1.22)
MONOCYTES NFR BLD AUTO: 11 % (ref 4–12)
NEUTROPHILS # BLD AUTO: 2.57 THOUSANDS/ÂΜL (ref 1.85–7.62)
NEUTS SEG NFR BLD AUTO: 58 % (ref 43–75)
NRBC BLD AUTO-RTO: 0 /100 WBCS
PLATELET # BLD AUTO: 247 THOUSANDS/UL (ref 149–390)
PMV BLD AUTO: 9.9 FL (ref 8.9–12.7)
POTASSIUM SERPL-SCNC: 4.3 MMOL/L (ref 3.5–5.3)
PROT SERPL-MCNC: 6.9 G/DL (ref 6.4–8.4)
RBC # BLD AUTO: 4.27 MILLION/UL (ref 3.81–5.12)
SODIUM SERPL-SCNC: 140 MMOL/L (ref 135–147)
TRIGL SERPL-MCNC: 46 MG/DL
WBC # BLD AUTO: 4.43 THOUSAND/UL (ref 4.31–10.16)

## 2023-08-04 PROCEDURE — 80061 LIPID PANEL: CPT

## 2023-08-04 PROCEDURE — 80053 COMPREHEN METABOLIC PANEL: CPT

## 2023-08-04 PROCEDURE — 85652 RBC SED RATE AUTOMATED: CPT

## 2023-08-04 PROCEDURE — 82550 ASSAY OF CK (CPK): CPT

## 2023-08-04 PROCEDURE — 36415 COLL VENOUS BLD VENIPUNCTURE: CPT

## 2023-08-04 PROCEDURE — 86140 C-REACTIVE PROTEIN: CPT

## 2023-08-04 PROCEDURE — 85025 COMPLETE CBC W/AUTO DIFF WBC: CPT

## 2023-08-04 PROCEDURE — 86300 IMMUNOASSAY TUMOR CA 15-3: CPT

## 2023-08-05 LAB — CANCER AG27-29 SERPL-ACNC: 25.2 U/ML (ref 0–38.6)

## 2023-08-08 ENCOUNTER — OFFICE VISIT (OUTPATIENT)
Dept: INTERNAL MEDICINE CLINIC | Facility: CLINIC | Age: 63
End: 2023-08-08
Payer: COMMERCIAL

## 2023-08-08 VITALS
HEIGHT: 65 IN | RESPIRATION RATE: 16 BRPM | HEART RATE: 57 BPM | SYSTOLIC BLOOD PRESSURE: 110 MMHG | WEIGHT: 149.8 LBS | BODY MASS INDEX: 24.96 KG/M2 | OXYGEN SATURATION: 99 % | DIASTOLIC BLOOD PRESSURE: 78 MMHG

## 2023-08-08 DIAGNOSIS — R39.15 URINARY URGENCY: ICD-10-CM

## 2023-08-08 DIAGNOSIS — Z00.00 ANNUAL PHYSICAL EXAM: Primary | ICD-10-CM

## 2023-08-08 DIAGNOSIS — Z23 NEED FOR COVID-19 VACCINE: ICD-10-CM

## 2023-08-08 DIAGNOSIS — Z13.6 SCREENING FOR CARDIOVASCULAR CONDITION: ICD-10-CM

## 2023-08-08 PROCEDURE — 81003 URINALYSIS AUTO W/O SCOPE: CPT | Performed by: INTERNAL MEDICINE

## 2023-08-08 PROCEDURE — 99396 PREV VISIT EST AGE 40-64: CPT | Performed by: INTERNAL MEDICINE

## 2023-08-08 NOTE — ASSESSMENT & PLAN NOTE
Assessment and plan 1. Health maintenance annual wellness examination overall the patient is clinically stable and doing well, we encouraged the patient to follow a healthy and balanced diet. We recommend that the patient exercise routinely approximately 30 minutes 5 times per week . We have reviewed the patient's vaccines and have made recommendations for updates if necessary annual flu shot in the fall    . We will be ordering screening laboratories which are age appropriate. Return to the office in     6 months call if any problems.

## 2023-08-08 NOTE — PROGRESS NOTES
[address] [facility]    NAME: Diandra Bazan  AGE: 63 y.o. SEX: female  : 1960     DATE: 2023     Assessment and Plan:     Problem List Items Addressed This Visit        Other    Annual physical exam - Primary     Assessment and plan 1. Health maintenance annual wellness examination overall the patient is clinically stable and doing well, we encouraged the patient to follow a healthy and balanced diet. We recommend that the patient exercise routinely approximately 30 minutes 5 times per week . We have reviewed the patient's vaccines and have made recommendations for updates if necessary annual flu shot in the fall    . We will be ordering screening laboratories which are age appropriate. Return to the office in     6 months call if any problems. Urinary urgency    Relevant Orders    UA w Reflex to Microscopic w Reflex to Culture   Other Visit Diagnoses     Need for COVID-19 vaccine        Relevant Orders    Covid Vaccine at Practice    Screening for cardiovascular condition        Relevant Orders    Comprehensive metabolic panel    Lipid Panel with Direct LDL reflex        Will be seeing pod Dr Tarun Kwon for   Immunizations and preventive care screenings were discussed with patient today. Appropriate education was printed on patient's after visit summary. Counseling:  Exercise: the importance of regular exercise/physical activity was discussed. Recommend exercise 3-5 times per week for at least 30 minutes. Depression Screening and Follow-up Plan: Patient was screened for depression during today's encounter. They screened negative with a PHQ-2 score of 0. Return in about 3 months (around 2023).      Chief Complaint:     Chief Complaint   Patient presents with   • Annual Exam      History of Present Illness:     Adult Annual Physical   Patient here for a comprehensive physical exam. The patient reports no problems. Patient will be seeing podiatry regarding hallux valgus deformity    Diet and Physical Activity  Diet/Nutrition: well balanced diet. Exercise: walking. yoga     Depression Screening  PHQ-2/9 Depression Screening    Little interest or pleasure in doing things: 0 - not at all  Feeling down, depressed, or hopeless: 0 - not at all  PHQ-2 Score: 0  PHQ-2 Interpretation: Negative depression screen       General Health  Sleep: sleeps well. Hearing: normal - bilateral.  Vision: goes for regular eye exams. Dental: regular dental visits. /GYN Health  Patient is: postmenopausal  Last menstrual period: 45  Contraceptive method: .     Review of Systems:     Review of Systems   Constitutional: Negative for activity change, appetite change and unexpected weight change. HENT: Negative for congestion and postnasal drip. Eyes: Negative for visual disturbance. Respiratory: Negative for cough and shortness of breath. Cardiovascular: Negative for chest pain. Gastrointestinal: Negative for abdominal pain, diarrhea, nausea and vomiting. Genitourinary: Positive for urgency. Neurological: Negative for dizziness, light-headedness and headaches. Past Medical History:     Past Medical History:   Diagnosis Date   • Benign neoplasm of lung     Right lung lobe benign. Removed 2010.    • BRCA2 positive    • Breast cancer ([address])    • Breathing difficulty 2011    no breathing difficulties but patient reported drop in BP during anesthesia for appendectomy and was advised to have cardiologist consult   • Cancer [facility]) 2008   • Depression    • Dermatomyositis [facility])    • Lung nodule    • Melanoma ([address]) 2014    mid chest (insitu)   • Wears glasses       Past Surgical History:     Past Surgical History:   Procedure Laterality Date   • APPENDECTOMY      2011   • BREAST SURGERY      Bilateral Mastectomy   • COLONOSCOPY N/A 01/26/2016    Procedure: COLONOSCOPY;  Surgeon: Emily Torres MD;  [facility]: [facility];  Service:    • HYSTERECTOMY      2007   • KNEE SURGERY  2007   • LUNG BIOPSY     • MASS EXCISION Left 07/18/2019    Procedure: MID BACK MASS EXCISION BIOPSY;  Surgeon: Catana Oppenheim, MD;  [facility];  Service: General   • MASTECTOMY      reconstruction 2008   • OOPHORECTOMY     • SKIN BIOPSY        Social History:     Social History     Socioeconomic History   • Marital status: /Civil Union     Spouse name: None   • Number of children: None   • Years of education: None   • Highest education level: None   Occupational History   • None   Tobacco Use   • Smoking status: Never   • Smokeless tobacco: Never   Vaping Use   • Vaping Use: Never used   Substance and Sexual Activity   • Alcohol use: No   • Drug use: No   • Sexual activity: Yes     Partners: Male     Birth control/protection: Post-menopausal, None     Comment: complete hysterectomy   Other Topics Concern   • None   Social History Narrative   • None     Social Determinants of Health     Financial Resource Strain: Not on file   Food Insecurity: Not on file   Transportation Needs: Not on file   Physical Activity: Not on file   Stress: Not on file   Social Connections: Not on file   Intimate Partner Violence: Not on file   Housing Stability: Not on file      Family History:     Family History   Problem Relation Age of Onset   • Cancer Mother         Ovarian   • Ovarian cancer Mother    • No Known Problems Father    • BRCA2 Positive Sister    • Heart disease Sister         post partum cardiomyopathy, pacer   • No Known Problems Brother    • Prostate cancer Maternal Uncle    • Prostate cancer Maternal Uncle    • BRCA2 Negative Paternal Aunt    • Breast cancer Paternal Aunt    • Cancer Maternal Grandmother 80        skin   • Skin cancer Maternal Grandmother    • No Known Problems Paternal Grandmother    • No Known Problems Paternal Grandfather    • BRCA2 Positive Daughter       Current Medications:     Current Outpatient Medications   Medication Sig Dispense Refill   • Calcium-Magnesium-Vitamin D (CALCIUM 500 PO) Take 1,000 mg by mouth daily. • Cholecalciferol (VITAMIN D) 2000 UNITS CAPS Take 2,000 Units/day by mouth     • cholecalciferol (VITAMIN D3) 1,000 units tablet Take 1,000 Units by mouth daily     • clobetasol (TEMOVATE) 0.05 % cream as needed       • folic acid (FOLVITE) 1 mg tablet Take 1 tablet (1 mg total) by mouth daily 90 tablet 3   • Glycerin-Hypromellose- (DRY EYE RELIEF DROPS OP) Apply to eye     • methotrexate 2.5 mg tablet Take 4 tablets (10 mg total) by mouth once a week 48 tablet 3   • multivitamin (THERAGRAN) TABS Take 1 tablet by mouth daily. No current facility-administered medications for this visit. Allergies:     No Known Allergies   Physical Exam:     /78   Pulse 57   Resp 16   Ht 5' 5" (1.651 m)   Wt 67.9 kg (149 lb 12.8 oz)   SpO2 99%   BMI 24.93 kg/m²     Physical Exam  Vitals and nursing note reviewed. Constitutional:       General: She is not in acute distress. Appearance: Normal appearance. She is well-developed. She is not ill-appearing, toxic-appearing or diaphoretic. HENT:      Head: Normocephalic and atraumatic. Right Ear: External ear normal.      Left Ear: External ear normal.      Nose: Nose normal.   Eyes:      Pupils: Pupils are equal, round, and reactive to light. Cardiovascular:      Rate and Rhythm: Normal rate and regular rhythm. Heart sounds: Normal heart sounds. No murmur heard. Pulmonary:      Effort: Pulmonary effort is normal.      Breath sounds: Normal breath sounds. Abdominal:      General: There is no distension. Palpations: Abdomen is soft. Tenderness: There is no abdominal tenderness. There is no guarding. Neurological:      Mental Status: She is alert. Psychiatric:         Mood and Affect: Mood is not anxious or depressed.      Hallux valgus deformity mild redness of the skin history redness has been    Garth Bhatt, DO  [facility]

## 2023-08-09 LAB
BILIRUB UR QL STRIP: NEGATIVE
CLARITY UR: CLEAR
COLOR UR: YELLOW
GLUCOSE UR STRIP-MCNC: NEGATIVE MG/DL
HGB UR QL STRIP.AUTO: NEGATIVE
KETONES UR STRIP-MCNC: NEGATIVE MG/DL
LEUKOCYTE ESTERASE UR QL STRIP: NEGATIVE
NITRITE UR QL STRIP: NEGATIVE
PH UR STRIP.AUTO: 6.5 [PH]
PROT UR STRIP-MCNC: NEGATIVE MG/DL
SP GR UR STRIP.AUTO: 1.01 (ref 1–1.03)
UROBILINOGEN UR STRIP-ACNC: <2 MG/DL

## 2023-08-15 ENCOUNTER — OFFICE VISIT (OUTPATIENT)
Dept: PODIATRY | Facility: CLINIC | Age: 63
End: 2023-08-15
Payer: COMMERCIAL

## 2023-08-15 ENCOUNTER — APPOINTMENT (OUTPATIENT)
Dept: RADIOLOGY | Age: 63
End: 2023-08-15
Payer: COMMERCIAL

## 2023-08-15 VITALS
WEIGHT: 149 LBS | BODY MASS INDEX: 24.83 KG/M2 | DIASTOLIC BLOOD PRESSURE: 74 MMHG | HEART RATE: 53 BPM | SYSTOLIC BLOOD PRESSURE: 119 MMHG | HEIGHT: 65 IN

## 2023-08-15 DIAGNOSIS — M20.11 HALLUX VALGUS WITH BUNIONS, RIGHT: ICD-10-CM

## 2023-08-15 DIAGNOSIS — M79.674 PAIN OF TOE OF RIGHT FOOT: ICD-10-CM

## 2023-08-15 DIAGNOSIS — M20.21 HALLUX RIGIDUS, RIGHT FOOT: Primary | ICD-10-CM

## 2023-08-15 DIAGNOSIS — M19.071 PRIMARY OSTEOARTHRITIS OF RIGHT FOOT: ICD-10-CM

## 2023-08-15 DIAGNOSIS — M21.611 HALLUX VALGUS WITH BUNIONS, RIGHT: ICD-10-CM

## 2023-08-15 PROCEDURE — 73630 X-RAY EXAM OF FOOT: CPT

## 2023-08-15 PROCEDURE — 99203 OFFICE O/P NEW LOW 30 MIN: CPT | Performed by: PODIATRIST

## 2023-08-15 NOTE — LETTER
August 15, 2023     Raul Harper, [address] [address]    Patient: Ashkan Brennan   YOB: 1960   Date of Visit: 8/15/2023       Dear Dr. Neymar Garcia: Thank you for referring Izabela Clemente to me for evaluation. Below are my notes for this consultation. If you have questions, please do not hesitate to call me. I look forward to following your patient along with you. Sincerely,        Esequiel Romberg, DPM        CC: Linda Hua, DO Esequiel Romberg, [place]  8/15/2023  6:57 PM  Sign when Signing Visit                 PATIENT:  Ashkan Brennan  1960       ASSESSMENT:    1. Hallux rigidus, right foot  XR foot 3+ vw right      2. Primary osteoarthritis of right foot        3. Pain of toe of right foot  Ambulatory Referral to Podiatry           PLAN:  1. Reviewed medical records. Reviewed the note from PCP. Patient was counseled and educated on the condition and the diagnosis. 2. X-ray was obtained and personally reviewed. The radiological findings were discussed with the patient. 3. Her symptom is most likely due to hallux rigidus with significant osteophyte rather than dermatomyositis. Acute gout cannot be totally excluded. The diagnosis, treatment options and prognosis were discussed with the patient. 4. Discussed non-surgical vs surgical treatment options. 5. Pt to monitor the progress for now since her symptom is mostly resolved. Discussed proper footwear, icing, and arch support. 6. Patient will return in 6 months for re-evaluation. Imaging: I have personally reviewed pertinent films in PACS  Labs, pathology, and Other Studies: I have personally reviewed pertinent reports. Subjective:       HPI  The patient was referred to my office for evaluation of right foot pain. She has acute pain around right 1st MPJ with redness and mild swelling about 1 month ago. Her symptom lasted for about 1 week. Pain is minimal at this time.   She denied any injury, but she has a bump on right 1st MPJ. She related this to dermatomyositis. No associated numbness or paresthesia. No significant weakness or dysfunction. The following portions of the patient's history were reviewed and updated as appropriate: allergies, current medications, past family history, past medical history, past social history, past surgical history and problem list.  All pertinent labs and images were reviewed. Past Medical History  Past Medical History:   Diagnosis Date   • Benign neoplasm of lung     Right lung lobe benign. Removed 2010. • BRCA2 positive    • Breast cancer ([address])    • Breathing difficulty 2011    no breathing difficulties but patient reported drop in BP during anesthesia for appendectomy and was advised to have cardiologist consult   • Cancer [facility]) 2008   • Depression    • Dermatomyositis [facility])    • Lung nodule    • Melanoma ([address]) 2014    mid chest (insitu)   • Wears glasses        Past Surgical History  Past Surgical History:   Procedure Laterality Date   • APPENDECTOMY      2011   • BREAST SURGERY      Bilateral Mastectomy   • COLONOSCOPY N/A 01/26/2016    Procedure: COLONOSCOPY;  Surgeon: Marylu Lucero MD;  [facility]; Service:    • HYSTERECTOMY      2007   • KNEE SURGERY  2007   • LUNG BIOPSY     • MASS EXCISION Left 07/18/2019    Procedure: MID BACK MASS EXCISION BIOPSY;  Surgeon: Jer Rawls MD;  [facility];  Service: General   • MASTECTOMY      reconstruction 2008   • OOPHORECTOMY     • SKIN BIOPSY          Allergies:  Patient has no known allergies. Medications:  Current Outpatient Medications   Medication Sig Dispense Refill   • Calcium-Magnesium-Vitamin D (CALCIUM 500 PO) Take 1,000 mg by mouth daily.      • Cholecalciferol (VITAMIN D) 2000 UNITS CAPS Take 2,000 Units/day by mouth     • cholecalciferol (VITAMIN D3) 1,000 units tablet Take 1,000 Units by mouth daily     • clobetasol (TEMOVATE) 0.05 % cream as needed       • Glycerin-Hypromellose- (DRY EYE RELIEF DROPS OP) Apply to eye     • multivitamin (THERAGRAN) TABS Take 1 tablet by mouth daily. • folic acid (FOLVITE) 1 mg tablet Take 1 tablet (1 mg total) by mouth daily 90 tablet 3   • methotrexate 2.5 mg tablet Take 4 tablets (10 mg total) by mouth once a week 48 tablet 3     No current facility-administered medications for this visit. Social History:  Social History     Socioeconomic History   • Marital status: /Civil Union     Spouse name: None   • Number of children: None   • Years of education: None   • Highest education level: None   Occupational History   • None   Tobacco Use   • Smoking status: Never   • Smokeless tobacco: Never   Vaping Use   • Vaping Use: Never used   Substance and Sexual Activity   • Alcohol use: No   • Drug use: No   • Sexual activity: Yes     Partners: Male     Birth control/protection: Post-menopausal, None     Comment: complete hysterectomy   Other Topics Concern   • None   Social History Narrative   • None     Social Determinants of Health     Financial Resource Strain: Not on file   Food Insecurity: Not on file   Transportation Needs: Not on file   Physical Activity: Not on file   Stress: Not on file   Social Connections: Not on file   Intimate Partner Violence: Not on file   Housing Stability: Not on file          Review of Systems   Constitutional: Negative for chills and fever. HENT: Negative for sore throat. Respiratory: Negative for cough and shortness of breath. Cardiovascular: Negative for chest pain. Gastrointestinal: Negative for nausea and vomiting. Musculoskeletal: Negative for gait problem. Skin: Negative for rash and wound. Neurological: Negative for weakness and numbness. Hematological: Negative. Psychiatric/Behavioral: Negative for behavioral problems and confusion.         Objective:      /74   Pulse (!) 53   Ht 5' 5" (1.651 m)   Wt 67.6 kg (149 lb)   BMI 24.79 kg/m²         Physical Exam  Vitals reviewed. Constitutional:       General: She is not in acute distress. Appearance: She is not toxic-appearing or diaphoretic. HENT:      Head: Normocephalic and atraumatic. Eyes:      Extraocular Movements: Extraocular movements intact. Cardiovascular:      Rate and Rhythm: Normal rate and regular rhythm. Pulses: Normal pulses. Dorsalis pedis pulses are 2+ on the right side and 2+ on the left side. Posterior tibial pulses are 2+ on the right side and 2+ on the left side. Pulmonary:      Effort: Pulmonary effort is normal. No respiratory distress. Musculoskeletal:         General: Deformity present. No signs of injury. Cervical back: Normal range of motion and neck supple. Right lower leg: No edema. Left lower leg: No edema. Right foot: No foot drop. Left foot: No foot drop. Comments: Large osteophyte / prominence on dorsomedial right 1st MPJ. Decreased ROM right 1st MPJ. Skin:     General: Skin is warm. Capillary Refill: Capillary refill takes less than 2 seconds. Coloration: Skin is not cyanotic or mottled. Findings: No abscess, ecchymosis or wound. Neurological:      General: No focal deficit present. Mental Status: She is alert and oriented to person, place, and time. Cranial Nerves: No cranial nerve deficit. Sensory: No sensory deficit. Motor: No weakness. Coordination: Coordination normal.   Psychiatric:         Mood and Affect: Mood normal.         Behavior: Behavior normal.         Thought Content:  Thought content normal.         Judgment: Judgment normal.

## 2023-08-15 NOTE — PROGRESS NOTES
PATIENT:  Radha Duncan  1960       ASSESSMENT:     1. Hallux rigidus, right foot  XR foot 3+ vw right      2. Primary osteoarthritis of right foot        3. Pain of toe of right foot  Ambulatory Referral to Podiatry            PLAN:  1. Reviewed medical records. Reviewed the note from PCP. Patient was counseled and educated on the condition and the diagnosis. 2. X-ray was obtained and personally reviewed. The radiological findings were discussed with the patient. 3. Her symptom is most likely due to hallux rigidus with significant osteophyte rather than dermatomyositis. Acute gout cannot be totally excluded. The diagnosis, treatment options and prognosis were discussed with the patient. 4. Discussed non-surgical vs surgical treatment options. 5. Pt to monitor the progress for now since her symptom is mostly resolved. Discussed proper footwear, icing, and arch support. 6. Patient will return in 6 months for re-evaluation. Imaging: I have personally reviewed pertinent films in PACS  Labs, pathology, and Other Studies: I have personally reviewed pertinent reports. Subjective:       HPI  The patient was referred to my office for evaluation of right foot pain. She has acute pain around right 1st MPJ with redness and mild swelling about 1 month ago. Her symptom lasted for about 1 week. Pain is minimal at this time. She denied any injury, but she has a bump on right 1st MPJ. She related this to dermatomyositis. No associated numbness or paresthesia. No significant weakness or dysfunction. The following portions of the patient's history were reviewed and updated as appropriate: allergies, current medications, past family history, past medical history, past social history, past surgical history and problem list.  All pertinent labs and images were reviewed.       Past Medical History  Past Medical History:   Diagnosis Date   • Benign neoplasm of lung Right lung lobe benign. Removed 2010. • BRCA2 positive    • Breast cancer ([address])    • Breathing difficulty 2011    no breathing difficulties but patient reported drop in BP during anesthesia for appendectomy and was advised to have cardiologist consult   • Cancer [facility]) 2008   • Depression    • Dermatomyositis [facility])    • Lung nodule    • Melanoma ([address]) 2014    mid chest (insitu)   • Wears glasses        Past Surgical History  Past Surgical History:   Procedure Laterality Date   • APPENDECTOMY      2011   • BREAST SURGERY      Bilateral Mastectomy   • COLONOSCOPY N/A 01/26/2016    Procedure: COLONOSCOPY;  Surgeon: Jacob Louis MD;  [facility]; Service:    • HYSTERECTOMY      2007   • KNEE SURGERY  2007   • LUNG BIOPSY     • MASS EXCISION Left 07/18/2019    Procedure: MID BACK MASS EXCISION BIOPSY;  Surgeon: Angelita Padilla MD;  [facility];  Service: General   • MASTECTOMY      reconstruction 2008   • OOPHORECTOMY     • SKIN BIOPSY          Allergies:  Patient has no known allergies. Medications:  Current Outpatient Medications   Medication Sig Dispense Refill   • Calcium-Magnesium-Vitamin D (CALCIUM 500 PO) Take 1,000 mg by mouth daily. • Cholecalciferol (VITAMIN D) 2000 UNITS CAPS Take 2,000 Units/day by mouth     • cholecalciferol (VITAMIN D3) 1,000 units tablet Take 1,000 Units by mouth daily     • clobetasol (TEMOVATE) 0.05 % cream as needed       • Glycerin-Hypromellose- (DRY EYE RELIEF DROPS OP) Apply to eye     • multivitamin (THERAGRAN) TABS Take 1 tablet by mouth daily. • folic acid (FOLVITE) 1 mg tablet Take 1 tablet (1 mg total) by mouth daily 90 tablet 3   • methotrexate 2.5 mg tablet Take 4 tablets (10 mg total) by mouth once a week 48 tablet 3     No current facility-administered medications for this visit.        Social History:  Social History     Socioeconomic History   • Marital status: /Civil Union     Spouse name: None   • Number of children: None • Years of education: None   • Highest education level: None   Occupational History   • None   Tobacco Use   • Smoking status: Never   • Smokeless tobacco: Never   Vaping Use   • Vaping Use: Never used   Substance and Sexual Activity   • Alcohol use: No   • Drug use: No   • Sexual activity: Yes     Partners: Male     Birth control/protection: Post-menopausal, None     Comment: complete hysterectomy   Other Topics Concern   • None   Social History Narrative   • None     Social Determinants of Health     Financial Resource Strain: Not on file   Food Insecurity: Not on file   Transportation Needs: Not on file   Physical Activity: Not on file   Stress: Not on file   Social Connections: Not on file   Intimate Partner Violence: Not on file   Housing Stability: Not on file          Review of Systems   Constitutional: Negative for chills and fever. HENT: Negative for sore throat. Respiratory: Negative for cough and shortness of breath. Cardiovascular: Negative for chest pain. Gastrointestinal: Negative for nausea and vomiting. Musculoskeletal: Negative for gait problem. Skin: Negative for rash and wound. Neurological: Negative for weakness and numbness. Hematological: Negative. Psychiatric/Behavioral: Negative for behavioral problems and confusion. Objective:      /74   Pulse (!) 53   Ht 5' 5" (1.651 m)   Wt 67.6 kg (149 lb)   BMI 24.79 kg/m²          Physical Exam  Vitals reviewed. Constitutional:       General: She is not in acute distress. Appearance: She is not toxic-appearing or diaphoretic. HENT:      Head: Normocephalic and atraumatic. Eyes:      Extraocular Movements: Extraocular movements intact. Cardiovascular:      Rate and Rhythm: Normal rate and regular rhythm. Pulses: Normal pulses. Dorsalis pedis pulses are 2+ on the right side and 2+ on the left side. Posterior tibial pulses are 2+ on the right side and 2+ on the left side. Pulmonary:      Effort: Pulmonary effort is normal. No respiratory distress. Musculoskeletal:         General: Deformity present. No signs of injury. Cervical back: Normal range of motion and neck supple. Right lower leg: No edema. Left lower leg: No edema. Right foot: No foot drop. Left foot: No foot drop. Comments: Large osteophyte / prominence on dorsomedial right 1st MPJ. Decreased ROM right 1st MPJ. Skin:     General: Skin is warm. Capillary Refill: Capillary refill takes less than 2 seconds. Coloration: Skin is not cyanotic or mottled. Findings: No abscess, ecchymosis or wound. Neurological:      General: No focal deficit present. Mental Status: She is alert and oriented to person, place, and time. Cranial Nerves: No cranial nerve deficit. Sensory: No sensory deficit. Motor: No weakness. Coordination: Coordination normal.   Psychiatric:         Mood and Affect: Mood normal.         Behavior: Behavior normal.         Thought Content:  Thought content normal.         Judgment: Judgment normal.

## 2023-08-17 ENCOUNTER — HOSPITAL ENCOUNTER (EMERGENCY)
Facility: HOSPITAL | Age: 63
Discharge: HOME | End: 2023-08-17
Attending: EMERGENCY MEDICINE
Payer: COMMERCIAL

## 2023-08-17 ENCOUNTER — APPOINTMENT (EMERGENCY)
Dept: RADIOLOGY | Facility: HOSPITAL | Age: 63
End: 2023-08-17
Payer: COMMERCIAL

## 2023-08-17 ENCOUNTER — APPOINTMENT (OUTPATIENT)
Dept: RADIOLOGY | Age: 63
End: 2023-08-17
Payer: COMMERCIAL

## 2023-08-17 ENCOUNTER — OFFICE VISIT (OUTPATIENT)
Dept: URGENT CARE | Age: 63
End: 2023-08-17
Payer: COMMERCIAL

## 2023-08-17 VITALS
OXYGEN SATURATION: 93 % | DIASTOLIC BLOOD PRESSURE: 56 MMHG | TEMPERATURE: 97.6 F | SYSTOLIC BLOOD PRESSURE: 127 MMHG | RESPIRATION RATE: 20 BRPM | HEART RATE: 60 BPM

## 2023-08-17 VITALS
TEMPERATURE: 96.5 F | OXYGEN SATURATION: 97 % | BODY MASS INDEX: 24.79 KG/M2 | RESPIRATION RATE: 20 BRPM | WEIGHT: 149 LBS

## 2023-08-17 DIAGNOSIS — S52.531A CLOSED COLLES' FRACTURE OF RIGHT RADIUS, INITIAL ENCOUNTER: Primary | ICD-10-CM

## 2023-08-17 DIAGNOSIS — T14.90XA INJURY: ICD-10-CM

## 2023-08-17 PROCEDURE — 73100 X-RAY EXAM OF WRIST: CPT

## 2023-08-17 PROCEDURE — 99215 OFFICE O/P EST HI 40 MIN: CPT | Performed by: PHYSICIAN ASSISTANT

## 2023-08-17 PROCEDURE — 73110 X-RAY EXAM OF WRIST: CPT

## 2023-08-17 PROCEDURE — 99284 EMERGENCY DEPT VISIT MOD MDM: CPT | Performed by: EMERGENCY MEDICINE

## 2023-08-17 PROCEDURE — 99283 EMERGENCY DEPT VISIT LOW MDM: CPT

## 2023-08-17 PROCEDURE — 25605 CLTX DST RDL FX/EPHYS SEP W/: CPT | Performed by: EMERGENCY MEDICINE

## 2023-08-17 PROCEDURE — 29125 APPL SHORT ARM SPLINT STATIC: CPT | Performed by: PHYSICIAN ASSISTANT

## 2023-08-17 RX ORDER — PROPOFOL 10 MG/ML
150 INJECTION, EMULSION INTRAVENOUS ONCE
Status: DISCONTINUED | OUTPATIENT
Start: 2023-08-17 | End: 2023-08-17

## 2023-08-17 RX ORDER — ACETAMINOPHEN 325 MG/1
650 TABLET ORAL EVERY 6 HOURS PRN
Qty: 60 TABLET | Refills: 0 | Status: SHIPPED | OUTPATIENT
Start: 2023-08-17

## 2023-08-17 RX ORDER — ROPIVACAINE HYDROCHLORIDE 5 MG/ML
15 INJECTION, SOLUTION EPIDURAL; INFILTRATION; PERINEURAL ONCE
Status: COMPLETED | OUTPATIENT
Start: 2023-08-17 | End: 2023-08-17

## 2023-08-17 RX ORDER — LIDOCAINE HYDROCHLORIDE 10 MG/ML
10 INJECTION, SOLUTION EPIDURAL; INFILTRATION; INTRACAUDAL; PERINEURAL ONCE
Status: COMPLETED | OUTPATIENT
Start: 2023-08-17 | End: 2023-08-17

## 2023-08-17 RX ORDER — IBUPROFEN 400 MG/1
400 TABLET ORAL EVERY 6 HOURS PRN
Qty: 50 TABLET | Refills: 0 | Status: SHIPPED | OUTPATIENT
Start: 2023-08-17

## 2023-08-17 RX ORDER — LIDOCAINE HYDROCHLORIDE 10 MG/ML
INJECTION, SOLUTION EPIDURAL; INFILTRATION; INTRACAUDAL; PERINEURAL
Status: COMPLETED
Start: 2023-08-17 | End: 2023-08-17

## 2023-08-17 RX ORDER — HYDROMORPHONE HCL/PF 1 MG/ML
1 SYRINGE (ML) INJECTION ONCE
Status: DISCONTINUED | OUTPATIENT
Start: 2023-08-17 | End: 2023-08-17

## 2023-08-17 RX ADMIN — ROPIVACAINE HYDROCHLORIDE 15 ML: 5 INJECTION, SOLUTION EPIDURAL; INFILTRATION; PERINEURAL at 20:21

## 2023-08-17 RX ADMIN — LIDOCAINE HYDROCHLORIDE 10 ML: 10 INJECTION, SOLUTION EPIDURAL; INFILTRATION; INTRACAUDAL; PERINEURAL at 20:21

## 2023-08-17 NOTE — PATIENT INSTRUCTIONS
Proceed directly to [facility] for further evaluation and treatment. Leave splint in place until evaluation.

## 2023-08-17 NOTE — Clinical Note
Bravo Mena was seen and treated in our emergency department on 8/17/2023. Other - See Comments    Nonweightbearing to Right Wrist    Diagnosis: Right Wrist Fracture    Tran  is off the rest of the shift today. She may return on this date: 08/21/2023         If you have any questions or concerns, please don't hesitate to call.       Greyson Stroud, DO    ______________________________           _______________          _______________  Hospital Representative                              Date                                Time

## 2023-08-17 NOTE — PROGRESS NOTES
[facility]        NAME: Millicent Franco is a 63 y.o. female  : 1960    MRN: [id number]  DATE: 2023  TIME: 7:01 PM    Assessment and Plan   Closed Colles' fracture of right radius, initial encounter [S52.531A]  1. Closed Colles' fracture of right radius, initial encounter  XR wrist 3+ vw right    Transfer to other facility      Pt presents with acute injury and deformity of the right wrist. X-ray demonstrates displaced extra-articular fracture of the distal radius with comminution. Requires reduction. Pt splinted and referred to [facility] ED at her request.     Patient Instructions   Patient Instructions   Proceed directly to [facility] for further evaluation and treatment. Leave splint in place until evaluation. Chief Complaint     Chief Complaint   Patient presents with   • Wrist Injury     Patient fell down hurting right wrist it happen today. History of Present Illness       63year old female presents with complaint of pain, swelling, bruising, and deformity of the right wrist. Pt reports she was walking out of a restaurant and slipped in mud landing on her outstretched right hand about 15 minutes prior to arrival. Notes some tingling, but denies weakness able to move fingers. Pain rated 8-9/10 worse with wrist movement. Review of Systems   Review of Systems   Musculoskeletal: Positive for arthralgias and joint swelling. Current Medications       Current Outpatient Medications:   •  Calcium-Magnesium-Vitamin D (CALCIUM 500 PO), Take 1,000 mg by mouth daily. , Disp: , Rfl:   •  Cholecalciferol (VITAMIN D) 2000 UNITS CAPS, Take 2,000 Units/day by mouth, Disp: , Rfl:   •  cholecalciferol (VITAMIN D3) 1,000 units tablet, Take 1,000 Units by mouth daily, Disp: , Rfl:   •  clobetasol (TEMOVATE) 0.05 % cream, as needed  , Disp: , Rfl:   •  folic acid (FOLVITE) 1 mg tablet, Take 1 tablet (1 mg total) by mouth daily, Disp: 90 tablet, Rfl: 3  • Glycerin-Hypromellose- (DRY EYE RELIEF DROPS OP), Apply to eye, Disp: , Rfl:   •  methotrexate 2.5 mg tablet, Take 4 tablets (10 mg total) by mouth once a week, Disp: 48 tablet, Rfl: 3  •  multivitamin (THERAGRAN) TABS, Take 1 tablet by mouth daily. , Disp: , Rfl:     Current Allergies     Allergies as of 08/17/2023   • (No Known Allergies)            The following portions of the patient's history were reviewed and updated as appropriate: allergies, current medications, past family history, past medical history, past social history, past surgical history and problem list.     Past Medical History:   Diagnosis Date   • Benign neoplasm of lung     Right lung lobe benign. Removed 2010. • BRCA2 positive    • Breast cancer ([address])    • Breathing difficulty 2011    no breathing difficulties but patient reported drop in BP during anesthesia for appendectomy and was advised to have cardiologist consult   • Cancer [facility]) 2008   • Depression    • Dermatomyositis [facility])    • Lung nodule    • Melanoma ([address]) 2014    mid chest (insitu)   • Wears glasses        Past Surgical History:   Procedure Laterality Date   • APPENDECTOMY      2011   • BREAST SURGERY      Bilateral Mastectomy   • COLONOSCOPY N/A 01/26/2016    Procedure: COLONOSCOPY;  Surgeon: Puja Dawson MD;  [facility];   Service:    • HYSTERECTOMY      2007   • KNEE SURGERY  2007   • LUNG BIOPSY     • MASS EXCISION Left 07/18/2019    Procedure: MID BACK MASS EXCISION BIOPSY;  Surgeon: Raza Coley MD;  [facility];  Service: General   • MASTECTOMY      reconstruction 2008   • OOPHORECTOMY     • SKIN BIOPSY         Family History   Problem Relation Age of Onset   • Cancer Mother         Ovarian   • Ovarian cancer Mother    • No Known Problems Father    • BRCA2 Positive Sister    • Heart disease Sister         post partum cardiomyopathy, pacer   • No Known Problems Brother    • Prostate cancer Maternal Uncle    • Prostate cancer Maternal Uncle • BRCA2 Negative Paternal Aunt    • Breast cancer Paternal Aunt    • Cancer Maternal Grandmother 80        skin   • Skin cancer Maternal Grandmother    • No Known Problems Paternal Grandmother    • No Known Problems Paternal Grandfather    • BRCA2 Positive Daughter          Medications have been verified. Objective   Temp (!) 96.5 °F (35.8 °C) (Temporal)   Resp 20   Wt 67.6 kg (149 lb)   SpO2 97%   BMI 24.79 kg/m²   No LMP recorded. Patient has had a hysterectomy. Physical Exam     Physical Exam  Vitals and nursing note reviewed. Constitutional:       General: She is awake. She is not in acute distress. Appearance: Normal appearance. She is well-developed and well-groomed. She is not ill-appearing, toxic-appearing or diaphoretic. HENT:      Head: Normocephalic and atraumatic. Right Ear: Hearing and external ear normal.      Left Ear: Hearing and external ear normal.   Eyes:      General: Lids are normal. Vision grossly intact. Gaze aligned appropriately. Cardiovascular:      Rate and Rhythm: Normal rate. Pulmonary:      Effort: Pulmonary effort is normal.      Comments: Patient is speaking in full sentences with no increased respiratory effort. No audible wheezing or stridor. Musculoskeletal:      Right wrist: Swelling, deformity, tenderness and bony tenderness (distal radius) present. No effusion or lacerations. Decreased range of motion. Cervical back: Normal range of motion. Skin:     General: Skin is warm and dry. Neurological:      Mental Status: She is alert and oriented to person, place, and time. Coordination: Coordination is intact. Gait: Gait is intact. Psychiatric:         Attention and Perception: Attention and perception normal.         Mood and Affect: Mood and affect normal.         Speech: Speech normal.         Behavior: Behavior normal. Behavior is cooperative.      Splint application    Date/Time: 8/17/2023 6:30 PM    Performed by: Ko Simental Dipti Irving PA-C  Authorized by: Mahamed Swift PA-C  Universal Protocol:  Procedure performed by: Seferino Garcia)  Consent: Verbal consent obtained. Risks and benefits: risks, benefits and alternatives were discussed  Consent given by: patient  Time out: Immediately prior to procedure a "time out" was called to verify the correct patient, procedure, equipment, support staff and site/side marked as required. Patient understanding: patient states understanding of the procedure being performed  Patient identity confirmed: verbally with patient      Pre-procedure details:     Sensation:  Normal    Skin color:  Bruised and pink  Procedure details:     Laterality:  Right    Location:  Wrist    Wrist:  R wrist    Strapping: yes      Splint type:  Sugar tong    Supplies:  Sling, Ortho-Glass and 2 layer wrap  Post-procedure details:     Pain:  Improved    Sensation:  Normal    Skin color:  Unchanged (bruised and pink)     Patient tolerance of procedure: Tolerated well, no immediate complications                Note: Portions of this record may have been created with voice recognition software. Occasional wrong word or "sound a like" substitutions may have occurred due to the inherent limitations of voice recognition software. Please read the chart carefully and recognize, using context, where substitutions have occurred. *

## 2023-08-18 ENCOUNTER — TELEPHONE (OUTPATIENT)
Age: 63
End: 2023-08-18

## 2023-08-18 NOTE — TELEPHONE ENCOUNTER
Caller: Self    Doctor: Smooth King    Reason for call: Patient will keep appt with Dr Smooth King but would like to be notified if something opened up for Monday with Hand surgery    Call back#: [phone]

## 2023-08-18 NOTE — DISCHARGE INSTRUCTIONS
You can take Acetaminophen (Tylenol) 650mg every six hours for up to three doses per day for one week maximum, do not take more than 3000mg in 24 hours. You can take ibuprofen (Motrin/Advil) 400mg every six hours for up to three doses per day for one week maximum, do not take with any other NSAIDs (aspirin/Aleve/etc). You need to follow up with your primary care doctor for further evaluation and management. Continue to monitor pain, feeling in the fingers, movement of the fingers at home. Please follow up with orthopedic surgery. Please return to the Emergency Department if you experience worsening of your current symptoms, numbness/tingling/weakness of the arm/wrist, severe pain, or any other concerning symptoms.

## 2023-08-18 NOTE — ED PROVIDER NOTES
History  Chief Complaint   Patient presents with   • Fall     [place] and falling onto R hand. Pt was seen at [facility] and was told to come here for a wrist fx     HPI   Patient is a 27-year-old female who was sent to the ED from urgent care for evaluation of right wrist fracture. Patient reports she was hiking along a muddy path, slipped falling backwards onto her buttocks and extending right arm. Patient reports immediate pain and noticing deformity to right wrist.  X-rays performed at urgent care concerning for Colles' fracture, splinted, and sent to [facility] ED. Patient reports pain to right wrist.  Denies any other injuries, AC/AP, head trauma, or any other complaints or concerns at this time. Prior to Admission Medications   Prescriptions Last Dose Informant Patient Reported? Taking? Calcium-Magnesium-Vitamin D (CALCIUM 500 PO)  Self Yes No   Sig: Take 1,000 mg by mouth daily. Cholecalciferol (VITAMIN D) 2000 UNITS CAPS  Self Yes No   Sig: Take 2,000 Units/day by mouth   Glycerin-Hypromellose- (DRY EYE RELIEF DROPS OP)  Self Yes No   Sig: Apply to eye   cholecalciferol (VITAMIN D3) 1,000 units tablet  Self Yes No   Sig: Take 1,000 Units by mouth daily   clobetasol (TEMOVATE) 0.05 % cream  Self Yes No   Sig: as needed     folic acid (FOLVITE) 1 mg tablet   No No   Sig: Take 1 tablet (1 mg total) by mouth daily   methotrexate 2.5 mg tablet   No No   Sig: Take 4 tablets (10 mg total) by mouth once a week   multivitamin (THERAGRAN) TABS  Self Yes No   Sig: Take 1 tablet by mouth daily. Facility-Administered Medications: None       Past Medical History:   Diagnosis Date   • Benign neoplasm of lung     Right lung lobe benign. Removed 2010.    • BRCA2 positive    • Breast cancer ([address])    • Breathing difficulty 2011    no breathing difficulties but patient reported drop in BP during anesthesia for appendectomy and was advised to have cardiologist consult   • Cancer [facility]) 2008   • Depression    • Dermatomyositis [facility])    • Lung nodule    • Melanoma ([address]) 2014    mid chest (insitu)   • Wears glasses        Past Surgical History:   Procedure Laterality Date   • APPENDECTOMY      2011   • BREAST SURGERY      Bilateral Mastectomy   • COLONOSCOPY N/A 01/26/2016    Procedure: COLONOSCOPY;  Surgeon: Hiral Watson MD;  [facility]; Service:    • HYSTERECTOMY      2007   • KNEE SURGERY  2007   • LUNG BIOPSY     • MASS EXCISION Left 07/18/2019    Procedure: MID BACK MASS EXCISION BIOPSY;  Surgeon: Renetta Wong MD;  [facility];  Service: General   • MASTECTOMY      reconstruction 2008   • OOPHORECTOMY     • SKIN BIOPSY         Family History   Problem Relation Age of Onset   • Cancer Mother         Ovarian   • Ovarian cancer Mother    • No Known Problems Father    • BRCA2 Positive Sister    • Heart disease Sister         post partum cardiomyopathy, pacer   • No Known Problems Brother    • Prostate cancer Maternal Uncle    • Prostate cancer Maternal Uncle    • BRCA2 Negative Paternal Aunt    • Breast cancer Paternal Aunt    • Cancer Maternal Grandmother 80        skin   • Skin cancer Maternal Grandmother    • No Known Problems Paternal Grandmother    • No Known Problems Paternal Grandfather    • BRCA2 Positive Daughter      I have reviewed and agree with the history as documented. E-Cigarette/Vaping   • E-Cigarette Use Never User      E-Cigarette/Vaping Substances   • Nicotine No    • THC No    • CBD No    • Flavoring No    • Other No    • Unknown No      Social History     Tobacco Use   • Smoking status: Never   • Smokeless tobacco: Never   Vaping Use   • Vaping Use: Never used   Substance Use Topics   • Alcohol use: No   • Drug use: No        Review of Systems   Musculoskeletal: Positive for arthralgias and myalgias. All other systems reviewed and are negative.       Physical Exam  ED Triage Vitals [08/17/23 1925]   Temperature Pulse Respirations Blood Pressure SpO2   97.6 °F (36.4 °C) 60 20 127/56 93 %      Temp Source Heart Rate Source Patient Position - Orthostatic VS BP Location FiO2 (%)   Oral Monitor Lying Left arm --      Pain Score       8             Orthostatic Vital Signs  Vitals:    08/17/23 1925   BP: 127/56   Pulse: 60   Patient Position - Orthostatic VS: Lying       Physical Exam  Vitals and nursing note reviewed. Constitutional:       General: She is awake. She is not in acute distress. HENT:      Head: Normocephalic and atraumatic. Nose: No congestion or rhinorrhea. Mouth/Throat:      Mouth: Mucous membranes are moist.   Eyes:      General: No scleral icterus. Extraocular Movements: Extraocular movements intact. Conjunctiva/sclera: Conjunctivae normal.   Cardiovascular:      Rate and Rhythm: Normal rate and regular rhythm. Pulmonary:      Effort: Pulmonary effort is normal. No respiratory distress. Abdominal:      General: Abdomen is flat. There is no distension. Musculoskeletal:         General: Deformity (Right wrist) present. Cervical back: Normal range of motion and neck supple. Skin:     General: Skin is warm. Coloration: Skin is not jaundiced. Neurological:      Mental Status: She is alert. Psychiatric:         Mood and Affect: Mood normal.         Behavior: Behavior is cooperative. ED Medications  Medications   lidocaine (PF) (XYLOCAINE-MPF) 1 % injection 10 mL (10 mL Infiltration Given 8/17/23 2021)   ropivacaine (NAROPIN) 0.5 % injection 15 mL (15 mL Infiltration Given by Other 8/17/23 2021)       Diagnostic Studies  Results Reviewed     None                 XR wrist 3+ views RIGHT   Final Result by Brook Corrales MD (61/65 4086)      Improved alignment of a fracture of the distal right radius.             Workstation performed: [id number]         XR wrist 2 vw right   Final Result by Brook Corrales MD (46/20 7022)      Fracture of the distal right radius with no significant change in alignment since a study from earlier in the day. Workstation performed: [id number]               Procedures  Orthopedic injury treatment    Date/Time: 8/17/2023 9:30 PM    Performed by: Miriam Will DO  Authorized by: Miriam Will DO    Patient Location:  ED  [place] Protocol:  Procedure performed by: (Dr. Brian Travis)  Consent: Verbal consent obtained. Risks and benefits: risks, benefits and alternatives were discussed  Consent given by: patient  Patient understanding: patient states understanding of the procedure being performed  Patient consent: the patient's understanding of the procedure matches consent given  Procedure consent: procedure consent matches procedure scheduled  Relevant documents: relevant documents present and verified  Test results: test results available and properly labeled  Site marked: the operative site was marked  Radiology Images displayed and confirmed.  If images not available, report reviewed: imaging studies available  Required items: required blood products, implants, devices, and special equipment available  Patient identity confirmed: verbally with patient, arm band and provided demographic data      Injury location:  Wrist  Location details:  Right wrist  Injury type:  Fracture  Fracture type: distal radius    Fracture type: distal radius    Neurovascular status: Neurovascularly intact    Distal perfusion: normal    Neurological function: normal    Range of motion: reduced    Local anesthesia used?: Yes    Anesthesia:  Hematoma block  Local anesthetic:  Lidocaine 1% without epinephrine and Ropivacaine 0.5%  Anesthetic total (ml):  15  Manipulation performed?: Yes    Skeletal traction used?: Yes    Reduction successful?: Yes    Confirmation: Reduction confirmed by x-ray    Immobilization:  Splint and sling  Neurovascular status: Neurovascularly intact    Distal perfusion: normal    Neurological function: normal Range of motion: improved    Patient tolerance:  Patient tolerated the procedure well with no immediate complications  Splint application    Date/Time: 8/17/2023 10:00 PM    Performed by: Ronny Mosqueda DO  Authorized by: Jaron Ball DO  Universal Protocol:  Consent: Verbal consent obtained. Risks and benefits: risks, benefits and alternatives were discussed  Consent given by: patient  Patient understanding: patient states understanding of the procedure being performed  Patient consent: the patient's understanding of the procedure matches consent given  Procedure consent: procedure consent matches procedure scheduled  Relevant documents: relevant documents present and verified  Test results: test results available and properly labeled  Site marked: the operative site was marked  Radiology Images displayed and confirmed. If images not available, report reviewed: imaging studies available  Required items: required blood products, implants, devices, and special equipment available  Patient identity confirmed: verbally with patient, arm band and provided demographic data      Pre-procedure details:     Sensation:  Normal  Procedure details:     Laterality:  Right    Location:  Wrist    Wrist:  R wrist    Splint type:  Sugar tong    Supplies:  Cotton padding, Ortho-Glass, elastic bandage and sling  Post-procedure details:     Pain:  Improved    Sensation:  Normal    Patient tolerance of procedure: Tolerated well, no immediate complications          ED Course                                       Medical Decision Making  Closed Colles' fracture of right radius, initial encounter: acute illness or injury  Amount and/or Complexity of Data Reviewed  Independent Historian: spouse  External Data Reviewed: radiology. Details: Right distal radius fracture noted on x-rays performed earlier today  Radiology: ordered. Risk  OTC drugs. Prescription drug management.         Patient is a 80-year-old female presenting with right distal radius fracture. Prior imaging reviewed, distal radius fracture with impaction and posterior displacement noted. Hematoma block to right wrist with significant relief of pain. Patient placed in traction. Reduction performed. Splint placed. See above for further details. Patient remained neurovascularly intact following procedure and splinting. Patient denies any new or worsening complaints or concerns, reports improvement in right wrist pain. Discussed results and plan with patient and  at bedside. Advised on need for outpatient follow up, given information and referral for orthopedic surgery. Given return precautions verbally and in discharge instructions. Given prescriptions for Tylenol Motrin and advised on proper use. Patient declines prescription for opioid pain medication at this time. All questions answered. Patient expressed verbal understanding and is agreeable with plan for discharge with outpatient follow up. Disposition  Final diagnoses:   Closed Colles' fracture of right radius, initial encounter     Time reflects when diagnosis was documented in both MDM as applicable and the Disposition within this note     Time User Action Codes Description Comment    8/17/2023 10:02 PM Navid Perdomo Add [S19.926U] Closed Colles' fracture of right radius, initial encounter       ED Disposition     ED Disposition   Discharge    Condition   Stable    Date/Time   Thu Aug 17, 2023 10:01 PM    Comment   Harrison Cisneros discharge to home/self care.                Follow-up Information     Follow up With Specialties Details Why Contact Info Additional [address] Orthopedic Surgery Schedule an appointment as soon as possible for a visit in 1 week  [address] [address]  [phone] [facility], [address] [place], [place], [address] [phone]  Use Entrance A     [address] [place] [address] Emergency Department Emergency Medicine Go to  If symptoms worsen [address] [address]  [facility] Emergency Department, [address] [facility], [place], [place], [facility]          Discharge Medication List as of 8/17/2023 10:11 PM      START taking these medications    Details   acetaminophen (TYLENOL) 325 mg tablet Take 2 tablets (650 mg total) by mouth every 6 (six) hours as needed for mild pain, Starting Thu 8/17/2023, Normal      ibuprofen (MOTRIN) 400 mg tablet Take 1 tablet (400 mg total) by mouth every 6 (six) hours as needed for mild pain, Starting Thu 8/17/2023, Normal         CONTINUE these medications which have NOT CHANGED    Details   Calcium-Magnesium-Vitamin D (CALCIUM 500 PO) Take 1,000 mg by mouth daily. , Historical Med      Cholecalciferol (VITAMIN D) 2000 UNITS CAPS Take 2,000 Units/day by mouth, Historical Med      cholecalciferol (VITAMIN D3) 1,000 units tablet Take 1,000 Units by mouth daily, Historical Med      clobetasol (TEMOVATE) 0.05 % cream as needed  , Starting Fri 3/27/2020, Historical Med      folic acid (FOLVITE) 1 mg tablet Take 1 tablet (1 mg total) by mouth daily, Starting Tue 5/16/2023, Until Mon 8/14/2023, Normal      Glycerin-Hypromellose- (DRY EYE RELIEF DROPS OP) Apply to eye, Historical Med      methotrexate 2.5 mg tablet Take 4 tablets (10 mg total) by mouth once a week, Starting Tue 5/16/2023, Until Mon 8/14/2023, Normal      multivitamin (THERAGRAN) TABS Take 1 tablet by mouth daily. , Historical Med               PDMP Review     None           ED Provider  Attending physically available and evaluated Alexis Perez. I managed the patient along with the ED Attending.     Electronically Signed by         Sixto March, DO 08/19/23 0701

## 2023-08-18 NOTE — TELEPHONE ENCOUNTER
Caller: Patient    Doctor: Hand Specialist    Reason for call:    Patient was seen in the ED for Closed Colles' fracture of right radius, xrays in Epic. Patient scheduled herself  With Dr Fidencio Vazquez online for 8/22/23, but she is calling back for a sooner appointment for hand .   Please give her a call to assist.   (I did not cancel Fidencio Vazquez in case this is the only option)    Call back#: [phone]

## 2023-08-22 ENCOUNTER — IMMUNIZATIONS (OUTPATIENT)
Dept: FAMILY MEDICINE CLINIC | Facility: CLINIC | Age: 63
End: 2023-08-22
Payer: COMMERCIAL

## 2023-08-22 ENCOUNTER — OFFICE VISIT (OUTPATIENT)
Dept: OBGYN CLINIC | Facility: HOSPITAL | Age: 63
End: 2023-08-22
Payer: COMMERCIAL

## 2023-08-22 VITALS
SYSTOLIC BLOOD PRESSURE: 129 MMHG | DIASTOLIC BLOOD PRESSURE: 80 MMHG | WEIGHT: 153.8 LBS | HEIGHT: 65 IN | BODY MASS INDEX: 25.62 KG/M2 | HEART RATE: 49 BPM

## 2023-08-22 DIAGNOSIS — S52.531A CLOSED COLLES' FRACTURE OF RIGHT RADIUS, INITIAL ENCOUNTER: ICD-10-CM

## 2023-08-22 PROCEDURE — 91312 PR SARSCOV2 VACCINE BIVALENT 30 MCG/0.3 ML IM USE: CPT

## 2023-08-22 PROCEDURE — 0124A PR IMM ADMN SARSCOV2 BIVALENT 30 MCG/0.3 ML BST: CPT

## 2023-08-22 PROCEDURE — 99204 OFFICE O/P NEW MOD 45 MIN: CPT | Performed by: ORTHOPAEDIC SURGERY

## 2023-08-22 NOTE — PROGRESS NOTES
Assessment:  1. Closed Colles' fracture of right radius, initial encounter  Ambulatory Referral to Orthopedic Surgery          Plan:  Discussed conservative vs surgical treatment options with the patient today. Surgical intervention is recommended at this time. Risks and benefits were discussed in detail. Patient will be scheduled for an ORIF right distal radius fracture. To do next visit:  Return for Post op. The above stated was discussed in layman's terms and the patient expressed understanding. All questions were answered to the patient's satisfaction. Subjective:   Elton Snow is a 63 y. o. RHD female who presents with a chief complaint of right wrist pain. The pain began 5 day(s) ago and is associated with an acute injury. Patient reports a slip [place] fall [place] The patient describes the pain as aching, dull and sharp in intensity,  intermittent in timing, and localizes the pain to the  right distal radius. The pain is worse with movement and relieved by rest, splint. The pain is not associated with numbness and tingling. The pain is not associated with constitutional symptoms. The patient is awoken at night by the pain. The patient was seen in the ED where fracture was reduced and referred here today. Past Medical History:   Diagnosis Date   • Benign neoplasm of lung     Right lung lobe benign. Removed 2010.    • BRCA2 positive    • Breast cancer ([address])    • Breathing difficulty 2011    no breathing difficulties but patient reported drop in BP during anesthesia for appendectomy and was advised to have cardiologist consult   • Cancer [facility]) 2008   • Depression    • Dermatomyositis [facility])    • Lung nodule    • Melanoma ([address]) 2014    mid chest (insitu)   • Wears glasses        Past Surgical History:   Procedure Laterality Date   • APPENDECTOMY      2011   • BREAST SURGERY      Bilateral Mastectomy   • COLONOSCOPY N/A 01/26/2016    Procedure: COLONOSCOPY;  Surgeon: Pascual Kidd MD;  Location: [facility]; Service:    • HYSTERECTOMY      2007   • KNEE SURGERY  2007   • LUNG BIOPSY     • MASS EXCISION Left 07/18/2019    Procedure: MID BACK MASS EXCISION BIOPSY;  Surgeon: Steve Lewis MD;  Location: [facility];  Service: General   • MASTECTOMY      reconstruction 2008   • OOPHORECTOMY     • SKIN BIOPSY         Family History   Problem Relation Age of Onset   • Cancer Mother         Ovarian   • Ovarian cancer Mother    • No Known Problems Father    • BRCA2 Positive Sister    • Heart disease Sister         post partum cardiomyopathy, pacer   • No Known Problems Brother    • Prostate cancer Maternal Uncle    • Prostate cancer Maternal Uncle    • BRCA2 Negative Paternal Aunt    • Breast cancer Paternal Aunt    • Cancer Maternal Grandmother 80        skin   • Skin cancer Maternal Grandmother    • No Known Problems Paternal Grandmother    • No Known Problems Paternal Grandfather    • BRCA2 Positive Daughter        Social History     Occupational History   • Not on file   Tobacco Use   • Smoking status: Never   • Smokeless tobacco: Never   Vaping Use   • Vaping Use: Never used   Substance and Sexual Activity   • Alcohol use: No   • Drug use: No   • Sexual activity: Yes     Partners: Male     Birth control/protection: Post-menopausal, None     Comment: complete hysterectomy         Current Outpatient Medications:   •  acetaminophen (TYLENOL) 325 mg tablet, Take 2 tablets (650 mg total) by mouth every 6 (six) hours as needed for mild pain, Disp: 60 tablet, Rfl: 0  •  Calcium-Magnesium-Vitamin D (CALCIUM 500 PO), Take 1,000 mg by mouth daily. , Disp: , Rfl:   •  Cholecalciferol (VITAMIN D) 2000 UNITS CAPS, Take 2,000 Units/day by mouth, Disp: , Rfl:   •  cholecalciferol (VITAMIN D3) 1,000 units tablet, Take 1,000 Units by mouth daily, Disp: , Rfl:   •  clobetasol (TEMOVATE) 0.05 % cream, as needed  , Disp: , Rfl:   •  Glycerin-Hypromellose- (DRY EYE RELIEF DROPS OP), Apply to eye, Disp: , Rfl:   •  ibuprofen (MOTRIN) 400 mg tablet, Take 1 tablet (400 mg total) by mouth every 6 (six) hours as needed for mild pain, Disp: 50 tablet, Rfl: 0  •  multivitamin (THERAGRAN) TABS, Take 1 tablet by mouth daily. , Disp: , Rfl:   •  folic acid (FOLVITE) 1 mg tablet, Take 1 tablet (1 mg total) by mouth daily, Disp: 90 tablet, Rfl: 3  •  methotrexate 2.5 mg tablet, Take 4 tablets (10 mg total) by mouth once a week, Disp: 48 tablet, Rfl: 3    No Known Allergies      Objective:  Vitals:    08/22/23 0817   BP: 129/80   Pulse: (!) 49       Review of Systems   Constitutional: Negative for chills and fever. HENT: Negative for drooling and sneezing. Eyes: Negative for redness. Respiratory: Negative for cough and wheezing. Gastrointestinal: Negative for nausea and vomiting. Musculoskeletal:        Please see ortho exam   Psychiatric/Behavioral: Negative for behavioral problems. The patient is not nervous/anxious. Right wrist exam:  Splint was left in place for today's visit. Digital ROM intact  5/5 Motor to the APB, FDI, FDP2, FDP5, EDC. Sensation intact to light touch in the median, radial, and ulnar nerve distribution. Physical Exam  Vitals reviewed. Constitutional:       Appearance: She is well-developed. Eyes:      Pupils: Pupils are equal, round, and reactive to light. Pulmonary:      Effort: Pulmonary effort is normal.      Breath sounds: Normal breath sounds. Abdominal:      General: Abdomen is flat. There is no distension. Skin:     General: Skin is warm and dry. Neurological:      Mental Status: She is alert and oriented to person, place, and time. Psychiatric:         Behavior: Behavior normal.         Thought Content: Thought content normal.         Judgment: Judgment normal.           Diagnostics, reviewed and taken today if performed as documented:   The attending physician has personally reviewed the pertinent films in PACS and interpretation is as follows:  Right wrist x-rays demonstrates dorsally angulated distal radius fracture. Procedures, if performed today:    Procedures    None performed        Scribe Attestation    I,:  Sherwin Burch am acting as a scribe while in the presence of the attending physician.:       I,:  Lesli Omalley MD personally performed the services described in this documentation    as scribed in my presence.:               Portions of the record may have been created with voice recognition software. Occasional wrong word or "sound a like" substitutions may have occurred due to the inherent limitations of voice recognition software. Read the chart carefully and recognize, using context, where substitutions have occurred.

## 2023-08-28 RX ORDER — CEFAZOLIN SODIUM 2 G/50ML
2000 SOLUTION INTRAVENOUS ONCE
Status: CANCELLED | OUTPATIENT
Start: 2023-08-28 | End: 2023-08-28

## 2023-08-28 RX ORDER — CHLORHEXIDINE GLUCONATE 0.12 MG/ML
15 RINSE ORAL ONCE
Status: CANCELLED | OUTPATIENT
Start: 2023-08-28 | End: 2023-08-28

## 2023-08-28 RX ORDER — SODIUM CHLORIDE, SODIUM LACTATE, POTASSIUM CHLORIDE, CALCIUM CHLORIDE 600; 310; 30; 20 MG/100ML; MG/100ML; MG/100ML; MG/100ML
125 INJECTION, SOLUTION INTRAVENOUS CONTINUOUS
Status: CANCELLED | OUTPATIENT
Start: 2023-08-28

## 2023-08-29 ENCOUNTER — ANESTHESIA EVENT (OUTPATIENT)
Dept: PERIOP | Facility: HOSPITAL | Age: 63
End: 2023-08-29
Payer: COMMERCIAL

## 2023-08-29 NOTE — PRE-PROCEDURE INSTRUCTIONS
Pre-Surgery Instructions:   Medication Instructions   • acetaminophen (TYLENOL) 325 mg tablet Uses PRN- OK to take day of surgery   • Calcium-Magnesium-Vitamin D (CALCIUM 500 PO) Hold day of surgery. • Cholecalciferol (VITAMIN D) 2000 UNITS CAPS Hold day of surgery. • cholecalciferol (VITAMIN D3) 1,000 units tablet Hold day of surgery. • clobetasol (TEMOVATE) 0.05 % cream Uses PRN- DO NOT take day of surgery   • folic acid (FOLVITE) 1 mg tablet Hold day of surgery. • Glycerin-Hypromellose- (DRY EYE RELIEF DROPS OP) Uses PRN- OK to take day of surgery   • ibuprofen (MOTRIN) 400 mg tablet Stop taking 1 day prior to surgery. • methotrexate 2.5 mg tablet next dose after surgery   • multivitamin (THERAGRAN) TABS Hold day of surgery. Medication instructions for day surgery reviewed. Please use only a sip of water to take your instructed medications. Avoid all over the counter vitamins, supplements and NSAIDS for one week prior to surgery per anesthesia guidelines. Tylenol is ok to take as needed. You will receive a call one business day prior to surgery with an arrival time and hospital directions. If your surgery is scheduled on a Monday, the hospital will be calling you on the Friday prior to your surgery. If you have not heard from anyone by 8pm, please call the hospital supervisor through the hospital  at [phone]. Samuel Tate [phone]). Do not eat or drink anything after midnight the night before your surgery, including candy, mints, lifesavers, or chewing gum. Do not drink alcohol 24hrs before your surgery. Try not to smoke at least 24hrs before your surgery. Follow the pre surgery showering instructions as listed in the [facility] Surgical Experience Booklet” or otherwise provided by your surgeon's office. Do not shave the surgical area 24 hours before surgery.  Do not apply any lotions, creams, including makeup, cologne, deodorant, or perfumes after showering on the day of your surgery. No contact lenses, eye make-up, or artificial eyelashes. Remove nail polish, including gel polish, and any artificial, gel, or acrylic nails if possible. Remove all jewelry including rings and body piercing jewelry. Wear causal clothing that is easy to take on and off. Consider your type of surgery. Keep any valuables, jewelry, piercings at home. Please bring any specially ordered equipment (sling, braces) if indicated. Arrange for a responsible person to drive you to and from the hospital on the day of your surgery. Visitor Guidelines discussed. Call the surgeon's office with any new illnesses, exposures, or additional questions prior to surgery. Please reference your [facility] Surgical Experience Booklet” for additional information to prepare for your upcoming surgery.

## 2023-08-30 ENCOUNTER — HOSPITAL ENCOUNTER (OUTPATIENT)
Dept: RADIOLOGY | Facility: HOSPITAL | Age: 63
Setting detail: OUTPATIENT SURGERY
Discharge: HOME | End: 2023-08-30
Payer: COMMERCIAL

## 2023-08-30 ENCOUNTER — HOSPITAL ENCOUNTER (OUTPATIENT)
Facility: HOSPITAL | Age: 63
Setting detail: OUTPATIENT SURGERY
Discharge: HOME | End: 2023-08-30
Attending: ORTHOPAEDIC SURGERY | Admitting: ORTHOPAEDIC SURGERY
Payer: COMMERCIAL

## 2023-08-30 ENCOUNTER — ANESTHESIA (OUTPATIENT)
Dept: PERIOP | Facility: HOSPITAL | Age: 63
End: 2023-08-30
Payer: COMMERCIAL

## 2023-08-30 ENCOUNTER — TELEPHONE (OUTPATIENT)
Dept: OBGYN CLINIC | Facility: HOSPITAL | Age: 63
End: 2023-08-30

## 2023-08-30 VITALS
WEIGHT: 150 LBS | RESPIRATION RATE: 18 BRPM | BODY MASS INDEX: 24.99 KG/M2 | DIASTOLIC BLOOD PRESSURE: 76 MMHG | OXYGEN SATURATION: 100 % | HEART RATE: 51 BPM | SYSTOLIC BLOOD PRESSURE: 112 MMHG | TEMPERATURE: 97.2 F | HEIGHT: 65 IN

## 2023-08-30 DIAGNOSIS — Z98.890 S/P ORIF (OPEN REDUCTION INTERNAL FIXATION) FRACTURE: ICD-10-CM

## 2023-08-30 DIAGNOSIS — S52.501A CLOSED FRACTURE OF DISTAL END OF RIGHT RADIUS, UNSPECIFIED FRACTURE MORPHOLOGY, INITIAL ENCOUNTER: Primary | ICD-10-CM

## 2023-08-30 DIAGNOSIS — Z87.81 S/P ORIF (OPEN REDUCTION INTERNAL FIXATION) FRACTURE: ICD-10-CM

## 2023-08-30 DIAGNOSIS — S52.531A CLOSED COLLES' FRACTURE OF RIGHT RADIUS, INITIAL ENCOUNTER: ICD-10-CM

## 2023-08-30 PROCEDURE — C1713 ANCHOR/SCREW BN/BN,TIS/BN: HCPCS | Performed by: ORTHOPAEDIC SURGERY

## 2023-08-30 PROCEDURE — 25607 OPTX DST RD XARTC FX/EPI SEP: CPT | Performed by: ORTHOPAEDIC SURGERY

## 2023-08-30 PROCEDURE — NC001 PR NO CHARGE

## 2023-08-30 PROCEDURE — 73100 X-RAY EXAM OF WRIST: CPT

## 2023-08-30 DEVICE — 2.4MM VA LOCKING SCREW STARDRIVE 20MM: Type: IMPLANTABLE DEVICE | Site: WRIST | Status: FUNCTIONAL

## 2023-08-30 DEVICE — 2.4MM VA LOCKING SCREW STARDRIVE 18MM: Type: IMPLANTABLE DEVICE | Site: WRIST | Status: FUNCTIONAL

## 2023-08-30 DEVICE — 2.4MM VA-LCP 2-COL DSTL RAD PL NRW 6H HD/2H SHAFT/RT
Type: IMPLANTABLE DEVICE | Site: WRIST | Status: FUNCTIONAL
Brand: VA-LCP

## 2023-08-30 DEVICE — 2.7MM CORTEX SCREW SLF-TPNG WITH T8 STARDRIVE RECESS 12MM: Type: IMPLANTABLE DEVICE | Site: WRIST | Status: FUNCTIONAL

## 2023-08-30 DEVICE — 2.4MM VA LOCKING SCREW STARDRIVE 14MM: Type: IMPLANTABLE DEVICE | Site: WRIST | Status: FUNCTIONAL

## 2023-08-30 RX ORDER — MIDAZOLAM HYDROCHLORIDE 2 MG/2ML
INJECTION, SOLUTION INTRAMUSCULAR; INTRAVENOUS AS NEEDED
Status: DISCONTINUED | OUTPATIENT
Start: 2023-08-30 | End: 2023-08-30

## 2023-08-30 RX ORDER — HYDROMORPHONE HCL/PF 1 MG/ML
0.5 SYRINGE (ML) INJECTION EVERY 4 HOURS PRN
Status: DISCONTINUED | OUTPATIENT
Start: 2023-08-30 | End: 2023-08-30 | Stop reason: HOSPADM

## 2023-08-30 RX ORDER — ONDANSETRON 2 MG/ML
4 INJECTION INTRAMUSCULAR; INTRAVENOUS ONCE AS NEEDED
Status: COMPLETED | OUTPATIENT
Start: 2023-08-30 | End: 2023-08-30

## 2023-08-30 RX ORDER — CEFAZOLIN SODIUM 2 G/50ML
2000 SOLUTION INTRAVENOUS ONCE
Status: DISCONTINUED | OUTPATIENT
Start: 2023-08-30 | End: 2023-08-30 | Stop reason: HOSPADM

## 2023-08-30 RX ORDER — PROPOFOL 10 MG/ML
INJECTION, EMULSION INTRAVENOUS AS NEEDED
Status: DISCONTINUED | OUTPATIENT
Start: 2023-08-30 | End: 2023-08-30

## 2023-08-30 RX ORDER — CEFAZOLIN SODIUM 1 G/50ML
SOLUTION INTRAVENOUS AS NEEDED
Status: DISCONTINUED | OUTPATIENT
Start: 2023-08-30 | End: 2023-08-30

## 2023-08-30 RX ORDER — HYDROMORPHONE HCL/PF 1 MG/ML
0.5 SYRINGE (ML) INJECTION
Status: DISCONTINUED | OUTPATIENT
Start: 2023-08-30 | End: 2023-08-30 | Stop reason: HOSPADM

## 2023-08-30 RX ORDER — LIDOCAINE HYDROCHLORIDE 10 MG/ML
INJECTION, SOLUTION EPIDURAL; INFILTRATION; INTRACAUDAL; PERINEURAL AS NEEDED
Status: DISCONTINUED | OUTPATIENT
Start: 2023-08-30 | End: 2023-08-30

## 2023-08-30 RX ORDER — SODIUM CHLORIDE, SODIUM LACTATE, POTASSIUM CHLORIDE, CALCIUM CHLORIDE 600; 310; 30; 20 MG/100ML; MG/100ML; MG/100ML; MG/100ML
125 INJECTION, SOLUTION INTRAVENOUS CONTINUOUS
Status: DISCONTINUED | OUTPATIENT
Start: 2023-08-30 | End: 2023-08-30 | Stop reason: HOSPADM

## 2023-08-30 RX ORDER — FENTANYL CITRATE/PF 50 MCG/ML
25 SYRINGE (ML) INJECTION
Status: DISCONTINUED | OUTPATIENT
Start: 2023-08-30 | End: 2023-08-30 | Stop reason: HOSPADM

## 2023-08-30 RX ORDER — OXYCODONE HYDROCHLORIDE 10 MG/1
10 TABLET ORAL EVERY 4 HOURS PRN
Status: DISCONTINUED | OUTPATIENT
Start: 2023-08-30 | End: 2023-08-30 | Stop reason: HOSPADM

## 2023-08-30 RX ORDER — ONDANSETRON 2 MG/ML
4 INJECTION INTRAMUSCULAR; INTRAVENOUS EVERY 6 HOURS PRN
Status: DISCONTINUED | OUTPATIENT
Start: 2023-08-30 | End: 2023-08-30 | Stop reason: HOSPADM

## 2023-08-30 RX ORDER — OXYCODONE HYDROCHLORIDE 5 MG/1
5 TABLET ORAL EVERY 6 HOURS PRN
Qty: 20 TABLET | Refills: 0 | Status: SHIPPED | OUTPATIENT
Start: 2023-08-30 | End: 2023-09-09

## 2023-08-30 RX ORDER — CHLORHEXIDINE GLUCONATE 0.12 MG/ML
15 RINSE ORAL ONCE
Status: DISCONTINUED | OUTPATIENT
Start: 2023-08-30 | End: 2023-08-30 | Stop reason: CLARIF

## 2023-08-30 RX ORDER — ROPIVACAINE HYDROCHLORIDE 2 MG/ML
INJECTION, SOLUTION EPIDURAL; INFILTRATION; PERINEURAL
Status: COMPLETED | OUTPATIENT
Start: 2023-08-30 | End: 2023-08-30

## 2023-08-30 RX ORDER — ACETAMINOPHEN 325 MG/1
650 TABLET ORAL EVERY 6 HOURS PRN
Status: DISCONTINUED | OUTPATIENT
Start: 2023-08-30 | End: 2023-08-30 | Stop reason: HOSPADM

## 2023-08-30 RX ORDER — ONDANSETRON 2 MG/ML
INJECTION INTRAMUSCULAR; INTRAVENOUS AS NEEDED
Status: DISCONTINUED | OUTPATIENT
Start: 2023-08-30 | End: 2023-08-30

## 2023-08-30 RX ORDER — DEXAMETHASONE SODIUM PHOSPHATE 10 MG/ML
INJECTION, SOLUTION INTRAMUSCULAR; INTRAVENOUS AS NEEDED
Status: DISCONTINUED | OUTPATIENT
Start: 2023-08-30 | End: 2023-08-30

## 2023-08-30 RX ORDER — CEFAZOLIN SODIUM 1 G/50ML
SOLUTION INTRAVENOUS
Status: DISCONTINUED
Start: 2023-08-30 | End: 2023-08-30 | Stop reason: HOSPADM

## 2023-08-30 RX ORDER — OXYCODONE HYDROCHLORIDE 5 MG/1
5 TABLET ORAL EVERY 4 HOURS PRN
Status: DISCONTINUED | OUTPATIENT
Start: 2023-08-30 | End: 2023-08-30 | Stop reason: HOSPADM

## 2023-08-30 RX ORDER — FENTANYL CITRATE 50 UG/ML
INJECTION, SOLUTION INTRAMUSCULAR; INTRAVENOUS AS NEEDED
Status: DISCONTINUED | OUTPATIENT
Start: 2023-08-30 | End: 2023-08-30

## 2023-08-30 RX ADMIN — MIDAZOLAM 1 MG: 1 INJECTION INTRAMUSCULAR; INTRAVENOUS at 12:01

## 2023-08-30 RX ADMIN — Medication 20 MG: at 13:20

## 2023-08-30 RX ADMIN — SODIUM CHLORIDE, SODIUM LACTATE, POTASSIUM CHLORIDE, AND CALCIUM CHLORIDE: .6; .31; .03; .02 INJECTION, SOLUTION INTRAVENOUS at 11:00

## 2023-08-30 RX ADMIN — DEXAMETHASONE SODIUM PHOSPHATE 10 MG: 10 INJECTION, SOLUTION INTRAMUSCULAR; INTRAVENOUS at 13:14

## 2023-08-30 RX ADMIN — FENTANYL CITRATE 50 MCG: 50 INJECTION INTRAMUSCULAR; INTRAVENOUS at 12:01

## 2023-08-30 RX ADMIN — PHENYLEPHRINE HYDROCHLORIDE 10 MCG/MIN: 10 INJECTION INTRAVENOUS at 13:31

## 2023-08-30 RX ADMIN — CEFAZOLIN SODIUM 1000 MG: 1 SOLUTION INTRAVENOUS at 13:08

## 2023-08-30 RX ADMIN — ROPIVACAINE HYDROCHLORIDE 25 ML: 2 INJECTION, SOLUTION EPIDURAL; INFILTRATION at 12:13

## 2023-08-30 RX ADMIN — LIDOCAINE HYDROCHLORIDE 50 MG: 10 INJECTION, SOLUTION EPIDURAL; INFILTRATION; INTRACAUDAL; PERINEURAL at 13:14

## 2023-08-30 RX ADMIN — ONDANSETRON 4 MG: 2 INJECTION INTRAMUSCULAR; INTRAVENOUS at 13:14

## 2023-08-30 RX ADMIN — PROPOFOL 16 MG: 10 INJECTION, EMULSION INTRAVENOUS at 13:14

## 2023-08-30 RX ADMIN — ONDANSETRON 4 MG: 2 INJECTION INTRAMUSCULAR; INTRAVENOUS at 14:42

## 2023-08-30 RX ADMIN — MIDAZOLAM 1 MG: 1 INJECTION INTRAMUSCULAR; INTRAVENOUS at 12:05

## 2023-08-30 NOTE — ANESTHESIA PREPROCEDURE EVALUATION
Review of Systems/Medical History  Patient summary reviewed. Chart reviewed. Cardiovascular  EKG reviewed. ,    Pulmonary  Not a smoker , No recent URI ,        GI/Hepatic    No GERD ,             Endo/Other     GYN    Hysterectomy, Breast cancer Right mastectomy and left mastectomy       Hematology   Musculoskeletal       Neurology    Cerebral bleeding with side effects ,    Psychology   Depression ,              Physical Exam    Airway    Mallampati score: I  TM Distance: >3 FB  Neck ROM: full     Dental   No notable dental hx     Cardiovascular  Cardiovascular exam normal    Pulmonary  Pulmonary exam normal     Other Findings        Lab Results   Component Value Date    GLUC 87 05/16/2023    GLUF 90 08/04/2023    ALT 22 08/04/2023    AST 16 08/04/2023    BUN 21 08/04/2023    CALCIUM 9.2 08/04/2023     08/04/2023    CHOL 177 10/02/2014    CO2 32 08/04/2023    CREATININE 0.85 08/04/2023    HDL 78 08/04/2023    HCT 40.5 08/04/2023    HGB 13.3 08/04/2023    PROT 7.6 11/19/2015    HGBA1C 5.0 01/21/2023     08/04/2023    K 4.3 08/04/2023     09/15/2015    TRIG 46 08/04/2023    WBC 4.43 08/04/2023       Anesthesia Plan  ASA Score- 2     Anesthesia Type- regional with ASA Monitors. Additional Monitors:   Airway Plan:           Plan Factors-    Chart reviewed. EKG reviewed. Existing labs reviewed. Patient summary reviewed. Patient is not a current smoker. Patient not instructed to abstain from smoking on day of procedure. Patient did not smoke on day of surgery. Induction- intravenous. Postoperative Plan-     Informed Consent- Anesthetic plan and risks discussed with patient and spouse. I personally reviewed this patient with the CRNA. Discussed and agreed on the Anesthesia Plan with the CRNA. Rosi Myrick

## 2023-08-30 NOTE — ANESTHESIA POSTPROCEDURE EVALUATION
Post-Op Assessment Note    CV Status:  Stable  Pain Score: 0    Pain management: adequate     Mental Status:  Alert and awake   Hydration Status:  Euvolemic   PONV Controlled:  Controlled   Airway Patency:  Patent      Post Op Vitals Reviewed: Yes      Staff: Anesthesiologist, CRNA         No notable events documented.     BP   150/84   Temp   97.1   Pulse 47   Resp   16   SpO2   98

## 2023-08-30 NOTE — OP NOTE
OPERATIVE REPORT  PATIENT NAME: Emeka Farris    :  1960  MRN: [id number]  Pt Location: [facility] 04    SURGERY DATE: 2023    Surgeon(s) and Role:     * Luis Angel Haley MD - Primary     * Isaura Hand - Assisting     * Adrian Durbin MD - Assisting    Preop Diagnosis:  Closed Colles' fracture of right radius, initial encounter [S52.451A]    Post-Op Diagnosis Codes:     * Closed Colles' fracture of right radius, initial encounter [S52.531A]    Procedure(s):  Right - OPEN REDUCTION W/ INTERNAL FIXATION (ORIF) RADIUS / ULNA (WRIST)    Specimen(s):  * No specimens in log *    Estimated Blood Loss:   Minimal    Drains:  * No LDAs found *    Anesthesia Type:   General w/ Regional    Operative Indications:  Closed Colles' fracture of right radius, initial encounter [S52.181P]    Operative Findings:  Displaced extra-articular right distal radial fracture    Complications:   None    Procedure and Technique:  Open reduction internal fixation of displaced extra-articular right distal radius fracture    After informed consent was obtained patient was brought to the operating room and transferred to the operating table in supine position. General anesthesia was obtained. Tourniquet cuff was placed in the upper aspect of the right arm and the patient prepped and draped normal sterile fashion. Right arm was elevated and tourniquet was raised. Using the standard volar approach of Willis dissection was taken down through the skin and subcutaneous tissues to the level of the flexor carpi radialis. Dissection was then taken down through the base of the pronator quadratus down to the level of the distal radius. Using the standard technique the fracture was reduced to an anatomic position and secured with Dulce wires. This was then stabilized with a Synthes volar distal radial locking plate with 2.7 mm cortical and 2.4 mm locking screws.     Final fluoroscopic views were obtained showing everything to be in good position    Wounds were closed with 3-0 Vicryl and 4-0 nylon. Sterile dressings were applied. Patient was then awakened from general anesthesia and transferred to recovery in stable condition having tolerated the procedure well. I was present for the entire procedure. and A physician assistant was required during the procedure for retraction, tissue handling, dissection and suturing.     Patient Disposition:  PACU         SIGNATURE: Simon Leung MD  DATE: August 30, 2023  TIME: 2:17 PM

## 2023-08-30 NOTE — TELEPHONE ENCOUNTER
Caller: Patient's spouse    Doctor: Joey Noguera    Reason for call: Spouse calling to make post-op appt. They are on vacation until 9/17. Would like a PA to see the patient in the Dr. Katarzyna Rodriguez absence on 9/18/23 if possible for suture removal.  Thank you!     Call back#: [phone]

## 2023-08-30 NOTE — ANESTHESIA PROCEDURE NOTES
Peripheral Block    Patient location during procedure: holding area  Start time: 8/30/2023 12:11 PM  Reason for block: procedure for pain, at surgeon's request and post-op pain management  Staffing  Performed by: Kuldip Chris MD  Authorized by: Kuldip Chris MD    Preanesthetic Checklist  Completed: patient identified, IV checked, site marked, risks and benefits discussed, surgical consent, monitors and equipment checked, pre-op evaluation and timeout performed  Peripheral Block  Patient position: sitting  Prep: ChloraPrep  Patient monitoring: frequent blood pressure checks, continuous pulse oximetry and heart rate  Block type: Supraclavicular  Laterality: right  Injection technique: single-shot  Procedures: ultrasound guided, Ultrasound guidance required for the procedure to increase accuracy and safety of medication placement and decrease risk of complications.   Ultrasound permanent image savedropivacaine (NAROPIN) 0.2% injection 20 mL - Perineural   25 mL - 8/30/2023 12:13:00 PM  Needle  Needle type: Stimuplex   Needle gauge: 22 G  Needle length: 2 in  Needle localization: anatomical landmarks and ultrasound guidance  Assessment  Injection assessment: incremental injection, frequent aspiration, injected with ease, negative aspiration, negative for heart rate change, no paresthesia on injection, no symptoms of intraneural/intravenous injection and needle tip visualized at all times  Paresthesia pain: none  Post-procedure:  site cleaned  patient tolerated the procedure well with no immediate complications

## 2023-08-30 NOTE — LETTER
[address] [facility] [id number]  Dept: [phone]    August 30, 2023     Patient: Cj Mullen   YOB: 1960   Date of Visit: 8/30/2023       To Whom it May Concern:    Aden Billingsley is under our professional care. She was seen in the hospital for surgery today, 8/30/2023. She may return to work on 8/31/2023. She should do only desk work or computer until her first post-op visit. She should not be asked to lift anything. Please allow her to work to her tolerance and to take breaks as necessary. If you have any questions or concerns, please don't hesitate to call.          Sincerely,          P[address] MD

## 2023-08-31 ENCOUNTER — OFFICE VISIT (OUTPATIENT)
Dept: HEMATOLOGY ONCOLOGY | Facility: CLINIC | Age: 63
End: 2023-08-31
Payer: COMMERCIAL

## 2023-08-31 VITALS
BODY MASS INDEX: 24.99 KG/M2 | TEMPERATURE: 97.6 F | OXYGEN SATURATION: 98 % | HEIGHT: 65 IN | WEIGHT: 150 LBS | HEART RATE: 68 BPM | SYSTOLIC BLOOD PRESSURE: 118 MMHG | DIASTOLIC BLOOD PRESSURE: 62 MMHG | RESPIRATION RATE: 17 BRPM

## 2023-08-31 DIAGNOSIS — Z14.8 CARRIER OF HIGH RISK CANCER GENE MUTATION: ICD-10-CM

## 2023-08-31 DIAGNOSIS — C50.011 MALIGNANT NEOPLASM OF NIPPLE OF BOTH BREASTS IN FEMALE, ESTROGEN RECEPTOR POSITIVE (HCC): Primary | ICD-10-CM

## 2023-08-31 DIAGNOSIS — Z85.820 HISTORY OF MELANOMA: ICD-10-CM

## 2023-08-31 DIAGNOSIS — Z15.09 BRCA2 GENE MUTATION POSITIVE: ICD-10-CM

## 2023-08-31 DIAGNOSIS — Z15.01 BRCA2 GENE MUTATION POSITIVE: ICD-10-CM

## 2023-08-31 DIAGNOSIS — C50.012 MALIGNANT NEOPLASM OF NIPPLE OF BOTH BREASTS IN FEMALE, ESTROGEN RECEPTOR POSITIVE (HCC): Primary | ICD-10-CM

## 2023-08-31 DIAGNOSIS — M33.90 DERMATOMYOSITIS (HCC): ICD-10-CM

## 2023-08-31 DIAGNOSIS — Z17.0 MALIGNANT NEOPLASM OF NIPPLE OF BOTH BREASTS IN FEMALE, ESTROGEN RECEPTOR POSITIVE (HCC): Primary | ICD-10-CM

## 2023-08-31 PROCEDURE — 99214 OFFICE O/P EST MOD 30 MIN: CPT | Performed by: INTERNAL MEDICINE

## 2023-08-31 NOTE — PROGRESS NOTES
HPI: Patient is here with her . Follow-up visit for bilateral breast cancers and also history of melanoma in situ. She tested positive for BRCA2 gene mutation . Patient had bilateral mastectomies in 2008 for hormone receptor positive HER2 negative stage II A cancer in the right breast and stage I A cancer in the left breast and 1 positive lymph node in right axilla. Patient had reconstruction surgeries, 4 cycles of Taxotere plus Cytoxan followed by 5 years of Femara that she finished in September 2013. She had ITZEL and BSO. She has been aware of cancer risks because of BRCA2 mutation. She goes for pancreatic checkup with MRI of abdomen and would also check peritoneal cancer. She  goes to her dermatologist for screening of skin for melanoma. She states that her dermatologist has left town and she needs a new dermatologist.  She follows with her ophthalmologist for ocular melanoma screening. Patient states her family has been aware of her  positive  BRCA 2. She has  family history of breast and ovarian cancer. Patient has history of  dermatomyositis . She has been on methotrexate and folic acid for the last 3 years. She follows with her rheumatologist.       , Patient had a 9 mm nodule in the right lower lung and she had wedge resection and that was negative for malignancy. Patient follows with the her lung specialist for mild residual ground-glass opacity in right lower lobe and that has improved . Curtistine Howie She has renal and liver cysts and also tiny  polyp in gallbladder again monitored with MRI scan. .  She had colonoscopy in February 2021 and she states that was clear. .  She has small lymph nodes in left submandibular area and they are being monitored by her primary physician by  ultrasound of the neck. She has some tiredness. She has dryness of the eyes and mouth. 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    She has cast on her right forearm because of traumatic fracture of right radius. Current Outpatient Medications:   •  acetaminophen (TYLENOL) 325 mg tablet, Take 2 tablets (650 mg total) by mouth every 6 (six) hours as needed for mild pain, Disp: 60 tablet, Rfl: 0  •  Calcium-Magnesium-Vitamin D (CALCIUM 500 PO), Take 1,000 mg by mouth daily. , Disp: , Rfl:   •  Cholecalciferol (VITAMIN D) 2000 UNITS CAPS, Take 2,000 Units/day by mouth, Disp: , Rfl:   •  cholecalciferol (VITAMIN D3) 1,000 units tablet, Take 1,000 Units by mouth daily, Disp: , Rfl:   •  clobetasol (TEMOVATE) 0.05 % cream, as needed  , Disp: , Rfl:   •  Glycerin-Hypromellose- (DRY EYE RELIEF DROPS OP), Apply to eye, Disp: , Rfl:   •  ibuprofen (MOTRIN) 400 mg tablet, Take 1 tablet (400 mg total) by mouth every 6 (six) hours as needed for mild pain, Disp: 50 tablet, Rfl: 0  •  multivitamin (THERAGRAN) TABS, Take 1 tablet by mouth daily. , Disp: , Rfl:   •  oxyCODONE (Roxicodone) 5 immediate release tablet, Take 1 tablet (5 mg total) by mouth every 6 (six) hours as needed for severe pain for up to 10 days Max Daily Amount: 20 mg, Disp: 20 tablet, Rfl: 0  •  folic acid (FOLVITE) 1 mg tablet, Take 1 tablet (1 mg total) by mouth daily, Disp: 90 tablet, Rfl: 3  •  methotrexate 2.5 mg tablet, Take 4 tablets (10 mg total) by mouth once a week, Disp: 48 tablet, Rfl: 3  No current facility-administered medications for this visit. No Known Allergies    Oncology History   Bilateral malignant neoplasm of breast in female, estrogen receptor positive ([address])    Initial Diagnosis    Bilateral malignant neoplasm of breast in female, estrogen receptor positive [facility])      Surgery    Double mastectomy, reconstructive surgery      Chemotherapy    Chemotherapy with Taxotere and Cytoxan for 4 cycles followed by Femara from 2008 thru 2013.          ROS:  08/31/23 Reviewed 13 systems: See symptoms in HPI:    Presently   No other neurological, cardiac, pulmonary, GI and  symptoms other than listed in HPI. .  Other symptoms are in HPI   No other symptoms like fever, chills, bleeding,  skin rash at present,  weight loss, night sweats, arthritic symptoms at present,   weakness, numbness,  claudication and gait problem. No frequent infections. Not unusually sensitive to heat or cold. No swelling of the ankles. Patient is less anxious. /62 (BP Location: Left arm, Patient Position: Sitting, Cuff Size: Adult)   Pulse 68   Temp 97.6 °F (36.4 °C)   Resp 17   Ht 5' 5" (1.651 m)   Wt 68 kg (150 lb)   SpO2 98%   BMI 24.96 kg/m²     Physical Exam:    Patient is alert and oriented. Patient is not in distress. Vitals are above. There is no icterus. There is no oral thrush. There is no palpable neck mass. Clear lung fields. Heart rate is regular. There is no palpable abdominal mass. Abdomen is soft and nontender. There is no ascites. There is no edema of ankles. There is no calf tenderness. There is no focal neurological deficit, no skin rash, no palpable lymphadenopathy in the neck and axillary areas,   Patient is less anxious. Performance status 0. Cast on right forearm. IMAGING:     IMPRESSION:     1. Mild residual groundglass opacity in the right lower lobe in the area of previously seen consolidation, likely postinflammatory. Previously seen left lower lobe consolidation has resolved with mild residual scarring.     2.  Stable pulmonary nodules.              Workstation performed: [id number]          Imaging    OSSEOUS STRUCTURES:  No acute fracture or destructive osseous lesion.     IMPRESSION:     1. Resolution of previously seen peripheral right lower lobe groundglass opacification, likely infectious or inflammatory in retrospect.   2.  No new concerning pulmonary nodules.              Workstation performed: [id number]          Imaging    CT chest without contrast (Order: [id number]) - 6/6/2022     LOWER CHEST:   Unremarkable.     LIVER:   Normal in size and configuration. No suspicious mass. Unchanged 6 mm segment 7 flash filling hemangioma #5/16 and 15/42 stable since March 2019. Previously seen segment 2/3 subcentimeter focus of capsular/subcapsular enhancement is reidentified on #13/65 and now has more of a discoid appearance. This is in keeping with a vascular shunt. Scattered simple cysts. The hepatic veins and portal veins are patent.        BILE DUCTS:  No intrahepatic or extrahepatic bile duct dilation. Common bile duct is normal in caliber. No choledocholithiasis, biliary stricture or suspicious mass.      GALLBLADDER known 6 mm small gallbladder polyps are better visualized on the prior ultrasound.     PANCREAS:  Unremarkable.     ADRENAL GLANDS:  Normal.     SPLEEN:  Normal.     KIDNEYS/PROXIMAL URETERS:  No hydroureteronephrosis. No suspicious renal mass. Scattered subcentimeter simple cysts.     BOWEL:   No dilated loops of bowel.      PERITONEUM/RETROPERITONEUM:  No ascites.     LYMPH NODES:  No abdominal lymphadenopathy.     VASCULAR STRUCTURES:  No aneurysm.     ABDOMINAL WALL:  Enhancing 7 mm left lateral abdominal wall focus, #76452/[id number] is stable since at least 9/24/2014.     OSSEOUS STRUCTURES:  No suspicious osseous lesion.        IMPRESSION:     No significant change from 3/25/2022.        Workstation performed: [id number]        Imaging    MRI abdomen w wo contrast and mrcp (Order: [id number]) - 4/7/2023        IMPRESSION:   No evidence of malignancy.   Clinical management of left axillary lump is recommended.           ASSESSMENT/BI-RADS CATEGORY:  Left: 2 - Benign  Overall: 2 - Benign     RECOMMENDATION:       - Clinical management for the left breast.     Workstation ID: [id number]     Imaging    US Breast Axilla Left (Order: [id number]) - 1/10/2023    IMPRESSION:     Normal MRI of the brain.     Workstation performed: [id number]        Imaging    MRI brain wo contrast (Order: [id number]) - 8/25/2022    LABS:    Results for orders placed or performed in visit on 08/08/23   UA w Reflex to Microscopic w Reflex to Culture    Specimen: Urine   Result Value Ref Range    Color, UA Yellow     Clarity, UA Clear     Specific Gravity, UA 1.015 1.005 - 1.030    pH, UA 6.5 4.5, 5.0, 5.5, 6.0, 6.5, 7.0, 7.5, 8.0    Leukocytes, UA Negative Negative    Nitrite, UA Negative Negative    Protein, UA Negative Negative mg/dl    Glucose, UA Negative Negative mg/dl    Ketones, UA Negative Negative mg/dl    Urobilinogen, UA <2.0 <2.0 mg/dl mg/dl    Bilirubin, UA Negative Negative    Occult Blood, UA Negative Negative          Component Ref Range & Units 8/4/23  6:47 AM   CA 27-29 0.0 - 38.6 U/mL 25.2       CBC and differential  Order: [id number]   Status: Final result      Visible to patient: Yes (seen)      Next appt: 09/05/2023 at 07:15 AM in Orthopedic Surgery Kevon Carrasco MD)      Dx: Malignant neoplasm of nipple of both . ..      0 Result Notes             Component Ref Range & Units 8/4/23  6:47 AM 5/16/23  2:05 PM 2/18/23  7:25 AM 1/21/23  7:38 AM 8/12/22  6:39 AM 7/22/22  7:02 AM 3/20/22  9:40 AM   WBC 4.31 - 10.16 Thousand/uL 4.43  7.48  4.93  5.00  5.27  5.40  4.66    RBC 3.81 - 5.12 Million/uL 4.27  4.31  4.39  4.20  4.55  4.21  4.25    Hemoglobin 11.5 - 15.4 g/dL 13.3  13.4  13.3  12.8  14.1  13.0  13.0    Hematocrit 34.8 - 46.1 % 40.5  41.2  42.1  40.8  43.9  40.6  39.3    MCV 82 - 98 fL 95  96  96  97  97  96  93    MCH 26.8 - 34.3 pg 31.1  31.1  30.3  30.5  31.0  30.9  30.6    MCHC 31.4 - 37.4 g/dL 32.8  32.5  31.6  31.4  32.1  32.0  33.1    RDW 11.6 - 15.1 % 14.0  13.9  13.9  14.0  13.7  14.0  13.9    MPV 8.9 - 12.7 fL 9.9  9.0  9.5  9.9  10.1  10.0  9.9    Platelets 033 - 674 Thousands/uL 247  237  259  248  258  255  221    nRBC /100 WBCs 0  0  0  0  0  0  0    Neutrophils Relative 43 - 75 % 58  76 High   69  62  58  60  67    Immat GRANS % 0 - 2 % 0  0  0  0  0  0  0    Lymphocytes Relative 14 - 44 % 28  16  22  28  31  27  22    Monocytes Relative 4 - 12 % 11  7  8  8  8  10  9    Eosinophils Relative 0 - 6 % 2  1  1  1  2  2  2    Basophils Relative 0 - 1 % 1  0  0  1  1  1  0    Neutrophils Absolute 1.85 - 7.62 Thousands/µL 2.57  5.63  3.38  3.12  3.05  3.29  3.13    Immature Grans Absolute 0.00 - 0.20 Thousand/uL 0.01  0.03  0.01  0.01  0.01  0.02  0.00    Lymphocytes Absolute 0.60 - 4.47 Thousands/µL 1.22  1.23  1.08  1.40  1.63  1.46  1.03    Monocytes Absolute 0.17 - 1.22 Thousand/µL 0.49  0.52  0.38  0.39  0.43  0.52  0.40    Eosinophils Absolute 0.00 - 0.61 Thousand/µL 0.10  0.04  0.06  0.05  0.11  0.08  0.08    Basophils Absolute 0.00 - 0.10 Thousands/µL 0.04  0.03  0.02  0.03  0.04  0.03  0.02                               Component Ref Range & Units 8/4/23  6:47 AM 5/16/23  2:05 PM 2/18/23  7:25 AM 1/21/23  7:38 AM 8/12/22  6:39 AM 7/22/22  7:02 AM 3/20/22  9:40 AM   Sodium 135 - 147 mmol/L 140  141  138  141  141  141  142 R    Potassium 3.5 - 5.3 mmol/L 4.3  3.8  4.3  4.3  3.9  4.1  4.5    Chloride 96 - 108 mmol/L 107  104  106  107  107  109 High   107 R    CO2 21 - 32 mmol/L 32  32  30  31  30  30  32    ANION GAP mmol/L 1  5 R  2 Low  R  3 Low  R  4 R  2 Low  R  3 Low  R    BUN 5 - 25 mg/dL 21  18  19  20  16  18  18    Creatinine 0.60 - 1.30 mg/dL 0.85  0.69 CM  0.71 CM  0.71 CM  0.86 CM  0.74 CM  0.84 CM    Comment: Standardized to IDMS reference method   Glucose, Fasting 65 - 99 mg/dL 90   83 CM  90 CM  84 CM  89 CM     Comment: Specimen collection should occur prior to Sulfasalazine administration due to the potential for falsely depressed results. Specimen collection should occur prior to Sulfapyridine administration due to the potential for falsely elevated results. Calcium 8.3 - 10.1 mg/dL 9.2  9.4 R  9.3  9.1  9.6  9.2  9.5    AST 5 - 45 U/L 16  18 R  12 CM  12 CM  21 CM  18 CM  16 CM    Comment: Specimen collection should occur prior to Sulfasalazine administration due to the potential for falsely depressed results.     ALT 12 - 78 U/L 22  14 R, CM  21 CM  20 CM  20 CM  22 CM  19 CM    Comment: Specimen collection should occur prior to Sulfasalazine and/or Sulfapyridine administration due to the potential for falsely depressed results. Alkaline Phosphatase 46 - 116 U/L 64  60 R  65  72  79  73  69    Total Protein 6.4 - 8.4 g/dL 6.9  7.3  7.2  7.1  7.4  7.2  7.2 R    Albumin 3.5 - 5.0 g/dL 3.8  4.6  3.9  3.9  4.0  3.8  3.9    Total Bilirubin 0.20 - 1.00 mg/dL 0.56  0.55 CM  0.38 CM  0.34 CM  0.44 CM  0.51 CM  0.32 CM    Comment: Use of this assay is not recommended for patients undergoing treatment with eltrombopag due to the potential for falsely elevated results. eGFR ml/min/1.73sq m 74  94  92  92  74  88  76                               Component Ref Range & Units 4/25/23  4:00 PM 10/10/22 12:45 PM 4/11/22  1:49 PM 10/11/21 12:35 PM 9/8/21  6:36 AM 4/7/21 11:37 AM 3/18/21  6:39 AM    0.0 - 30.0 U/mL 6.3  8.1 CM  9.4 CM  9.2 CM  9.4 CM  6.4 CM  6.6 CM    Comment: Performed at [facility].  Results cannot be interpreted as absolute evidence for the presence or the absence of               Labs, Imaging, & Other studies:   All pertinent labs and imaging studies were personally reviewed      Reviewed test results and discussed with patient. Assessment and plan:     Patient is here with her . Follow-up visit for bilateral breast cancers and also history of melanoma in situ. She tested positive for BRCA2 gene mutation . Patient had bilateral mastectomies in 2008 for hormone receptor positive HER2 negative stage II A cancer in the right breast and stage I A cancer in the left breast and 1 positive lymph node in right axilla. Patient had reconstruction surgeries, 4 cycles of Taxotere plus Cytoxan followed by 5 years of Femara that she finished in September 2013. She had ITZEL and BSO. She has been aware of cancer risks because of BRCA2 mutation.   She goes for pancreatic checkup with MRI of abdomen and would also check peritoneal cancer. She  goes to her dermatologist for screening of skin for melanoma. She states that her dermatologist has left town and she needs a new dermatologist.  She follows with her ophthalmologist for ocular melanoma screening. Patient states her family has been aware of her  positive  BRCA 2. She has  family history of breast and ovarian cancer. Patient has history of  dermatomyositis . She has been on methotrexate and folic acid for the last 3 years. She follows with her rheumatologist.       , Patient had a 9 mm nodule in the right lower lung and she had wedge resection and that was negative for malignancy. Patient follows with the her lung specialist for mild residual ground-glass opacity in right lower lobe and that has improved . Fransisco Yi She has renal and liver cysts and also tiny  polyp in gallbladder again monitored with MRI scan. .  She had colonoscopy in February 2021 and she states that was clear. .  She has small lymph nodes in left submandibular area and they are being monitored by her primary physician by  ultrasound of the neck. She has some tiredness. She has dryness of the eyes and mouth. 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 She has cast on her right forearm because of traumatic fracture of right radius.           Physical examination and test results are as recorded and discussed. She has BRCA2 mutation. Breast cancers  remain in remission. She gets checked for other cancers like melanoma, pancreatic cancer and peritoneal cancer and others. She had ITZEL and BSO. She has history of melanoma in situ. She follows with her primary physician and other consultants. .  All discussed in detail. Questions answered. Discussed the importance of eating healthy foods, staying active and health screening test.  Patient is capable of self-care.   Discussed precautions against coronavirus and other infections. .  Goal is cure from breast cancers and melanoma and early  detection of other cancers mentioned above. .  See diagnoses, instructions and orders below    1. Malignant neoplasm of nipple of both breasts in female, estrogen receptor positive ([address])    - Ambulatory referral to Dermatology; Future  - MRI abdomen w wo contrast; Future  - CBC and differential; Future  - Comprehensive metabolic panel; Future  - Cancer antigen 27.29; Future    2. BRCA2 gene mutation positive    - MRI abdomen w wo contrast; Future    3. History of melanoma    - Ambulatory referral to Dermatology; Future  - MRI abdomen w wo contrast; Future    4. Dermatomyositis ([address])    - Ambulatory referral to Dermatology; Future    5. Carrier of high risk cancer gene mutation    - Ambulatory referral to Dermatology; Future  - MRI abdomen w wo contrast; Future      Blood work prior to MRI scan of the abdomen prior to next visit in 8 months.           I used a dictation device to dictate this note and there could be mistakes in my note and for that she may call my office        Patient voiced understanding and agreed       Counseling / Coordination of Care   . Allyson Scales   Provided counseling and support

## 2023-09-01 NOTE — ED ATTENDING ATTESTATION
8/17/2023  ISandra DO, saw and evaluated the patient. I have discussed the patient with the resident/non-physician practitioner and agree with the resident's/non-physician practitioner's findings, Plan of Care, and MDM as documented in the resident's/non-physician practitioner's note, except where noted. All available labs and Radiology studies were reviewed. I was present for key portions of any procedure(s) performed by the resident/non-physician practitioner and I was immediately available to provide assistance. At this point I agree with the current assessment done in the Emergency Department. I have conducted an independent evaluation of this patient a history and physical is as follows:       72-year-old female presents for right wrist fracture was seen in urgent care performed xray, fracture. distla radius. Sent here for reduction. Plan reduce, splint, ortho f/u.   NVI prior to reduction  No open wounds  ED Course         Critical Care Time  Procedures

## 2023-09-05 ENCOUNTER — OFFICE VISIT (OUTPATIENT)
Dept: OBGYN CLINIC | Facility: HOSPITAL | Age: 63
End: 2023-09-05

## 2023-09-05 VITALS
DIASTOLIC BLOOD PRESSURE: 67 MMHG | BODY MASS INDEX: 24.66 KG/M2 | HEIGHT: 65 IN | SYSTOLIC BLOOD PRESSURE: 102 MMHG | HEART RATE: 58 BPM | WEIGHT: 148 LBS

## 2023-09-05 DIAGNOSIS — Z98.890 S/P ORIF (OPEN REDUCTION INTERNAL FIXATION) FRACTURE: ICD-10-CM

## 2023-09-05 DIAGNOSIS — S52.531D CLOSED COLLES' FRACTURE OF RIGHT RADIUS WITH ROUTINE HEALING, SUBSEQUENT ENCOUNTER: Primary | ICD-10-CM

## 2023-09-05 DIAGNOSIS — Z87.81 S/P ORIF (OPEN REDUCTION INTERNAL FIXATION) FRACTURE: ICD-10-CM

## 2023-09-05 PROCEDURE — 99024 POSTOP FOLLOW-UP VISIT: CPT | Performed by: ORTHOPAEDIC SURGERY

## 2023-09-05 NOTE — PROGRESS NOTES
Assessment:   Diagnosis ICD-10-CM Associated Orders   1. Closed Colles' fracture of right radius with routine healing, subsequent encounter  S52.531D       2. S/P ORIF (open reduction internal fixation) fracture  Z98.890     Z87.81         Plan:  · The patient's wound is healing very appropriately at this time. · She can move her wrist as much as she can to tolerance. She can jerome and do fine motor skills such as typing. We reviewed pronation, supination, and finger motion today especially. · She should avoid sudden movements and heavy lifting with the wrist.  · She may wear the brace when out in public and possibly to sleep if she finds sleeping uncomfortable. · In terms of driving, she must be safe. She must wait at least until after her stitches are removed. When she feels safe to drive, she must start with parking lot driving to ensure that she feels safe and then she can advance to short distance and short duration driving. To do next visit:  Follow-up on Friday for new X-rays and suture removal.    The above stated was discussed in layman's terms and the patient expressed understanding. All questions were answered to the patient's satisfaction. Scribe Attestation    I,:  Sorin Manzano PA-C am acting as a scribe while in the presence of the attending physician.:       I,:  Isidoro Tuttle MD personally performed the services described in this documentation    as scribed in my presence.:           Subjective:   Mitzy Salguero is a 63 y.o. female who presents to the office today 6 days out from her ORIF of the right distal radius on 8/30/23. She is here today for a wound check. She wants to know when she can start driving. She wants to know when she should wear her brace. She has returned to her job as a  and she has begun crocheting again.     Review of systems negative unless otherwise specified in HPI    Past Medical History:   Diagnosis Date   • Anesthesia complication     HR drops   • Benign neoplasm of lung     Right lung lobe benign. Removed 2010. • BRCA2 positive    • Breast cancer ([address])    • Breathing difficulty 2011    no breathing difficulties but patient reported drop in BP during anesthesia for appendectomy and was advised to have cardiologist consult   • Cancer [facility]) 2008   • Depression    • Dermatomyositis [facility])    • Lung nodule    • Melanoma ([address]) 2014    mid chest (insitu)   • Wears glasses        Past Surgical History:   Procedure Laterality Date   • APPENDECTOMY      2011   • BREAST SURGERY      Bilateral Mastectomy   • COLONOSCOPY N/A 01/26/2016    Procedure: COLONOSCOPY;  Surgeon: Janell Ramirez MD;  [facility]; Service:    • HYSTERECTOMY      2007   • KNEE SURGERY  2007   • LUNG BIOPSY     • MASS EXCISION Left 07/18/2019    Procedure: MID BACK MASS EXCISION BIOPSY;  Surgeon: Jenae Fernandez MD;  [facility];  Service: General   • MASTECTOMY      reconstruction 2008   • OOPHORECTOMY     • [place] OPEN TREATMENT RADIAL SHAFT FRACTURE Right 8/30/2023    Procedure: OPEN REDUCTION W/ INTERNAL FIXATION (ORIF) RADIUS / ULNA (WRIST);   Surgeon: Arlena Alpers, MD;  [facility];  Service: Orthopedics   • SKIN BIOPSY         Family History   Problem Relation Age of Onset   • Cancer Mother         Ovarian   • Ovarian cancer Mother    • No Known Problems Father    • BRCA2 Positive Sister    • Heart disease Sister         post partum cardiomyopathy, pacer   • No Known Problems Brother    • Prostate cancer Maternal Uncle    • Prostate cancer Maternal Uncle    • BRCA2 Negative Paternal Aunt    • Breast cancer Paternal Aunt    • Cancer Maternal Grandmother 80        skin   • Skin cancer Maternal Grandmother    • No Known Problems Paternal Grandmother    • No Known Problems Paternal Grandfather    • BRCA2 Positive Daughter        Social History     Occupational History   • Not on file   Tobacco Use   • Smoking status: Never   • Smokeless tobacco: Never   Vaping Use   • Vaping Use: Never used   Substance and Sexual Activity   • Alcohol use: No   • Drug use: No   • Sexual activity: Yes     Partners: Male     Birth control/protection: Post-menopausal, None     Comment: complete hysterectomy         Current Outpatient Medications:   •  acetaminophen (TYLENOL) 325 mg tablet, Take 2 tablets (650 mg total) by mouth every 6 (six) hours as needed for mild pain, Disp: 60 tablet, Rfl: 0  •  Calcium-Magnesium-Vitamin D (CALCIUM 500 PO), Take 1,000 mg by mouth daily. , Disp: , Rfl:   •  Cholecalciferol (VITAMIN D) 2000 UNITS CAPS, Take 2,000 Units/day by mouth, Disp: , Rfl:   •  cholecalciferol (VITAMIN D3) 1,000 units tablet, Take 1,000 Units by mouth daily, Disp: , Rfl:   •  clobetasol (TEMOVATE) 0.05 % cream, as needed  , Disp: , Rfl:   •  Glycerin-Hypromellose- (DRY EYE RELIEF DROPS OP), Apply to eye, Disp: , Rfl:   •  ibuprofen (MOTRIN) 400 mg tablet, Take 1 tablet (400 mg total) by mouth every 6 (six) hours as needed for mild pain, Disp: 50 tablet, Rfl: 0  •  multivitamin (THERAGRAN) TABS, Take 1 tablet by mouth daily. , Disp: , Rfl:   •  oxyCODONE (Roxicodone) 5 immediate release tablet, Take 1 tablet (5 mg total) by mouth every 6 (six) hours as needed for severe pain for up to 10 days Max Daily Amount: 20 mg, Disp: 20 tablet, Rfl: 0  •  folic acid (FOLVITE) 1 mg tablet, Take 1 tablet (1 mg total) by mouth daily, Disp: 90 tablet, Rfl: 3  •  methotrexate 2.5 mg tablet, Take 4 tablets (10 mg total) by mouth once a week, Disp: 48 tablet, Rfl: 3    No Known Allergies         Vitals:    09/05/23 0719   BP: 102/67   Pulse: 58       Objective:    General:  Patient is WDWN, alert and oriented, appears stated age, and is in no acute distress. Musculoskeletal:    Right Wrist:    Inspection:  Incision is well-approximated and well-healing without erythema or purulent material indicative of infection.     Range of Motion:  She is able to move all fingers and make a near full composite fist.    Sensation:  SILT over the fingers. Other:  Fingers WWP. Diagnostics, reviewed and taken today if performed as documented:    None performed        Procedures, if performed today:    None performed      Portions of the record may have been created with voice recognition software. Occasional wrong word or "sound a like" substitutions may have occurred due to the inherent limitations of voice recognition software. Read the chart carefully and recognize, using context, where substitutions have occurred.

## 2023-09-08 ENCOUNTER — OFFICE VISIT (OUTPATIENT)
Dept: OBGYN CLINIC | Facility: HOSPITAL | Age: 63
End: 2023-09-08

## 2023-09-08 ENCOUNTER — HOSPITAL ENCOUNTER (OUTPATIENT)
Dept: RADIOLOGY | Facility: HOSPITAL | Age: 63
Discharge: HOME | End: 2023-09-08
Payer: COMMERCIAL

## 2023-09-08 VITALS
BODY MASS INDEX: 25.12 KG/M2 | HEIGHT: 65 IN | DIASTOLIC BLOOD PRESSURE: 80 MMHG | HEART RATE: 52 BPM | SYSTOLIC BLOOD PRESSURE: 118 MMHG | WEIGHT: 150.8 LBS

## 2023-09-08 DIAGNOSIS — S52.531D CLOSED COLLES' FRACTURE OF RIGHT RADIUS WITH ROUTINE HEALING, SUBSEQUENT ENCOUNTER: ICD-10-CM

## 2023-09-08 DIAGNOSIS — S52.531D CLOSED COLLES' FRACTURE OF RIGHT RADIUS WITH ROUTINE HEALING, SUBSEQUENT ENCOUNTER: Primary | ICD-10-CM

## 2023-09-08 PROCEDURE — 99024 POSTOP FOLLOW-UP VISIT: CPT

## 2023-09-08 PROCEDURE — 73110 X-RAY EXAM OF WRIST: CPT

## 2023-09-08 NOTE — PROGRESS NOTES
Assessment:   Diagnosis ICD-10-CM Associated Orders   1. Closed Colles' fracture of right radius with routine healing, subsequent encounter  S52.531D XR wrist 3+ vw right          Plan:  · A discussion was had with the patient that her imaging looks very good at today's visit. · Her sutures were removed and Steri-Strips were applied. She may shower at this time and pat the wounds dry when she is done. The Steri-Strips will fall off on their own in the shower in 7-10 days. She should avoid soaking her wounds in a bath or pool. · She should continue to work on wrist supination, pronation, flexion, and extension. She may remove her brace while participating in fine motor skills such as typing but should wear it when walking around. · She should not attempt to drive until she feels safe to do so, and then she should start out in a parking lot. To do next visit:  Follow-up in 4 weeks for further imaging and to assess post-op pain and function. The above stated was discussed in layman's terms and the patient expressed understanding. All questions were answered to the patient's satisfaction. Subjective:   Fco Vargas is a 63 y.o. female who presents to the office today 9 days out from her ORIF of the right distal radius. She is doing well. Her pain is improving. She denies numbness or tingling. She wants to know when she can drive. She works as a . She has a vacation next week. Review of systems negative unless otherwise specified in HPI    Past Medical History:   Diagnosis Date   • Anesthesia complication     HR drops   • Benign neoplasm of lung     Right lung lobe benign. Removed 2010.    • BRCA2 positive    • Breast cancer ([address])    • Breathing difficulty 2011    no breathing difficulties but patient reported drop in BP during anesthesia for appendectomy and was advised to have cardiologist consult   • Cancer [facility]) 2008   • Depression    • Dermatomyositis ([address])    • Lung nodule    • Melanoma ([address]) 2014    mid chest (insitu)   • Wears glasses        Past Surgical History:   Procedure Laterality Date   • APPENDECTOMY      2011   • BREAST SURGERY      Bilateral Mastectomy   • COLONOSCOPY N/A 01/26/2016    Procedure: COLONOSCOPY;  Surgeon: Georgia Lora MD;  [facility]; Service:    • HYSTERECTOMY      2007   • KNEE SURGERY  2007   • LUNG BIOPSY     • MASS EXCISION Left 07/18/2019    Procedure: MID BACK MASS EXCISION BIOPSY;  Surgeon: Jose Pizarro MD;  [facility];  Service: General   • MASTECTOMY      reconstruction 2008   • OOPHORECTOMY     • [place] OPEN TREATMENT RADIAL SHAFT FRACTURE Right 8/30/2023    Procedure: OPEN REDUCTION W/ INTERNAL FIXATION (ORIF) RADIUS / ULNA (WRIST);   Surgeon: Ayaan Morales MD;  [facility];  Service: Orthopedics   • SKIN BIOPSY         Family History   Problem Relation Age of Onset   • Cancer Mother         Ovarian   • Ovarian cancer Mother    • No Known Problems Father    • BRCA2 Positive Sister    • Heart disease Sister         post partum cardiomyopathy, pacer   • No Known Problems Brother    • Prostate cancer Maternal Uncle    • Prostate cancer Maternal Uncle    • BRCA2 Negative Paternal Aunt    • Breast cancer Paternal Aunt    • Cancer Maternal Grandmother 80        skin   • Skin cancer Maternal Grandmother    • No Known Problems Paternal Grandmother    • No Known Problems Paternal Grandfather    • BRCA2 Positive Daughter        Social History     Occupational History   • Not on file   Tobacco Use   • Smoking status: Never   • Smokeless tobacco: Never   Vaping Use   • Vaping Use: Never used   Substance and Sexual Activity   • Alcohol use: No   • Drug use: No   • Sexual activity: Yes     Partners: Male     Birth control/protection: Post-menopausal, None     Comment: complete hysterectomy         Current Outpatient Medications:   •  acetaminophen (TYLENOL) 325 mg tablet, Take 2 tablets (650 mg total) by mouth every 6 (six) hours as needed for mild pain, Disp: 60 tablet, Rfl: 0  •  Calcium-Magnesium-Vitamin D (CALCIUM 500 PO), Take 1,000 mg by mouth daily. , Disp: , Rfl:   •  Cholecalciferol (VITAMIN D) 2000 UNITS CAPS, Take 2,000 Units/day by mouth, Disp: , Rfl:   •  cholecalciferol (VITAMIN D3) 1,000 units tablet, Take 1,000 Units by mouth daily, Disp: , Rfl:   •  clobetasol (TEMOVATE) 0.05 % cream, as needed  , Disp: , Rfl:   •  Glycerin-Hypromellose- (DRY EYE RELIEF DROPS OP), Apply to eye, Disp: , Rfl:   •  ibuprofen (MOTRIN) 400 mg tablet, Take 1 tablet (400 mg total) by mouth every 6 (six) hours as needed for mild pain, Disp: 50 tablet, Rfl: 0  •  multivitamin (THERAGRAN) TABS, Take 1 tablet by mouth daily. , Disp: , Rfl:   •  folic acid (FOLVITE) 1 mg tablet, Take 1 tablet (1 mg total) by mouth daily, Disp: 90 tablet, Rfl: 3  •  methotrexate 2.5 mg tablet, Take 4 tablets (10 mg total) by mouth once a week, Disp: 48 tablet, Rfl: 3  •  oxyCODONE (Roxicodone) 5 immediate release tablet, Take 1 tablet (5 mg total) by mouth every 6 (six) hours as needed for severe pain for up to 10 days Max Daily Amount: 20 mg (Patient not taking: Reported on 9/8/2023), Disp: 20 tablet, Rfl: 0    No Known Allergies         Vitals:    09/08/23 1210   BP: 118/80   Pulse: (!) 52       Objective:    General:  Patient is WDWN, alert and oriented, appears stated age, and is in no acute distress. Musculoskeletal:    Right Wrist:    Inspection:  Incision is well-approximated and well-healed without erythema or purulent material indicative of infection. Range of Motion:  Motor intact M/R/U/ain/pin. Sensation:  SILT M/R/U nerves. Other:  Fingers WWP. Diagnostics, reviewed and taken today if performed as documented: The attending physician has personally reviewed the pertinent films in PACS and interpretation is as follows:  Right Wrist X-rays:  The patient's fractures are held in stable alignment.   There is no evidence of hardware loosening or failure. Procedures, if performed today:    None performed      Portions of the record may have been created with voice recognition software. Occasional wrong word or "sound a like" substitutions may have occurred due to the inherent limitations of voice recognition software. Read the chart carefully and recognize, using context, where substitutions have occurred.

## 2023-10-03 ENCOUNTER — OFFICE VISIT (OUTPATIENT)
Dept: OBGYN CLINIC | Facility: HOSPITAL | Age: 63
End: 2023-10-03

## 2023-10-03 ENCOUNTER — HOSPITAL ENCOUNTER (OUTPATIENT)
Dept: RADIOLOGY | Facility: HOSPITAL | Age: 63
Discharge: HOME | End: 2023-10-03
Attending: ORTHOPAEDIC SURGERY
Payer: COMMERCIAL

## 2023-10-03 VITALS
HEART RATE: 57 BPM | SYSTOLIC BLOOD PRESSURE: 113 MMHG | BODY MASS INDEX: 25.09 KG/M2 | HEIGHT: 65 IN | DIASTOLIC BLOOD PRESSURE: 74 MMHG

## 2023-10-03 DIAGNOSIS — S52.531D CLOSED COLLES' FRACTURE OF RIGHT RADIUS WITH ROUTINE HEALING, SUBSEQUENT ENCOUNTER: ICD-10-CM

## 2023-10-03 DIAGNOSIS — S52.531D CLOSED COLLES' FRACTURE OF RIGHT RADIUS WITH ROUTINE HEALING, SUBSEQUENT ENCOUNTER: Primary | ICD-10-CM

## 2023-10-03 PROCEDURE — 99024 POSTOP FOLLOW-UP VISIT: CPT | Performed by: ORTHOPAEDIC SURGERY

## 2023-10-03 PROCEDURE — 73110 X-RAY EXAM OF WRIST: CPT

## 2023-10-03 NOTE — PROGRESS NOTES
Assessment:   Diagnosis ICD-10-CM Associated Orders   1. Closed Colles' fracture of right radius with routine healing, subsequent encounter  S52.531D XR wrist 3+ vw right          Plan:  · A discussion was had with the patient that her imaging looks very good at today's visit. · She should continue with all range of motion exercises. She was given a list of range of motion exercises today. · She may slowly advance her weight bearing status over the next 6 weeks. At this time, she may begin to lift up to 5 pounds. Over the next 6 weeks, she may slowly work up to lifting 10 pounds. She should not lift more than 10 pounds before her next visit. · She should not do anything which causes her severe pain. · We discussed that she should stick with short distance, short duration driving until she becomes more comfortable. To do next visit:  Follow-up in 6 weeks for further imaging and to assess post-op pain and function. The above stated was discussed in layman's terms and the patient expressed understanding. All questions were answered to the patient's satisfaction. Subjective:   Cj Mullen is a 63 y.o. female who presents to the office today for a 5-week post-op appointment from her right distal radius ORIF on 8/30/2023. Today the patient states that she is slowly improving. She has returned to her job as a . She only wears a comfort cool brace at this time. She is working on range of motion exercises. She takes Tylenol occasionally for pain. She denies any wrist or hand numbness or tingling. She is very satisfied with her progress so far. Review of systems negative unless otherwise specified in HPI    Past Medical History:   Diagnosis Date   • Anesthesia complication     HR drops   • Benign neoplasm of lung     Right lung lobe benign. Removed 2010.    • BRCA2 positive    • Breast cancer [facility])    • Breathing difficulty 2011    no breathing difficulties but patient reported drop in BP during anesthesia for appendectomy and was advised to have cardiologist consult   • Cancer [facility]) 2008   • Depression    • Dermatomyositis [facility])    • Lung nodule    • Melanoma ([address]) 2014    mid chest (insitu)   • Wears glasses        Past Surgical History:   Procedure Laterality Date   • APPENDECTOMY      2011   • BREAST SURGERY      Bilateral Mastectomy   • COLONOSCOPY N/A 01/26/2016    Procedure: COLONOSCOPY;  Surgeon: Claribel Tran MD;  [facility]; Service:    • HYSTERECTOMY      2007   • KNEE SURGERY  2007   • LUNG BIOPSY     • MASS EXCISION Left 07/18/2019    Procedure: MID BACK MASS EXCISION BIOPSY;  Surgeon: Monroe Partida MD;  [facility];  Service: General   • MASTECTOMY      reconstruction 2008   • OOPHORECTOMY     • [place] OPEN TREATMENT RADIAL SHAFT FRACTURE Right 8/30/2023    Procedure: OPEN REDUCTION W/ INTERNAL FIXATION (ORIF) RADIUS / ULNA (WRIST);   Surgeon: Hanna Jackson MD;  [facility];  Service: Orthopedics   • SKIN BIOPSY         Family History   Problem Relation Age of Onset   • Cancer Mother         Ovarian   • Ovarian cancer Mother    • No Known Problems Father    • BRCA2 Positive Sister    • Heart disease Sister         post partum cardiomyopathy, pacer   • No Known Problems Brother    • Prostate cancer Maternal Uncle    • Prostate cancer Maternal Uncle    • BRCA2 Negative Paternal Aunt    • Breast cancer Paternal Aunt    • Cancer Maternal Grandmother 80        skin   • Skin cancer Maternal Grandmother    • No Known Problems Paternal Grandmother    • No Known Problems Paternal Grandfather    • BRCA2 Positive Daughter        Social History     Occupational History   • Not on file   Tobacco Use   • Smoking status: Never   • Smokeless tobacco: Never   Vaping Use   • Vaping Use: Never used   Substance and Sexual Activity   • Alcohol use: No   • Drug use: No   • Sexual activity: Yes     Partners: Male     Birth control/protection: Post-menopausal, None     Comment: complete hysterectomy         Current Outpatient Medications:   •  Calcium-Magnesium-Vitamin D (CALCIUM 500 PO), Take 1,000 mg by mouth daily. , Disp: , Rfl:   •  Cholecalciferol (VITAMIN D) 2000 UNITS CAPS, Take 2,000 Units/day by mouth, Disp: , Rfl:   •  cholecalciferol (VITAMIN D3) 1,000 units tablet, Take 1,000 Units by mouth daily, Disp: , Rfl:   •  clobetasol (TEMOVATE) 0.05 % cream, as needed  , Disp: , Rfl:   •  Glycerin-Hypromellose- (DRY EYE RELIEF DROPS OP), Apply to eye, Disp: , Rfl:   •  multivitamin (THERAGRAN) TABS, Take 1 tablet by mouth daily. , Disp: , Rfl:   •  acetaminophen (TYLENOL) 325 mg tablet, Take 2 tablets (650 mg total) by mouth every 6 (six) hours as needed for mild pain, Disp: 60 tablet, Rfl: 0  •  folic acid (FOLVITE) 1 mg tablet, Take 1 tablet (1 mg total) by mouth daily, Disp: 90 tablet, Rfl: 3  •  ibuprofen (MOTRIN) 400 mg tablet, Take 1 tablet (400 mg total) by mouth every 6 (six) hours as needed for mild pain, Disp: 50 tablet, Rfl: 0  •  methotrexate 2.5 mg tablet, Take 4 tablets (10 mg total) by mouth once a week, Disp: 48 tablet, Rfl: 3    No Known Allergies         Vitals:    10/03/23 0708   BP: 113/74   Pulse: 57       Objective:    General:  Patient is WDWN, alert and oriented, appears stated age, and is in no acute distress. Musculoskeletal:    Right Wrist:    Inspection:  Incisions are well-approximated and well-healed without erythema or purulent material indicative of infection. She does have some ecchymosis over the proximal portion of the incision site. Range of Motion:  The patient is able to achieve approximately 30 degrees of active wrist flexion, 30 degrees of extension, 90 degrees of pronation, and 70 degrees of supination. Motor intact R/M/U/ain/pin. Palpation:  She is tender over the ecchymosis. She is not significantly tender over the fracture site. Sensation:  SILT R/M/U nerves. Other:  Fingers WWP.         Diagnostics, reviewed and taken today if performed as documented: The attending physician has personally reviewed the pertinent films in PACS and interpretation is as follows:  Right Wrist X-rays:  The patient's fractures are held in stable alignment. There is no evidence of hardware loosening or failure. Procedures, if performed today:    None performed      Portions of the record may have been created with voice recognition software. Occasional wrong word or "sound a like" substitutions may have occurred due to the inherent limitations of voice recognition software. Read the chart carefully and recognize, using context, where substitutions have occurred.

## 2023-10-07 ENCOUNTER — IMMUNIZATIONS (OUTPATIENT)
Dept: INTERNAL MEDICINE CLINIC | Facility: CLINIC | Age: 63
End: 2023-10-07
Payer: COMMERCIAL

## 2023-10-07 DIAGNOSIS — S52.531A CLOSED COLLES' FRACTURE OF RIGHT RADIUS, INITIAL ENCOUNTER: Primary | ICD-10-CM

## 2023-10-07 DIAGNOSIS — Z23 ENCOUNTER FOR IMMUNIZATION: ICD-10-CM

## 2023-10-07 PROCEDURE — 90471 IMMUNIZATION ADMIN: CPT

## 2023-10-07 PROCEDURE — 90686 IIV4 VACC NO PRSV 0.5 ML IM: CPT

## 2023-10-18 ENCOUNTER — OFFICE VISIT (OUTPATIENT)
Dept: DERMATOLOGY | Facility: CLINIC | Age: 63
End: 2023-10-18

## 2023-10-18 VITALS — HEIGHT: 65 IN | WEIGHT: 151.5 LBS | BODY MASS INDEX: 25.24 KG/M2 | TEMPERATURE: 97.7 F

## 2023-10-18 DIAGNOSIS — D48.5 NEOPLASM OF UNCERTAIN BEHAVIOR OF SKIN: ICD-10-CM

## 2023-10-18 DIAGNOSIS — M33.90 DERMATOMYOSITIS (HCC): ICD-10-CM

## 2023-10-18 DIAGNOSIS — Z86.006 HISTORY OF MELANOMA IN SITU: Primary | ICD-10-CM

## 2023-10-18 DIAGNOSIS — D23.9 DERMATOFIBROMA: ICD-10-CM

## 2023-10-18 PROCEDURE — 88341 IMHCHEM/IMCYTCHM EA ADD ANTB: CPT | Performed by: STUDENT IN AN ORGANIZED HEALTH CARE EDUCATION/TRAINING PROGRAM

## 2023-10-18 PROCEDURE — 88305 TISSUE EXAM BY PATHOLOGIST: CPT | Performed by: STUDENT IN AN ORGANIZED HEALTH CARE EDUCATION/TRAINING PROGRAM

## 2023-10-18 PROCEDURE — 88342 IMHCHEM/IMCYTCHM 1ST ANTB: CPT | Performed by: STUDENT IN AN ORGANIZED HEALTH CARE EDUCATION/TRAINING PROGRAM

## 2023-10-18 NOTE — PROGRESS NOTES
[facility] Note     Patient Name: Emelda Goodell  Encounter Date: 10/18/2023     Have you been cared for by a [facility]ologist in the last 3 years and, if so, which description applies to you? Yes. I have been here within the last 3 years, and my medical history has NOT changed since that time. I am FEMALE/of child-bearing potential.    REVIEW OF SYSTEMS:  Have you recently had or currently have any of the following? No changes in my recent health. PAST MEDICAL HISTORY:  Have you personally ever had or currently have any of the following? If "YES," then please provide more detail. No changes in my medical history. HISTORY OF IMMUNOSUPPRESSION: Do you have a history of any of the following:  Systemic Immunosuppression such as Diabetes, Biologic or Immunotherapy, Chemotherapy, Organ Transplantation, Bone Marrow Transplantation? No     Answering "YES" requires the addition of the dotphrase "IMMUNOSUPPRESSED" as the first diagnosis of the patient's visit. FAMILY HISTORY:  Any "first degree relatives" (parent, brother, sister, or child) with the following? No changes in my family's known health. PATIENT EXPERIENCE:    Do you want the Dermatologist to perform a COMPLETE skin exam today including a clinical examination under the "bra and underwear" areas? Yes  If necessary, do we have your permission to call and leave a detailed message on your Preferred Phone number that includes your specific medical information? Yes      No Known Allergies   Current Outpatient Medications:     Calcium-Magnesium-Vitamin D (CALCIUM 500 PO), Take 1,000 mg by mouth daily. , Disp: , Rfl:     cholecalciferol (VITAMIN D3) 1,000 units tablet, Take 1,000 Units by mouth daily, Disp: , Rfl:     clobetasol (TEMOVATE) 0.05 % cream, as needed  , Disp: , Rfl:     folic acid (FOLVITE) 1 mg tablet, Take 1 tablet (1 mg total) by mouth daily, Disp: 90 tablet, Rfl: 3    Glycerin-Hypromellose- (DRY EYE RELIEF DROPS OP), Apply to eye, Disp: , Rfl:     methotrexate 2.5 mg tablet, Take 4 tablets (10 mg total) by mouth once a week, Disp: 48 tablet, Rfl: 3    multivitamin (THERAGRAN) TABS, Take 1 tablet by mouth daily. , Disp: , Rfl:     acetaminophen (TYLENOL) 325 mg tablet, Take 2 tablets (650 mg total) by mouth every 6 (six) hours as needed for mild pain, Disp: 60 tablet, Rfl: 0    Cholecalciferol (VITAMIN D) 2000 UNITS CAPS, Take 2,000 Units/day by mouth (Patient not taking: Reported on 10/18/2023), Disp: , Rfl:     ibuprofen (MOTRIN) 400 mg tablet, Take 1 tablet (400 mg total) by mouth every 6 (six) hours as needed for mild pain, Disp: 50 tablet, Rfl: 0          Whom besides the patient is providing clinical information about today's encounter? NO ADDITIONAL HISTORIAN (patient alone provided history)    Physical Exam and Assessment/Plan by Diagnosis:    HISTORY OF MELANOMA IN SITU    Physical Exam:  Anatomic Location Affected:  Mid chest  Morphological Description of Scar:  Well healed  Year Treated: 2014  TNM Classification: N0T0  Suspected Recurrence: yes    Additional History of Present Condition:  History of melanoma with no signs of recurrence. Patient with history of breast cancer; patient is BRCA II gene positive     Assessment and Plan:  Based on a thorough discussion of this condition and the management approach to it (including a comprehensive discussion of the known risks, side effects and potential benefits of treatment), the patient (family) agrees to implement the following specific plan:  Monitor for changes or recurrence  Routine skin checks every 6 months        2. HISTORY OF DERMATOMYOSITIS     Physical Exam:  Anatomic Location Affected:  Trunk, face, hands   Morphological Description: no skin findings present today        Additional History of Present Condition:  Patient notes that comes and goes.  Taking methotrexate (4 pills weekly) and when flares applying clobetasol cream.  Was on plaquenil and stopped due to discoloration. Assessment and Plan:  Based on a thorough discussion of this condition and the management approach to it (including a comprehensive discussion of the known risks, side effects and potential benefits of treatment), the patient (family) agrees to implement the following specific plan:  Continue to follow up with Rheumatology every 6 months; Rheum is managing her MTX  Discussed sun precautions           3. DERMATOFIBROMA    Physical Exam:  Anatomic Location: Right thigh, Left thigh  Morphologic Description: Rubbery firm papule that "dimples" with lateral pressure. Additional History of Present Condition:  Present on exam.    History of pruritus within lesion: No  History of HIV: No  History of immunosuppression or other autoimmune conditions: YES      Plan:  Asymptomatic. No treatment is required. 4. NEOPLASM OF UNCERTAIN BEHAVIOR OF SKIN    Physical Exam:  (Anatomic Location); (Size and Morphological Description); (Differential Diagnosis):  Left posterior thigh; 0.5 cm dark brown macule; ddx nevus versus malignant melanoma    Additional History of Present Condition:  Noted on exam.    Assessment and Plan:  I have discussed with the patient that a sample of skin via a "skin biopsy” would be potentially helpful to further make a specific diagnosis under the microscope. Based on a thorough discussion of this condition and the management approach to it (including a comprehensive discussion of the known risks, side effects and potential benefits of treatment), the patient (family) agrees to implement the following specific plan:    Procedure:  Skin Biopsy.   After a thorough discussion of treatment options and risk/benefits/alternatives (including but not limited to local pain, scarring, dyspigmentation, blistering, possible superinfection, and inability to confirm a diagnosis via histopathology), verbal and written consent were obtained and portion of the rash was biopsied for tissue sample. See below for consent that was obtained from patient and subsequent Procedure Note. PROCEDURE TANGENTIAL (SHAVE) BIOPSY NOTE:    Performing Physician: Brandi Peters  Anatomic Location; Clinical Description with size (cm); Pre-Op Diagnosis:   Left posterior thigh; 0.5 cm dark brown macule; ddx nevus versus malignant melanoma  Post-op diagnosis: Same     Local anesthesia: 1% xylocaine with epi      Topical anesthesia: None    Hemostasis: Aluminum chloride       After obtaining informed consent  at which time there was a discussion about the purpose of biopsy  and low risks of infection and bleeding. The area was prepped and draped in the usual fashion. Anesthesia was obtained with 1% lidocaine with epinephrine. A shave biopsy to an appropriate sampling depth was obtained by Shave (Dermablade or 15 blade) The resulting wound was covered with surgical ointment and bandaged appropriately. The patient tolerated the procedure well without complications and was without signs of functional compromise. Specimen has been sent for review by Dermatopathology. Standard post-procedure care has been explained and has been included in written form within the patient's copy of Informed Consent. INFORMED CONSENT DISCUSSION AND POST-OPERATIVE INSTRUCTIONS FOR PATIENT    I.  RATIONALE FOR PROCEDURE  I understand that a skin biopsy allows the Dermatologist to test a lesion or rash under the microscope to obtain a diagnosis. It usually involves numbing the area with numbing medication and removing a small piece of skin; sometimes the area will be closed with sutures. In this specific procedure, sutures are not usually needed. If any sutures are placed, then they are usually need to be removed in 2 weeks or less. I understand that my Dermatologist recommends that a skin "shave" biopsy be performed today.   A local anesthetic, similar to the kind that a dentist uses when filling a cavity, will be injected with a very small needle into the skin area to be sampled. The injected skin and tissue underneath "will go to sleep” and become numb so no pain should be felt afterwards. An instrument shaped like a tiny "razor blade" (shave biopsy instrument) will be used to cut a small piece of tissue and skin from the area so that a sample of tissue can be taken and examined more closely under the microscope. A slight amount of bleeding will occur, but it will be stopped with direct pressure and a pressure bandage and any other appropriate methods. I understands that a scar will form where the wound was created. Surgical ointment will be applied to help protect the wound. Sutures are not usually needed. II.  RISKS AND POTENTIAL COMPLICATIONS   I understand the risks and potential complications of a skin biopsy include but are not limited to the following:  Bleeding  Infection  Pain  Scar/keloid  Skin discoloration  Incomplete Removal  Recurrence  Nerve Damage/Numbness/Loss of Function  Allergic Reaction to Anesthesia  Biopsies are diagnostic procedures and based on findings additional treatment or evaluation may be required  Loss or destruction of specimen resulting in no additional findings    My Dermatologist has explained to me the nature of the condition, the nature of the procedure, and the benefits to be reasonably expected compared with alternative approaches. My Dermatologist has discussed the likelihood of major risks or complications of this procedure including the specific risks listed above, such as bleeding, infection, and scarring/keloid. I understand that a scar is expected after this procedure. I understand that my physician cannot predict if the scar will form a "keloid," which extends beyond the borders of the wound that is created. A keloid is a thick, painful, and bumpy scar.   A keloid can be difficult to treat, as it does not always respond well to therapy, which includes injecting cortisone directly into the keloid every few weeks. While this usually reduces the pain and size of the scar, it does not eliminate it. I understand that photographs may be taken before and after the procedure. These will be maintained as part of the medical providers confidential records and may not be made available to me. I further authorize the medical provider to use the photographs for teaching purposes or to illustrate scientific papers, books, or lectures if in his/her judgment, medical research, education, or science may benefit from its use. I have had an opportunity to fully inquire about the risks and benefits of this procedure and its alternatives. I have been given ample time and opportunity to ask questions and to seek a second opinion if I wished to do so. I acknowledge that there have specifically been no guarantees as to the cosmetic results from the procedure. I am aware that with any procedure there is always the possibility of an unexpected complication. III. POST-PROCEDURAL CARE (WHAT YOU WILL NEED TO DO "AFTER THE BIOPSY" TO OPTIMIZE HEALING)    Keep the area clean and dry. Try NOT to remove the bandage or get it wet for the first 24 hours. Gently clean the area and apply surgical ointment (such as Vaseline petrolatum ointment, which is available "over the counter" and not a prescription) to the biopsy site for up to 2 weeks straight. This acts to protect the wound from the outside world. Sutures are not usually placed in this procedure. If any sutures were placed, return for suture removal as instructed (generally 1 week for the face, 2 weeks for the body). Take Acetaminophen (Tylenol) for discomfort, if no contraindications. Ibuprofen or aspirin could make bleeding worse. Call our office immediately for signs of infection: fever, chills, increased redness, warmth, tenderness, discomfort/pain, or pus or foul smell coming from the wound.     WHAT TO DO IF THERE IS ANY BLEEDING? If a small amount of bleeding is noticed, place a clean cloth over the area and apply firm pressure for ten minutes. Check the wound after 10 minutes of direct pressure. If bleeding persists, try one more time for an additional 10 minutes of direct pressure on the area. If the bleeding becomes heavier or does not stop after the second attempt, or if you have any other questions about this procedure, then please call your [facility] by calling [phone] (SKIN). I hereby acknowledge that I have reviewed and verified the site with my Dermatologist and have requested and authorized my Dermatologist to proceed with the procedure.       Scribe Attestation      I,:  Chapis Cook am acting as a scribe while in the presence of the attending physician.:       I,:  Antony Yanez MD personally performed the services described in this documentation    as scribed in my presence.:

## 2023-10-18 NOTE — PATIENT INSTRUCTIONS
HISTORY OF MELANOMA IN SITU    Assessment and Plan:  Based on a thorough discussion of this condition and the management approach to it (including a comprehensive discussion of the known risks, side effects and potential benefits of treatment), the patient (family) agrees to implement the following specific plan:  Monitor for changes or recurrence  Routine skin checks every 6 months    What happens at follow-up? The main purpose of follow-up is to detect recurrences early (metastatic melanoma), but it also offers an opportunity to diagnose a new primary melanoma at the first possible opportunity. A second invasive melanoma occurs in 5-10% of melanoma patients and a new melanoma in situ is diagnosed in more than 20% of melanoma patients. Our practice makes the following recommendations for follow-up for patients with invasive melanoma. At-least "monthly" self-skin examinations   Routine skin checks by a board certified dermatologist  Follow-up intervals are "every 3 months" within 2 years of a new melanoma diagnosis; "every 6 months" between 2-4 years of a new melanoma diagnosis; and "annually" after 4 years of a new melanoma diagnosis  Individual patient's needs should be considered before an appropriate follow-up is offered  Provide education and support to help the patient adjust to their illness    Follow-up appointments should include:  A check of the scar where the primary melanoma was removed  Checking the regional lymph nodes  A general skin examination  A full physical examination at least annually by your primary care physician    In those with more advanced primary disease, follow-up may include:  Blood tests  Imaging: ultrasound, X-ray, CT, MRI and PET scan. Most tests are not worthwhile for patients with stage 1 or 2 melanoma unless there are signs or symptoms of disease recurrence or metastasis.  No tests are necessary for healthy patients who have remained well for five years or longer after removal of their melanoma. What is the outlook for patients with melanoma? Melanoma in situ is cured by excision because it has no potential to spread around the body. The risk of spread and ultimate death from invasive melanoma depends on several factors, but the main one is the Breslow thickness of the melanoma at the time it was surgically removed. Metastases are rare for melanomas < 0.75 mm and the risk for tumours 0.75-1 mm thick is about 5%. The risk steadily increases with thickness so that melanomas > 4 mm have a risk of metastasis of about 40%. Melanoma is a potentially serious type of skin cancer, in which there is uncontrolled growth of melanocytes (pigment cells). Melanoma is sometimes called malignant melanoma. Normal melanocytes are found in the basal layer of the epidermis (the outer layer of skin). Melanocytes produce a protein called melanin, which protects skin cells by absorbing ultraviolet (UV) radiation. Melanocytes are found in equal numbers in black and white skin, but melanocytes in black skin produce much more melanin. People with dark brown or black skin are very much less likely to be damaged by UV radiation than those with white skin. 2. HISTORY OF DERMATOMYOSITIS     Assessment and Plan:  Based on a thorough discussion of this condition and the management approach to it (including a comprehensive discussion of the known risks, side effects and potential benefits of treatment), the patient (family) agrees to implement the following specific plan:  Continue to follow up with Rheumatology   Discussed sun precautions     What causes dermatomyositis? Dermatomyositis is a rare acquired muscle disease that is accompanied by a rash. It is one of a group of muscle diseases called inflammatory myopathies. Dermatomyositis may affect people of any race, age or sex, although it is twice as common in women than in men.  The onset of the disease is most common in those aged 49-69 years.  Dermatomyositis is considered one of the connective tissue diseases, like systemic sclerosis and lupus erythematosus. Dermatomyositis is thought to be caused by small vessel damage which in turn affects skin and muscle. Factors that may contribute to its development are listed below. Genetic predisposition  Underlying cancer (more likely in older people)  Autoimmune (immune reaction against self)  Infectious or toxic agents acting as triggers  Certain drugs, which include hydroxyurea, penicillamine, statins, quinidine, and phenylbutazone      What are the symptoms of dermatomyositis? The two main groups of symptoms affect the skin and muscles. In many patients, the first sign of dermatomyositis is the presence of a symptomless, itchy or burning rash. The rash often, but not always, develops before the muscle weakness. Reddish or bluish-purple patches mostly affect sun-exposed areas. A violaceous (purple) rash may also affect cheeks, nose, shoulders, upper chest, elbows, and outer thighs. Purple eyelids, which are described as heliotrope, as they resemble the heliotrope flower, Heliotropium peruvianum, which has small purple petals. A scaly scalp and thinned out hair may occur. Less commonly, there is poikiloderma, in which the skin is atrophic (pale, thin skin), red(dilated blood vessels) and brown (post-inflammatory pigmentation). Purple papules or plaques are found on bony prominences, especially the knuckles (Gottron papules). Ragged cuticles and prominent blood vessels on nail folds are best seen by capillaroscopy or dermoscopy. In severe cases, calcinosis can occur. It presents as hard yellow or white lumps under the skin. These usually appear on fingers or over joints. Sometimes these nodules may poke through the skin and ulcerate. The ulcers may become infected. Muscle weakness may arise at the same time as the dermatomyositis rash, or it may occur weeks, months or years later. Proximal muscles are affected, that is, those closest to the trunk (upper arms, thighs). The first indication of myositis is when the following everyday movements become difficult. Climbing stairs or walking  Rising from a sitting or crouching position  Lifting objects  Raising arms above the shoulders, e.g. combing hair  Difficulty swallowing (dysphagia)  Occasionally the affected muscles ache and become tender to touch. How do we diagnose dermatomyositis? The diagnosis of dermatomyositis is usually confirmed by the following tests. Blood test to detect raised circulating muscle enzymes: creatine kinase (CK) and sometimes aldolase, aspartate aminotransferase (AST) and lactic dehydrogenase (LDH)  Blood test to detect autoantibodies: non-specific antinuclear antibody (THELMA) is found in most patients, specific Anti-Mi-2 is found in one quarter and Anti-Radha-1 in a few, usually those who have lung disease, and are diagnostic of antisynthetase syndrome. Skin biopsy of the rash: the microscopic appearance of an interface dermatitis is similar to systemic (acute) lupus erythematosus  Biopsy of an affected muscle  Electromyography (EMG) testing  Magnetic resonance imaging (MRI) scan of muscles  In those over 60, full body examination and testing are recommended to look for underlying cancer. Cancer screenings are recommended for up to five years after the initial onset of dermatomyositis in these patients. How do we treat dermatomyositis? The primary aim of treatment is to control the skin disease and muscle disease. An oral corticosteroid such as prednisone in moderate to high dose is the mainstay of medical therapy and is given to slow down the rate of disease progression. Immunosuppressive or cytotoxic drugs may also be used including methotrexate, azathioprine, cyclophosphamide, ciclosporin, mycophenolate, high dose intravenous immunoglobulin and experimentally, biologics such as rituximab.  Other important measures in the management of dermatomyositis include:  Diltiazem, a calcium channel blocker usually prescribed for high blood pressure, may reduce calcinosis  Colchicine has also been reported to reduce calcinosis  Hydroxychloroquine may reduce the photosensitive rash  Avoid excessive sun exposure and use sun protection measures, including sunscreens, to minimize the harmful effects of the sun on already damaged and photosensitive skin  Bedrest for those with severe inflammation of muscles  Physical therapy and activity to keep the muscles and joints moving  Avoid eating food before bedtime and raise the bed head for those with difficulty swallowing     Most patients will require treatment throughout their lifetime, but dermatomyositis completely resolves in about one-in-five patients. Patients who have a disease affecting their heart or lungs, or who also have underlying cancer often require more help from a multidisciplinary team.       3. DERMATOFIBROMA    Plan:  Asymptomatic. No treatment is required. Medical Complexity:           4. NEOPLASM OF UNCERTAIN BEHAVIOR OF SKIN    Physical Exam:  (Anatomic Location); (Size and Morphological Description); (Differential Diagnosis):  Left posterior thigh; 0.5 cm dark brown macule; ddx nevus versus malignant melanoma  Pertinent Positives:  Pertinent Negatives: Additional History of Present Condition:  Noted on exam.    Assessment and Plan:  I have discussed with the patient that a sample of skin via a "skin biopsy” would be potentially helpful to further make a specific diagnosis under the microscope. Based on a thorough discussion of this condition and the management approach to it (including a comprehensive discussion of the known risks, side effects and potential benefits of treatment), the patient (family) agrees to implement the following specific plan:    Procedure:  Skin Biopsy.   After a thorough discussion of treatment options and risk/benefits/alternatives (including but not limited to local pain, scarring, dyspigmentation, blistering, possible superinfection, and inability to confirm a diagnosis via histopathology), verbal and written consent were obtained and portion of the rash was biopsied for tissue sample. See below for consent that was obtained from patient and subsequent Procedure Note. PROCEDURE TANGENTIAL (SHAVE) BIOPSY NOTE:    INFORMED CONSENT DISCUSSION AND POST-OPERATIVE INSTRUCTIONS FOR PATIENT    I.  RATIONALE FOR PROCEDURE  I understand that a skin biopsy allows the Dermatologist to test a lesion or rash under the microscope to obtain a diagnosis. It usually involves numbing the area with numbing medication and removing a small piece of skin; sometimes the area will be closed with sutures. In this specific procedure, sutures are not usually needed. If any sutures are placed, then they are usually need to be removed in 2 weeks or less. I understand that my Dermatologist recommends that a skin "shave" biopsy be performed today. A local anesthetic, similar to the kind that a dentist uses when filling a cavity, will be injected with a very small needle into the skin area to be sampled. The injected skin and tissue underneath "will go to sleep” and become numb so no pain should be felt afterwards. An instrument shaped like a tiny "razor blade" (shave biopsy instrument) will be used to cut a small piece of tissue and skin from the area so that a sample of tissue can be taken and examined more closely under the microscope. A slight amount of bleeding will occur, but it will be stopped with direct pressure and a pressure bandage and any other appropriate methods. I understands that a scar will form where the wound was created. Surgical ointment will be applied to help protect the wound. Sutures are not usually needed.     II.  RISKS AND POTENTIAL COMPLICATIONS   I understand the risks and potential complications of a skin biopsy include but are not limited to the following:  Bleeding  Infection  Pain  Scar/keloid  Skin discoloration  Incomplete Removal  Recurrence  Nerve Damage/Numbness/Loss of Function  Allergic Reaction to Anesthesia  Biopsies are diagnostic procedures and based on findings additional treatment or evaluation may be required  Loss or destruction of specimen resulting in no additional findings    My Dermatologist has explained to me the nature of the condition, the nature of the procedure, and the benefits to be reasonably expected compared with alternative approaches. My Dermatologist has discussed the likelihood of major risks or complications of this procedure including the specific risks listed above, such as bleeding, infection, and scarring/keloid. I understand that a scar is expected after this procedure. I understand that my physician cannot predict if the scar will form a "keloid," which extends beyond the borders of the wound that is created. A keloid is a thick, painful, and bumpy scar. A keloid can be difficult to treat, as it does not always respond well to therapy, which includes injecting cortisone directly into the keloid every few weeks. While this usually reduces the pain and size of the scar, it does not eliminate it. I understand that photographs may be taken before and after the procedure. These will be maintained as part of the medical providers confidential records and may not be made available to me. I further authorize the medical provider to use the photographs for teaching purposes or to illustrate scientific papers, books, or lectures if in his/her judgment, medical research, education, or science may benefit from its use. I have had an opportunity to fully inquire about the risks and benefits of this procedure and its alternatives. I have been given ample time and opportunity to ask questions and to seek a second opinion if I wished to do so.   I acknowledge that there have specifically been no guarantees as to the cosmetic results from the procedure. I am aware that with any procedure there is always the possibility of an unexpected complication. III. POST-PROCEDURAL CARE (WHAT YOU WILL NEED TO DO "AFTER THE BIOPSY" TO OPTIMIZE HEALING)    Keep the area clean and dry. Try NOT to remove the bandage or get it wet for the first 24 hours. Gently clean the area and apply surgical ointment (such as Vaseline petrolatum ointment, which is available "over the counter" and not a prescription) to the biopsy site for up to 2 weeks straight. This acts to protect the wound from the outside world. Sutures are not usually placed in this procedure. If any sutures were placed, return for suture removal as instructed (generally 1 week for the face, 2 weeks for the body). Take Acetaminophen (Tylenol) for discomfort, if no contraindications. Ibuprofen or aspirin could make bleeding worse. Call our office immediately for signs of infection: fever, chills, increased redness, warmth, tenderness, discomfort/pain, or pus or foul smell coming from the wound. WHAT TO DO IF THERE IS ANY BLEEDING? If a small amount of bleeding is noticed, place a clean cloth over the area and apply firm pressure for ten minutes. Check the wound after 10 minutes of direct pressure. If bleeding persists, try one more time for an additional 10 minutes of direct pressure on the area. If the bleeding becomes heavier or does not stop after the second attempt, or if you have any other questions about this procedure, then please call your [facility] by calling [phone] (SKIN). I hereby acknowledge that I have reviewed and verified the site with my Dermatologist and have requested and authorized my Dermatologist to proceed with the procedure.

## 2023-10-23 ENCOUNTER — APPOINTMENT (OUTPATIENT)
Dept: LAB | Age: 63
End: 2023-10-23
Payer: COMMERCIAL

## 2023-10-23 DIAGNOSIS — Z15.09 BRCA2 GENE MUTATION POSITIVE: ICD-10-CM

## 2023-10-23 DIAGNOSIS — Z15.01 BRCA2 GENE MUTATION POSITIVE: ICD-10-CM

## 2023-10-23 LAB — CANCER AG125 SERPL-ACNC: 9.5 U/ML (ref 0–35)

## 2023-10-23 PROCEDURE — 88305 TISSUE EXAM BY PATHOLOGIST: CPT | Performed by: STUDENT IN AN ORGANIZED HEALTH CARE EDUCATION/TRAINING PROGRAM

## 2023-10-23 PROCEDURE — 88341 IMHCHEM/IMCYTCHM EA ADD ANTB: CPT | Performed by: STUDENT IN AN ORGANIZED HEALTH CARE EDUCATION/TRAINING PROGRAM

## 2023-10-23 PROCEDURE — 88342 IMHCHEM/IMCYTCHM 1ST ANTB: CPT | Performed by: STUDENT IN AN ORGANIZED HEALTH CARE EDUCATION/TRAINING PROGRAM

## 2023-10-23 PROCEDURE — 86304 IMMUNOASSAY TUMOR CA 125: CPT

## 2023-10-23 PROCEDURE — 36415 COLL VENOUS BLD VENIPUNCTURE: CPT

## 2023-10-31 ENCOUNTER — TELEPHONE (OUTPATIENT)
Dept: HEMATOLOGY ONCOLOGY | Facility: CLINIC | Age: 63
End: 2023-10-31

## 2023-10-31 NOTE — TELEPHONE ENCOUNTER
Appointment Change  Cancel, Reschedule, Change to Virtual      Who are you speaking with? Patient   If it is not the patient, is the caller listed on the communication consent form? N/A   Which provider is the appointment scheduled with? Carlo Quinones PA-C   When was the original appointment scheduled? Please list date and time 11/10/23 1130   At which location is the appointment scheduled to take place? [facility]   Was the appointment rescheduled? Was the appointment changed from an in person visit to a virtual visit? If so, please list the details of the change. 12/8/23 1130   What is the reason for the appointment change? Appt conflict       Was [facility] transport scheduled? N/A   Does [facility] transport need to be scheduled for the new visit (if applicable) N/A   Does the patient need an infusion appointment rescheduled? N/A   Does the patient have an upcoming infusion appointment scheduled? If so, when? No   Is the patient undergoing chemotherapy? N/A   For appointments cancelled with less than 24 hours:  Was the no-show policy reviewed?  N/A

## 2023-11-14 ENCOUNTER — HOSPITAL ENCOUNTER (OUTPATIENT)
Dept: RADIOLOGY | Facility: HOSPITAL | Age: 63
Discharge: HOME | End: 2023-11-14
Attending: ORTHOPAEDIC SURGERY
Payer: COMMERCIAL

## 2023-11-14 ENCOUNTER — OFFICE VISIT (OUTPATIENT)
Dept: OBGYN CLINIC | Facility: HOSPITAL | Age: 63
End: 2023-11-14

## 2023-11-14 VITALS
BODY MASS INDEX: 25.21 KG/M2 | SYSTOLIC BLOOD PRESSURE: 117 MMHG | HEART RATE: 48 BPM | HEIGHT: 65 IN | DIASTOLIC BLOOD PRESSURE: 75 MMHG

## 2023-11-14 DIAGNOSIS — S52.531D CLOSED COLLES' FRACTURE OF RIGHT RADIUS WITH ROUTINE HEALING, SUBSEQUENT ENCOUNTER: Primary | ICD-10-CM

## 2023-11-14 DIAGNOSIS — S52.531D CLOSED COLLES' FRACTURE OF RIGHT RADIUS WITH ROUTINE HEALING, SUBSEQUENT ENCOUNTER: ICD-10-CM

## 2023-11-14 PROCEDURE — 73110 X-RAY EXAM OF WRIST: CPT

## 2023-11-14 PROCEDURE — 99024 POSTOP FOLLOW-UP VISIT: CPT | Performed by: ORTHOPAEDIC SURGERY

## 2023-11-14 NOTE — PROGRESS NOTES
Assessment:  1. Closed Colles' fracture of right radius with routine healing, subsequent encounter  XR wrist 3+ vw right            Surgery: Open Reduction W/ Internal Fixation (orif) Radius / Ulna (wrist) - Right  Date of Surgery: 8/30/2023        Discussion and Plan:   Patient is progressing nicely on exam today. Progress activities as tolerated  If ulnar sided wrist pain continues we can consider referral to hand therapy    Follow Up:  Return if symptoms worsen or fail to improve. CHIEF COMPLAINT:  Chief Complaint   Patient presents with    Right Wrist - Post-op         SUBJECTIVE:  Elton Snow is a 63y.o. year old female who presents for follow up after Open Reduction W/ Internal Fixation (orif) Radius / Ulna (wrist) - Right. Today patient reports overall she is doing well. She reports improvement with ROM. She has ulnar sided pain opening doorknobs. PHYSICAL EXAMINATION:  General: well developed and well nourished, alert, oriented times 3, and appears comfortable  Psychiatric: Normal    MUSCULOSKELETAL EXAMINATION:  Incision: Clean, dry, intact  Surgery Site: normal, no evidence of infection   Range of Motion: As expected and full composite fist possible  Neurovascular status: Neuro intact, good cap refill  Activity Restrictions: No restrictions       I have personally reviewed pertinent films in PACS and my interpretation is as follows. Right wrist x-rays demonstrates healed fracture, hardware in acceptable alignment and position.      Scribe Attestation      I,:  Hima Talavera am acting as a scribe while in the presence of the attending physician.:       I,:  Kathy Alex MD personally performed the services described in this documentation    as scribed in my presence.:

## 2023-11-21 ENCOUNTER — OFFICE VISIT (OUTPATIENT)
Dept: RHEUMATOLOGY | Facility: CLINIC | Age: 63
End: 2023-11-21
Payer: COMMERCIAL

## 2023-11-21 ENCOUNTER — APPOINTMENT (OUTPATIENT)
Dept: LAB | Facility: CLINIC | Age: 63
End: 2023-11-21
Payer: COMMERCIAL

## 2023-11-21 VITALS
SYSTOLIC BLOOD PRESSURE: 128 MMHG | WEIGHT: 148 LBS | HEIGHT: 65 IN | DIASTOLIC BLOOD PRESSURE: 78 MMHG | BODY MASS INDEX: 24.66 KG/M2

## 2023-11-21 DIAGNOSIS — Z79.899 HIGH RISK MEDICATION USE: ICD-10-CM

## 2023-11-21 DIAGNOSIS — S52.531A CLOSED COLLES' FRACTURE OF RIGHT RADIUS, INITIAL ENCOUNTER: ICD-10-CM

## 2023-11-21 DIAGNOSIS — R76.8 SS-A ANTIBODY POSITIVE: ICD-10-CM

## 2023-11-21 DIAGNOSIS — M85.89 OSTEOPENIA OF MULTIPLE SITES: ICD-10-CM

## 2023-11-21 DIAGNOSIS — M33.90 DERMATOMYOSITIS (HCC): Primary | ICD-10-CM

## 2023-11-21 LAB
25(OH)D3 SERPL-MCNC: 59.5 NG/ML (ref 30–100)
ALBUMIN SERPL BCP-MCNC: 4.7 G/DL (ref 3.5–5)
ALP SERPL-CCNC: 68 U/L (ref 34–104)
ALT SERPL W P-5'-P-CCNC: 12 U/L (ref 7–52)
ANION GAP SERPL CALCULATED.3IONS-SCNC: 7 MMOL/L
AST SERPL W P-5'-P-CCNC: 16 U/L (ref 13–39)
BASOPHILS # BLD AUTO: 0.04 THOUSANDS/ÂΜL (ref 0–0.1)
BASOPHILS NFR BLD AUTO: 1 % (ref 0–1)
BILIRUB SERPL-MCNC: 0.55 MG/DL (ref 0.2–1)
BUN SERPL-MCNC: 16 MG/DL (ref 5–25)
CALCIUM SERPL-MCNC: 9.6 MG/DL (ref 8.4–10.2)
CHLORIDE SERPL-SCNC: 102 MMOL/L (ref 96–108)
CK SERPL-CCNC: 40 U/L (ref 26–192)
CO2 SERPL-SCNC: 31 MMOL/L (ref 21–32)
CREAT SERPL-MCNC: 0.64 MG/DL (ref 0.6–1.3)
CRP SERPL QL: 1 MG/L
EOSINOPHIL # BLD AUTO: 0.05 THOUSAND/ÂΜL (ref 0–0.61)
EOSINOPHIL NFR BLD AUTO: 1 % (ref 0–6)
ERYTHROCYTE [DISTWIDTH] IN BLOOD BY AUTOMATED COUNT: 13.4 % (ref 11.6–15.1)
ERYTHROCYTE [SEDIMENTATION RATE] IN BLOOD: 10 MM/HOUR (ref 0–29)
GFR SERPL CREATININE-BSD FRML MDRD: 97 ML/MIN/1.73SQ M
GLUCOSE SERPL-MCNC: 90 MG/DL (ref 65–140)
HCT VFR BLD AUTO: 43.2 % (ref 34.8–46.1)
HGB BLD-MCNC: 13.9 G/DL (ref 11.5–15.4)
IMM GRANULOCYTES # BLD AUTO: 0.01 THOUSAND/UL (ref 0–0.2)
IMM GRANULOCYTES NFR BLD AUTO: 0 % (ref 0–2)
LYMPHOCYTES # BLD AUTO: 1.19 THOUSANDS/ÂΜL (ref 0.6–4.47)
LYMPHOCYTES NFR BLD AUTO: 20 % (ref 14–44)
MCH RBC QN AUTO: 31 PG (ref 26.8–34.3)
MCHC RBC AUTO-ENTMCNC: 32.2 G/DL (ref 31.4–37.4)
MCV RBC AUTO: 96 FL (ref 82–98)
MONOCYTES # BLD AUTO: 0.41 THOUSAND/ÂΜL (ref 0.17–1.22)
MONOCYTES NFR BLD AUTO: 7 % (ref 4–12)
NEUTROPHILS # BLD AUTO: 4.17 THOUSANDS/ÂΜL (ref 1.85–7.62)
NEUTS SEG NFR BLD AUTO: 71 % (ref 43–75)
NRBC BLD AUTO-RTO: 0 /100 WBCS
PLATELET # BLD AUTO: 242 THOUSANDS/UL (ref 149–390)
PMV BLD AUTO: 9.4 FL (ref 8.9–12.7)
POTASSIUM SERPL-SCNC: 4 MMOL/L (ref 3.5–5.3)
PROT SERPL-MCNC: 7.3 G/DL (ref 6.4–8.4)
RBC # BLD AUTO: 4.49 MILLION/UL (ref 3.81–5.12)
SODIUM SERPL-SCNC: 140 MMOL/L (ref 135–147)
WBC # BLD AUTO: 5.87 THOUSAND/UL (ref 4.31–10.16)

## 2023-11-21 PROCEDURE — 82306 VITAMIN D 25 HYDROXY: CPT | Performed by: PHYSICIAN ASSISTANT

## 2023-11-21 PROCEDURE — 85025 COMPLETE CBC W/AUTO DIFF WBC: CPT | Performed by: PHYSICIAN ASSISTANT

## 2023-11-21 PROCEDURE — 36415 COLL VENOUS BLD VENIPUNCTURE: CPT | Performed by: PHYSICIAN ASSISTANT

## 2023-11-21 PROCEDURE — 99215 OFFICE O/P EST HI 40 MIN: CPT | Performed by: PHYSICIAN ASSISTANT

## 2023-11-21 PROCEDURE — 85652 RBC SED RATE AUTOMATED: CPT | Performed by: PHYSICIAN ASSISTANT

## 2023-11-21 PROCEDURE — 82550 ASSAY OF CK (CPK): CPT | Performed by: PHYSICIAN ASSISTANT

## 2023-11-21 PROCEDURE — 80053 COMPREHEN METABOLIC PANEL: CPT | Performed by: PHYSICIAN ASSISTANT

## 2023-11-21 PROCEDURE — 86140 C-REACTIVE PROTEIN: CPT | Performed by: PHYSICIAN ASSISTANT

## 2023-11-21 RX ORDER — FOLIC ACID 1 MG/1
1 TABLET ORAL DAILY
Qty: 90 TABLET | Refills: 3 | Status: SHIPPED | OUTPATIENT
Start: 2023-11-21 | End: 2024-02-19

## 2023-11-21 NOTE — PROGRESS NOTES
Assessment and Plan:   Ms. Tamera Kearney is a 67yo female who presents for follow up of history of dermatomyositis with positive skin biopsy. Fortunately, she has never had any signs or symptoms of muscle involvement and has never had a muscle biopsy. Workup revealed +SSA antibody which is most likely from the dermatomyositis. Lip biopsy was done by ENT 3/3/23 which was negative for Sjogren's syndrome. She has been experiencing intermittent rashes on the mid back as well as on the chest area, sometimes associated with a burning pain. We discussed that she will update high risk medication monitoring labs now to include inflammation markers and will most likely increase MTX to 15 mg weekly. Continue folic acid 1 mg daily and Q12 week high risk medication monitoring labs. We discussed that if needed, can consider the addition of gabapentin for DM pain, but will hold off for now since it is not bothersome. Since last visit, the patient did experience a right radial closed Colles' fracture. She underwent ORIF 8/30/2023. We discussed that she should obtain updated DEXA scan since the last DEXA in May 2021 revealed osteopenia and she has had a fracture since that time. Continue calcium and vitamin D and will also update vitamin D 25-hydroxy with labs after today's visit to determine if she should increase supplementation or not. Follow-up in 6 months, or sooner as needed    Plan:  Diagnoses and all orders for this visit:    Dermatomyositis ([address])  -     CBC and differential; Standing  -     C-reactive protein; Standing  -     Comprehensive metabolic panel; Standing  -     Sedimentation rate, automated; Standing  -     CK  -     CBC and differential  -     C-reactive protein  -     Comprehensive metabolic panel  -     Sedimentation rate, automated  -     folic acid (FOLVITE) 1 mg tablet;  Take 1 tablet (1 mg total) by mouth daily    High risk medication use  -     CBC and differential; Standing  - Comprehensive metabolic panel; Standing  -     CBC and differential  -     Comprehensive metabolic panel  -     folic acid (FOLVITE) 1 mg tablet; Take 1 tablet (1 mg total) by mouth daily    SS-A antibody positive    Osteopenia of multiple sites  -     DXA bone density spine hip and pelvis  -     DXA forearm wrist and heel  -     Vitamin D 25 hydroxy    Closed Colles' fracture of right radius, initial encounter  -     DXA bone density spine hip and pelvis  -     DXA forearm wrist and heel  -     Vitamin D 25 hydroxy        I have personally reviewed prior notes, recent laboratory results, and pertinent films in PACS. Activities as tolerated. Exercise: try to maintain a low impact exercise regimen as much as possible. Walk for 30 minutes a day for at least 3 days a week. Continue other medications as prescribed by PCP and other specialists. RTC in 6 months    Rheumatic Disease Summary:  1. H/o Dermatomyositis (?amyopathic, +SSA, ? Sjogren’s)  -Rheum eval 1/26/22 for h/o dermatomyositis, previously saw Dr. Richar Juan, records reviewed   -Onset of burning erythematous anterior chest rash around 2011, ultimately had skin biopsy in 2015 which was apparently suggestive of DM vs mucinous eruption; rheum labs at that time negative for THELMA with panel, myositis panel, normal CK 52, also had negative EMG at that time, never had muscle biopsy or sx of muscle involvement   -She was on HCQ for 3 years before MTX, but came off because of a dark staining on the skin from her neck down her shoulders. This resolved after she stopped.   She then got improvement with methotrexate and has been on it ever since then.   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  -Visit 11/21/2023: Burning rashes on chest and mid back. Update labs and likely increase MTX to 15 mg weekly. 2. Osteopenia   -DEXA 5/11/21: T -2.1 L FN, -1.9 lumbar, stable at lumbar and decreased at L hip, FRAX 1/9% for hip/major OP fracture   -DEXA due 5/2023  3. Osteoarthritis  -XR right foot 8/15/2023: Moderate to severe osteoarthritic degenerative changes of the first tarsometatarsal joint with overlying soft tissue swelling. 4. Comorbidities: BRCA mutation, h/o B/L breast cancer 2008 s/p mastectomies/chemo/femara, s/p ITZEL/BSO, anxiety, h/o benign lung nodule resection       HPI  Ms. Justo Townsend is a 65yo female who presents for follow up of history of dermatomyositis with positive skin biopsy. The patient states that she saw podiatry and the nodular areas on the great toes that she thought were her bunions were actually due to arthritis. She is planning for follow-up and they will discuss conservative versus surgical options. She states that those areas are not bothersome to her unless she is on her toes during yoga. Occasional pain in the left hip area when sleeping on that side. She experiences rashes on the chest and mid back area that come and go. They are not painful, but sometimes she experiences a burning sensation in those areas. They do not impact her daily activities or keep her from sleeping. The following portions of the patient's history were reviewed and updated as appropriate: allergies, current medications, past family history, past medical history, past social history, past surgical history and problem list.      Review of Systems  Constitutional: Negative for weight change, fevers, chills, night sweats, fatigue. ENT/Mouth: Negative for hearing changes, ear pain, nasal congestion, sinus pain, hoarseness, sore throat, rhinorrhea, swallowing difficulty.    Eyes: Negative for pain, redness, discharge, vision changes. Cardiovascular: Negative for chest pain, SOB, palpitations. Respiratory: Negative for cough, sputum, wheezing, dyspnea. Gastrointestinal: Negative for nausea, vomiting, diarrhea, constipation, pain, heartburn. Genitourinary: Negative for dysuria, urinary frequency, hematuria. Musculoskeletal: As per HPI. Skin: Negative for skin rash, color changes. Neuro: Negative for weakness, numbness, tingling, loss of consciousness. Psych: Negative for anxiety, depression. Heme/Lymph: Negative for easy bruising, bleeding, lymphadenopathy. Past Medical History:   Diagnosis Date    Anesthesia complication     HR drops    Benign neoplasm of lung     Right lung lobe benign. Removed 2010. BRCA2 positive     Breast cancer ([address])     Breathing difficulty 2011    no breathing difficulties but patient reported drop in BP during anesthesia for appendectomy and was advised to have cardiologist consult    Cancer [facility]) 2008    Depression     Dermatomyositis ([address])     Lung nodule     Melanoma ([address]) 2014    mid chest (insitu)    Wears glasses        Past Surgical History:   Procedure Laterality Date    APPENDECTOMY      2011    BREAST SURGERY      Bilateral Mastectomy    COLONOSCOPY N/A 01/26/2016    Procedure: COLONOSCOPY;  Surgeon: Capo Lugo MD;  Location: [facility]; Service:     HYSTERECTOMY      2007    KNEE SURGERY  2007    LUNG BIOPSY      MASS EXCISION Left 07/18/2019    Procedure: MID BACK MASS EXCISION BIOPSY;  Surgeon: Kelvin Chong MD;  Location: [facility];  Service: General    MASTECTOMY      reconstruction 2008    OOPHORECTOMY      OH OPEN TREATMENT RADIAL SHAFT FRACTURE Right 8/30/2023    Procedure: OPEN REDUCTION W/ INTERNAL FIXATION (ORIF) RADIUS / ULNA (WRIST);   Surgeon: Gabriel Olvera MD;  Location: [facility];  Service: Orthopedics    SKIN BIOPSY         Social History     Socioeconomic History    Marital status: /Civil Union Spouse name: Not on file    Number of children: Not on file    Years of education: Not on file    Highest education level: Not on file   Occupational History    Not on file   Tobacco Use    Smoking status: Never    Smokeless tobacco: Never   Vaping Use    Vaping Use: Never used   Substance and Sexual Activity    Alcohol use: No    Drug use: No    Sexual activity: Yes     Partners: Male     Birth control/protection: Post-menopausal, None     Comment: complete hysterectomy   Other Topics Concern    Not on file   Social History Narrative    Not on file     Social Determinants of Health     Financial Resource Strain: Not on file   Food Insecurity: Not on file   Transportation Needs: Not on file   Physical Activity: Not on file   Stress: Not on file   Social Connections: Not on file   Intimate Partner Violence: Not on file   Housing Stability: Not on file       Family History   Problem Relation Age of Onset    Cancer Mother         Ovarian    Ovarian cancer Mother     No Known Problems Father     BRCA2 Positive Sister     Heart disease Sister         post partum cardiomyopathy, pacer    No Known Problems Brother     Prostate cancer Maternal Uncle     Prostate cancer Maternal Uncle     BRCA2 Negative Paternal Aunt     Breast cancer Paternal Aunt     Cancer Maternal Grandmother 80        skin    Skin cancer Maternal Grandmother     No Known Problems Paternal Grandmother     No Known Problems Paternal Grandfather     BRCA2 Positive Daughter        No Known Allergies      Current Outpatient Medications:     Calcium-Magnesium-Vitamin D (CALCIUM 500 PO), Take 1,000 mg by mouth daily. , Disp: , Rfl:     cholecalciferol (VITAMIN D3) 1,000 units tablet, Take 1,000 Units by mouth daily, Disp: , Rfl:     folic acid (FOLVITE) 1 mg tablet, Take 1 tablet (1 mg total) by mouth daily, Disp: 90 tablet, Rfl: 3    Glycerin-Hypromellose- (DRY EYE RELIEF DROPS OP), Apply to eye, Disp: , Rfl:     multivitamin (THERAGRAN) TABS, Take 1 tablet by mouth daily. , Disp: , Rfl:     acetaminophen (TYLENOL) 325 mg tablet, Take 2 tablets (650 mg total) by mouth every 6 (six) hours as needed for mild pain, Disp: 60 tablet, Rfl: 0    Cholecalciferol (VITAMIN D) 2000 UNITS CAPS, Take 2,000 Units/day by mouth (Patient not taking: Reported on 10/18/2023), Disp: , Rfl:     clobetasol (TEMOVATE) 0.05 % cream, as needed  , Disp: , Rfl:     ibuprofen (MOTRIN) 400 mg tablet, Take 1 tablet (400 mg total) by mouth every 6 (six) hours as needed for mild pain, Disp: 50 tablet, Rfl: 0    methotrexate 2.5 mg tablet, Take 4 tablets (10 mg total) by mouth once a week, Disp: 48 tablet, Rfl: 3      Objective:    Vitals:    11/21/23 1253   BP: 128/78   Weight: 67.1 kg (148 lb)   Height: 5' 5" (1.651 m)       Physical Exam  General: Well appearing, well nourished, in no acute distress. Oriented x 3, normal mood and affect. Ambulating without difficulty. Skin: + Mild erythema of anterior chest area. Very faint redness of the mid back area  Hair: Normal texture and distribution. Nails: Normal color, no deformities. HEENT:  Head: Normocephalic, atraumatic. Eyes: Conjunctiva clear, sclera non-icteric, EOM intact. Nose: No external lesions, mucosa non-inflamed. Mouth: No Palatal patch appreciated on hard palate. Neck: Supple   Musculoskeletal:   No asymmetry or deformities noted of bilateral upper and lower extremities. No tenderness to palpation or swelling of joints bilaterally to include: shoulder, elbow, wrist, MCP I-V, PIP I-V, knee, ankle  + Degenerative change of first MTP of right foot. Nontender, not swollen, not hot, not red  Neurologic: Alert and oriented. No focal neurological deficits appreciated. Psychiatric: Normal mood and affect.        Ren Mckeon PA-C  Rheumatology

## 2023-11-22 DIAGNOSIS — M33.90 DERMATOMYOSITIS (HCC): ICD-10-CM

## 2023-11-22 DIAGNOSIS — Z79.899 HIGH RISK MEDICATION USE: ICD-10-CM

## 2023-12-08 ENCOUNTER — OFFICE VISIT (OUTPATIENT)
Dept: GYNECOLOGIC ONCOLOGY | Facility: CLINIC | Age: 63
End: 2023-12-08
Payer: COMMERCIAL

## 2023-12-08 VITALS
BODY MASS INDEX: 25.24 KG/M2 | HEIGHT: 65 IN | OXYGEN SATURATION: 98 % | WEIGHT: 151.5 LBS | HEART RATE: 58 BPM | DIASTOLIC BLOOD PRESSURE: 70 MMHG | SYSTOLIC BLOOD PRESSURE: 94 MMHG

## 2023-12-08 DIAGNOSIS — C50.911 BILATERAL MALIGNANT NEOPLASM OF BREAST IN FEMALE, ESTROGEN RECEPTOR POSITIVE, UNSPECIFIED SITE OF BREAST (HCC): ICD-10-CM

## 2023-12-08 DIAGNOSIS — Z15.09 BRCA2 GENE MUTATION POSITIVE: Primary | ICD-10-CM

## 2023-12-08 DIAGNOSIS — C50.912 BILATERAL MALIGNANT NEOPLASM OF BREAST IN FEMALE, ESTROGEN RECEPTOR POSITIVE, UNSPECIFIED SITE OF BREAST (HCC): ICD-10-CM

## 2023-12-08 DIAGNOSIS — Z15.01 BRCA2 GENE MUTATION POSITIVE: Primary | ICD-10-CM

## 2023-12-08 DIAGNOSIS — Z17.0 BILATERAL MALIGNANT NEOPLASM OF BREAST IN FEMALE, ESTROGEN RECEPTOR POSITIVE, UNSPECIFIED SITE OF BREAST (HCC): ICD-10-CM

## 2023-12-08 PROCEDURE — 99213 OFFICE O/P EST LOW 20 MIN: CPT | Performed by: PHYSICIAN ASSISTANT

## 2023-12-08 NOTE — ASSESSMENT & PLAN NOTE
63-year-old with pathogenic BRCA2 mutation with personal history of b/l breast cancer and melanoma in situ. She is s/p prophylactic TLH, BSO. Her clinical exam is without evidence of peritoneal cancer.  normal.      Return to the office in 1 year for continued BRCA surveillance.  to be monitored every 6 months.

## 2023-12-08 NOTE — PROGRESS NOTES
Assessment/Plan:    Problem List Items Addressed This Visit          Other    Bilateral malignant neoplasm of breast in female, estrogen receptor positive ([address])     Continue f/u with med-onc. BRCA2 gene mutation positive - Primary     82-year-old with pathogenic BRCA2 mutation with personal history of b/l breast cancer and melanoma in situ. She is s/p prophylactic TLH, BSO. Her clinical exam is without evidence of peritoneal cancer.  normal.      Return to the office in 1 year for continued BRCA surveillance.  to be monitored every 6 months. Relevant Orders             CHIEF COMPLAINT:   BRCA surveillance    Problem:  1. BRCA 2 mutation  2. Bilateral breast cancer, stage IIA on left and stage IA on right, ER/OK positive, HER2 negative  3. Melanoma in situ of the chest wall   4. Dermatomyositis        Previous therapy:  Oncology History   Bilateral malignant neoplasm of breast in female, estrogen receptor positive ([address])    Initial Diagnosis    Bilateral malignant neoplasm of breast in female, estrogen receptor positive [facility])      Surgery    Double mastectomy, reconstructive surgery      Chemotherapy    Chemotherapy with Taxotere and Cytoxan for 4 cycles followed by Femara from 2008 thru 2013.     8/31/2023 -  Cancer Staged    Staging form: Breast, AJCC 8th Edition  - Clinical stage from 8/31/2023: cT1a, cN1(sn), cM0, GX, ER+, OK+, HER2- - Signed by Larry Amanda MD on 8/31/2023  Stage prefix: Initial diagnosis  Method of lymph node assessment: [place] lymph node biopsy  Histologic grading system: 3 grade system             Patient ID: Ba Bucio is a 63 y.o. female  who has no new complaints today. No vaginal bleeding, abdominal/pelvic pain. Normal bowel and bladder function. No interval change in medical history since last visit. Her  from 10/23/23 was reviewed and is normal. Quality of life is good.         The following portions of the patient's history were reviewed and updated as appropriate: allergies, current medications, past medical history, past surgical history, and problem list.    Review of Systems   Constitutional: Negative. HENT: Negative. Eyes: Negative. Respiratory: Negative. Cardiovascular: Negative. Gastrointestinal: Negative. Genitourinary: Negative. Musculoskeletal: Negative. Skin: Negative. Neurological: Negative. Psychiatric/Behavioral: Negative. Current Outpatient Medications   Medication Sig Dispense Refill    Calcium-Magnesium-Vitamin D (CALCIUM 500 PO) Take 1,000 mg by mouth daily. cholecalciferol (VITAMIN D3) 1,000 units tablet Take 1,000 Units by mouth daily      clobetasol (TEMOVATE) 0.05 % cream as needed        folic acid (FOLVITE) 1 mg tablet Take 1 tablet (1 mg total) by mouth daily 90 tablet 3    Glycerin-Hypromellose- (DRY EYE RELIEF DROPS OP) Apply to eye      methotrexate 2.5 mg tablet Take 6 tablets (15 mg total) by mouth once a week 72 tablet 1    multivitamin (THERAGRAN) TABS Take 1 tablet by mouth daily. acetaminophen (TYLENOL) 325 mg tablet Take 2 tablets (650 mg total) by mouth every 6 (six) hours as needed for mild pain 60 tablet 0    Cholecalciferol (VITAMIN D) 2000 UNITS CAPS Take 2,000 Units/day by mouth (Patient not taking: Reported on 10/18/2023)      ibuprofen (MOTRIN) 400 mg tablet Take 1 tablet (400 mg total) by mouth every 6 (six) hours as needed for mild pain 50 tablet 0     No current facility-administered medications for this visit. Objective:    Blood pressure 94/70, pulse 58, height 5' 5" (1.651 m), weight 68.7 kg (151 lb 8 oz), SpO2 98 %. Body mass index is 25.21 kg/m². Body surface area is 1.76 meters squared. Physical Exam  Vitals reviewed. Exam conducted with a chaperone present. Constitutional:       General: She is not in acute distress. Appearance: Normal appearance. She is not ill-appearing. HENT:      Head: Normocephalic and atraumatic. Mouth/Throat:      Mouth: Mucous membranes are moist.   Eyes:      General:         Right eye: No discharge. Left eye: No discharge. Conjunctiva/sclera: Conjunctivae normal.   Pulmonary:      Effort: Pulmonary effort is normal.   Abdominal:      Palpations: Abdomen is soft. There is no mass. Tenderness: There is no abdominal tenderness. Hernia: No hernia is present. Genitourinary:     Comments: The external female genitalia is normal. The bartholin's, uretheral and skenes glands are normal. The urethral meatus is normal (midline with no lesions). Anus without fissure or lesion. Speculum exam reveals a grossly normal vagina. No masses, lesions,discharge or bleeding. No significant cystocele or rectocele noted. Bimanual exam notes a surgical absent cervix, uterus and adnexal structures. No masses or fullness. Bladder is without fullness, mass or tenderness. Musculoskeletal:      Right lower leg: No edema. Left lower leg: No edema. Skin:     General: Skin is warm and dry. Coloration: Skin is not jaundiced. Findings: No rash. Neurological:      General: No focal deficit present. Mental Status: She is alert and oriented to person, place, and time. Cranial Nerves: No cranial nerve deficit. Sensory: No sensory deficit. Motor: No weakness. Gait: Gait normal.   Psychiatric:         Mood and Affect: Mood normal.         Behavior: Behavior normal.         Thought Content:  Thought content normal.         Judgment: Judgment normal.              Trend:  Lab Results   Component Value Date     9.5 10/23/2023     6.3 04/25/2023     8.1 10/10/2022     9.4 04/11/2022     9.2 10/11/2021     9.4 09/08/2021     6.4 04/07/2021     6.6 03/18/2021     8.2 10/05/2020     7.6 09/09/2020     8.2 03/02/2020     6.9 09/03/2019     7.1 03/04/2019     5.9 09/17/2018     6.0 02/12/2018     6.7 09/11/2017     7.2 02/27/2017     7.3 08/31/2016     6.9 03/08/2016     8.1 09/15/2015     8.0 04/20/2015     8.5 09/29/2014     8.2 04/09/2014

## 2023-12-19 ENCOUNTER — OFFICE VISIT (OUTPATIENT)
Dept: SURGICAL ONCOLOGY | Facility: CLINIC | Age: 63
End: 2023-12-19
Payer: COMMERCIAL

## 2023-12-19 VITALS
DIASTOLIC BLOOD PRESSURE: 70 MMHG | OXYGEN SATURATION: 98 % | HEIGHT: 65 IN | HEART RATE: 51 BPM | SYSTOLIC BLOOD PRESSURE: 120 MMHG | TEMPERATURE: 97.2 F | BODY MASS INDEX: 25.21 KG/M2

## 2023-12-19 DIAGNOSIS — Z15.01 BRCA2 GENE MUTATION POSITIVE: Primary | ICD-10-CM

## 2023-12-19 DIAGNOSIS — Z98.82 H/O BILATERAL BREAST IMPLANTS: ICD-10-CM

## 2023-12-19 DIAGNOSIS — Z85.3 HISTORY OF BREAST CANCER: ICD-10-CM

## 2023-12-19 DIAGNOSIS — Z14.8 CARRIER OF HIGH RISK CANCER GENE MUTATION: ICD-10-CM

## 2023-12-19 DIAGNOSIS — Z15.09 BRCA2 GENE MUTATION POSITIVE: Primary | ICD-10-CM

## 2023-12-19 PROCEDURE — 99213 OFFICE O/P EST LOW 20 MIN: CPT

## 2023-12-19 NOTE — PROGRESS NOTES
Surgical Oncology Follow Up       [facility] [place]  [address]  [place] [address]    Tran Mandujano  1960  [id number]  [facility]COL[facility] [place]  [address]  [place] [address]    No chief complaint on file.      Assessment/Plan:  1. BRCA2 gene mutation positive  - 1 year follow up    2. History of breast cancer    3. Carrier of high risk cancer gene mutation    4. H/O bilateral breast implants  - MRI breast wo con silicone implant eval only bilat; Future      Discussion/Summary: Patient is a 63-year-old female presenting today for a follow up for BRCA 2 mutation. 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 Her breast cancer pathology revealed invasive carcinoma ER/GA positive, HER2 negative. She underwent bilateral mastectomies with flap reconstruction, chemotherapy, 5 years of aromatase inhibitor therapy. I saw her 1 year ago for suspected mass in left axilla however imaging was benign. She is currently follow with gyn onc and dermatology. She notes she had breast reconstruction with saline implants approx 15 years ago. She denies changes, however I informed her implant lifespan is usually around 10 years. I recommended a breast MRI to evaluate the implant integrity. She was agreeable. I will see the patient back in 1 year or sooner should the need arise. She was instructed to call with any questions or concerns prior to this time. All questions were answered today.      History of Present Illness:     Oncology History   Bilateral malignant neoplasm of breast in female, estrogen receptor positive (HCC)    Initial Diagnosis    Bilateral malignant neoplasm of breast in female, estrogen receptor positive (HCC)      Surgery    Double mastectomy, reconstructive surgery      Chemotherapy    Chemotherapy with Taxotere and  Cytoxan for 4 cycles followed by Femara from 2008 thru 2013.     8/31/2023 -  Cancer Staged    Staging form: Breast, AJCC 8th Edition  - Clinical stage from 8/31/2023: cT1a, cN1(sn), cM0, GX, ER+, AZ+, HER2- - Signed by Ernesto Howard MD on 8/31/2023  Stage prefix: Initial diagnosis  Method of lymph node assessment: [place] lymph node biopsy  Histologic grading system: 3 grade system            -Interval History:  Patient is a 63-year-old female presenting today for a follow up for BRCA 2 mutation. 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 Patient denies changes on her breast exam. She denies persistent headaches, bone pain, back pain, shortness of breath, or abdominal pain.      Review of Systems:  Review of Systems   Constitutional:  Negative for activity change, appetite change, fatigue and unexpected weight change.   Respiratory:  Negative for cough and shortness of breath.    Cardiovascular:  Negative for chest pain.   Gastrointestinal:  Negative for abdominal pain, diarrhea, nausea and vomiting.   Endocrine: Negative for heat intolerance.   Musculoskeletal:  Negative for arthralgias, back pain and myalgias.   Skin:  Negative for rash.   Neurological:  Negative for weakness and headaches.   Hematological:  Negative for adenopathy.       Patient Active Problem List   Diagnosis    Bilateral malignant neoplasm of breast in female, estrogen receptor positive (HCC)    Chest wall mass    BRCA2 gene mutation positive    Dermatomyositis (HCC)    Encounter for hepatitis C screening test for low risk patient    Vitamin D deficiency    Abnormal laboratory test    Leukopenia    Lung nodule    History of melanoma    Flu-like symptoms    History of breast cancer    Wellness examination    Cervical adenopathy    Abnormal CT scan    At increased risk of exposure to COVID-19 virus    Ground glass opacity present on imaging of lung    Carrier of high risk cancer  gene mutation    Dyspnea on exertion    Overweight (BMI 25.0-29.9)    Ocular migraine    Annual physical exam    SS-A antibody positive    Pain of toe of right foot    Urinary urgency    Closed fracture of distal end of right radius, unspecified fracture morphology, initial encounter    S/P ORIF (open reduction internal fixation) fracture     Past Medical History:   Diagnosis Date    Anesthesia complication     HR drops    Benign neoplasm of lung     Right lung lobe benign. Removed 2010.    BRCA2 positive     Breast cancer (HCC)     Breathing difficulty 2011    no breathing difficulties but patient reported drop in BP during anesthesia for appendectomy and was advised to have cardiologist consult    Cancer (HCC) 2008    Depression     Dermatomyositis (HCC)     Lung nodule     Melanoma (HCC) 2014    mid chest (insitu)    Wears glasses      Past Surgical History:   Procedure Laterality Date    APPENDECTOMY      2011    BREAST SURGERY      Bilateral Mastectomy    COLONOSCOPY N/A 01/26/2016    Procedure: COLONOSCOPY;  Surgeon: David Gutierrez MD;  [facility]: [facility];  Service:     HYSTERECTOMY      2007    KNEE SURGERY  2007    LUNG BIOPSY      MASS EXCISION Left 07/18/2019    Procedure: MID BACK MASS EXCISION BIOPSY;  Surgeon: David Llanes MD;  [facility];  Service: General    MASTECTOMY      reconstruction 2008    OOPHORECTOMY      OH OPEN TREATMENT RADIAL SHAFT FRACTURE Right 8/30/2023    Procedure: OPEN REDUCTION W/ INTERNAL FIXATION (ORIF) RADIUS / ULNA (WRIST);  Surgeon: Chu Chin MD;  [facility];  Service: Orthopedics    SKIN BIOPSY       Family History   Problem Relation Age of Onset    Cancer Mother         Ovarian    Ovarian cancer Mother     No Known Problems Father     BRCA2 Positive Sister     Heart disease Sister         post partum cardiomyopathy, pacer    No Known Problems Brother     Prostate cancer Maternal Uncle     Prostate cancer Maternal Uncle     BRCA2 Negative  Paternal Aunt     Breast cancer Paternal Aunt     Cancer Maternal Grandmother 90        skin    Skin cancer Maternal Grandmother     No Known Problems Paternal Grandmother     No Known Problems Paternal Grandfather     BRCA2 Positive Daughter      Social History     Socioeconomic History    Marital status: /Civil Union     Spouse name: Not on file    Number of children: Not on file    Years of education: Not on file    Highest education level: Not on file   Occupational History    Not on file   Tobacco Use    Smoking status: Never    Smokeless tobacco: Never   Vaping Use    Vaping status: Never Used   Substance and Sexual Activity    Alcohol use: No    Drug use: No    Sexual activity: Yes     Partners: Male     Birth control/protection: Post-menopausal, None     Comment: complete hysterectomy   Other Topics Concern    Not on file   Social History Narrative    Not on file     Social Determinants of Health     Financial Resource Strain: Not on file   Food Insecurity: Not on file   Transportation Needs: Not on file   Physical Activity: Not on file   Stress: Not on file   Social Connections: Not on file   Intimate Partner Violence: Not on file   Housing Stability: Not on file       Current Outpatient Medications:     acetaminophen (TYLENOL) 325 mg tablet, Take 2 tablets (650 mg total) by mouth every 6 (six) hours as needed for mild pain, Disp: 60 tablet, Rfl: 0    Calcium-Magnesium-Vitamin D (CALCIUM 500 PO), Take 1,000 mg by mouth daily., Disp: , Rfl:     Cholecalciferol (VITAMIN D) 2000 UNITS CAPS, Take 2,000 Units/day by mouth (Patient not taking: Reported on 10/18/2023), Disp: , Rfl:     cholecalciferol (VITAMIN D3) 1,000 units tablet, Take 1,000 Units by mouth daily, Disp: , Rfl:     clobetasol (TEMOVATE) 0.05 % cream, as needed  , Disp: , Rfl:     folic acid (FOLVITE) 1 mg tablet, Take 1 tablet (1 mg total) by mouth daily, Disp: 90 tablet, Rfl: 3    Glycerin-Hypromellose- (DRY EYE RELIEF DROPS OP),  Apply to eye, Disp: , Rfl:     ibuprofen (MOTRIN) 400 mg tablet, Take 1 tablet (400 mg total) by mouth every 6 (six) hours as needed for mild pain, Disp: 50 tablet, Rfl: 0    methotrexate 2.5 mg tablet, Take 6 tablets (15 mg total) by mouth once a week, Disp: 72 tablet, Rfl: 1    multivitamin (THERAGRAN) TABS, Take 1 tablet by mouth daily., Disp: , Rfl:   No Known Allergies  There were no vitals filed for this visit.    Physical Exam  Constitutional:       General: She is not in acute distress.     Appearance: Normal appearance.   Cardiovascular:      Rate and Rhythm: Normal rate and regular rhythm.      Pulses: Normal pulses.      Heart sounds: Normal heart sounds.   Pulmonary:      Effort: Pulmonary effort is normal.      Breath sounds: Normal breath sounds.   Chest:      Chest wall: No mass.   Breasts:     Right: No swelling, bleeding, mass, skin change or tenderness.      Left: No swelling, bleeding, mass, skin change or tenderness.       Abdominal:      General: Abdomen is flat.      Palpations: Abdomen is soft.   Lymphadenopathy:      Upper Body:      Right upper body: No supraclavicular, axillary or pectoral adenopathy.      Left upper body: No supraclavicular, axillary or pectoral adenopathy.   Skin:     General: Skin is warm.   Neurological:      General: No focal deficit present.      Mental Status: She is alert and oriented to person, place, and time.   Psychiatric:         Mood and Affect: Mood normal.         Behavior: Behavior normal.           Results:    Imaging  No results found.    I reviewed the above imaging data.      Advance Care Planning/Advance Directives:  Discussed disease status, cancer treatment plans and/or cancer treatment goals with the patient.

## 2023-12-21 ENCOUNTER — ESTABLISHED COMPREHENSIVE EXAM (OUTPATIENT)
Dept: URBAN - METROPOLITAN AREA CLINIC 6 | Facility: CLINIC | Age: 63
End: 2023-12-21

## 2023-12-21 DIAGNOSIS — D31.31: ICD-10-CM

## 2023-12-21 DIAGNOSIS — H25.813: ICD-10-CM

## 2023-12-21 DIAGNOSIS — H52.13: ICD-10-CM

## 2023-12-21 PROCEDURE — 92014 COMPRE OPH EXAM EST PT 1/>: CPT

## 2023-12-21 PROCEDURE — 92250 FUNDUS PHOTOGRAPHY W/I&R: CPT

## 2023-12-21 ASSESSMENT — KERATOMETRY
OD_K2POWER_DIOPTERS: 44.50
OD_K1POWER_DIOPTERS: 43.25
OD_AXISANGLE2_DEGREES: 83
OS_AXISANGLE_DEGREES: 163
OS_AXISANGLE2_DEGREES: 73
OD_AXISANGLE_DEGREES: 173
OS_K2POWER_DIOPTERS: 45.25
OS_K1POWER_DIOPTERS: 43.75

## 2023-12-21 ASSESSMENT — VISUAL ACUITY
OU_CC: J1
OS_CC: 20/25-1
OD_CC: 20/30-1

## 2023-12-21 ASSESSMENT — TONOMETRY
OS_IOP_MMHG: 11
OD_IOP_MMHG: 10

## 2024-01-19 ENCOUNTER — TELEPHONE (OUTPATIENT)
Dept: OTHER | Facility: OTHER | Age: 64
End: 2024-01-19

## 2024-01-19 NOTE — TELEPHONE ENCOUNTER
Patient is calling to in calling to inform office that her insurance denied claim due to wrong procedure code .Correct code that should be used for procedure is 35640. Please f/u

## 2024-01-22 ENCOUNTER — TELEPHONE (OUTPATIENT)
Dept: HEMATOLOGY ONCOLOGY | Facility: CLINIC | Age: 64
End: 2024-01-22

## 2024-01-22 ENCOUNTER — PRE-OP CATARACT MEASUREMENTS (OUTPATIENT)
Dept: URBAN - METROPOLITAN AREA CLINIC 6 | Facility: CLINIC | Age: 64
End: 2024-01-22

## 2024-01-22 ENCOUNTER — TELEPHONE (OUTPATIENT)
Dept: SURGICAL ONCOLOGY | Facility: CLINIC | Age: 64
End: 2024-01-22

## 2024-01-22 DIAGNOSIS — H52.13: ICD-10-CM

## 2024-01-22 DIAGNOSIS — H25.813: ICD-10-CM

## 2024-01-22 DIAGNOSIS — H35.413: ICD-10-CM

## 2024-01-22 DIAGNOSIS — D31.31: ICD-10-CM

## 2024-01-22 PROCEDURE — 99214 OFFICE O/P EST MOD 30 MIN: CPT

## 2024-01-22 PROCEDURE — 92136 OPHTHALMIC BIOMETRY: CPT

## 2024-01-22 ASSESSMENT — TONOMETRY
OS_IOP_MMHG: 12
OD_IOP_MMHG: 11

## 2024-01-22 ASSESSMENT — VISUAL ACUITY
OD_CC: 20/30-2
OS_GLARE: 20/50
OD_GLARE: 20/70
OS_CC: 20/30-1

## 2024-01-22 ASSESSMENT — KERATOMETRY
OS_K1POWER_DIOPTERS: 43.75
OS_AXISANGLE2_DEGREES: 73
OS_AXISANGLE_DEGREES: 163
OS_K2POWER_DIOPTERS: 45.25
OD_K1POWER_DIOPTERS: 43.25
OD_AXISANGLE_DEGREES: 173
OD_K2POWER_DIOPTERS: 44.50
OD_AXISANGLE2_DEGREES: 83

## 2024-01-22 NOTE — TELEPHONE ENCOUNTER
Patient Call    Who are you speaking with? Patient    If it is not the patient, are they listed on an active communication consent form? N/A   What is the reason for this call? Checking on auth for mri tomorrow. Advised it is still in review    Does this require a call back? N/A   If a call back is required, please list best call back number N/a   If a call back is required, advise that a message will be forwarded to their care team and someone will return their call as soon as possible.   Did you relay this information to the patient? N/A

## 2024-01-22 NOTE — TELEPHONE ENCOUNTER
Patient reached out regarding insurance denial of MRI to assess implant integrity.  Spoke to radiologist which informed me that MRIs to assess implant integrity are only performed for silicone implants not saline.  Patient has saline implants.  I informed the patient that she would not need MRI for saline implants because if there was concern for rupture this would be found on clinical exam. Patient was understanding and is going to cancel breast MRI.

## 2024-02-02 ENCOUNTER — APPOINTMENT (OUTPATIENT)
Dept: LAB | Facility: CLINIC | Age: 64
End: 2024-02-02
Payer: COMMERCIAL

## 2024-02-02 DIAGNOSIS — Z13.6 SCREENING FOR CARDIOVASCULAR CONDITION: ICD-10-CM

## 2024-02-02 LAB
ALBUMIN SERPL BCP-MCNC: 4.6 G/DL (ref 3.5–5)
ALP SERPL-CCNC: 65 U/L (ref 34–104)
ALT SERPL W P-5'-P-CCNC: 39 U/L (ref 7–52)
ANION GAP SERPL CALCULATED.3IONS-SCNC: 7 MMOL/L
AST SERPL W P-5'-P-CCNC: 33 U/L (ref 13–39)
BILIRUB SERPL-MCNC: 0.55 MG/DL (ref 0.2–1)
BUN SERPL-MCNC: 21 MG/DL (ref 5–25)
CALCIUM SERPL-MCNC: 9.5 MG/DL (ref 8.4–10.2)
CHLORIDE SERPL-SCNC: 103 MMOL/L (ref 96–108)
CHOLEST SERPL-MCNC: 184 MG/DL
CO2 SERPL-SCNC: 30 MMOL/L (ref 21–32)
CREAT SERPL-MCNC: 0.65 MG/DL (ref 0.6–1.3)
GFR SERPL CREATININE-BSD FRML MDRD: 96 ML/MIN/1.73SQ M
GLUCOSE P FAST SERPL-MCNC: 92 MG/DL (ref 65–99)
HDLC SERPL-MCNC: 73 MG/DL
LDLC SERPL CALC-MCNC: 97 MG/DL (ref 0–100)
POTASSIUM SERPL-SCNC: 4 MMOL/L (ref 3.5–5.3)
PROT SERPL-MCNC: 7.3 G/DL (ref 6.4–8.4)
SODIUM SERPL-SCNC: 140 MMOL/L (ref 135–147)
TRIGL SERPL-MCNC: 69 MG/DL

## 2024-02-02 PROCEDURE — 36415 COLL VENOUS BLD VENIPUNCTURE: CPT

## 2024-02-02 PROCEDURE — 80053 COMPREHEN METABOLIC PANEL: CPT

## 2024-02-02 PROCEDURE — 80061 LIPID PANEL: CPT

## 2024-02-12 ENCOUNTER — CONSULT (OUTPATIENT)
Dept: INTERNAL MEDICINE CLINIC | Facility: CLINIC | Age: 64
End: 2024-02-12
Payer: COMMERCIAL

## 2024-02-12 VITALS
BODY MASS INDEX: 26.19 KG/M2 | SYSTOLIC BLOOD PRESSURE: 112 MMHG | HEART RATE: 63 BPM | WEIGHT: 157.2 LBS | DIASTOLIC BLOOD PRESSURE: 72 MMHG | HEIGHT: 65 IN | OXYGEN SATURATION: 98 %

## 2024-02-12 DIAGNOSIS — Z01.818 PREOP EXAM FOR INTERNAL MEDICINE: Primary | ICD-10-CM

## 2024-02-12 PROCEDURE — 99213 OFFICE O/P EST LOW 20 MIN: CPT | Performed by: INTERNAL MEDICINE

## 2024-02-12 NOTE — PATIENT INSTRUCTIONS
Problem List Items Addressed This Visit          Other    Preop exam for internal medicine - Primary     Patient is medically cleared for cataract surgeries, no cardiopulmonary complaints, exam today is normal, patient not on blood thinners.

## 2024-02-12 NOTE — ASSESSMENT & PLAN NOTE
Patient is medically cleared for cataract surgeries, no cardiopulmonary complaints, exam today is normal, patient not on blood thinners.

## 2024-02-12 NOTE — PROGRESS NOTES
Name: Tran Mandujano      : 1960      MRN: [id number]  Encounter Provider: Quentin Goff MD  Encounter Date: 2024   Encounter department: [facility]    Assessment & Plan     1. Preop exam for internal medicine  Assessment & Plan:  Patient is medically cleared for cataract surgeries, no cardiopulmonary complaints, exam today is normal, patient not on blood thinners.             Subjective     Patient being seen in coverage for her PCP.  She is here for a preop medical clearance for eye surgery.  No cardiopulmonary complaints, no chest pain, no shortness of breath.  No problems with anesthesia in the past, patient not on any blood thinners.      Review of Systems   Constitutional:  Negative for chills, fatigue and fever.   HENT:  Negative for congestion, nosebleeds, postnasal drip, sore throat and trouble swallowing.    Eyes:  Negative for pain.   Respiratory:  Negative for cough, chest tightness, shortness of breath and wheezing.    Cardiovascular:  Negative for chest pain, palpitations and leg swelling.   Gastrointestinal:  Negative for abdominal pain, constipation, diarrhea, nausea and vomiting.   Endocrine: Negative for polydipsia and polyuria.   Genitourinary:  Negative for dysuria, flank pain and hematuria.   Skin:  Negative for rash.   Neurological:  Negative for dizziness, tremors, light-headedness and headaches.   Hematological:  Does not bruise/bleed easily.   Psychiatric/Behavioral:  Negative for confusion and dysphoric mood. The patient is not nervous/anxious.        Past Medical History:   Diagnosis Date   • Anesthesia complication     HR drops   • Benign neoplasm of lung     Right lung lobe benign. Removed .   • BRCA2 positive    • Breast cancer (HCC)    • Breathing difficulty     no breathing difficulties but patient reported drop in BP during anesthesia for appendectomy and was advised to have cardiologist consult   • Cancer (HCC)    • Depression     • Dermatomyositis (HCC)    • Lung nodule    • Melanoma (HCC) 2014    mid chest (insitu)   • Wears glasses      Past Surgical History:   Procedure Laterality Date   • APPENDECTOMY      2011   • BREAST SURGERY      Bilateral Mastectomy   • COLONOSCOPY N/A 01/26/2016    Procedure: COLONOSCOPY;  Surgeon: David Gutierrez MD;  [facility];  Service:    • HYSTERECTOMY      2007   • KNEE SURGERY  2007   • LUNG BIOPSY     • MASS EXCISION Left 07/18/2019    Procedure: MID BACK MASS EXCISION BIOPSY;  Surgeon: David Llanes MD;  [facility];  Service: General   • MASTECTOMY      reconstruction 2008   • OOPHORECTOMY     • [place] OPEN TREATMENT RADIAL SHAFT FRACTURE Right 8/30/2023    Procedure: OPEN REDUCTION W/ INTERNAL FIXATION (ORIF) RADIUS / ULNA (WRIST);  Surgeon: Chu Chin MD;  [facility];  Service: Orthopedics   • SKIN BIOPSY       Family History   Problem Relation Age of Onset   • Cancer Mother         Ovarian   • Ovarian cancer Mother    • No Known Problems Father    • BRCA2 Positive Sister    • Heart disease Sister         post partum cardiomyopathy, pacer   • No Known Problems Brother    • Prostate cancer Maternal Uncle    • Prostate cancer Maternal Uncle    • BRCA2 Negative Paternal Aunt    • Breast cancer Paternal Aunt    • Cancer Maternal Grandmother 90        skin   • Skin cancer Maternal Grandmother    • No Known Problems Paternal Grandmother    • No Known Problems Paternal Grandfather    • BRCA2 Positive Daughter      Social History     Socioeconomic History   • Marital status: /Civil Union     Spouse name: None   • Number of children: None   • Years of education: None   • Highest education level: None   Occupational History   • None   Tobacco Use   • Smoking status: Never   • Smokeless tobacco: Never   Vaping Use   • Vaping status: Never Used   Substance and Sexual Activity   • Alcohol use: No   • Drug use: No   • Sexual activity: Yes     Partners: Male     Birth  control/protection: Post-menopausal, None     Comment: complete hysterectomy   Other Topics Concern   • None   Social History Narrative   • None     Social Determinants of Health     Financial Resource Strain: Not on file   Food Insecurity: Not on file   Transportation Needs: Not on file   Physical Activity: Not on file   Stress: Not on file   Social Connections: Not on file   Intimate Partner Violence: Not on file   Housing Stability: Not on file     Current Outpatient Medications on File Prior to Visit   Medication Sig   • Calcium-Magnesium-Vitamin D (CALCIUM 500 PO) Take 1,000 mg by mouth daily.   • cholecalciferol (VITAMIN D3) 1,000 units tablet Take 1,000 Units by mouth daily   • clobetasol (TEMOVATE) 0.05 % cream as needed     • folic acid (FOLVITE) 1 mg tablet Take 1 tablet (1 mg total) by mouth daily   • Glycerin-Hypromellose- (DRY EYE RELIEF DROPS OP) Apply to eye   • methotrexate 2.5 mg tablet Take 6 tablets (15 mg total) by mouth once a week   • multivitamin (THERAGRAN) TABS Take 1 tablet by mouth daily.   • acetaminophen (TYLENOL) 325 mg tablet Take 2 tablets (650 mg total) by mouth every 6 (six) hours as needed for mild pain   • Cholecalciferol (VITAMIN D) 2000 UNITS CAPS Take 2,000 Units/day by mouth (Patient not taking: Reported on 10/18/2023)   • ibuprofen (MOTRIN) 400 mg tablet Take 1 tablet (400 mg total) by mouth every 6 (six) hours as needed for mild pain     No Known Allergies  Immunization History   Administered Date(s) Administered   • COVID-19 MODERNA VACC 0.25 ML IM BOOSTER 03/04/2022   • COVID-19 MODERNA VACC 0.5 ML IM 01/29/2021, 02/26/2021, 08/31/2021   • COVID-19 Pfizer Vac BIVALENT Cali-sucrose 12 Yr+ IM 09/22/2022, 08/22/2023   • H1N1, All Formulations 12/11/2009   • Hep A, adult 06/16/2000, 12/20/2000   • Hep B, adult 06/16/2000, 07/17/2000, 12/20/2000   • Influenza Quadrivalent Preservative Free 3 years and older IM 10/04/2014, 09/19/2016, 10/27/2017   • Influenza  "Quadrivalent, 6-35 Months IM 10/12/2015   • Influenza, injectable, quadrivalent, preservative free 0.5 mL 10/07/2023   • Influenza, recombinant, quadrivalent,injectable, preservative free 11/02/2018, 11/25/2019, 10/10/2020, 10/02/2021, 10/01/2022   • Influenza, seasonal, injectable 09/20/2012, 09/24/2013   • Pneumococcal Polysaccharide PPV23 04/01/2008   • Td (adult), adsorbed 08/26/2003   • Tdap 09/04/2014   • Zoster Vaccine Recombinant 06/27/2019, 12/19/2019       Objective     /72 (BP Location: Left arm, Patient Position: Sitting, Cuff Size: Large)   Pulse 63   Ht 5' 5\" (1.651 m)   Wt 71.3 kg (157 lb 3.2 oz)   SpO2 98%   BMI 26.16 kg/m²     Physical Exam  Vitals reviewed.   Constitutional:       General: She is not in acute distress.     Appearance: Normal appearance.   Cardiovascular:      Rate and Rhythm: Normal rate and regular rhythm.      Heart sounds: Normal heart sounds. No murmur heard.  Pulmonary:      Effort: Pulmonary effort is normal. No respiratory distress.      Breath sounds: Normal breath sounds. No stridor. No wheezing or rhonchi.   Musculoskeletal:      Right lower leg: No edema.      Left lower leg: No edema.   Skin:     Coloration: Skin is not jaundiced or pale.   Neurological:      General: No focal deficit present.      Mental Status: She is alert and oriented to person, place, and time.       Quentin Goff MD    "

## 2024-02-12 NOTE — LETTER
2024     Kendall Yang DO  [address]  [facility] [address]    Patient: Tran Mandujano   YOB: 1960   Date of Visit: 2024       Dear Dr. Yang:    Thank you for referring Tran Mandujano to me for evaluation. Below are my notes for this consultation.    If you have questions, please do not hesitate to call me. I look forward to following your patient along with you.         Sincerely,        Quentin Goff MD        CC: No Recipients    Quentin Goff MD  2024  2:09 PM  Incomplete  Name: Tran Mandujano      : 1960      MRN: [id number]  Encounter Provider: Quentin Goff MD  Encounter Date: 2024   Encounter department: [facility]    Assessment & Plan     1. Preop exam for internal medicine  Assessment & Plan:  Patient is medically cleared for cataract surgeries, no cardiopulmonary complaints, exam today is normal, patient not on blood thinners.             Subjective     Patient being seen in coverage for her PCP.  She is here for a preop medical clearance for eye surgery.  No cardiopulmonary complaints, no chest pain, no shortness of breath.  No problems with anesthesia in the past, patient not on any blood thinners.      Review of Systems   Constitutional:  Negative for chills, fatigue and fever.   HENT:  Negative for congestion, nosebleeds, postnasal drip, sore throat and trouble swallowing.    Eyes:  Negative for pain.   Respiratory:  Negative for cough, chest tightness, shortness of breath and wheezing.    Cardiovascular:  Negative for chest pain, palpitations and leg swelling.   Gastrointestinal:  Negative for abdominal pain, constipation, diarrhea, nausea and vomiting.   Endocrine: Negative for polydipsia and polyuria.   Genitourinary:  Negative for dysuria, flank pain and hematuria.   Skin:  Negative for rash.   Neurological:  Negative for dizziness, tremors, light-headedness and headaches.   Hematological:   Does not bruise/bleed easily.   Psychiatric/Behavioral:  Negative for confusion and dysphoric mood. The patient is not nervous/anxious.        Past Medical History:   Diagnosis Date   • Anesthesia complication     HR drops   • Benign neoplasm of lung     Right lung lobe benign. Removed 2010.   • BRCA2 positive    • Breast cancer (HCC)    • Breathing difficulty 2011    no breathing difficulties but patient reported drop in BP during anesthesia for appendectomy and was advised to have cardiologist consult   • Cancer (HCC) 2008   • Depression    • Dermatomyositis (HCC)    • Lung nodule    • Melanoma (HCC) 2014    mid chest (insitu)   • Wears glasses      Past Surgical History:   Procedure Laterality Date   • APPENDECTOMY      2011   • BREAST SURGERY      Bilateral Mastectomy   • COLONOSCOPY N/A 01/26/2016    Procedure: COLONOSCOPY;  Surgeon: David Gutierrez MD;  Location: [facility];  Service:    • HYSTERECTOMY      2007   • KNEE SURGERY  2007   • LUNG BIOPSY     • MASS EXCISION Left 07/18/2019    Procedure: MID BACK MASS EXCISION BIOPSY;  Surgeon: David Llanes MD;  [facility];  Service: General   • MASTECTOMY      reconstruction 2008   • OOPHORECTOMY     • ND OPEN TREATMENT RADIAL SHAFT FRACTURE Right 8/30/2023    Procedure: OPEN REDUCTION W/ INTERNAL FIXATION (ORIF) RADIUS / ULNA (WRIST);  Surgeon: Chu Chin MD;  Location: [facility];  Service: Orthopedics   • SKIN BIOPSY       Family History   Problem Relation Age of Onset   • Cancer Mother         Ovarian   • Ovarian cancer Mother    • No Known Problems Father    • BRCA2 Positive Sister    • Heart disease Sister         post partum cardiomyopathy, pacer   • No Known Problems Brother    • Prostate cancer Maternal Uncle    • Prostate cancer Maternal Uncle    • BRCA2 Negative Paternal Aunt    • Breast cancer Paternal Aunt    • Cancer Maternal Grandmother 90        skin   • Skin cancer Maternal Grandmother    • No Known Problems Paternal Grandmother     • No Known Problems Paternal Grandfather    • BRCA2 Positive Daughter      Social History     Socioeconomic History   • Marital status: /Civil Union     Spouse name: None   • Number of children: None   • Years of education: None   • Highest education level: None   Occupational History   • None   Tobacco Use   • Smoking status: Never   • Smokeless tobacco: Never   Vaping Use   • Vaping status: Never Used   Substance and Sexual Activity   • Alcohol use: No   • Drug use: No   • Sexual activity: Yes     Partners: Male     Birth control/protection: Post-menopausal, None     Comment: complete hysterectomy   Other Topics Concern   • None   Social History Narrative   • None     Social Determinants of Health     Financial Resource Strain: Not on file   Food Insecurity: Not on file   Transportation Needs: Not on file   Physical Activity: Not on file   Stress: Not on file   Social Connections: Not on file   Intimate Partner Violence: Not on file   Housing Stability: Not on file     Current Outpatient Medications on File Prior to Visit   Medication Sig   • Calcium-Magnesium-Vitamin D (CALCIUM 500 PO) Take 1,000 mg by mouth daily.   • cholecalciferol (VITAMIN D3) 1,000 units tablet Take 1,000 Units by mouth daily   • clobetasol (TEMOVATE) 0.05 % cream as needed     • folic acid (FOLVITE) 1 mg tablet Take 1 tablet (1 mg total) by mouth daily   • Glycerin-Hypromellose- (DRY EYE RELIEF DROPS OP) Apply to eye   • methotrexate 2.5 mg tablet Take 6 tablets (15 mg total) by mouth once a week   • multivitamin (THERAGRAN) TABS Take 1 tablet by mouth daily.   • acetaminophen (TYLENOL) 325 mg tablet Take 2 tablets (650 mg total) by mouth every 6 (six) hours as needed for mild pain   • Cholecalciferol (VITAMIN D) 2000 UNITS CAPS Take 2,000 Units/day by mouth (Patient not taking: Reported on 10/18/2023)   • ibuprofen (MOTRIN) 400 mg tablet Take 1 tablet (400 mg total) by mouth every 6 (six) hours as needed for mild pain  "    No Known Allergies  Immunization History   Administered Date(s) Administered   • COVID-19 MODERNA VACC 0.25 ML IM BOOSTER 03/04/2022   • COVID-19 MODERNA VACC 0.5 ML IM 01/29/2021, 02/26/2021, 08/31/2021   • COVID-19 Pfizer Vac BIVALENT Cali-sucrose 12 Yr+ IM 09/22/2022, 08/22/2023   • H1N1, All Formulations 12/11/2009   • Hep A, adult 06/16/2000, 12/20/2000   • Hep B, adult 06/16/2000, 07/17/2000, 12/20/2000   • Influenza Quadrivalent Preservative Free 3 years and older IM 10/04/2014, 09/19/2016, 10/27/2017   • Influenza Quadrivalent, 6-35 Months IM 10/12/2015   • Influenza, injectable, quadrivalent, preservative free 0.5 mL 10/07/2023   • Influenza, recombinant, quadrivalent,injectable, preservative free 11/02/2018, 11/25/2019, 10/10/2020, 10/02/2021, 10/01/2022   • Influenza, seasonal, injectable 09/20/2012, 09/24/2013   • Pneumococcal Polysaccharide PPV23 04/01/2008   • Td (adult), adsorbed 08/26/2003   • Tdap 09/04/2014   • Zoster Vaccine Recombinant 06/27/2019, 12/19/2019       Objective     /72 (BP Location: Left arm, Patient Position: Sitting, Cuff Size: Large)   Pulse 63   Ht 5' 5\" (1.651 m)   Wt 71.3 kg (157 lb 3.2 oz)   SpO2 98%   BMI 26.16 kg/m²     Physical Exam  Vitals reviewed.   Constitutional:       General: She is not in acute distress.     Appearance: Normal appearance.   Cardiovascular:      Rate and Rhythm: Normal rate and regular rhythm.      Heart sounds: Normal heart sounds. No murmur heard.  Pulmonary:      Effort: Pulmonary effort is normal. No respiratory distress.      Breath sounds: Normal breath sounds. No stridor. No wheezing or rhonchi.   Musculoskeletal:      Right lower leg: No edema.      Left lower leg: No edema.   Skin:     Coloration: Skin is not jaundiced or pale.   Neurological:      General: No focal deficit present.      Mental Status: She is alert and oriented to person, place, and time.       Quentin Goff MD    "

## 2024-02-20 ENCOUNTER — SURGERY/PROCEDURE (OUTPATIENT)
Dept: URBAN - METROPOLITAN AREA SURGICAL CENTER 6 | Facility: SURGICAL CENTER | Age: 64
End: 2024-02-20

## 2024-02-20 DIAGNOSIS — H25.811: ICD-10-CM

## 2024-02-20 PROCEDURE — MISCFEMTO FEMTO

## 2024-02-20 PROCEDURE — 66984 XCAPSL CTRC RMVL W/O ECP: CPT

## 2024-02-20 PROCEDURE — MISCMFIOL MISCMFIOL

## 2024-02-21 ENCOUNTER — 1 DAY POST-OP (OUTPATIENT)
Dept: URBAN - METROPOLITAN AREA CLINIC 6 | Facility: CLINIC | Age: 64
End: 2024-02-21

## 2024-02-21 DIAGNOSIS — Z96.1: ICD-10-CM

## 2024-02-21 DIAGNOSIS — H25.812: ICD-10-CM

## 2024-02-21 PROCEDURE — 92136 OPHTHALMIC BIOMETRY: CPT | Mod: 26,LT

## 2024-02-21 PROCEDURE — 99024 POSTOP FOLLOW-UP VISIT: CPT

## 2024-02-21 ASSESSMENT — KERATOMETRY
OS_K1POWER_DIOPTERS: 43.75
OS_AXISANGLE2_DEGREES: 73
OD_K1POWER_DIOPTERS: 43.25
OS_AXISANGLE2_DEGREES: 73
OD_AXISANGLE2_DEGREES: 83
OD_AXISANGLE_DEGREES: 173
OD_K2POWER_DIOPTERS: 44.50
OS_AXISANGLE_DEGREES: 163
OS_K1POWER_DIOPTERS: 43.75
OD_AXISANGLE_DEGREES: 173
OS_AXISANGLE_DEGREES: 163
OD_K1POWER_DIOPTERS: 43.25
OS_K2POWER_DIOPTERS: 45.25
OD_AXISANGLE2_DEGREES: 83
OS_K2POWER_DIOPTERS: 45.25
OD_K2POWER_DIOPTERS: 44.50

## 2024-02-21 ASSESSMENT — VISUAL ACUITY
OS_SC: 20/30
OD_SC: 20/20-2

## 2024-02-21 ASSESSMENT — TONOMETRY
OD_IOP_MMHG: 13
OS_IOP_MMHG: 14

## 2024-02-27 ENCOUNTER — SURGERY/PROCEDURE (OUTPATIENT)
Dept: URBAN - METROPOLITAN AREA SURGICAL CENTER 6 | Facility: SURGICAL CENTER | Age: 64
End: 2024-02-27

## 2024-02-27 DIAGNOSIS — H25.812: ICD-10-CM

## 2024-02-27 PROCEDURE — MISCMFIOL MISCMFIOL

## 2024-02-27 PROCEDURE — 66984 XCAPSL CTRC RMVL W/O ECP: CPT | Mod: 79,LT

## 2024-02-27 PROCEDURE — MISCFEMTO FEMTO

## 2024-02-28 ENCOUNTER — 1 DAY POST-OP (OUTPATIENT)
Dept: URBAN - METROPOLITAN AREA CLINIC 6 | Facility: CLINIC | Age: 64
End: 2024-02-28

## 2024-02-28 DIAGNOSIS — Z96.1: ICD-10-CM

## 2024-02-28 PROCEDURE — 99024 POSTOP FOLLOW-UP VISIT: CPT

## 2024-02-28 ASSESSMENT — KERATOMETRY
OS_AXISANGLE2_DEGREES: 73
OS_AXISANGLE_DEGREES: 163
OD_AXISANGLE2_DEGREES: 83
OD_K2POWER_DIOPTERS: 44.50
OD_AXISANGLE_DEGREES: 173
OS_K1POWER_DIOPTERS: 43.75
OD_K2POWER_DIOPTERS: 44.50
OD_AXISANGLE_DEGREES: 173
OS_K2POWER_DIOPTERS: 45.25
OD_AXISANGLE2_DEGREES: 83
OS_K2POWER_DIOPTERS: 45.25
OS_AXISANGLE2_DEGREES: 73
OS_K1POWER_DIOPTERS: 43.75
OS_AXISANGLE_DEGREES: 163
OD_K1POWER_DIOPTERS: 43.25
OD_K1POWER_DIOPTERS: 43.25

## 2024-02-28 ASSESSMENT — VISUAL ACUITY
OS_SC: 20/25-1
OU_SC: 20/20-1
OU_SC: J1+
OD_SC: 20/20-1

## 2024-02-28 ASSESSMENT — TONOMETRY
OS_IOP_MMHG: 14
OD_IOP_MMHG: 12

## 2024-03-05 ENCOUNTER — OFFICE VISIT (OUTPATIENT)
Dept: PODIATRY | Facility: CLINIC | Age: 64
End: 2024-03-05
Payer: COMMERCIAL

## 2024-03-05 VITALS
SYSTOLIC BLOOD PRESSURE: 105 MMHG | RESPIRATION RATE: 18 BRPM | BODY MASS INDEX: 26.16 KG/M2 | HEART RATE: 63 BPM | DIASTOLIC BLOOD PRESSURE: 74 MMHG | HEIGHT: 65 IN

## 2024-03-05 DIAGNOSIS — M20.21 HALLUX RIGIDUS, RIGHT FOOT: Primary | ICD-10-CM

## 2024-03-05 DIAGNOSIS — M19.071 PRIMARY OSTEOARTHRITIS OF RIGHT FOOT: ICD-10-CM

## 2024-03-05 PROCEDURE — 99213 OFFICE O/P EST LOW 20 MIN: CPT | Performed by: PODIATRIST

## 2024-03-05 NOTE — PROGRESS NOTES
PATIENT:  Tran Mandujano  1960       ASSESSMENT:     1. Hallux rigidus, right foot        2. Primary osteoarthritis of right foot              PLAN:  1. Reviewed medical records.  Patient was counseled and educated on the condition and the diagnosis.    2. Previous X-ray was personally reviewed.  The radiological findings were discussed with the patient.    3. The diagnosis, treatment options and prognosis were discussed with the patient.    4. She is doing well with conservative care.  Instructed supportive care and proper footwear.  Sent her for carbon fiber insoles.    5. Possible injection / surgery depending on the progress in the future.  Pt to call the office for any increased symptoms.      Imaging: I have personally reviewed pertinent films in PACS  Labs, pathology, and Other Studies: I have personally reviewed pertinent reports.        Subjective:       HPI  The patient presents for follow-up on right foot pain.  She is doing well with minimal pain in right foot.  She has been wearing supportive shoes and stretching to help her condition.  No new complaint.      The following portions of the patient's history were reviewed and updated as appropriate: allergies, current medications, past family history, past medical history, past social history, past surgical history and problem list.  All pertinent labs and images were reviewed.      Past Medical History  Past Medical History:   Diagnosis Date    Anesthesia complication     HR drops    Benign neoplasm of lung     Right lung lobe benign. Removed 2010.    BRCA2 positive     Breast cancer (HCC)     Breathing difficulty 2011    no breathing difficulties but patient reported drop in BP during anesthesia for appendectomy and was advised to have cardiologist consult    Cancer (HCC) 2008    Depression     Dermatomyositis (HCC)     Lung nodule     Melanoma (HCC) 2014    mid chest (insitu)    Wears glasses        Past Surgical History  Past  Surgical History:   Procedure Laterality Date    APPENDECTOMY      2011    BREAST SURGERY      Bilateral Mastectomy    COLONOSCOPY N/A 01/26/2016    Procedure: COLONOSCOPY;  Surgeon: David Gutierrez MD;  Location: [facility];  Service:     HYSTERECTOMY      2007    KNEE SURGERY  2007    LUNG BIOPSY      MASS EXCISION Left 07/18/2019    Procedure: MID BACK MASS EXCISION BIOPSY;  Surgeon: David Llanes MD;  [facility];  Service: General    MASTECTOMY      reconstruction 2008    OOPHORECTOMY      ME OPEN TREATMENT RADIAL SHAFT FRACTURE Right 8/30/2023    Procedure: OPEN REDUCTION W/ INTERNAL FIXATION (ORIF) RADIUS / ULNA (WRIST);  Surgeon: Chu Chin MD;  [facility];  Service: Orthopedics    SKIN BIOPSY          Allergies:  Patient has no known allergies.    Medications:  Current Outpatient Medications   Medication Sig Dispense Refill    acetaminophen (TYLENOL) 325 mg tablet Take 2 tablets (650 mg total) by mouth every 6 (six) hours as needed for mild pain 60 tablet 0    Calcium-Magnesium-Vitamin D (CALCIUM 500 PO) Take 1,000 mg by mouth daily.      Cholecalciferol (VITAMIN D) 2000 UNITS CAPS Take 2,000 Units/day by mouth (Patient not taking: Reported on 10/18/2023)      cholecalciferol (VITAMIN D3) 1,000 units tablet Take 1,000 Units by mouth daily      clobetasol (TEMOVATE) 0.05 % cream as needed        folic acid (FOLVITE) 1 mg tablet Take 1 tablet (1 mg total) by mouth daily 90 tablet 3    Glycerin-Hypromellose- (DRY EYE RELIEF DROPS OP) Apply to eye      ibuprofen (MOTRIN) 400 mg tablet Take 1 tablet (400 mg total) by mouth every 6 (six) hours as needed for mild pain 50 tablet 0    methotrexate 2.5 mg tablet Take 6 tablets (15 mg total) by mouth once a week 72 tablet 1    multivitamin (THERAGRAN) TABS Take 1 tablet by mouth daily.       No current facility-administered medications for this visit.       Social History:  Social History     Socioeconomic History    Marital status:  "/Civil Union     Spouse name: None    Number of children: None    Years of education: None    Highest education level: None   Occupational History    None   Tobacco Use    Smoking status: Never    Smokeless tobacco: Never   Vaping Use    Vaping status: Never Used   Substance and Sexual Activity    Alcohol use: No    Drug use: No    Sexual activity: Yes     Partners: Male     Birth control/protection: Post-menopausal, None     Comment: complete hysterectomy   Other Topics Concern    None   Social History Narrative    None     Social Determinants of Health     Financial Resource Strain: Not on file   Food Insecurity: Not on file   Transportation Needs: Not on file   Physical Activity: Not on file   Stress: Not on file   Social Connections: Not on file   Intimate Partner Violence: Not on file   Housing Stability: Not on file          Review of Systems   Constitutional:  Negative for chills and fever.   Respiratory:  Negative for cough and shortness of breath.    Cardiovascular:  Negative for chest pain.   Gastrointestinal:  Negative for nausea and vomiting.   Musculoskeletal:  Negative for gait problem.   Skin:  Negative for wound.   Neurological:  Negative for weakness and numbness.   Psychiatric/Behavioral:  Negative for confusion.          Objective:      /74   Pulse 63   Resp 18   Ht 5' 5\" (1.651 m)   BMI 26.16 kg/m²          Physical Exam  Vitals reviewed.   Constitutional:       General: She is not in acute distress.     Appearance: She is not toxic-appearing or diaphoretic.   Cardiovascular:      Rate and Rhythm: Normal rate and regular rhythm.      Pulses: Normal pulses.           Dorsalis pedis pulses are 2+ on the right side and 2+ on the left side.        Posterior tibial pulses are 2+ on the right side and 2+ on the left side.   Pulmonary:      Effort: Pulmonary effort is normal. No respiratory distress.   Musculoskeletal:         General: Deformity present. No signs of injury.      Right " lower leg: No edema.      Left lower leg: No edema.      Right foot: No foot drop.      Left foot: No foot drop.      Comments: Large osteophyte / prominence on dorsomedial right 1st MPJ.  Decreased ROM right 1st MPJ.  No focal pain.  No pain with right 1st MPJ ROM.  No crepitus.     Skin:     General: Skin is warm.      Capillary Refill: Capillary refill takes less than 2 seconds.      Coloration: Skin is not cyanotic or mottled.      Findings: No abscess, ecchymosis or wound.   Neurological:      General: No focal deficit present.      Mental Status: She is alert and oriented to person, place, and time.      Cranial Nerves: No cranial nerve deficit.      Sensory: No sensory deficit.      Motor: No weakness.      Coordination: Coordination normal.   Psychiatric:         Mood and Affect: Mood normal.         Behavior: Behavior normal.         Thought Content: Thought content normal.         Judgment: Judgment normal.

## 2024-03-06 ENCOUNTER — 1 WEEK POST-OP (OUTPATIENT)
Dept: URBAN - METROPOLITAN AREA CLINIC 6 | Facility: CLINIC | Age: 64
End: 2024-03-06

## 2024-03-06 DIAGNOSIS — Z96.1: ICD-10-CM

## 2024-03-06 PROCEDURE — 99024 POSTOP FOLLOW-UP VISIT: CPT

## 2024-03-06 ASSESSMENT — KERATOMETRY
OD_AXISANGLE_DEGREES: 173
OD_K1POWER_DIOPTERS: 43.25
OS_AXISANGLE_DEGREES: 163
OS_K1POWER_DIOPTERS: 43.75
OD_K2POWER_DIOPTERS: 44.50
OD_AXISANGLE2_DEGREES: 83
OS_AXISANGLE2_DEGREES: 73
OS_K2POWER_DIOPTERS: 45.25

## 2024-03-06 ASSESSMENT — TONOMETRY
OD_IOP_MMHG: 11
OS_IOP_MMHG: 10

## 2024-03-06 ASSESSMENT — VISUAL ACUITY
OD_SC: J1+
OD_SC: 20/20-1
OS_SC: J1+
OS_SC: 20/20

## 2024-03-22 ENCOUNTER — HOSPITAL ENCOUNTER (OUTPATIENT)
Dept: RADIOLOGY | Age: 64
Discharge: HOME | End: 2024-03-22
Payer: COMMERCIAL

## 2024-03-22 ENCOUNTER — HOSPITAL ENCOUNTER (OUTPATIENT)
Dept: RADIOLOGY | Age: 64
End: 2024-03-22
Payer: COMMERCIAL

## 2024-03-22 PROCEDURE — 77080 DXA BONE DENSITY AXIAL: CPT

## 2024-04-09 ENCOUNTER — 1 MONTH POST-OP (OUTPATIENT)
Dept: URBAN - METROPOLITAN AREA CLINIC 6 | Facility: CLINIC | Age: 64
End: 2024-04-09

## 2024-04-09 DIAGNOSIS — Z96.1: ICD-10-CM

## 2024-04-09 PROCEDURE — 99024 POSTOP FOLLOW-UP VISIT: CPT

## 2024-04-09 ASSESSMENT — TONOMETRY
OD_IOP_MMHG: 10
OS_IOP_MMHG: 11

## 2024-04-09 ASSESSMENT — KERATOMETRY
OD_K1POWER_DIOPTERS: 43.25
OS_K2POWER_DIOPTERS: 45.25
OS_K1POWER_DIOPTERS: 43.75
OD_AXISANGLE_DEGREES: 173
OD_K2POWER_DIOPTERS: 44.50
OS_AXISANGLE2_DEGREES: 73
OS_AXISANGLE_DEGREES: 163
OD_AXISANGLE2_DEGREES: 83

## 2024-04-09 ASSESSMENT — VISUAL ACUITY
OD_SC: 20/20-2
OS_SC: 20/25
OD_SC: J1+
OS_SC: J1+

## 2024-04-12 ENCOUNTER — APPOINTMENT (OUTPATIENT)
Dept: LAB | Age: 64
End: 2024-04-12
Payer: COMMERCIAL

## 2024-04-12 DIAGNOSIS — C50.012 MALIGNANT NEOPLASM OF NIPPLE OF BOTH BREASTS IN FEMALE, ESTROGEN RECEPTOR POSITIVE (HCC): ICD-10-CM

## 2024-04-12 DIAGNOSIS — C50.011 MALIGNANT NEOPLASM OF NIPPLE OF BOTH BREASTS IN FEMALE, ESTROGEN RECEPTOR POSITIVE (HCC): ICD-10-CM

## 2024-04-12 DIAGNOSIS — Z17.0 MALIGNANT NEOPLASM OF NIPPLE OF BOTH BREASTS IN FEMALE, ESTROGEN RECEPTOR POSITIVE (HCC): ICD-10-CM

## 2024-04-12 LAB
ALBUMIN SERPL BCP-MCNC: 4.4 G/DL (ref 3.5–5)
ALP SERPL-CCNC: 65 U/L (ref 34–104)
ALT SERPL W P-5'-P-CCNC: 24 U/L (ref 7–52)
ANION GAP SERPL CALCULATED.3IONS-SCNC: 4 MMOL/L (ref 4–13)
AST SERPL W P-5'-P-CCNC: 20 U/L (ref 13–39)
BASOPHILS # BLD AUTO: 0.03 THOUSANDS/ÂΜL (ref 0–0.1)
BASOPHILS NFR BLD AUTO: 1 % (ref 0–1)
BILIRUB SERPL-MCNC: 0.43 MG/DL (ref 0.2–1)
BUN SERPL-MCNC: 19 MG/DL (ref 5–25)
CALCIUM SERPL-MCNC: 9.3 MG/DL (ref 8.4–10.2)
CHLORIDE SERPL-SCNC: 104 MMOL/L (ref 96–108)
CO2 SERPL-SCNC: 32 MMOL/L (ref 21–32)
CREAT SERPL-MCNC: 0.64 MG/DL (ref 0.6–1.3)
EOSINOPHIL # BLD AUTO: 0.07 THOUSAND/ÂΜL (ref 0–0.61)
EOSINOPHIL NFR BLD AUTO: 1 % (ref 0–6)
ERYTHROCYTE [DISTWIDTH] IN BLOOD BY AUTOMATED COUNT: 13.9 % (ref 11.6–15.1)
GFR SERPL CREATININE-BSD FRML MDRD: 96 ML/MIN/1.73SQ M
GLUCOSE P FAST SERPL-MCNC: 80 MG/DL (ref 65–99)
HCT VFR BLD AUTO: 41.5 % (ref 34.8–46.1)
HGB BLD-MCNC: 13.2 G/DL (ref 11.5–15.4)
IMM GRANULOCYTES # BLD AUTO: 0.02 THOUSAND/UL (ref 0–0.2)
IMM GRANULOCYTES NFR BLD AUTO: 0 % (ref 0–2)
LYMPHOCYTES # BLD AUTO: 1.36 THOUSANDS/ÂΜL (ref 0.6–4.47)
LYMPHOCYTES NFR BLD AUTO: 26 % (ref 14–44)
MCH RBC QN AUTO: 31.1 PG (ref 26.8–34.3)
MCHC RBC AUTO-ENTMCNC: 31.8 G/DL (ref 31.4–37.4)
MCV RBC AUTO: 98 FL (ref 82–98)
MONOCYTES # BLD AUTO: 0.51 THOUSAND/ÂΜL (ref 0.17–1.22)
MONOCYTES NFR BLD AUTO: 10 % (ref 4–12)
NEUTROPHILS # BLD AUTO: 3.2 THOUSANDS/ÂΜL (ref 1.85–7.62)
NEUTS SEG NFR BLD AUTO: 62 % (ref 43–75)
NRBC BLD AUTO-RTO: 0 /100 WBCS
PLATELET # BLD AUTO: 293 THOUSANDS/UL (ref 149–390)
PMV BLD AUTO: 10.2 FL (ref 8.9–12.7)
POTASSIUM SERPL-SCNC: 4.3 MMOL/L (ref 3.5–5.3)
PROT SERPL-MCNC: 6.9 G/DL (ref 6.4–8.4)
RBC # BLD AUTO: 4.25 MILLION/UL (ref 3.81–5.12)
SODIUM SERPL-SCNC: 140 MMOL/L (ref 135–147)
WBC # BLD AUTO: 5.19 THOUSAND/UL (ref 4.31–10.16)

## 2024-04-12 PROCEDURE — 85025 COMPLETE CBC W/AUTO DIFF WBC: CPT

## 2024-04-12 PROCEDURE — 80053 COMPREHEN METABOLIC PANEL: CPT

## 2024-04-12 PROCEDURE — 86300 IMMUNOASSAY TUMOR CA 15-3: CPT

## 2024-04-12 PROCEDURE — 36415 COLL VENOUS BLD VENIPUNCTURE: CPT

## 2024-04-13 LAB — CANCER AG27-29 SERPL-ACNC: 22.9 U/ML (ref 0–38.6)

## 2024-04-24 ENCOUNTER — OFFICE VISIT (OUTPATIENT)
Dept: DERMATOLOGY | Facility: CLINIC | Age: 64
End: 2024-04-24
Payer: COMMERCIAL

## 2024-04-24 VITALS — HEIGHT: 64 IN | WEIGHT: 152 LBS | BODY MASS INDEX: 25.95 KG/M2

## 2024-04-24 DIAGNOSIS — D18.01 CHERRY ANGIOMA: ICD-10-CM

## 2024-04-24 DIAGNOSIS — D23.9 DERMATOFIBROMA: ICD-10-CM

## 2024-04-24 DIAGNOSIS — D48.5 NEOPLASM OF UNCERTAIN BEHAVIOR OF SKIN: ICD-10-CM

## 2024-04-24 DIAGNOSIS — D22.9 NEVUS SEBACEOUS: ICD-10-CM

## 2024-04-24 DIAGNOSIS — Z87.39 HISTORY OF DERMATOMYOSITIS: ICD-10-CM

## 2024-04-24 DIAGNOSIS — Z85.820 HISTORY OF MELANOMA: Primary | ICD-10-CM

## 2024-04-24 PROCEDURE — 88341 IMHCHEM/IMCYTCHM EA ADD ANTB: CPT | Performed by: STUDENT IN AN ORGANIZED HEALTH CARE EDUCATION/TRAINING PROGRAM

## 2024-04-24 PROCEDURE — 88342 IMHCHEM/IMCYTCHM 1ST ANTB: CPT | Performed by: STUDENT IN AN ORGANIZED HEALTH CARE EDUCATION/TRAINING PROGRAM

## 2024-04-24 PROCEDURE — 88305 TISSUE EXAM BY PATHOLOGIST: CPT | Performed by: STUDENT IN AN ORGANIZED HEALTH CARE EDUCATION/TRAINING PROGRAM

## 2024-04-24 PROCEDURE — 11102 TANGNTL BX SKIN SINGLE LES: CPT

## 2024-04-24 PROCEDURE — 99214 OFFICE O/P EST MOD 30 MIN: CPT

## 2024-04-24 NOTE — PROGRESS NOTES
"[facility] Note     Patient Name: Tran Mandujano  Encounter Date: 04/24/2024     Have you been cared for by a [facility] Dermatologist in the last 3 years and, if so, which description applies to you?    Yes.  I have been here within the last 3 years, and my medical history has NOT changed since that time.  I am FEMALE/of child-bearing potential.    REVIEW OF SYSTEMS:  Have you recently had or currently have any of the following? No changes in my recent health.   PAST MEDICAL HISTORY:  Have you personally ever had or currently have any of the following?  If \"YES,\" then please provide more detail. No changes in my medical history.   HISTORY OF IMMUNOSUPPRESSION: Do you have a history of any of the following:  Systemic Immunosuppression such as Diabetes, Biologic or Immunotherapy, Chemotherapy, Organ Transplantation, Bone Marrow Transplantation?  YES, methotrexate for dermatomyositis     Answering \"YES\" requires the addition of the dotphrase \"IMMUNOSUPPRESSED\" as the first diagnosis of the patient's visit.   FAMILY HISTORY:  Any \"first degree relatives\" (parent, brother, sister, or child) with the following?    No changes in my family's known health.   PATIENT EXPERIENCE:    Do you want the Dermatologist to perform a COMPLETE skin exam today including a clinical examination under the \"bra and underwear\" areas?  Yes  If necessary, do we have your permission to call and leave a detailed message on your Preferred Phone number that includes your specific medical information?  Yes      No Known Allergies   Current Outpatient Medications:     Calcium-Magnesium-Vitamin D (CALCIUM 500 PO), Take 1,000 mg by mouth daily., Disp: , Rfl:     cholecalciferol (VITAMIN D3) 1,000 units tablet, Take 1,000 Units by mouth daily, Disp: , Rfl:     clobetasol (TEMOVATE) 0.05 % cream, as needed  , Disp: , Rfl:     folic acid (FOLVITE) 1 mg tablet, Take 1 tablet (1 mg total) by mouth daily, Disp: 90 tablet, Rfl: 3    " "Glycerin-Hypromellose- (DRY EYE RELIEF DROPS OP), Apply to eye, Disp: , Rfl:     methotrexate 2.5 mg tablet, Take 6 tablets (15 mg total) by mouth once a week, Disp: 72 tablet, Rfl: 1    multivitamin (THERAGRAN) TABS, Take 1 tablet by mouth daily., Disp: , Rfl:     acetaminophen (TYLENOL) 325 mg tablet, Take 2 tablets (650 mg total) by mouth every 6 (six) hours as needed for mild pain, Disp: 60 tablet, Rfl: 0    Cholecalciferol (VITAMIN D) 2000 UNITS CAPS, Take 2,000 Units/day by mouth (Patient not taking: Reported on 4/24/2024), Disp: , Rfl:     ibuprofen (MOTRIN) 400 mg tablet, Take 1 tablet (400 mg total) by mouth every 6 (six) hours as needed for mild pain, Disp: 50 tablet, Rfl: 0          Whom besides the patient is providing clinical information about today's encounter?   NO ADDITIONAL HISTORIAN (patient alone provided history)    Patient is a 62yo female with PMH of melanoma in situ 2014, breast cancer and is BRCA II positive, hx dermatomyositis on MTX managed by rheumatology. Presents today for 6mo skin check    Physical Exam and Assessment/Plan by Diagnosis:    NEOPLASM OF UNCERTAIN BEHAVIOR OF SKIN    Physical Exam:  (Anatomic Location); (Size and Morphological Description); (Differential Diagnosis):  Specimen A; right upper back; skin; shave; 63 year old female with a 0.4 cm x 0.4 cm irregular pink papule; DDX; rule out atypia     Additional History of Present Condition:  patient presents for skin exam. Patient has history of melanoma in situ.     Assessment and Plan:  I have discussed with the patient that a sample of skin via a \"skin biopsy” would be potentially helpful to further make a specific diagnosis under the microscope.  Based on a thorough discussion of this condition and the management approach to it (including a comprehensive discussion of the known risks, side effects and potential benefits of treatment), the patient (family) agrees to implement the following specific " plan:    Procedure:  Skin Biopsy.  After a thorough discussion of treatment options and risk/benefits/alternatives (including but not limited to local pain, scarring, dyspigmentation, blistering, possible superinfection, and inability to confirm a diagnosis via histopathology), verbal and written consent were obtained and portion of the rash was biopsied for tissue sample.  See below for consent that was obtained from patient and subsequent Procedure Note.   PROCEDURE TANGENTIAL (SHAVE) BIOPSY NOTE:    Performing Physician:  Ariane Somers  Anatomic Location; Clinical Description with size (cm); Pre-Op Diagnosis:   Specimen A; right upper back; skin; shave; 63 year old female with a 0.4 cm x 0.4 cm irregular pink papule; DDX; rule out atypia   Post-op diagnosis: Same     Local anesthesia: 3:1 1% xylocaine with epi and 1-100,000 buffered     Topical anesthesia: None    Hemostasis: Aluminum chloride       After obtaining informed consent  at which time there was a discussion about the purpose of biopsy  and low risks of infection and bleeding.  The area was prepped and draped in the usual fashion. Anesthesia was obtained with 1% lidocaine with epinephrine. A shave biopsy to an appropriate sampling depth was obtained by Shave (Dermablade or 15 blade) The resulting wound was covered with surgical ointment and bandaged appropriately.     The patient tolerated the procedure well without complications and was without signs of functional compromise.      Specimen has been sent for review by Dermatopathology.    Standard post-procedure care has been explained and has been included in written form within the patient's copy of Informed Consent.    INFORMED CONSENT DISCUSSION AND POST-OPERATIVE INSTRUCTIONS FOR PATIENT    I.  RATIONALE FOR PROCEDURE  I understand that a skin biopsy allows the Dermatologist to test a lesion or rash under the microscope to obtain a diagnosis.  It usually involves numbing the area with numbing  "medication and removing a small piece of skin; sometimes the area will be closed with sutures. In this specific procedure, sutures are not usually needed.  If any sutures are placed, then they are usually need to be removed in 2 weeks or less.    I understand that my Dermatologist recommends that a skin \"shave\" biopsy be performed today.  A local anesthetic, similar to the kind that a dentist uses when filling a cavity, will be injected with a very small needle into the skin area to be sampled.  The injected skin and tissue underneath \"will go to sleep” and become numb so no pain should be felt afterwards.  An instrument shaped like a tiny \"razor blade\" (shave biopsy instrument) will be used to cut a small piece of tissue and skin from the area so that a sample of tissue can be taken and examined more closely under the microscope.  A slight amount of bleeding will occur, but it will be stopped with direct pressure and a pressure bandage and any other appropriate methods.  I understands that a scar will form where the wound was created.  Surgical ointment will be applied to help protect the wound.  Sutures are not usually needed.    II.  RISKS AND POTENTIAL COMPLICATIONS   I understand the risks and potential complications of a skin biopsy include but are not limited to the following:  Bleeding  Infection  Pain  Scar/keloid  Skin discoloration  Incomplete Removal  Recurrence  Nerve Damage/Numbness/Loss of Function  Allergic Reaction to Anesthesia  Biopsies are diagnostic procedures and based on findings additional treatment or evaluation may be required  Loss or destruction of specimen resulting in no additional findings    My Dermatologist has explained to me the nature of the condition, the nature of the procedure, and the benefits to be reasonably expected compared with alternative approaches.  My Dermatologist has discussed the likelihood of major risks or complications of this procedure including the specific " "risks listed above, such as bleeding, infection, and scarring/keloid.  I understand that a scar is expected after this procedure.  I understand that my physician cannot predict if the scar will form a \"keloid,\" which extends beyond the borders of the wound that is created.  A keloid is a thick, painful, and bumpy scar.  A keloid can be difficult to treat, as it does not always respond well to therapy, which includes injecting cortisone directly into the keloid every few weeks.  While this usually reduces the pain and size of the scar, it does not eliminate it.      I understand that photographs may be taken before and after the procedure.  These will be maintained as part of the medical providers confidential records and may not be made available to me.  I further authorize the medical provider to use the photographs for teaching purposes or to illustrate scientific papers, books, or lectures if in his/her judgment, medical research, education, or science may benefit from its use.    I have had an opportunity to fully inquire about the risks and benefits of this procedure and its alternatives.   I have been given ample time and opportunity to ask questions and to seek a second opinion if I wished to do so.  I acknowledge that there have specifically been no guarantees as to the cosmetic results from the procedure.  I am aware that with any procedure there is always the possibility of an unexpected complication.    III. POST-PROCEDURAL CARE (WHAT YOU WILL NEED TO DO \"AFTER THE BIOPSY\" TO OPTIMIZE HEALING)    Keep the area clean and dry.  Try NOT to remove the bandage or get it wet for the first 24 hours.    Gently clean the area and apply surgical ointment (such as Vaseline petrolatum ointment, which is available \"over the counter\" and not a prescription) to the biopsy site for up to 2 weeks straight.  This acts to protect the wound from the outside world.      Sutures are not usually placed in this procedure.  If " any sutures were placed, return for suture removal as instructed (generally 1 week for the face, 2 weeks for the body).      Take Acetaminophen (Tylenol) for discomfort, if no contraindications.  Ibuprofen or aspirin could make bleeding worse.    Call our office immediately for signs of infection: fever, chills, increased redness, warmth, tenderness, discomfort/pain, or pus or foul smell coming from the wound.    WHAT TO DO IF THERE IS ANY BLEEDING?  If a small amount of bleeding is noticed, place a clean cloth over the area and apply firm pressure for ten minutes.  Check the wound after 10 minutes of direct pressure.  If bleeding persists, try one more time for an additional 10 minutes of direct pressure on the area.  If the bleeding becomes heavier or does not stop after the second attempt, or if you have any other questions about this procedure, then please call your [facility]'s Dermatologist by calling [phone] (SKIN).     I hereby acknowledge that I have reviewed and verified the site with my Dermatologist and have requested and authorized my Dermatologist to proceed with the procedure.    HISTORY OF MELANOMA IN SITU    Physical Exam:  Anatomic Location Affected:  middle of chest   Morphological Description of Scar:  well healed  Year Treated: 2014  TNM Classification: N0T0  Suspected Recurrence: No    Additional History of Present Condition:  patient states in 2014 she had removal of Melanoma in situ on chest. Patient has history of breast cancer with a double Mastectomy and Hysterectomy. Patient states she is BRCA 2 positive. Patient does not have any concerns about recurrence at this time.     Assessment and Plan:  Based on a thorough discussion of this condition and the management approach to it (including a comprehensive discussion of the known risks, side effects and potential benefits of treatment), the patient (family) agrees to implement the following specific plan:  Continue to monitor for  "recurrence  Follow up 6 months for skin checks     HISTORY OF DERMATOMYOSITIS     Physical Exam:  Anatomic Location Affected:  Trunk, face, hands   Morphological Description: mild erythema on chest     Additional History of Present Condition:  Patient notes that comes and goes. Recently increased to methotrexate 6 pills a week from 4 and when flares applying clobetasol cream. Methotrexate is managed by rheumatology  Assessment and Plan:  Based on a thorough discussion of this condition and the management approach to it (including a comprehensive discussion of the known risks, side effects and potential benefits of treatment), the patient (family) agrees to implement the following specific plan:  Continue to follow up with Rheumatology every 6 months; Rheum is managing her MTX  Discussed sun precautions     DERMATOFIBROMA    Physical Exam:  Anatomic Location Affected:  3 on right leg,  2 on left leg  Morphological Description:  Rubbery firm papule that \"dimples\" with lateral pressure.    Additional History of Present Condition:  present on exam     Assessment and Plan:  Based on a thorough discussion of this condition and the management approach to it (including a comprehensive discussion of the known risks, side effects and potential benefits of treatment), the patient (family) agrees to implement the following specific plan:  Asymptomatic. Not treatment needed     SEBORRHEIC KERATOSES  - Relevant exam: Scattered over the trunk/extremities are waxy brown to black plaques and papules with stuck on appearance and consistent dermoscopy  - Exam and clinical history consistent with seborrheic keratoses  - Counseled that these are benign growths that do not require treatment    MELANOCYTIC NEVI  -Relevant exam: Scattered over the trunk/extremities are homogenously pigmented brown macules and papules. ELM performed and without concerning findings. No outliers unless otherwise noted in today's note  - Exam and clinical " history consistent with melanocytic nevi  - Counseled to return to clinic prior to scheduled appointment should any of these lesions change or should any new lesions of concern arise  - Counseled on use of sun protection daily. Reviewed latest FDA sunscreen guidelines, including use of broad spectrum (UVA and UVB blocking) sunscreen or sun protective clothing with SPF 30-50 every 2-3 hours and reapplied after exposure to water    LENTIGINES  OTHER SKIN CHANGES DUE TO CHRONIC EXPOSURE TO NONIONIZING RADIATION  - Relevant exam: Over sun exposed areas are brown macules. ELM performed and without concerning findings.  - Exam and clinical history consistent with lentigines.  - Counseled to return to clinic prior to scheduled appointment should any of these lesions change or should any new lesions of concern arise.  - Recommended use of sunscreen as above and below.    CHERRY ANGIOMAS  - Relevant exam: Scattered over the trunk/extremities are red papules  - Exam and clinical history consistent with cherry angiomas  - Educated that these are benign    NEVUS SPILUS    Physical Exam:  Anatomic Location Affected:  sacrum  Morphological Description:  patch with speckled pigment  Pertinent Positives:  Pertinent Negatives:    Additional History of Present Condition:  noted on exam    Assessment and Plan:  Based on a thorough discussion of this condition and the management approach to it (including a comprehensive discussion of the known risks, side effects and potential benefits of treatment), the patient (family) agrees to implement the following specific plan:  Reassured benign      Scribe Attestation      I,:  Haley Pascual am acting as a scribe while in the presence of the attending physician.:       I,:  Ariane Somers PA-C personally performed the services described in this documentation    as scribed in my presence.:

## 2024-04-24 NOTE — PATIENT INSTRUCTIONS
"    NEOPLASM OF UNCERTAIN BEHAVIOR OF SKIN    Assessment and Plan:  I have discussed with the patient that a sample of skin via a \"skin biopsy” would be potentially helpful to further make a specific diagnosis under the microscope.  Based on a thorough discussion of this condition and the management approach to it (including a comprehensive discussion of the known risks, side effects and potential benefits of treatment), the patient (family) agrees to implement the following specific plan:    Procedure:  Skin Biopsy.  After a thorough discussion of treatment options and risk/benefits/alternatives (including but not limited to local pain, scarring, dyspigmentation, blistering, possible superinfection, and inability to confirm a diagnosis via histopathology), verbal and written consent were obtained and portion of the rash was biopsied for tissue sample.  See below for consent that was obtained from patient and subsequent Procedure Note.   PROCEDURE TANGENTIAL (SHAVE) BIOPSY NOTE:      After obtaining informed consent  at which time there was a discussion about the purpose of biopsy  and low risks of infection and bleeding.  The area was prepped and draped in the usual fashion. Anesthesia was obtained with 1% lidocaine with epinephrine. A shave biopsy to an appropriate sampling depth was obtained by Shave (Dermablade or 15 blade) The resulting wound was covered with surgical ointment and bandaged appropriately.     The patient tolerated the procedure well without complications and was without signs of functional compromise.      Specimen has been sent for review by Dermatopathology.    Standard post-procedure care has been explained and has been included in written form within the patient's copy of Informed Consent.    INFORMED CONSENT DISCUSSION AND POST-OPERATIVE INSTRUCTIONS FOR PATIENT    I.  RATIONALE FOR PROCEDURE  I understand that a skin biopsy allows the Dermatologist to test a lesion or rash under the " "microscope to obtain a diagnosis.  It usually involves numbing the area with numbing medication and removing a small piece of skin; sometimes the area will be closed with sutures. In this specific procedure, sutures are not usually needed.  If any sutures are placed, then they are usually need to be removed in 2 weeks or less.    I understand that my Dermatologist recommends that a skin \"shave\" biopsy be performed today.  A local anesthetic, similar to the kind that a dentist uses when filling a cavity, will be injected with a very small needle into the skin area to be sampled.  The injected skin and tissue underneath \"will go to sleep” and become numb so no pain should be felt afterwards.  An instrument shaped like a tiny \"razor blade\" (shave biopsy instrument) will be used to cut a small piece of tissue and skin from the area so that a sample of tissue can be taken and examined more closely under the microscope.  A slight amount of bleeding will occur, but it will be stopped with direct pressure and a pressure bandage and any other appropriate methods.  I understands that a scar will form where the wound was created.  Surgical ointment will be applied to help protect the wound.  Sutures are not usually needed.    II.  RISKS AND POTENTIAL COMPLICATIONS   I understand the risks and potential complications of a skin biopsy include but are not limited to the following:  Bleeding  Infection  Pain  Scar/keloid  Skin discoloration  Incomplete Removal  Recurrence  Nerve Damage/Numbness/Loss of Function  Allergic Reaction to Anesthesia  Biopsies are diagnostic procedures and based on findings additional treatment or evaluation may be required  Loss or destruction of specimen resulting in no additional findings    My Dermatologist has explained to me the nature of the condition, the nature of the procedure, and the benefits to be reasonably expected compared with alternative approaches.  My Dermatologist has discussed the " "likelihood of major risks or complications of this procedure including the specific risks listed above, such as bleeding, infection, and scarring/keloid.  I understand that a scar is expected after this procedure.  I understand that my physician cannot predict if the scar will form a \"keloid,\" which extends beyond the borders of the wound that is created.  A keloid is a thick, painful, and bumpy scar.  A keloid can be difficult to treat, as it does not always respond well to therapy, which includes injecting cortisone directly into the keloid every few weeks.  While this usually reduces the pain and size of the scar, it does not eliminate it.      I understand that photographs may be taken before and after the procedure.  These will be maintained as part of the medical providers confidential records and may not be made available to me.  I further authorize the medical provider to use the photographs for teaching purposes or to illustrate scientific papers, books, or lectures if in his/her judgment, medical research, education, or science may benefit from its use.    I have had an opportunity to fully inquire about the risks and benefits of this procedure and its alternatives.   I have been given ample time and opportunity to ask questions and to seek a second opinion if I wished to do so.  I acknowledge that there have specifically been no guarantees as to the cosmetic results from the procedure.  I am aware that with any procedure there is always the possibility of an unexpected complication.    III. POST-PROCEDURAL CARE (WHAT YOU WILL NEED TO DO \"AFTER THE BIOPSY\" TO OPTIMIZE HEALING)    Keep the area clean and dry.  Try NOT to remove the bandage or get it wet for the first 24 hours.    Gently clean the area and apply surgical ointment (such as Vaseline petrolatum ointment, which is available \"over the counter\" and not a prescription) to the biopsy site for up to 2 weeks straight.  This acts to protect the wound " from the outside world.      Sutures are not usually placed in this procedure.  If any sutures were placed, return for suture removal as instructed (generally 1 week for the face, 2 weeks for the body).      Take Acetaminophen (Tylenol) for discomfort, if no contraindications.  Ibuprofen or aspirin could make bleeding worse.    Call our office immediately for signs of infection: fever, chills, increased redness, warmth, tenderness, discomfort/pain, or pus or foul smell coming from the wound.    WHAT TO DO IF THERE IS ANY BLEEDING?  If a small amount of bleeding is noticed, place a clean cloth over the area and apply firm pressure for ten minutes.  Check the wound after 10 minutes of direct pressure.  If bleeding persists, try one more time for an additional 10 minutes of direct pressure on the area.  If the bleeding becomes heavier or does not stop after the second attempt, or if you have any other questions about this procedure, then please call your [facility] Dermatologist by calling [phone] (SKIN).     I hereby acknowledge that I have reviewed and verified the site with my Dermatologist and have requested and authorized my Dermatologist to proceed with the procedure.         HISTORY OF MELANOMA      Assessment and Plan:  Based on a thorough discussion of this condition and the management approach to it (including a comprehensive discussion of the known risks, side effects and potential benefits of treatment), the patient (family) agrees to implement the following specific plan:  Continue to monitor   Follow up 6 months for skin checks     What happens at follow-up?  The main purpose of follow-up is to detect recurrences early (metastatic melanoma), but it also offers an opportunity to diagnose a new primary melanoma at the first possible opportunity. A second invasive melanoma occurs in 5-10% of melanoma patients and a new melanoma in situ is diagnosed in more than 20% of melanoma patients.    Our practice  "makes the following recommendations for follow-up for patients with invasive melanoma.  At-least \"monthly\" self-skin examinations   Routine skin checks by a board certified dermatologist  Follow-up intervals are \"every 3 months\" within 2 years of a new melanoma diagnosis; \"every 6 months\" between 2-4 years of a new melanoma diagnosis; and \"annually\" after 4 years of a new melanoma diagnosis  Individual patient's needs should be considered before an appropriate follow-up is offered  Provide education and support to help the patient adjust to their illness    Follow-up appointments should include:  A check of the scar where the primary melanoma was removed  Checking the regional lymph nodes  A general skin examination  A full physical examination at least annually by your primary care physician    In those with more advanced primary disease, follow-up may include:  Blood tests  Imaging: ultrasound, X-ray, CT, MRI and PET scan.    Most tests are not worthwhile for patients with stage 1 or 2 melanoma unless there are signs or symptoms of disease recurrence or metastasis. No tests are necessary for healthy patients who have remained well for five years or longer after removal of their melanoma.    What is the outlook for patients with melanoma?  Melanoma in situ is cured by excision because it has no potential to spread around the body.  The risk of spread and ultimate death from invasive melanoma depends on several factors, but the main one is the Breslow thickness of the melanoma at the time it was surgically removed.  Metastases are rare for melanomas < 0.75 mm and the risk for tumours 0.75-1 mm thick is about 5%. The risk steadily increases with thickness so that melanomas > 4 mm have a risk of metastasis of about 40%.    Melanoma is a potentially serious type of skin cancer, in which there is uncontrolled growth of melanocytes (pigment cells). Melanoma is sometimes called malignant melanoma.  Normal melanocytes are " "found in the basal layer of the epidermis (the outer layer of skin). Melanocytes produce a protein called melanin, which protects skin cells by absorbing ultraviolet (UV) radiation. Melanocytes are found in equal numbers in black and white skin, but melanocytes in black skin produce much more melanin. People with dark brown or black skin are very much less likely to be damaged by UV radiation than those with white skin.       2. HISTORY OF DERMATOMYOSITIS     Assessment and Plan:  Based on a thorough discussion of this condition and the management approach to it (including a comprehensive discussion of the known risks, side effects and potential benefits of treatment), the patient (family) agrees to implement the following specific plan:  Continue to follow up with Rheumatology every 6 months; Rheum is managing her MTX  Discussed sun precautions   DERMATOFIBROMA      Assessment and Plan:  Based on a thorough discussion of this condition and the management approach to it (including a comprehensive discussion of the known risks, side effects and potential benefits of treatment), the patient (family) agrees to implement the following specific plan:  Asymptomatic. Not treatment needed     Assessment and Plan:  A dermatofibroma is a common benign fibrous nodule that most often arises on the skin of the lower legs.  A dermatofibroma is also called a \"cutaneous fibrous histiocytoma.\"  Dermatofibromas occur at all ages and in people of every ethnicity. They are more common in women than in men.    It is not clear if dermatofibroma is a reactive process or if it is a neoplasm. The lesions are made up of proliferating fibroblasts. Histiocytes may also be involved.  They are sometimes attributed to an insect bite or ingrownhair or local trauma, but not consistently. They may be more numerous in patients with altered immunity.    Dermatofibromas most often occur on the legs and arms, but may also arise on the trunk or any site " "of the body.  Typical clinical features include the following:  People may have 1 or up to 15 lesions.  Size varies from 0.5-1.5 cm diameter; most lesions are 7-10 mm diameter.  They are firm nodules tethered to the skin surface and mobile over subcutaneous tissue.  The skin \"dimples\" on pinching the lesion.  Color may be pink to light brown in white skin, and dark brown to black in dark skin; some appear paler in the center.  They do not usually cause symptoms, but they are sometimes painful or itchy.  Because they are often raised lesions, they may be traumatized, for example by a razor.  Occasionally dozens may erupt within a few months, usually in the setting of immunosuppression (for example autoimmune disease, cancer or certain medications).  Dermatofibroma does not give rise to cancer. However, occasionally, it may be mistaken for dermatofibrosarcoma or desmoplastic melanoma.    A dermatofibroma is harmless and seldom causes any symptoms. Usually, only reassurance is needed. If it is nuisance or causing concern, the lesion can be removed surgically, resulting in a scar that is, by definition, usually longer in diameter than the widest portion of the dermatofibroma.  Cryotherapy, shave biopsy and laser surgery are rarely completely successful.  Skin punch biopsy or incisional biopsy may be undertaken if there is an atypical feature such as recent enlargement, ulceration, or asymmetrical structures and colours on dermatoscopy.     SEBORRHEIC KERATOSES  - Relevant exam: Scattered over the trunk/extremities are waxy brown to black plaques and papules with stuck on appearance and consistent dermoscopy  - Exam and clinical history consistent with seborrheic keratoses  - Counseled that these are benign growths that do not require treatment    MELANOCYTIC NEVI  -Relevant exam: Scattered over the trunk/extremities are homogenously pigmented brown macules and papules. ELM performed and without concerning findings. No " outliers unless otherwise noted in today's note  - Exam and clinical history consistent with melanocytic nevi  - Counseled to return to clinic prior to scheduled appointment should any of these lesions change or should any new lesions of concern arise  - Counseled on use of sun protection daily. Reviewed latest FDA sunscreen guidelines, including use of broad spectrum (UVA and UVB blocking) sunscreen or sun protective clothing with SPF 30-50 every 2-3 hours and reapplied after exposure to water    LENTIGINES  OTHER SKIN CHANGES DUE TO CHRONIC EXPOSURE TO NONIONIZING RADIATION  - Relevant exam: Over sun exposed areas are brown macules. ELM performed and without concerning findings.  - Exam and clinical history consistent with lentigines.  - Counseled to return to clinic prior to scheduled appointment should any of these lesions change or should any new lesions of concern arise.  - Recommended use of sunscreen as above and below.    CHERRY ANGIOMAS  - Relevant exam: Scattered over the trunk/extremities are red papules  - Exam and clinical history consistent with cherry angiomas  - Educated that these are benign    NEVUS SPILUS  A nevus spilus (NS) or speckled lentiginous nevus (SLN) typically presents before the age of 2 as a light brown macule or patch containing smaller, more darkly pigmented macules or papules within the borders. These smaller pigmented aspects may appear after the first background patch is noted. NS is thought by most but not all authors to represent a type of congenital melanocytic nevus. Typically a NS is a benign, isolated lesion with limited dimensions and can present anywhere on the body. NS occurs in roughly 2% of the general population. There is no reported gender or racial preference for development of NS.    While NSM lesions tend to have a stable appearance and do not change much throughout life, NSP lesions often present as light tan patches present at birth that then develop dark brown  macules and papules as the patient progresses from childhood to adulthood. The macules and papules in NSP can change in size and color with aging, as well.                          NS is a benign lesion that does not require treatment. Some patients or their parents may be concerned about the cosmetic appearance of the lesion if it is in a cosmetically sensistive area, such as the face. Wide excision may be performed in some cases, but must be balanced against the appearance of a large scar.

## 2024-04-25 ENCOUNTER — HOSPITAL ENCOUNTER (OUTPATIENT)
Dept: MRI IMAGING | Facility: HOSPITAL | Age: 64
Discharge: HOME | End: 2024-04-25
Attending: INTERNAL MEDICINE
Payer: COMMERCIAL

## 2024-04-25 DIAGNOSIS — Z15.09 BRCA2 GENE MUTATION POSITIVE: ICD-10-CM

## 2024-04-25 DIAGNOSIS — Z17.0 MALIGNANT NEOPLASM OF NIPPLE OF BOTH BREASTS IN FEMALE, ESTROGEN RECEPTOR POSITIVE (HCC): ICD-10-CM

## 2024-04-25 DIAGNOSIS — Z85.820 HISTORY OF MELANOMA: ICD-10-CM

## 2024-04-25 DIAGNOSIS — C50.011 MALIGNANT NEOPLASM OF NIPPLE OF BOTH BREASTS IN FEMALE, ESTROGEN RECEPTOR POSITIVE (HCC): ICD-10-CM

## 2024-04-25 DIAGNOSIS — Z15.01 BRCA2 GENE MUTATION POSITIVE: ICD-10-CM

## 2024-04-25 DIAGNOSIS — Z14.8 CARRIER OF HIGH RISK CANCER GENE MUTATION: ICD-10-CM

## 2024-04-25 DIAGNOSIS — C50.012 MALIGNANT NEOPLASM OF NIPPLE OF BOTH BREASTS IN FEMALE, ESTROGEN RECEPTOR POSITIVE (HCC): ICD-10-CM

## 2024-04-25 PROCEDURE — G1004 CDSM NDSC: HCPCS

## 2024-04-25 PROCEDURE — A9585 GADOBUTROL INJECTION: HCPCS | Performed by: INTERNAL MEDICINE

## 2024-04-25 PROCEDURE — 74183 MRI ABD W/O CNTR FLWD CNTR: CPT

## 2024-04-25 RX ORDER — GADOBUTROL 604.72 MG/ML
7 INJECTION INTRAVENOUS
Status: COMPLETED | OUTPATIENT
Start: 2024-04-25 | End: 2024-04-25

## 2024-04-25 RX ADMIN — GADOBUTROL 7 ML: 604.72 INJECTION INTRAVENOUS at 15:08

## 2024-04-27 PROCEDURE — 88341 IMHCHEM/IMCYTCHM EA ADD ANTB: CPT | Performed by: STUDENT IN AN ORGANIZED HEALTH CARE EDUCATION/TRAINING PROGRAM

## 2024-04-27 PROCEDURE — 88342 IMHCHEM/IMCYTCHM 1ST ANTB: CPT | Performed by: STUDENT IN AN ORGANIZED HEALTH CARE EDUCATION/TRAINING PROGRAM

## 2024-04-27 PROCEDURE — 88305 TISSUE EXAM BY PATHOLOGIST: CPT | Performed by: STUDENT IN AN ORGANIZED HEALTH CARE EDUCATION/TRAINING PROGRAM

## 2024-04-29 NOTE — RESULT ENCOUNTER NOTE
DERMATOPATHOLOGY RESULT NOTE    Results reviewed by ordering physician.  Called patient to personally discuss results. Discussed results with patient.       Instructions for Clinical Derm Team:   (remember to route Result Note to appropriate staff):    Call patient and schedule for excision of moderate to severe atypical nevus of right upper back. Thank you!    Result & Plan by Specimen:    Specimen A: lentiginous compound dysplastic nevus with moderate to severe atypia, extends to the tissue edges  Plan: excision    Tissue Exam: [id number]  Order: [id number]   Status: Final result      Visible to patient: Yes (seen)      Dx: Neoplasm of uncertain behavior of skin    0 Result Notes     Component   Case Report  Surgical Pathology Report                         Case: [id number]                                  Authorizing Provider:  Ariane Somers PA-C        Collected:           04/24/2024 1629              Ordering Location:     [facility]      Received:            04/24/2024 [address] [place] [address]                                                                      Pathologist:           Breanna Siddiqui MD                                                          Specimen:    Skin, Other, Speciman A: right upper back                                                Final Diagnosis  A. 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    Note: SOX10, HMB45, p16, and PRAME immunostains were reviewed; focal junctional melanocytic architectural disorder is seen, and the lesional cells retain expression of p16 and are negative for PRAME. Multiple levels examined.      Electronically signed by Breanna Siddiqui MD on 4/27/2024 at  9:08 PM  Additional Information   All reported additional testing was performed with appropriately reactive controls.  These tests were developed and their  "performance characteristics determined by [facility] or appropriate performing facility, though some tests may be performed on tissues which have not been validated for performance characteristics (such as staining performed on alcohol exposed cell blocks and decalcified tissues).  Results should be interpreted with caution and in the context of the patients' clinical condition. These tests may not be cleared or approved by the U.S. Food and Drug Administration, though the FDA has determined that such clearance or approval is not necessary. These tests are used for clinical purposes and they should not be regarded as investigational or for research. This laboratory has been approved by CLIA 88, designated as a high-complexity laboratory and is qualified to perform these tests.  .  Gross Description   A. The specimen is received in formalin, labeled with the patient's name and hospital number, and is designated \" right upper back.\"  It consists of a 0.5 x 0.4 cm fragment of skin, less than 0.1 cm thick.  The skin is white-tan to tan-brown.  The margin is inked green and the skin surface is inked red.  The specimen is bisected and submitted entirely in 1 cassette, between sponges.    Note: The estimated total formalin fixation time based upon information provided by the submitting clinician and the standard processing schedule is under 72 hours.    MSche[facility]  Clinical Information   Specimen A; right upper back; skin; shave; 63 year old female with a 0.4 cm x 0.4 cm irregular pink and brown macule; DDX; rule out atypia    ATTN DERM PATH  Resulting Agency [id number]          Specimen Collected: 04/24/24  4:29 PM Last Resulted: 04/27/24  9:09 PM     Order Details       View Encounter       Lab and Collection Details       Routing       Result History    View All Conversations on this Encounter        Scans on Order [id number]    Lab Result Document - Document on 4/27/2024  9:09 PM    "

## 2024-05-03 NOTE — TELEPHONE ENCOUNTER
Consults    Reason For Consult  Psoriatic arthritis     History Of Present Illness    Yobany Small is a 70 y.o. male with hx of AAA s/p EVAR (5/2020) with type Ia endoleak now s/p open repair and explant in addition to left nephroureterectomy for urothelial carcinoma of L kidney performed at same time (4/30/24). Additional PMHx includes CAD w/ ELLY to LCx (2020), HFrEF (25-30%) 2/2 ischemic cardiomyopathy s/p ICD, COPD, HTN, anemia, and psoriatic arthritis. Rheumatology consulted for mgmt.hand pain after long-term PsA tx held preoperatively.    Cannot see records of Rheumatology visits in the EMR but mention of dx psoriatic arthritis in 2019 initially treated with 10 mg Prednisone and started Methotrexate in 2020, PCP note says was followed by Rheumatologist, Dr. Joya and patient confirms this.      On evaluation today:  Patient sitting up at edge of bed. Notes swelling in his L hand starting a “few days” after his surgery. Denies pain or tenderness in the hands but states his flares always start this way. Said he hasn't had a flare of his PsA in years but has taken Prednisone chronically and MTX for years as well. Takes 6 tabs MTX (15 mg) weekly and 5 mg Prednisone daily. Last took on Saturday prior to admission.     Overall reports he is in pain post-op but not specifically in his hands. Notes a bothersome rash over his arms and back that is chronic but no active psoriatic plaques. Denies any fevers or chills.        Past Medical History  He has a past medical history of Abnormal findings on diagnostic imaging of heart and coronary circulation, Abnormal findings on diagnostic imaging of other specified body structures, Abnormal findings on diagnostic imaging of other specified body structures, Abnormal findings on diagnostic imaging of other specified body structures, Abnormal findings on diagnostic imaging of other specified body structures, Abnormal findings on diagnostic imaging of other specified body  Patient received Flublok  structures, Abnormal findings on diagnostic imaging of other specified body structures, BPH (benign prostatic hyperplasia), COPD (chronic obstructive pulmonary disease) (Multi), Coronary artery disease, Hyperlipidemia, Hypertension, and Personal history of other medical treatment.    Surgical History  He has a past surgical history that includes Other surgical history (05/30/2019); Other surgical history (06/16/2020); Other surgical history (06/16/2020); Other surgical history (04/02/2021); Other surgical history (01/30/2020); Other surgical history (06/16/2020); CT angio abdomen pelvis w and or wo IV IV contrast (11/7/2019); and CT angio abdomen pelvis w and or wo IV IV contrast (7/8/2020).     Social History  He reports that he quit smoking about 6 years ago. His smoking use included cigarettes. He has never used smokeless tobacco. He reports that he does not currently use alcohol. He reports that he does not use drugs.    Family History  Family History   Problem Relation Name Age of Onset    No Known Problems Mother          no relationship    No Known Problems Father          no relationship        Allergies  Patient has no known allergies.    Review of Systems  Negative except stated above      Physical Exam  General: thin, AOX3, NAD, answers questions appropriately   HEENT: EOMI, conjunctiva clear, sclera white, Mucosa appear moist. No oral ulcers noted.  Chest: normal respiratory effort, on supplemental oxygen   Cardiac: RRR  Skin: +dry skin and ecchymosis forearms b/l, multiple flat areas of hyperpignmentation with few scattered raised, rough lesions resembling seborrheic keratosis, no psoriatic plaques   MSK:   Upper Extremities:   Hand/Fingers: +synovitis in L 2-4th MCPs and DIPS with mild TTP, no swelling, erythema, or TTP in R hand, good  strength   Wrists: no erythema, swelling, or pain at wrist, FROM grossly   Elbows: No swelling, pain, erythema on elbows, FROM grossly   Shoulders: No obvious  "asymmetry, discoloration, or swelling. No TTP. Full active and passive ROM       Lower Extremities:   Hips: No obvious deformities. no joint tenderness, normal ROM grossly   Knees: No pain, deformities, swelling, rashes, warmth, normal ROM grossly   Ankles, feet: no deformities, swelling, ulceration, warmth, swelling, synovitis at ankle joints, normal ROM grossly.     Last Recorded Vitals  Blood pressure 112/64, pulse 62, temperature 36.5 °C (97.7 °F), resp. rate 13, height 1.803 m (5' 11\"), weight 55.7 kg (122 lb 12.7 oz), SpO2 97%.    Scheduled medications  acetaminophen, 1,000 mg, intravenous, q6h  aspirin, 81 mg, oral, Daily  cefTRIAXone, 2 g, intravenous, q24h  fluticasone furoate-vilanteroL, 1 puff, inhalation, Daily  heparin (porcine), 5,000 Units, subcutaneous, q8h  insulin lispro, 0-5 Units, subcutaneous, q4h  lidocaine, 1 patch, transdermal, Daily  methocarbamol, 1,000 mg, intravenous, q8h  metoprolol tartrate, 25 mg, oral, BID  pantoprazole, 40 mg, intravenous, Daily  perflutren protein A microsphere, 0.5 mL, intravenous, Once in imaging  predniSONE, 5 mg, oral, Daily  sacubitriL-valsartan, 1 tablet, oral, BID  sulfur hexafluoride microsphr, 2 mL, intravenous, Once in imaging  tiotropium, 2 puff, inhalation, Daily    Continuous medications  HYDROmorphone,   lactated Ringer's, 5 mL/hr, Last Rate: 5 mL/hr (05/02/24 1900)      PRN medications  PRN medications: dextrose, dextrose, glucagon, glucagon, hydrALAZINE, ipratropium-albuteroL, naloxone, oxygen       Assessment/Plan     69 y/o male with complex cardiovascular and surgical history now s/p open repair AAA and L nephroureterectomy i/s/o new urothelial carcinoma. Additional hx of psoriatic arthritis controlled with chronic low dose prednisone (5 mg daily) and MTX 15 mg weekly now with swelling in L hand. Synovitis on exam likely flare in setting of medications held preoperatively. Primary team restarted home Prednisone yesterday. Is being followed by " acute pain for post-op pain and notably has IV Robaxin, IV Tylenol, and dilaudid PCA pump for post-op pain control.     Recommendations:   -ok to continue Prednisone 5 mg daily  -if no signs/symptoms of severe infection/post-op complication, can restart Methotrexate 15 mg when due for next weekly dose   -rest of pain mgmt. per acute pain   -pt requesting to establish with Rheumatology at ; can request outpatient Rheumatology follow up on discharge (pt [place] provider [place])     Shama Mckeon,   PGY-IV Rheumatology   P: [phone]     Patient discussed with attending, Dr. Triplett

## 2024-05-09 ENCOUNTER — OFFICE VISIT (OUTPATIENT)
Dept: HEMATOLOGY ONCOLOGY | Facility: CLINIC | Age: 64
End: 2024-05-09
Payer: COMMERCIAL

## 2024-05-09 VITALS
DIASTOLIC BLOOD PRESSURE: 62 MMHG | SYSTOLIC BLOOD PRESSURE: 122 MMHG | TEMPERATURE: 97.1 F | WEIGHT: 157 LBS | HEIGHT: 64 IN | BODY MASS INDEX: 26.8 KG/M2 | HEART RATE: 53 BPM | RESPIRATION RATE: 17 BRPM | OXYGEN SATURATION: 99 %

## 2024-05-09 DIAGNOSIS — Z14.8 CARRIER OF HIGH RISK CANCER GENE MUTATION: ICD-10-CM

## 2024-05-09 DIAGNOSIS — Z85.820 HISTORY OF MELANOMA: ICD-10-CM

## 2024-05-09 DIAGNOSIS — M33.13 DERMATOMYOSITIS (HCC): ICD-10-CM

## 2024-05-09 DIAGNOSIS — C50.011 MALIGNANT NEOPLASM OF NIPPLE OF BOTH BREASTS IN FEMALE, ESTROGEN RECEPTOR POSITIVE (HCC): Primary | ICD-10-CM

## 2024-05-09 DIAGNOSIS — C50.012 MALIGNANT NEOPLASM OF NIPPLE OF BOTH BREASTS IN FEMALE, ESTROGEN RECEPTOR POSITIVE (HCC): Primary | ICD-10-CM

## 2024-05-09 DIAGNOSIS — Z15.01 BRCA2 GENE MUTATION POSITIVE: ICD-10-CM

## 2024-05-09 DIAGNOSIS — Z15.09 BRCA2 GENE MUTATION POSITIVE: ICD-10-CM

## 2024-05-09 DIAGNOSIS — Z17.0 MALIGNANT NEOPLASM OF NIPPLE OF BOTH BREASTS IN FEMALE, ESTROGEN RECEPTOR POSITIVE (HCC): Primary | ICD-10-CM

## 2024-05-09 PROCEDURE — 99214 OFFICE O/P EST MOD 30 MIN: CPT | Performed by: INTERNAL MEDICINE

## 2024-05-09 NOTE — PROGRESS NOTES
HPI: Continuation of care for bilateral breast cancers, melanoma in situ and positive test  for BRCA2 gene mutation .  In 2008 patient had  bilateral mastectomies for hormone receptor positive HER2 negative stage II A cancer in the right breast and stage I A cancer in the left breast and 1 positive lymph node in right axilla.  Patient had reconstruction surgeries, 4 cycles of Taxotere plus Cytoxan followed by 5 years of Femara that she finished in September 2013. She had ITZEL and BSO.    She has been aware of cancer risks because of BRCA2 mutation.  She goes for pancreatic checkup with MRI of abdomen and that would also check peritoneal cancer.  She  goes to her dermatologist for screening of skin for melanoma.  She goes to her ophthalmologist for ocular melanoma screening.  Patient had bilateral cataract surgeries recently with very good results.  Patient states her family has been aware of her  positive  BRCA 2.    She has  family history of breast and ovarian cancer.  Patient has history of  dermatomyositis .  She has been on methotrexate and folic acid for the last 3 years.  She follows with her rheumatologist.       , Patient had a 9 mm nodule in the right lower lung and she had wedge resection and that was negative for malignancy. Patient follows with the her lung specialist for mild residual ground-glass opacity in right lower lobe and that has improved .  .She has renal and liver cysts and also tiny  polyp in gallbladder again monitored with MRI scan.    .  She had colonoscopy in February 2021 and she states that was clear.   .  She has small lymph nodes in left submandibular area and they are being monitored by her primary physician by  ultrasound of the neck.    She has some tiredness.  She has dryness of the eyes and mouth.  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           Current Outpatient Medications:   •  Calcium-Magnesium-Vitamin D (CALCIUM 500  PO), Take 1,000 mg by mouth daily., Disp: , Rfl:   •  cholecalciferol (VITAMIN D3) 1,000 units tablet, Take 1,000 Units by mouth daily, Disp: , Rfl:   •  clobetasol (TEMOVATE) 0.05 % cream, as needed  , Disp: , Rfl:   •  folic acid (FOLVITE) 1 mg tablet, Take 1 tablet (1 mg total) by mouth daily, Disp: 90 tablet, Rfl: 3  •  Glycerin-Hypromellose- (DRY EYE RELIEF DROPS OP), Apply to eye, Disp: , Rfl:   •  methotrexate 2.5 mg tablet, Take 6 tablets (15 mg total) by mouth once a week, Disp: 72 tablet, Rfl: 1  •  multivitamin (THERAGRAN) TABS, Take 1 tablet by mouth daily., Disp: , Rfl:   •  acetaminophen (TYLENOL) 325 mg tablet, Take 2 tablets (650 mg total) by mouth every 6 (six) hours as needed for mild pain, Disp: 60 tablet, Rfl: 0  •  Cholecalciferol (VITAMIN D) 2000 UNITS CAPS, Take 2,000 Units/day by mouth (Patient not taking: Reported on 4/24/2024), Disp: , Rfl:   •  ibuprofen (MOTRIN) 400 mg tablet, Take 1 tablet (400 mg total) by mouth every 6 (six) hours as needed for mild pain, Disp: 50 tablet, Rfl: 0    No Known Allergies    Oncology History   Bilateral malignant neoplasm of breast in female, estrogen receptor positive (HCC)    Initial Diagnosis    Bilateral malignant neoplasm of breast in female, estrogen receptor positive (HCC)      Surgery    Double mastectomy, reconstructive surgery      Chemotherapy    Chemotherapy with Taxotere and Cytoxan for 4 cycles followed by Femara from 2008 thru 2013.     8/31/2023 -  Cancer Staged    Staging form: Breast, AJCC 8th Edition  - Clinical stage from 8/31/2023: cT1a, cN1(sn), cM0, GX, ER+, MD+, HER2- - Signed by Ernesto Howard MD on 8/31/2023  Stage prefix: Initial diagnosis  Method of lymph node assessment: [place] lymph node biopsy  Histologic grading system: 3 grade system           ROS:  05/11/24 Reviewed 13 systems: See symptoms in HPI:    Presently   No other neurological, cardiac, pulmonary, GI and  symptoms other than listed in HPI. .  Other  "symptoms are in HPI   No  fever, chills, bleeding,  skin rash at present,  weight loss, night sweats, arthritic symptoms at present,   weakness, numbness,  claudication and gait problem. No frequent infections.  Not unusually sensitive to heat or cold. No swelling of the ankles.  Patient is less anxious.       /62 (BP Location: Left arm, Patient Position: Sitting, Cuff Size: Adult)   Pulse (!) 53   Temp (!) 97.1 °F (36.2 °C)   Resp 17   Ht 5' 4\" (1.626 m)   Wt 71.2 kg (157 lb)   SpO2 99%   BMI 26.95 kg/m²     Physical Exam:    Alert and oriented and not in distress.  Vital signs are above.  No icterus.  No oral thrush.  No palpable neck mass.  Lung fields are clear to percussion and auscultation.  Regular heart rate.  Soft and nontender abdomen.  No palpable abdominal mass.  No ascites.  No edema of ankles.  No calf tenderness.  No focal neurological deficit.  No skin rash.  There is   no palpable lymphadenopathy in the neck and axillary areas,   Patient is less anxious.  Performance status 0.    IMAGING:     IMPRESSION:     1.  Mild residual groundglass opacity in the right lower lobe in the area of previously seen consolidation, likely postinflammatory.  Previously seen left lower lobe consolidation has resolved with mild residual scarring.     2.  Stable pulmonary nodules.              Workstation performed: [id number]          Imaging    OSSEOUS STRUCTURES:  No acute fracture or destructive osseous lesion.     IMPRESSION:     1.  Resolution of previously seen peripheral right lower lobe groundglass opacification, likely infectious or inflammatory in retrospect.  2.  No new concerning pulmonary nodules.              Workstation performed: [id number]          Imaging    CT chest without contrast (Order: [id number]) - 6/6/2022     LOWER CHEST:   Unremarkable.     LIVER:   Normal in size and configuration.   No suspicious mass.   Unchanged 6 mm segment 7 flash filling hemangioma #5/16 and 15/42 stable " since March 2019.  Previously seen segment 2/3 subcentimeter focus of capsular/subcapsular enhancement is reidentified on #13/65 and now has more of a discoid appearance.  This is in keeping with a vascular shunt.  Scattered simple cysts.  The hepatic veins and portal veins are patent.        BILE DUCTS:  No intrahepatic or extrahepatic bile duct dilation. Common bile duct is normal in caliber.  No choledocholithiasis, biliary stricture or suspicious mass.      GALLBLADDER known 6 mm small gallbladder polyps are better visualized on the prior ultrasound.     PANCREAS:  Unremarkable.     ADRENAL GLANDS:  Normal.     SPLEEN:  Normal.     KIDNEYS/PROXIMAL URETERS:  No hydroureteronephrosis.  No suspicious renal mass.  Scattered subcentimeter simple cysts.     BOWEL:   No dilated loops of bowel.      PERITONEUM/RETROPERITONEUM:  No ascites.     LYMPH NODES:  No abdominal lymphadenopathy.     VASCULAR STRUCTURES:  No aneurysm.     ABDOMINAL WALL:  Enhancing 7 mm left lateral abdominal wall focus, #45454/[id number] is stable since at least 9/24/2014.     OSSEOUS STRUCTURES:  No suspicious osseous lesion.        IMPRESSION:     No significant change from 3/25/2022.        Workstation performed: [id number]        Imaging    MRI abdomen w wo contrast and mrcp (Order: [id number]) - 4/7/2023        IMPRESSION:   No evidence of malignancy.  Clinical management of left axillary lump is recommended.           ASSESSMENT/BI-RADS CATEGORY:  Left: 2 - Benign  Overall: 2 - Benign     RECOMMENDATION:       - Clinical management for the left breast.     Workstation ID: [id number]     Imaging    US Breast Axilla Left (Order: [id number]) - 1/10/2023    IMPRESSION:     Normal MRI of the brain.     Workstation performed: [id number]        Imaging    MRI brain wo contrast (Order: [id number]) - 8/25/2022      MPRESSION:     No significant change from MRI abdomen studies from 4/7/2023 and 3/25/2022 without signs of primary or metastatic disease  within the abdomen.        Workstation performed: [id number]        Imaging    MRI abdomen w wo contrast (Order: [id number]) - 4/25/2024  LABS:      Results for orders placed or performed in visit on 04/24/24   Tissue Exam   Result Value Ref Range    Case Report       Surgical Pathology Report                         Case: [id number]                                  Authorizing Provider:  Ariane Somers PA-C        Collected:           04/24/2024 1629              Ordering Location:     [facility]      Received:            04/24/2024 [address] [place] [address]                                                                       Pathologist:           Breanna Siddiqui MD                                                           Specimen:    Skin, Other, Speciman A: right upper back                                                  Final Diagnosis       A. 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    Note: SOX10, HMB45, p16, and PRAME immunostains were reviewed; focal junctional melanocytic architectural disorder is seen, and the lesional cells retain expression of p16 and are negative for PRAME. Multiple levels examined.          Additional Information       All reported additional testing was performed with appropriately reactive controls.  These tests were developed and their performance characteristics determined by [facility] or appropriate performing facility, though some tests may be performed on tissues which have not been validated for performance characteristics (such as staining performed on alcohol exposed cell blocks and decalcified tissues).  Results should be interpreted with caution and in the context of the patients’ clinical condition. These tests may not be cleared or approved by the U.S. Food and Drug Administration, though the FDA has  "determined that such clearance or approval is not necessary. These tests are used for clinical purposes and they should not be regarded as investigational or for research. This laboratory has been approved by CLIA 88, designated as a high-complexity laboratory and is qualified to perform these tests.  .      Gross Description       A. The specimen is received in formalin, labeled with the patient's name and hospital number, and is designated \" right upper back.\"  It consists of a 0.5 x 0.4 cm fragment of skin, less than 0.1 cm thick.  The skin is white-tan to tan-brown.  The margin is inked green and the skin surface is inked red.  The specimen is bisected and submitted entirely in 1 cassette, between sponges.    Note: The estimated total formalin fixation time based upon information provided by the submitting clinician and the standard processing schedule is under 72 hours.    MScheib      Clinical Information       Specimen A; right upper back; skin; shave; 63 year old female with a 0.4 cm x 0.4 cm irregular pink and brown macule; DDX; rule out atypia     ATTN DERM PATH       Ref Range & Units 4/12/24 11:21 AM 8/4/23  6:47 AM   CA 27-29  0.0 - 38.6 U/mL 22.9 25.2 CM   Comment: [employer] Immunochemiluminometric Methodology (ICMA)     Comprehensive metabolic panel  Status: Final result     Comprehensive metabolic panel  Order: [id number]   Status: Final result       Visible to patient: Yes (seen)       Next appt: 05/21/2024 at 03:30 PM in Rheumatology (Harshal Johnson PA-C)       Dx: Malignant neoplasm of nipple of both ...    0 Result Notes            Component  Ref Range & Units 4/12/24 11:21 AM 2/2/24 11:11 AM 11/21/23  1:33 PM 8/4/23  6:47 AM 5/16/23  2:05 PM 2/18/23  7:25 AM 1/21/23  7:38 AM   Sodium  135 - 147 mmol/L 140 140 140 140 141 138 141   Potassium  3.5 - 5.3 mmol/L 4.3 4.0 4.0 4.3 3.8 4.3 4.3   Chloride  96 - 108 mmol/L 104 103 102 107 104 106 107   CO2  21 - 32 mmol/L 32 30 31 32 32 30 31 "   ANION GAP  4 - 13 mmol/L 4 7 R 7 R 1 R 5 2 Low  3 Low    BUN  5 - 25 mg/dL 19 21 16 21 18 19 20   Creatinine  0.60 - 1.30 mg/dL 0.64 0.65 CM 0.64 CM 0.85 CM 0.69 CM 0.71 CM 0.71 CM   Comment: Standardized to IDMS reference method   Glucose, Fasting  65 - 99 mg/dL 80 92  90 CM  83 CM 90 CM   Calcium  8.4 - 10.2 mg/dL 9.3 9.5 9.6 9.2 R 9.4 9.3 R 9.1 R   AST  13 - 39 U/L 20 33 16 16 R, CM 18 12 R, CM 12 R, CM   ALT  7 - 52 U/L 24 39 CM 12 CM 22 R, CM 14 CM 21 R, CM 20 R, CM   Comment: Specimen collection should occur prior to Sulfasalazine administration due to the potential for falsely depressed results.   Alkaline Phosphatase  34 - 104 U/L 65 65 68 64 R 60 65 R 72 R   Total Protein  6.4 - 8.4 g/dL 6.9 7.3 7.3 6.9 7.3 7.2 7.1   Albumin  3.5 - 5.0 g/dL 4.4 4.6 4.7 3.8 4.6 3.9 3.9   Total Bilirubin  0.20 - 1.00 mg/dL 0.43 0.55 CM 0.55 CM 0.56 CM 0.55 CM 0.38 CM 0.34 CM   Comment: Use of this assay is not recommended for patients undergoing treatment with eltrombopag due to the potential for falsely elevated results.  N-acetyl-p-benzoquinone imine (metabolite of Acetaminophen) will generate erroneously low results in samples for patients that have taken an overdose of Acetaminophen.   eGFR  ml/min/1.73sq m 96 96 97 74 94 92 92                   CBC and differential  Status: Final result     CBC and differential  Order: [id number]   Status: Final result       Visible to patient: Yes (seen)       Next appt: 05/21/2024 at 03:30 PM in Rheumatology (Harshal Johnson PA-C)       Dx: Malignant neoplasm of nipple of both ...    0 Result Notes            Component  Ref Range & Units 4/12/24 11:21 AM 11/21/23  1:33 PM 8/4/23  6:47 AM 5/16/23  2:05 PM 2/18/23  7:25 AM 1/21/23  7:38 AM 8/12/22  6:39 AM   WBC  4.31 - 10.16 Thousand/uL 5.19 5.87 4.43 7.48 4.93 5.00 5.27   RBC  3.81 - 5.12 Million/uL 4.25 4.49 4.27 4.31 4.39 4.20 4.55   Hemoglobin  11.5 - 15.4 g/dL 13.2 13.9 13.3 13.4 13.3 12.8 14.1   Hematocrit  34.8 - 46.1 % 41.5  43.2 40.5 41.2 42.1 40.8 43.9   MCV  82 - 98 fL 98 96 95 96 96 97 97   MCH  26.8 - 34.3 pg 31.1 31.0 31.1 31.1 30.3 30.5 31.0   MCHC  31.4 - 37.4 g/dL 31.8 32.2 32.8 32.5 31.6 31.4 32.1   RDW  11.6 - 15.1 % 13.9 13.4 14.0 13.9 13.9 14.0 13.7   MPV  8.9 - 12.7 fL 10.2 9.4 9.9 9.0 9.5 9.9 10.1   Platelets  149 - 390 Thousands/uL 293 242 247 237 259 248 258   nRBC  /100 WBCs 0 0 0 0 0 0 0   Segmented %  43 - 75 % 62 71 58 76 High  69 62 58   Immature Grans %  0 - 2 % 0 0 0 0 0 0 0   Lymphocytes %  14 - 44 % 26 20 28 16 22 28 31   Monocytes %  4 - 12 % 10 7 11 7 8 8 8   Eosinophils Relative  0 - 6 % 1 1 2 1 1 1 2   Basophils Relative  0 - 1 % 1 1 1 0 0 1 1   Absolute Neutrophils  1.85 - 7.62 Thousands/µL 3.20 4.17 2.57 5.63 3.38 3.12 3.05   Absolute Immature Grans  0.00 - 0.20 Thousand/uL 0.02 0.01 0.01 0.03 0.01 0.01 0.01   Absolute Lymphocytes  0.60 - 4.47 Thousands/µL 1.36 1.19 1.22 1.23 1.08 1.40 1.63   Absolute Monocytes  0.17 - 1.22 Thousand/µL 0.51 0.41 0.49 0.52 0.38 0.39 0.43   Eosinophils Absolute  0.00 - 0.61 Thousand/µL 0.07 0.05 0.10 0.04 0.06 0.05 0.11   Basophils Absolute  0.00 - 0.10 Thousands/µL 0.03 0.04 0.04 0.03 0.02 0.03 0.04              Specimen Collected: 04/12/24 11:21 AM Last Resulted: 04/12/24  4:02 PM                 Labs, Imaging, & Other studies:   All pertinent labs and imaging studies were personally reviewed      Reviewed test results and discussed with patient.    Assessment and plan:     Continuation of care for bilateral breast cancers, melanoma in situ and positive test  for BRCA2 gene mutation .  In 2008 patient had  bilateral mastectomies for hormone receptor positive HER2 negative stage II A cancer in the right breast and stage I A cancer in the left breast and 1 positive lymph node in right axilla.  Patient had reconstruction surgeries, 4 cycles of Taxotere plus Cytoxan followed by 5 years of Femara that she finished in September 2013. She had ITZEL and BSO.    She has been aware  of cancer risks because of BRCA2 mutation.  She goes for pancreatic checkup with MRI of abdomen and that would also check peritoneal cancer.  She  goes to her dermatologist for screening of skin for melanoma.  She goes to her ophthalmologist for ocular melanoma screening.  Patient had bilateral cataract surgeries recently with very good results.  Patient states her family has been aware of her  positive  BRCA 2.    She has  family history of breast and ovarian cancer.  Patient has history of  dermatomyositis .  She has been on methotrexate and folic acid for the last 3 years.  She follows with her rheumatologist.       , Patient had a 9 mm nodule in the right lower lung and she had wedge resection and that was negative for malignancy. Patient follows with the her lung specialist for mild residual ground-glass opacity in right lower lobe and that has improved .  .She has renal and liver cysts and also tiny  polyp in gallbladder again monitored with MRI scan.    .  She had colonoscopy in February 2021 and she states that was clear.   .  She has small lymph nodes in left submandibular area and they are being monitored by her primary physician by  ultrasound of the neck.    She has some tiredness.  She has dryness of the eyes and mouth.  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            Physical examination and test results are as recorded and discussed.  She has BRCA2 mutation. Breast cancers  remain in remission.  She gets checked for other cancers like melanoma, pancreatic cancer and peritoneal cancer and others.  She had ITZEL and BSO.  She has history of melanoma in situ.  She follows with her primary physician and other consultants.    .  All discussed in detail.  Questions answered.  Discussed the importance of eating healthy foods, staying active and health screening test.  Patient is capable of self-care.  Discussed precautions against coronavirus and other  infections.   .  Goal is cure from breast cancers and melanoma and early  detection of other cancers mentioned above.  .  See diagnoses, instructions and orders below      1. Malignant neoplasm of nipple of both breasts in female, estrogen receptor positive (HCC)    - CBC and differential; Future  - Comprehensive metabolic panel; Future  - Cancer antigen 27.29; Future    2. BRCA2 gene mutation positive      3. History of melanoma      4. Dermatomyositis (HCC)      5. Carrier of high risk cancer gene mutation         Blood work prior to next visit in 6 months    I used a dictation device to dictate this note and there could be mistakes in my note and for that she may call my office        Patient voiced understanding and agreed       Counseling / Coordination of Care   . .  Provided counseling and support

## 2024-05-21 ENCOUNTER — OFFICE VISIT (OUTPATIENT)
Dept: RHEUMATOLOGY | Facility: CLINIC | Age: 64
End: 2024-05-21
Payer: COMMERCIAL

## 2024-05-21 VITALS
HEIGHT: 64 IN | WEIGHT: 156 LBS | BODY MASS INDEX: 26.63 KG/M2 | DIASTOLIC BLOOD PRESSURE: 80 MMHG | SYSTOLIC BLOOD PRESSURE: 122 MMHG

## 2024-05-21 DIAGNOSIS — M85.89 OSTEOPENIA OF MULTIPLE SITES: ICD-10-CM

## 2024-05-21 DIAGNOSIS — R76.8 SS-A ANTIBODY POSITIVE: ICD-10-CM

## 2024-05-21 DIAGNOSIS — M33.13 DERMATOMYOSITIS (HCC): Primary | ICD-10-CM

## 2024-05-21 DIAGNOSIS — Z79.899 HIGH RISK MEDICATION USE: ICD-10-CM

## 2024-05-21 PROCEDURE — 99214 OFFICE O/P EST MOD 30 MIN: CPT | Performed by: PHYSICIAN ASSISTANT

## 2024-05-21 NOTE — PROGRESS NOTES
Assessment and Plan:   Ms. Mandujano is a 63-year-old female who presents for biopsy-proven a myopathic dermatomyositis.    Tran remains stable on MTX 15 mg once weekly without any systemic manifestations aside from some intermittent rashes on the mid back and chest area.  These have not worsened over time.  We will continue the same management for the time being and if she experiences any worsening symptoms or systemic manifestations, she will reach out to me.  Continue to obtain every 12 week high-risk medication monitoring labs for toxicity monitoring and and complement levels to be obtained with the next set.    Continue calcium and vitamin D for osteopenia.  Next DEXA due 3/2026.    Follow-up in 6 months, or sooner as needed    Plan:  Diagnoses and all orders for this visit:    Dermatomyositis (HCC)  -     C4 complement  -     C3 complement    High risk medication use    SS-A antibody positive    Osteopenia of multiple sites        I have personally reviewed prior notes, recent laboratory results, and pertinent films in PACS.     Activities as tolerated.   Exercise: try to maintain a low impact exercise regimen as much as possible. Walk for 30 minutes a day for at least 3 days a week.   Continue other medications as prescribed by PCP and other specialists.       RTC in 6 months    Rheumatic Disease Summary:  1. H/o Dermatomyositis (?amyopathic, +SSA, ?Sjogren’s)  -Rheum eval 1/26/22 for h/o dermatomyositis, previously saw Dr. Cummings, records reviewed   -Onset of burning erythematous anterior chest rash around 2011, ultimately had skin biopsy in 2015 which was apparently suggestive of DM vs mucinous eruption; rheum labs at that time negative for THELMA with panel, myositis panel, normal CK 52, also had negative EMG at that time, never had muscle biopsy or sx of muscle involvement   -She was on HCQ for 3 years before MTX, but came off because of a dark staining on the skin from her neck down her shoulders. This  resolved after she stopped.  She then got improvement with methotrexate and has been on it ever since then.   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  -Visit 11/21/2023: Burning rashes on chest and mid back.  Update labs and likely increase MTX to 15 mg weekly.  -Visit 5/21/2024: Continue MTX 15 weekly  2. Osteopenia   -DEXA 5/11/21: T -2.1 L FN, -1.9 lumbar, stable at lumbar and decreased at L hip, FRAX 1/9% for hip/major OP fracture   -DEXA 3/22/2024: LS T -2.2, left total hip T -0.8, left FN T -2.1, left forearm T -2.1.  FRAX 2.3% / 17%.  -DEXA due 3/2026  3.  Osteoarthritis  -XR right foot 8/15/2023: Moderate to severe osteoarthritic degenerative changes of the first tarsometatarsal joint with overlying soft tissue swelling.   4. Comorbidities: BRCA mutation, h/o B/L breast cancer 2008 s/p mastectomies/chemo/femara, s/p ITZEL/BSO, anxiety, h/o benign lung nodule resection       HPI  Ms. Mandujano is a 63-year-old female who presents for biopsy-proven a myopathic dermatomyositis.    Notes that she followed up with podiatry in the interim and was told that the bump on the base of her right great toe is due to arthritis and the 1 on the base of the left great toe is due to a bunion.  She states that occasionally this may cause her to need to modify some poses during yoga, but overall not bothersome.  Still experiences intermittent rashes on the face, chest and mid back that come and go.  Not painful, but mild burning sensation.  Does not impact her daily activity.    Otherwise, no fevers, chills, joint pains, joint swelling, joint  stiffness    The following portions of the patient's history were reviewed and updated as appropriate: allergies, current medications, past family history, past medical history, past social history, past surgical history and problem list.      Review of Systems  Constitutional: Negative for weight change, fevers, chills, night sweats, fatigue.  ENT/Mouth: Negative for hearing changes, ear pain, nasal congestion, sinus pain, hoarseness, sore throat, rhinorrhea, swallowing difficulty.   Eyes: Negative for pain, redness, discharge, vision changes.   Cardiovascular: Negative for chest pain, SOB, palpitations.   Respiratory: Negative for cough, sputum, wheezing, dyspnea.   Gastrointestinal: Negative for nausea, vomiting, diarrhea, constipation, pain, heartburn.  Genitourinary: Negative for dysuria, urinary frequency, hematuria.   Musculoskeletal: As per HPI.  Skin: +skin rash, color changes.   Neuro: Negative for weakness, numbness, tingling, loss of consciousness.   Psych: Negative for anxiety, depression.   Heme/Lymph: Negative for easy bruising, bleeding, lymphadenopathy.        Past Medical History:   Diagnosis Date    Anesthesia complication     HR drops    Benign neoplasm of lung     Right lung lobe benign. Removed 2010.    BRCA2 positive     Breast cancer (HCC)     Breathing difficulty 2011    no breathing difficulties but patient reported drop in BP during anesthesia for appendectomy and was advised to have cardiologist consult    Cancer (HCC) 2008    Depression     Dermatomyositis (HCC)     Lung nodule     Melanoma (HCC) 2014    mid chest (insitu)    Wears glasses        Past Surgical History:   Procedure Laterality Date    APPENDECTOMY      2011    BREAST SURGERY      Bilateral Mastectomy    CATARACT EXTRACTION BILATERAL W/ ANTERIOR VITRECTOMY Bilateral 02/2024    COLONOSCOPY N/A 01/26/2016    Procedure: COLONOSCOPY;  Surgeon: David Gutierrez MD;  Location: [facility];  Service:     HYSTERECTOMY      2007     KNEE SURGERY  2007    LUNG BIOPSY      MASS EXCISION Left 07/18/2019    Procedure: MID BACK MASS EXCISION BIOPSY;  Surgeon: David Llanes MD;  Location: [facility];  Service: General    MASTECTOMY      reconstruction 2008    OOPHORECTOMY      [facility] OPEN TREATMENT RADIAL SHAFT FRACTURE Right 08/30/2023    Procedure: OPEN REDUCTION W/ INTERNAL FIXATION (ORIF) RADIUS / ULNA (WRIST);  Surgeon: Chu Chin MD;  Location: [facility];  Service: Orthopedics    SKIN BIOPSY         Social History     Socioeconomic History    Marital status: /Civil Union     Spouse name: Not on file    Number of children: Not on file    Years of education: Not on file    Highest education level: Not on file   Occupational History    Not on file   Tobacco Use    Smoking status: Never    Smokeless tobacco: Never   Vaping Use    Vaping status: Never Used   Substance and Sexual Activity    Alcohol use: No    Drug use: No    Sexual activity: Yes     Partners: Male     Birth control/protection: None     Comment: complete hysterectomy   Other Topics Concern    Not on file   Social History Narrative    Not on file     Social Determinants of Health     Financial Resource Strain: Not on file   Food Insecurity: Not on file   Transportation Needs: Not on file   Physical Activity: Not on file   Stress: Not on file   Social Connections: Not on file   Intimate Partner Violence: Not on file   Housing Stability: Not on file       Family History   Problem Relation Age of Onset    Cancer Mother         Ovarian    Ovarian cancer Mother     No Known Problems Father     BRCA2 Positive Sister     Heart disease Sister         post partum cardiomyopathy, pacer    No Known Problems Brother     Prostate cancer Maternal Uncle     Prostate cancer Maternal Uncle     BRCA2 Negative Paternal Aunt     Breast cancer Paternal Aunt     Cancer Paternal Aunt         Breast/Ovarian    Cancer Maternal Grandmother 90        skin    Skin cancer Maternal Grandmother     No Known  "Problems Paternal Grandmother     No Known Problems Paternal Grandfather     BRCA2 Positive Daughter        No Known Allergies      Current Outpatient Medications:     Calcium-Magnesium-Vitamin D (CALCIUM 500 PO), Take 1,000 mg by mouth daily., Disp: , Rfl:     cholecalciferol (VITAMIN D3) 1,000 units tablet, Take 1,000 Units by mouth daily, Disp: , Rfl:     clobetasol (TEMOVATE) 0.05 % cream, as needed  , Disp: , Rfl:     Glycerin-Hypromellose- (DRY EYE RELIEF DROPS OP), Apply to eye, Disp: , Rfl:     multivitamin (THERAGRAN) TABS, Take 1 tablet by mouth daily., Disp: , Rfl:     Cholecalciferol (VITAMIN D) 2000 UNITS CAPS, Take 2,000 Units/day by mouth (Patient not taking: Reported on 4/24/2024), Disp: , Rfl:     folic acid (FOLVITE) 1 mg tablet, Take 1 tablet (1 mg total) by mouth daily, Disp: 90 tablet, Rfl: 3    methotrexate 2.5 mg tablet, Take 6 tablets (15 mg total) by mouth once a week, Disp: 72 tablet, Rfl: 1      Objective:    Vitals:    05/21/24 1526   BP: 122/80   Weight: 70.8 kg (156 lb)   Height: 5' 4\" (1.626 m)       Physical Exam  General: Well appearing, well nourished, in no acute distress. Oriented x 3, normal mood and affect.  Ambulating without difficulty.  Skin: Very mild redness noted of the anterior and posterior upper trunk  Hair: Normal texture and distribution.  Nails: Normal color, no deformities.  HEENT:  Head: Normocephalic, atraumatic.  Eyes: Conjunctiva clear, sclera non-icteric, EOM intact.  Nose: No external lesions, mucosa non-inflamed.  Mouth: Mucous membranes moist, no mucosal lesions.  Neck: Supple  Musculoskeletal:   No tenderness to palpation or swelling of joints bilaterally to include: shoulder, elbow, wrist, MCP I-V, PIP I-V, knee, ankle  + Raised, nontender bony deformity of right great toe  + Bunion of left great toe  Neurologic: Alert and oriented. No focal neurological deficits appreciated.   Psychiatric: Normal mood and affect.       Harshal Johnson, " PAPO  Rheumatology

## 2024-06-10 DIAGNOSIS — Z79.899 HIGH RISK MEDICATION USE: ICD-10-CM

## 2024-06-10 DIAGNOSIS — M33.13 DERMATOMYOSITIS (HCC): ICD-10-CM

## 2024-06-11 RX ORDER — METHOTREXATE 2.5 MG/1
TABLET ORAL
Qty: 72 TABLET | Refills: 1 | Status: SHIPPED | OUTPATIENT
Start: 2024-06-11 | End: 2024-09-09

## 2024-06-19 ENCOUNTER — APPOINTMENT (OUTPATIENT)
Dept: LAB | Age: 64
End: 2024-06-19
Payer: COMMERCIAL

## 2024-06-25 ENCOUNTER — ESTABLISHED COMPREHENSIVE EXAM (OUTPATIENT)
Dept: URBAN - METROPOLITAN AREA CLINIC 6 | Facility: CLINIC | Age: 64
End: 2024-06-25

## 2024-06-25 DIAGNOSIS — D31.31: ICD-10-CM

## 2024-06-25 DIAGNOSIS — H35.413: ICD-10-CM

## 2024-06-25 DIAGNOSIS — Z96.1: ICD-10-CM

## 2024-06-25 PROCEDURE — 92014 COMPRE OPH EXAM EST PT 1/>: CPT

## 2024-06-25 ASSESSMENT — KERATOMETRY
OS_AXISANGLE_DEGREES: 163
OD_K2POWER_DIOPTERS: 44.50
OD_AXISANGLE2_DEGREES: 83
OD_K1POWER_DIOPTERS: 43.25
OS_AXISANGLE2_DEGREES: 73
OS_K2POWER_DIOPTERS: 45.25
OS_K1POWER_DIOPTERS: 43.75
OD_AXISANGLE_DEGREES: 173

## 2024-06-25 ASSESSMENT — VISUAL ACUITY
OD_SC: 20/20-2
OS_SC: 20/20-1
OU_SC: J1

## 2024-06-25 ASSESSMENT — TONOMETRY
OD_IOP_MMHG: 10
OS_IOP_MMHG: 10

## 2024-07-01 ENCOUNTER — TELEPHONE (OUTPATIENT)
Age: 64
End: 2024-07-01

## 2024-07-01 NOTE — TELEPHONE ENCOUNTER
Patient called as she received the letter about Harshal Johnson and her needing to reschedule her appointment.    Patient is now scheduled with  on 11/14/24 at 2:15 PM in [place] for her 6 month follow up visit. Thank you

## 2024-07-03 ENCOUNTER — PROCEDURE VISIT (OUTPATIENT)
Dept: DERMATOLOGY | Facility: CLINIC | Age: 64
End: 2024-07-03
Payer: COMMERCIAL

## 2024-07-03 DIAGNOSIS — D22.9 ATYPICAL NEVUS: Primary | ICD-10-CM

## 2024-07-03 DIAGNOSIS — D84.9 IMMUNOSUPPRESSION (HCC): ICD-10-CM

## 2024-07-03 PROCEDURE — 88305 TISSUE EXAM BY PATHOLOGIST: CPT | Performed by: STUDENT IN AN ORGANIZED HEALTH CARE EDUCATION/TRAINING PROGRAM

## 2024-07-03 PROCEDURE — 11402 EXC TR-EXT B9+MARG 1.1-2 CM: CPT | Performed by: DERMATOLOGY

## 2024-07-03 PROCEDURE — 99213 OFFICE O/P EST LOW 20 MIN: CPT | Performed by: DERMATOLOGY

## 2024-07-03 PROCEDURE — 12032 INTMD RPR S/A/T/EXT 2.6-7.5: CPT | Performed by: DERMATOLOGY

## 2024-07-03 RX ORDER — MUPIROCIN 20 MG/G
OINTMENT TOPICAL 3 TIMES DAILY
Qty: 30 G | Refills: 0 | Status: SHIPPED | OUTPATIENT
Start: 2024-07-03

## 2024-07-03 NOTE — PATIENT INSTRUCTIONS
POSTOP DISCUSSION DISCUSSION AND INSTRUCTIONS FOR PATIENT      Rationale for Procedure  A skin excision allows the dermatologist to remove a lesion. The procedure involves a local numbing medication and removing the entire lesion. Typically, the lesion is being removed because it is atypical, traumatized, or for significant appearance reasons.  The area will be open like a brush burn and allowed to heal.   There will be no sutures.Tissue is sent to pathologist who will reconfirm diagnosis and verify completeness of lesion removal.    Description of Procedure  We would like to perform a skin excision today.  A local anesthetic, similar to the kind that a dentist uses when filling a cavity, will be injected with a very small needle into the skin area to be sampled.  The injected skin and tissue underneath should “go to sleep” and become numbed so that no further pain should be felt.  A scalpel will be used to cut around the lesion and tissue will be submitted to pathology for examination.  Depending on the diagnosis the lesion will be excised with a certain amount of normal skin to help assure completeness of lesion removal.  The physician will discuss in advance the anticipated size and extent of removal.   Bleeding will occur, but it will stopped with direct pressure, sutures, and electrocautery.    Surgical “Vaseline-type” ointment will also applied after the procedure to help create a barrier between the wound and the outside world.      Risks and Potential Complications  The advantage of a skin excision is that it allows us to remove a problem lesion quickly.  Although this usually permits the lesion to heal as soon as possible with the least scarring, there are some risks and potential complications that include but are not limited to the following:  Some bleeding is normal at time of procedure and some bleeding on gauze is normal  the first few days after surgery.  Profuse bleeding and bleeding with swelling  and pain should be reported as detailed  below  Infection is uncommon in skin surgery.  Infection should be reported and is indicated by pain, redness, and discharge of purulent material.  Some dull to at time sharp pain could occur initially the day after surgery.  Persistent pain or increasing pain days after surgery is not expected and should be reported.  Every effort is made to minimize scar, but location, size, and genetics do play a role in scar appearance.  A surgical wound does not achieve its optimal appearance until 6 months.  There are several treatments available if scarring would be problematic including scar creams, silicone pad, laser and scar revision.  Skin discoloration can occur especially in people of color.  Its important to avoid sun on wound in first 6 months after surgery.  Treatment is available if pigment is problematic.  Incomplete removal of the lesion or recurrence of lesion can occur and this would then require further treatment and more surgery.  Nerve Damage/Numbness/Loss of Function is very rare, but is most likely to occur if lesion is large or if it is in a high risk location  Allergic Reaction to lidocaine is rare.  More commonly,  epinephrine is used  with the lidocaine.  Occasionally, epinephrine (aka adrenalin) may cause a brief  feeling of anxiety or jitteriness.  The person at the microscope  (pathologist) may provide additional information that was unexpected. This unexpected finding could provoke the need for additional treatment or evaluation.    What You Will Need to Do After the Procedure  Keep the area clean and dry the first day. Try NOT to remove the bandage for the first day.  Gently clean the area with soap and water and apply Vaseline ointment (this is over the counter and not a prescription) to the excision  site for up to 2 weeks.    Apply a clean appropriately sized bandage to area.  Gauze and paper tape are recommended for sensitive skin.  Return for suture  removal as instructed (generally 1 week for the face, 2 weeks for the body).  Take Acetaminophen (Tylenol) for discomfort, if no contraindications.  Do NOT take Ibuprofen or aspirin unless specifically told to do so by your Dermatologist because these medications can make bleeding worse.  Call our office immediately for signs of infection: fever, chills, increased redness, warmth, tenderness, discomfort/pain, or pus or foul smell coming from the wound.    If bleeding is noticed, place a clean cloth over the area and apply firm pressure for thirty minutes.  Check the wound ONLY after 30 minutes of direct pressure; do not cheat and sneak a peak, as that does not count.  If bleeding persists after 30 minutes of legitimate direct pressure, then try one more round of direct pressure for an additional 10 minutes to the area.  Should the bleeding become heavier or not stop after the second attempt, call [facility] directly at [phone] (SKIN) or, if after hours, go to your local Emergency Room/Emergency Department.

## 2024-07-03 NOTE — PROGRESS NOTES
"[facility] Dermatology Clinic Note     Patient Name: Tran Mandujano  Encounter Date: 7/3/24     Have you been cared for by a [facility] Dermatologist in the last 3 years and, if so, which description applies to you?    Yes.  I have been here within the last 3 years, and my medical history has NOT changed since that time.  I am FEMALE/of child-bearing potential.    REVIEW OF SYSTEMS:  Have you recently had or currently have any of the following? No changes in my recent health.   PAST MEDICAL HISTORY:  Have you personally ever had or currently have any of the following?  If \"YES,\" then please provide more detail. No changes in my medical history.   HISTORY OF IMMUNOSUPPRESSION: Do you have a history of any of the following:  Systemic Immunosuppression such as Diabetes, Biologic or Immunotherapy, Chemotherapy, Organ Transplantation, Bone Marrow Transplantation?  No     Answering \"YES\" requires the addition of the dotphrase \"IMMUNOSUPPRESSED\" as the first diagnosis of the patient's visit.   FAMILY HISTORY:  Any \"first degree relatives\" (parent, brother, sister, or child) with the following?    No changes in my family's known health.   PATIENT EXPERIENCE:    Do you want the Dermatologist to perform a COMPLETE skin exam today including a clinical examination under the \"bra and underwear\" areas?  NO  If necessary, do we have your permission to call and leave a detailed message on your Preferred Phone number that includes your specific medical information?  Yes      No Known Allergies   Current Outpatient Medications:     Calcium-Magnesium-Vitamin D (CALCIUM 500 PO), Take 1,000 mg by mouth daily., Disp: , Rfl:     cholecalciferol (VITAMIN D3) 1,000 units tablet, Take 1,000 Units by mouth daily, Disp: , Rfl:     clobetasol (TEMOVATE) 0.05 % cream, as needed  , Disp: , Rfl:     folic acid (FOLVITE) 1 mg tablet, Take 1 tablet (1 mg total) by mouth daily, Disp: 90 tablet, Rfl: 3    Glycerin-Hypromellose- (DRY EYE " "RELIEF DROPS OP), Apply to eye, Disp: , Rfl:     methotrexate 2.5 MG tablet, TAKE 6 TABLETS(15 MG) BY MOUTH 1 TIME A WEEK, Disp: 72 tablet, Rfl: 1    multivitamin (THERAGRAN) TABS, Take 1 tablet by mouth daily., Disp: , Rfl:     mupirocin (BACTROBAN) 2 % ointment, Apply topically 3 (three) times a day For 10 days, Disp: 30 g, Rfl: 0    Cholecalciferol (VITAMIN D) 2000 UNITS CAPS, Take 2,000 Units/day by mouth (Patient not taking: Reported on 4/24/2024), Disp: , Rfl:           Whom besides the patient is providing clinical information about today's encounter?   NO ADDITIONAL HISTORIAN (patient alone provided history)    Physical Exam and Assessment/Plan by Diagnosis:        Immunosuppressed  BRCA2 positive  DERMATOMYOSITIS on METHOTREXATE   Hx of Melanoma in situ    Main Reason for Immunosuppression:    Biologic or Immunologic/Immunomodulating Therapy      Plan:  This diagnosis of \"Immunosuppression (HCC) [ID:  080005\" should be added to the patient's permanent PROBLEM LIST  Patient requires full skin exams every 6 with Dr/NP/PA. Sees someone outside of network         PROCEDURE:  EXCISION WITH INTERMEDIATE LAYERED CLOSURE     Attending:   Assistant:  AISHA Perez    Pre-Op Diagnosis: Severely Atypical Nevus  Post-Op Diagnosis: Same   Benign versus Malignant Benign      Lesion Anatomic Location: Right upper Back (Previous Accession Number: [id number] )  Pre-op size: 5 mm x 7 mm  Size of defect:  1.5 cm x 1.7 cm (with 0.5 centimeter margins)  Final repaired wound length:  5.6 cm    Written and verbal, witnessed informed consent was obtained. I discussed that excision is a method of removing lesions both benign and malignant lesions.  A portion of normal skin is often taken to ensure completeness of removal.  I reviewed that procedure will include numbing the area,  cutting around and under defect, undermining tissue, and closing the wound with sutures both inside and out.  These sutures are usually removed in 7 to " 14 days. Risks (bleeding, pain, infection, scarring, recurrence) and benefits discussed. It was discussed with patient that every effort is made to minimize scar, but scarring is influenced also by extrinsic factor such as location, age and genetics.     Time Out: performed:  yes  Correct patient: yes.   Correct site per Clinic Report: yes  Correct site per Patient Report: yes    LOCAL ANESTHESIA: 3:1 1% xylocaine with epi and 1-100,000 buffered    DESCRIPTION OF PROCEDURE: The patient was brought back into the procedure room, anesthetized locally, prepped and draped in the usual fashion. Using a #15 blade with a scalpel, the lesion was excised in elliptical fashion. The wound was  undermined in the  fascial plane. Hemostasis was achieved with light electrocoagulation. Purpose of undermining was to decrease wound tension and facilitate closure.    The wound was closed with subcutaneous sutures as follows:    Deep suture:4-0 Vicryl      Epidermal edge closure was accomplished with superficial sutures as follows:    Superficial suture: 4-0 Prolene  Superficial suture type: Interrupted    Estimated blood loss less than 3ml.    The patient tolerated the procedure well without any complications. The wound was cleaned with sterile saline, dried off, surgical vaseline ointment was applied, and the wound was covered. A pressure dressing was applied for stabilization and light pressure. The patient was given detailed oral and written instructions on postoperative care as detailed in consent. The patient left in good medical condition.    POSTOP DISCUSSION DISCUSSION AND INSTRUCTIONS FOR PATIENT      Rationale for Procedure  A skin excision allows the dermatologist to remove a lesion. The procedure involves a local numbing medication and removing the entire lesion. Typically, the lesion is being removed because it is atypical, traumatized, or for significant appearance reasons.  The area will be open like a brush burn and  allowed to heal.   There will be no sutures.Tissue is sent to pathologist who will reconfirm diagnosis and verify completeness of lesion removal.    Description of Procedure  We would like to perform a skin excision today.  A local anesthetic, similar to the kind that a dentist uses when filling a cavity, will be injected with a very small needle into the skin area to be sampled.  The injected skin and tissue underneath should “go to sleep” and become numbed so that no further pain should be felt.  A scalpel will be used to cut around the lesion and tissue will be submitted to pathology for examination.  Depending on the diagnosis the lesion will be excised with a certain amount of normal skin to help assure completeness of lesion removal.  The physician will discuss in advance the anticipated size and extent of removal.   Bleeding will occur, but it will stopped with direct pressure, sutures, and electrocautery.    Surgical “Vaseline-type” ointment will also applied after the procedure to help create a barrier between the wound and the outside world.      Risks and Potential Complications  The advantage of a skin excision is that it allows us to remove a problem lesion quickly.  Although this usually permits the lesion to heal as soon as possible with the least scarring, there are some risks and potential complications that include but are not limited to the following:  Some bleeding is normal at time of procedure and some bleeding on gauze is normal  the first few days after surgery.  Profuse bleeding and bleeding with swelling and pain should be reported as detailed  below  Infection is uncommon in skin surgery.  Infection should be reported and is indicated by pain, redness, and discharge of purulent material.  Some dull to at time sharp pain could occur initially the day after surgery.  Persistent pain or increasing pain days after surgery is not expected and should be reported.  Every effort is made to  minimize scar, but location, size, and genetics do play a role in scar appearance.  A surgical wound does not achieve its optimal appearance until 6 months.  There are several treatments available if scarring would be problematic including scar creams, silicone pad, laser and scar revision.  Skin discoloration can occur especially in people of color.  Its important to avoid sun on wound in first 6 months after surgery.  Treatment is available if pigment is problematic.  Incomplete removal of the lesion or recurrence of lesion can occur and this would then require further treatment and more surgery.  Nerve Damage/Numbness/Loss of Function is very rare, but is most likely to occur if lesion is large or if it is in a high risk location  Allergic Reaction to lidocaine is rare.  More commonly,  epinephrine is used  with the lidocaine.  Occasionally, epinephrine (aka adrenalin) may cause a brief  feeling of anxiety or jitteriness.  The person at the microscope  (pathologist) may provide additional information that was unexpected. This unexpected finding could provoke the need for additional treatment or evaluation.    What You Will Need to Do After the Procedure  Keep the area clean and dry the first day. Try NOT to remove the bandage for the first day.  Gently clean the area with soap and water and apply Vaseline ointment (this is over the counter and not a prescription) to the excision  site for up to 2 weeks.    Apply a clean appropriately sized bandage to area.  Gauze and paper tape are recommended for sensitive skin.  Return for suture removal as instructed (generally 1 week for the face, 2 weeks for the body).  Take Acetaminophen (Tylenol) for discomfort, if no contraindications.  Do NOT take Ibuprofen or aspirin unless specifically told to do so by your Dermatologist because these medications can make bleeding worse.  Call our office immediately for signs of infection: fever, chills, increased redness, warmth,  tenderness, discomfort/pain, or pus or foul smell coming from the wound.    If bleeding is noticed, place a clean cloth over the area and apply firm pressure for thirty minutes.  Check the wound ONLY after 30 minutes of direct pressure; do not cheat and sneak a peak, as that does not count.  If bleeding persists after 30 minutes of legitimate direct pressure, then try one more round of direct pressure for an additional 10 minutes to the area.  Should the bleeding become heavier or not stop after the second attempt, call [facility] Dermatology directly at [phone] (SKIN) or, if after hours, go to your local Emergency Room/Emergency Department.      Scribe Attestation      I,:  Sebastien Ibanez am acting as a scribe while in the presence of the attending physician.:       I,:  Case Millard MD personally performed the services described in this documentation    as scribed in my presence.:           Annette Schultz  PGY3 Dermatology Resident

## 2024-07-09 PROCEDURE — 88305 TISSUE EXAM BY PATHOLOGIST: CPT | Performed by: STUDENT IN AN ORGANIZED HEALTH CARE EDUCATION/TRAINING PROGRAM

## 2024-07-11 NOTE — RESULT ENCOUNTER NOTE
DERMATOPATHOLOGY RESULT NOTE    Results reviewed by ordering physician.  Called patient to personally discuss results. No answer, left voicemail with result. Pt has next appt 12/17/24 with Dr. Smiley      Instructions for Clinical Derm Team:   (remember to route Result Note to appropriate staff):    None    Result & Plan by Specimen:    Specimen A: re-excision of atypical nevus  Plan: margins clear    Tissue Exam: [id number]  Order: [id number]   Collected 7/3/2024  4:49 PM      Status: Final result      Visible to patient: Yes (seen)      Dx: Atypical nevus    0 Result Notes     Component   Case Report  Surgical Pathology Report                         Case: [id number]                                  Authorizing Provider:  Annette Schultz MD            Collected:           07/03/2024 1649              Ordering Location:     [facility]      Received:            07/03/2024 [address] [place] [address]                                                                      Pathologist:           Breanna Siddiqui MD                                                          Specimen:    Skin, Other, Specimen A: Right Upper Back                                                Final Diagnosis  A. Skin, right upper back, excision:    -Prior procedure site changes present.    -Residual atypical nevus not seen.      Electronically signed by Breanna Siddiqui MD on 7/9/2024 at 11:11 AM

## 2024-07-17 ENCOUNTER — CLINICAL SUPPORT (OUTPATIENT)
Dept: DERMATOLOGY | Facility: CLINIC | Age: 64
End: 2024-07-17

## 2024-07-17 DIAGNOSIS — Z48.02 ENCOUNTER FOR REMOVAL OF SUTURES: Primary | ICD-10-CM

## 2024-07-17 PROCEDURE — RECHECK

## 2024-07-17 NOTE — PROGRESS NOTES
"Suture removal    Date/Time: 7/17/2024 1:45 PM    Performed by: Gerry Trujillo RN  Authorized by: Case Millard MD  Universal Protocol:  Consent: Verbal consent obtained. Written consent not obtained.  Risks and benefits: risks, benefits and alternatives were discussed  Consent given by: patient  Time out: Immediately prior to procedure a \"time out\" was called to verify the correct patient, procedure, equipment, support staff and site/side marked as required.  Timeout called at: 7/17/2024 1:35 PM.  Patient understanding: patient states understanding of the procedure being performed  Patient consent: the patient's understanding of the procedure matches consent given  Procedure consent: procedure consent matches procedure scheduled  Relevant documents: relevant documents not present or verified  Test results: test results not available  Site marked: the operative site was not marked  Radiology Images displayed and confirmed. If images not available, report reviewed: imaging studies not available  Patient identity confirmed: verbally with patient      Patient location:  Clinic  Location:     Laterality:  Right    Location:  Upper extremity    Upper extremity location:  Shoulder    Shoulder location:  R shoulder  Procedure details:     Tools used:  Suture removal kit    Wound appearance:  No sign(s) of infection, good wound healing, clean, moist, pink, nontender and nonpurulent    Number of sutures removed:  9  Post-procedure details:     Post-removal:  Steri-Strips applied and Band-Aid applied (Vaseline to half. Benzoin to half.)    Patient tolerance of procedure:  Tolerated well, no immediate complications  Comments:      Patient was encouraged to continue to clean and care for the wound until fully healed. Patient was encouraged to continue to follow up for regular full body skin exams as scheduled.         "

## 2024-08-20 ENCOUNTER — OFFICE VISIT (OUTPATIENT)
Dept: INTERNAL MEDICINE CLINIC | Facility: CLINIC | Age: 64
End: 2024-08-20
Payer: COMMERCIAL

## 2024-08-20 VITALS
OXYGEN SATURATION: 97 % | WEIGHT: 158 LBS | SYSTOLIC BLOOD PRESSURE: 120 MMHG | HEART RATE: 55 BPM | BODY MASS INDEX: 26.98 KG/M2 | DIASTOLIC BLOOD PRESSURE: 72 MMHG | HEIGHT: 64 IN

## 2024-08-20 DIAGNOSIS — Z00.00 WELLNESS EXAMINATION: ICD-10-CM

## 2024-08-20 DIAGNOSIS — Z23 ENCOUNTER FOR IMMUNIZATION: Primary | ICD-10-CM

## 2024-08-20 PROCEDURE — 99396 PREV VISIT EST AGE 40-64: CPT | Performed by: INTERNAL MEDICINE

## 2024-08-20 NOTE — PROGRESS NOTES
Ambulatory Visit  Name: Tran Mandujano      : 1960      MRN: [id number]  Encounter Provider: Quentin Goff MD  Encounter Date: 2024   Encounter department: [facility]    Assessment & Plan   1. Encounter for immunization  2. Wellness examination  Assessment & Plan:  Discussed preventative health, cancer screening, immunizations, and safety issues.  Last colonoscopy 2021 with recommendations recheck again in 5 years.  Patient had the Shingrix vaccines.  Last Tdap vaccination , recommend repeating, patient can get this at the pharmacy.  Patient had bilateral mastectomies .  Patient had DEXA scan 2024.         History of Present Illness     I am seeing patient in coverage today, patient wants a physical done    Wellness: No smoking cigarettes, patient eats a healthy diet, exercises, patient gets routine dental care.  Patient does yoga for exercise and stretching also.      Review of Systems   Constitutional:  Negative for chills, fatigue and fever.   HENT:  Negative for congestion, nosebleeds, postnasal drip, sore throat and trouble swallowing.    Eyes:  Negative for pain.   Respiratory:  Negative for cough, chest tightness, shortness of breath and wheezing.    Cardiovascular:  Negative for chest pain, palpitations and leg swelling.   Gastrointestinal:  Negative for abdominal pain, constipation, diarrhea, nausea and vomiting.   Endocrine: Negative for polydipsia and polyuria.   Genitourinary:  Negative for dysuria, flank pain and hematuria.   Musculoskeletal:  Negative for arthralgias, back pain and myalgias.   Skin:  Negative for rash.   Neurological:  Negative for dizziness, tremors, light-headedness and headaches.   Hematological:  Does not bruise/bleed easily.   Psychiatric/Behavioral:  Negative for confusion and dysphoric mood. The patient is not nervous/anxious.      Past Medical History:   Diagnosis Date   • Anesthesia complication     HR drops   •  Benign neoplasm of lung     Right lung lobe benign. Removed 2010.   • BRCA2 positive    • Breast cancer (HCC)    • Breathing difficulty 2011    no breathing difficulties but patient reported drop in BP during anesthesia for appendectomy and was advised to have cardiologist consult   • Cancer (HCC) 2008   • Depression    • Dermatomyositis (HCC)    • Lung nodule    • Melanoma (HCC) 2014    mid chest (insitu)   • Wears glasses      Past Surgical History:   Procedure Laterality Date   • APPENDECTOMY      2011   • BREAST SURGERY      Bilateral Mastectomy   • CATARACT EXTRACTION BILATERAL W/ ANTERIOR VITRECTOMY Bilateral 02/2024   • COLONOSCOPY N/A 01/26/2016    Procedure: COLONOSCOPY;  Surgeon: David Gutierrez MD;  Location: [facility];  Service:    • FRACTURE SURGERY  August, 2024   • HYSTERECTOMY      2007   • KNEE SURGERY  2007   • LUNG BIOPSY     • LYMPH NODE BIOPSY     • MASS EXCISION Left 07/18/2019    Procedure: MID BACK MASS EXCISION BIOPSY;  Surgeon: David Llanes MD;  Location: [facility];  Service: General   • MASTECTOMY      reconstruction 2008   • OOPHORECTOMY     • CO OPEN TREATMENT RADIAL SHAFT FRACTURE Right 08/30/2023    Procedure: OPEN REDUCTION W/ INTERNAL FIXATION (ORIF) RADIUS / ULNA (WRIST);  Surgeon: Chu Chin MD;  Location: [facility];  Service: Orthopedics   • SKIN BIOPSY       Family History   Problem Relation Age of Onset   • Cancer Mother         Ovarian   • Ovarian cancer Mother    • No Known Problems Father    • BRCA2 Positive Sister    • Heart disease Sister         post partum cardiomyopathy, pacer   • No Known Problems Brother    • Prostate cancer Maternal Uncle    • Prostate cancer Maternal Uncle    • BRCA2 Negative Paternal Aunt    • Breast cancer Paternal Aunt    • Cancer Paternal Aunt         Breast/Ovarian   • Cancer Maternal Grandmother 90        skin   • Skin cancer Maternal Grandmother    • No Known Problems Paternal Grandmother    • No Known Problems Paternal Grandfather     • BRCA2 Positive Daughter      Social History     Tobacco Use   • Smoking status: Never   • Smokeless tobacco: Never   Vaping Use   • Vaping status: Never Used   Substance and Sexual Activity   • Alcohol use: No   • Drug use: No   • Sexual activity: Yes     Partners: Male     Birth control/protection: None     Comment: complete hysterectomy     Current Outpatient Medications on File Prior to Visit   Medication Sig   • Calcium-Magnesium-Vitamin D (CALCIUM 500 PO) Take 1,000 mg by mouth daily.   • cholecalciferol (VITAMIN D3) 1,000 units tablet Take 1,000 Units by mouth daily   • clobetasol (TEMOVATE) 0.05 % cream as needed     • folic acid (FOLVITE) 1 mg tablet Take 1 tablet (1 mg total) by mouth daily   • Glycerin-Hypromellose- (DRY EYE RELIEF DROPS OP) Apply to eye   • methotrexate 2.5 MG tablet TAKE 6 TABLETS(15 MG) BY MOUTH 1 TIME A WEEK   • multivitamin (THERAGRAN) TABS Take 1 tablet by mouth daily.   • Cholecalciferol (VITAMIN D) 2000 UNITS CAPS Take 2,000 Units/day by mouth (Patient not taking: Reported on 4/24/2024)   • mupirocin (BACTROBAN) 2 % ointment Apply topically 3 (three) times a day For 10 days (Patient not taking: Reported on 8/20/2024)     No Known Allergies  Immunization History   Administered Date(s) Administered   • COVID-19 MODERNA VACC 0.25 ML IM BOOSTER 03/04/2022   • COVID-19 MODERNA VACC 0.5 ML IM 01/29/2021, 02/26/2021, 08/31/2021   • COVID-19 Pfizer Vac BIVALENT Cali-sucrose 12 Yr+ IM 09/22/2022, 08/22/2023   • H1N1, All Formulations 12/11/2009   • Hep A, adult 06/16/2000, 12/20/2000   • Hep B, adult 06/16/2000, 07/17/2000, 12/20/2000   • Influenza Quadrivalent Preservative Free 3 years and older IM 10/04/2014, 09/19/2016, 10/27/2017   • Influenza Quadrivalent, 6-35 Months IM 10/12/2015   • Influenza, injectable, quadrivalent, preservative free 0.5 mL 10/07/2023   • Influenza, recombinant, quadrivalent,injectable, preservative free 11/02/2018, 11/25/2019, 10/10/2020, 10/02/2021,  "10/01/2022   • Influenza, seasonal, injectable 09/20/2012, 09/24/2013   • Pneumococcal Polysaccharide PPV23 04/01/2008   • Td (adult), adsorbed 08/26/2003   • Tdap 09/04/2014   • Zoster Vaccine Recombinant 06/27/2019, 12/19/2019     Objective     /72   Pulse 55   Ht 5' 4\" (1.626 m)   Wt 71.7 kg (158 lb)   SpO2 97%   BMI 27.12 kg/m²     Physical Exam  Vitals reviewed.   Constitutional:       Appearance: Normal appearance. She is well-developed.   HENT:      Head: Normocephalic and atraumatic.      Right Ear: External ear normal.      Left Ear: External ear normal.      Nose: Nose normal.   Eyes:      General: No scleral icterus.     Conjunctiva/sclera: Conjunctivae normal.   Neck:      Thyroid: No thyromegaly.      Trachea: No tracheal deviation.   Cardiovascular:      Rate and Rhythm: Normal rate and regular rhythm.      Heart sounds: Normal heart sounds. No murmur heard.  Pulmonary:      Effort: No respiratory distress.      Breath sounds: Normal breath sounds. No wheezing or rales.   Abdominal:      General: Bowel sounds are normal.      Palpations: Abdomen is soft. There is no mass.      Tenderness: There is no abdominal tenderness. There is no guarding.   Musculoskeletal:      Cervical back: Normal range of motion and neck supple.      Right lower leg: No edema.      Left lower leg: No edema.   Lymphadenopathy:      Cervical: No cervical adenopathy.   Skin:     Coloration: Skin is not jaundiced or pale.   Neurological:      General: No focal deficit present.      Mental Status: She is alert and oriented to person, place, and time.   Psychiatric:         Mood and Affect: Mood normal.         Behavior: Behavior normal.         Thought Content: Thought content normal.         Judgment: Judgment normal.         "

## 2024-08-20 NOTE — ASSESSMENT & PLAN NOTE
Discussed preventative health, cancer screening, immunizations, and safety issues.  Last colonoscopy March 9, 2021 with recommendations recheck again in 5 years.  Patient had the Shingrix vaccines.  Last Tdap vaccination 2014, recommend repeating, patient can get this at the pharmacy.  Patient had bilateral mastectomies 2008.  Patient had DEXA scan March 2024.

## 2024-08-20 NOTE — PATIENT INSTRUCTIONS
Problem List Items Addressed This Visit          Other    Wellness examination     Discussed preventative health, cancer screening, immunizations, and safety issues.  Last colonoscopy March 9, 2021 with recommendations recheck again in 5 years.  Patient had the Shingrix vaccines.  Last Tdap vaccination 2014, recommend repeating, patient can get this at the pharmacy.  Patient had bilateral mastectomies 2008.  Patient had DEXA scan March 2024.          Other Visit Diagnoses       Encounter for immunization    -  Primary

## 2024-09-23 ENCOUNTER — ESTABLISHED COMPREHENSIVE EXAM (OUTPATIENT)
Dept: URBAN - METROPOLITAN AREA CLINIC 6 | Facility: CLINIC | Age: 64
End: 2024-09-23

## 2024-09-23 DIAGNOSIS — H26.493: ICD-10-CM

## 2024-09-23 DIAGNOSIS — H04.123: ICD-10-CM

## 2024-09-23 PROCEDURE — 92012 INTRM OPH EXAM EST PATIENT: CPT

## 2024-09-23 ASSESSMENT — VISUAL ACUITY
OS_SC: 20/20
OD_SC: 20/20
OU_SC: J1+

## 2024-09-23 ASSESSMENT — KERATOMETRY
OS_AXISANGLE_DEGREES: 163
OS_K1POWER_DIOPTERS: 43.75
OS_K2POWER_DIOPTERS: 45.25
OD_K2POWER_DIOPTERS: 44.50
OD_AXISANGLE_DEGREES: 173
OD_K1POWER_DIOPTERS: 43.25
OD_AXISANGLE2_DEGREES: 83
OS_AXISANGLE2_DEGREES: 73

## 2024-09-23 ASSESSMENT — TONOMETRY
OD_IOP_MMHG: 11
OS_IOP_MMHG: 11

## 2024-10-05 ENCOUNTER — IMMUNIZATIONS (OUTPATIENT)
Dept: INTERNAL MEDICINE CLINIC | Facility: CLINIC | Age: 64
End: 2024-10-05
Payer: COMMERCIAL

## 2024-10-05 DIAGNOSIS — Z23 ENCOUNTER FOR IMMUNIZATION: Primary | ICD-10-CM

## 2024-10-05 PROCEDURE — 90471 IMMUNIZATION ADMIN: CPT

## 2024-10-05 PROCEDURE — 90673 RIV3 VACCINE NO PRESERV IM: CPT

## 2024-10-25 ENCOUNTER — APPOINTMENT (OUTPATIENT)
Dept: LAB | Age: 64
End: 2024-10-25
Payer: COMMERCIAL

## 2024-10-25 DIAGNOSIS — Z17.0 MALIGNANT NEOPLASM OF NIPPLE OF BOTH BREASTS IN FEMALE, ESTROGEN RECEPTOR POSITIVE (HCC): ICD-10-CM

## 2024-10-25 DIAGNOSIS — C50.011 MALIGNANT NEOPLASM OF NIPPLE OF BOTH BREASTS IN FEMALE, ESTROGEN RECEPTOR POSITIVE (HCC): ICD-10-CM

## 2024-10-25 DIAGNOSIS — C50.012 MALIGNANT NEOPLASM OF NIPPLE OF BOTH BREASTS IN FEMALE, ESTROGEN RECEPTOR POSITIVE (HCC): ICD-10-CM

## 2024-10-25 LAB
C3 SERPL-MCNC: 115 MG/DL (ref 87–200)
C4 SERPL-MCNC: 52 MG/DL (ref 19–52)

## 2024-10-25 PROCEDURE — 86300 IMMUNOASSAY TUMOR CA 15-3: CPT

## 2024-10-26 LAB — CANCER AG27-29 SERPL-ACNC: 30 U/ML (ref 0–38.6)

## 2024-10-28 ENCOUNTER — CLINIC PROCEDURE ONLY (OUTPATIENT)
Dept: URBAN - METROPOLITAN AREA CLINIC 6 | Facility: CLINIC | Age: 64
End: 2024-10-28

## 2024-10-28 DIAGNOSIS — H26.493: ICD-10-CM

## 2024-10-28 PROCEDURE — 66821 AFTER CATARACT LASER SURGERY: CPT

## 2024-10-28 ASSESSMENT — KERATOMETRY
OD_AXISANGLE2_DEGREES: 83
OD_AXISANGLE_DEGREES: 173
OS_AXISANGLE2_DEGREES: 73
OS_AXISANGLE_DEGREES: 163
OD_K2POWER_DIOPTERS: 44.50
OD_K1POWER_DIOPTERS: 43.25
OS_K2POWER_DIOPTERS: 45.25
OS_K1POWER_DIOPTERS: 43.75

## 2024-10-28 ASSESSMENT — TONOMETRY
OD_IOP_MMHG: 10
OS_IOP_MMHG: 11

## 2024-10-28 ASSESSMENT — VISUAL ACUITY
OD_SC: 20/20
OS_SC: 20/20

## 2024-11-11 ENCOUNTER — OFFICE VISIT (OUTPATIENT)
Dept: HEMATOLOGY ONCOLOGY | Facility: CLINIC | Age: 64
End: 2024-11-11
Payer: COMMERCIAL

## 2024-11-11 VITALS
OXYGEN SATURATION: 97 % | DIASTOLIC BLOOD PRESSURE: 68 MMHG | SYSTOLIC BLOOD PRESSURE: 104 MMHG | WEIGHT: 156 LBS | TEMPERATURE: 97.4 F | RESPIRATION RATE: 16 BRPM | HEART RATE: 67 BPM | HEIGHT: 64 IN | BODY MASS INDEX: 26.63 KG/M2

## 2024-11-11 DIAGNOSIS — Z91.89 AT HIGH RISK FOR PANCREATIC CANCER: ICD-10-CM

## 2024-11-11 DIAGNOSIS — M33.13 DERMATOMYOSITIS (HCC): ICD-10-CM

## 2024-11-11 DIAGNOSIS — C50.012 MALIGNANT NEOPLASM OF NIPPLE OF BOTH BREASTS IN FEMALE, ESTROGEN RECEPTOR POSITIVE (HCC): Primary | ICD-10-CM

## 2024-11-11 DIAGNOSIS — Z14.8 CARRIER OF HIGH RISK CANCER GENE MUTATION: ICD-10-CM

## 2024-11-11 DIAGNOSIS — C50.011 MALIGNANT NEOPLASM OF NIPPLE OF BOTH BREASTS IN FEMALE, ESTROGEN RECEPTOR POSITIVE (HCC): Primary | ICD-10-CM

## 2024-11-11 DIAGNOSIS — Z85.820 HISTORY OF MELANOMA: ICD-10-CM

## 2024-11-11 DIAGNOSIS — Z15.01 BRCA2 GENE MUTATION POSITIVE: ICD-10-CM

## 2024-11-11 DIAGNOSIS — Z15.09 BRCA2 GENE MUTATION POSITIVE: ICD-10-CM

## 2024-11-11 DIAGNOSIS — Z17.0 MALIGNANT NEOPLASM OF NIPPLE OF BOTH BREASTS IN FEMALE, ESTROGEN RECEPTOR POSITIVE (HCC): Primary | ICD-10-CM

## 2024-11-11 PROCEDURE — 99214 OFFICE O/P EST MOD 30 MIN: CPT | Performed by: INTERNAL MEDICINE

## 2024-11-11 NOTE — PROGRESS NOTES
HPI: Follow-up visit for BRCA2 gene mutation and bilateral breast cancers and melanoma in situ.  She already had ITZEL and BSO and goes for MRI scan of the abdomen for early detection of cancer of pancreas and peritoneum and she goes to her dermatologist and ophthalmologist for melanoma check.  Patient had  bilateral mastectomies in 2008 for hormone receptor positive HER2 negative stage II A cancer in the right breast and stage I A cancer in the left breast and 1 positive lymph node in right axilla.  Patient had reconstruction surgeries, 4 cycles of Taxotere plus Cytoxan followed by 5 years of Femara that she finished in September 2013. She had ITZEL and BSO.    She has been aware of cancer risks because of BRCA2 mutation.    Patient states her family has been aware of her  positive  BRCA 2.    She has  family history of breast and ovarian cancer.  Patient has history of  dermatomyositis .  She has been on methotrexate and folic acid for the last 3 years.  She follows with her rheumatologist.       , Patient had a 9 mm nodule in the right lower lung and she had wedge resection and that was negative for malignancy. Patient follows with the her lung specialist for mild residual ground-glass opacity in right lower lobe and that has improved .  .She has renal and liver cysts and also tiny  polyp in gallbladder again monitored with MRI scan.    .  She had colonoscopy in February 2021 and she states that was clear.   .  She has small lymph nodes in left submandibular area and they are being monitored by her primary physician by  ultrasound of the neck.    She has some tiredness.  She has dryness of the eyes and mouth.  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           Current Outpatient Medications:     Calcium-Magnesium-Vitamin D (CALCIUM 500 PO), Take 1,000 mg by mouth daily., Disp: , Rfl:     cholecalciferol (VITAMIN D3) 1,000 units tablet, Take 1,000 Units by mouth  daily, Disp: , Rfl:     clobetasol (TEMOVATE) 0.05 % cream, as needed  , Disp: , Rfl:     Glycerin-Hypromellose- (DRY EYE RELIEF DROPS OP), Apply to eye, Disp: , Rfl:     multivitamin (THERAGRAN) TABS, Take 1 tablet by mouth daily., Disp: , Rfl:     Cholecalciferol (VITAMIN D) 2000 UNITS CAPS, Take 2,000 Units/day by mouth (Patient not taking: Reported on 4/24/2024), Disp: , Rfl:     folic acid (FOLVITE) 1 mg tablet, Take 1 tablet (1 mg total) by mouth daily, Disp: 90 tablet, Rfl: 3    methotrexate 2.5 MG tablet, TAKE 6 TABLETS(15 MG) BY MOUTH 1 TIME A WEEK, Disp: 72 tablet, Rfl: 1    mupirocin (BACTROBAN) 2 % ointment, Apply topically 3 (three) times a day For 10 days (Patient not taking: Reported on 8/20/2024), Disp: 30 g, Rfl: 0    No Known Allergies    Oncology History   Bilateral malignant neoplasm of breast in female, estrogen receptor positive (HCC)    Initial Diagnosis    Bilateral malignant neoplasm of breast in female, estrogen receptor positive (HCC)      Surgery    Double mastectomy, reconstructive surgery      Chemotherapy    Chemotherapy with Taxotere and Cytoxan for 4 cycles followed by Femara from 2008 thru 2013.     8/31/2023 -  Cancer Staged    Staging form: Breast, AJCC 8th Edition  - Clinical stage from 8/31/2023: cT1a, cN1(sn), cM0, GX, ER+, WA+, HER2- - Signed by Ernesto Howard MD on 8/31/2023  Stage prefix: Initial diagnosis  Method of lymph node assessment: [place] [employer] lymph node biopsy  Histologic grading system: 3 grade system           ROS:  11/11/24 Reviewed 13 systems: See symptoms in HPI:    Presently   No other neurological, cardiac, pulmonary, GI and  symptoms other than listed in HPI. .  Other symptoms are in HPI   No symptoms like fever, chills, bleeding,  skin rash at present,  weight loss, night sweats, arthritic symptoms at present,   weakness, numbness,  claudication and gait problem. No frequent infections.  Not unusually sensitive to heat or cold. No swelling of the  "ankles.  Patient is less anxious.       /68 (BP Location: Left arm, Patient Position: Sitting, Cuff Size: Adult)   Pulse 67   Temp (!) 97.4 °F (36.3 °C) (Temporal)   Resp 16   Ht 5' 4\" (1.626 m)   Wt 70.8 kg (156 lb)   SpO2 97%   BMI 26.78 kg/m²     Physical Exam:    Patient is alert and oriented.  Patient is not in distress.  Vital signs are above.  There is no icterus.  There is no oral thrush.  There is no palpable neck mass.  Clear lung fields.  Heart rate is regular.  There is no palpable abdominal mass.  Abdomen is soft and nontender.  There is no ascites.  There is no edema of the ankles.  There is no calf tenderness.  There is no focal neurological deficit.  No skin rash.  There is no palpable lymphadenopathy in the neck and axillary areas,   Patient is less anxious.  Performance status 0.    IMAGING:     IMPRESSION:     1.  Mild residual groundglass opacity in the right lower lobe in the area of previously seen consolidation, likely postinflammatory.  Previously seen left lower lobe consolidation has resolved with mild residual scarring.     2.  Stable pulmonary nodules.              Workstation performed: [id number]          Imaging    OSSEOUS STRUCTURES:  No acute fracture or destructive osseous lesion.     IMPRESSION:     1.  Resolution of previously seen peripheral right lower lobe groundglass opacification, likely infectious or inflammatory in retrospect.  2.  No new concerning pulmonary nodules.              Workstation performed: [id number]          Imaging    CT chest without contrast (Order: [id number]) - 6/6/2022     LOWER CHEST:   Unremarkable.     LIVER:   Normal in size and configuration.   No suspicious mass.   Unchanged 6 mm segment 7 flash filling hemangioma #5/16 and 15/42 stable since March 2019.  Previously seen segment 2/3 subcentimeter focus of capsular/subcapsular enhancement is reidentified on #13/65 and now has more of a discoid appearance.  This is in keeping with a " vascular shunt.  Scattered simple cysts.  The hepatic veins and portal veins are patent.        BILE DUCTS:  No intrahepatic or extrahepatic bile duct dilation. Common bile duct is normal in caliber.  No choledocholithiasis, biliary stricture or suspicious mass.      GALLBLADDER known 6 mm small gallbladder polyps are better visualized on the prior ultrasound.     PANCREAS:  Unremarkable.     ADRENAL GLANDS:  Normal.     SPLEEN:  Normal.     KIDNEYS/PROXIMAL URETERS:  No hydroureteronephrosis.  No suspicious renal mass.  Scattered subcentimeter simple cysts.     BOWEL:   No dilated loops of bowel.      PERITONEUM/RETROPERITONEUM:  No ascites.     LYMPH NODES:  No abdominal lymphadenopathy.     VASCULAR STRUCTURES:  No aneurysm.     ABDOMINAL WALL:  Enhancing 7 mm left lateral abdominal wall focus, #24086/295 is stable since at least 9/24/2014.     OSSEOUS STRUCTURES:  No suspicious osseous lesion.        IMPRESSION:     No significant change from 3/25/2022.        Workstation performed: [id number]        Imaging    MRI abdomen w wo contrast and mrcp (Order: [id number]) - 4/7/2023        IMPRESSION:   No evidence of malignancy.  Clinical management of left axillary lump is recommended.           ASSESSMENT/BI-RADS CATEGORY:  Left: 2 - Benign  Overall: 2 - Benign     RECOMMENDATION:       - Clinical management for the left breast.     Workstation ID: [id number]     Imaging    US Breast Axilla Left (Order: [id number]) - 1/10/2023    IMPRESSION:     Normal MRI of the brain.     Workstation performed: [id number]        Imaging    MRI brain wo contrast (Order: [id number]) - 8/25/2022      MPRESSION:     No significant change from MRI abdomen studies from 4/7/2023 and 3/25/2022 without signs of primary or metastatic disease within the abdomen.        Workstation performed: [id number]        Imaging    MRI abdomen w wo contrast (Order: [id number]) - 4/25/2024  LABS:      Results for orders placed or performed in visit on  10/25/24   Cancer antigen 27.29    Collection Time: 10/25/24  6:35 AM   Result Value Ref Range    CA 27-29 30.0 0.0 - 38.6 U/mL   Comprehensive metabolic panel  Status: Final result     Comprehensive metabolic panel  Order: [id number]   Status: Final result       Visible to patient: Yes (seen)       Next appt: 11/14/2024 at 02:15 PM in Rheumatology (Charles Dalton DO)       Dx: Dermatomyositis (HCC); High risk medi...    0 Result Notes            Component  Ref Range & Units 10/25/24  6:35 AM 4/12/24 11:21 AM 2/2/24 11:11 AM 11/21/23  1:33 PM 8/4/23  6:47 AM 5/16/23  2:05 PM 2/18/23  7:25 AM   Sodium  135 - 147 mmol/L 141 140 140 140 140 141 138   Potassium  3.5 - 5.3 mmol/L 4.4 4.3 4.0 4.0 4.3 3.8 4.3   Chloride  96 - 108 mmol/L 104 104 103 102 107 104 106   CO2  21 - 32 mmol/L 31 32 30 31 32 32 30   ANION GAP  4 - 13 mmol/L 6 4 7 R 7 R 1 R 5 2 Low    BUN  5 - 25 mg/dL 18 19 21 16 21 18 19   Creatinine  0.60 - 1.30 mg/dL 0.68 0.64 CM 0.65 CM 0.64 CM 0.85 CM 0.69 CM 0.71 CM   Comment: Standardized to IDMS reference method   Glucose, Fasting  65 - 99 mg/dL 84 80 92  90 CM  83 CM   Calcium  8.4 - 10.2 mg/dL 9.1 9.3 9.5 9.6 9.2 R 9.4 9.3 R   AST  13 - 39 U/L 18 20 33 16 16 R, CM 18 12 R, CM   ALT  7 - 52 U/L 16 24 CM 39 CM 12 CM 22 R, CM 14 CM 21 R, CM   Comment: Specimen collection should occur prior to Sulfasalazine administration due to the potential for falsely depressed results.   Alkaline Phosphatase  34 - 104 U/L 62 65 65 68 64 R 60 65 R   Total Protein  6.4 - 8.4 g/dL 7.2 6.9 7.3 7.3 6.9 7.3 7.2   Albumin  3.5 - 5.0 g/dL 4.4 4.4 4.6 4.7 3.8 4.6 3.9   Total Bilirubin  0.20 - 1.00 mg/dL 0.45 0.43 CM 0.55 CM 0.55 CM 0.56 CM 0.55 CM 0.38 CM   Comment: Use of this assay is not recommended for patients undergoing treatment with eltrombopag due to the potential for falsely elevated results.  N-acetyl-p-benzoquinone imine (metabolite of Acetaminophen) will generate erroneously low results in samples for patients that  have taken an overdose of Acetaminophen.   eGFR  ml/min/1.73sq m 94 96 96 97 74 94 92                   CBC and differential  Status: Final result     CBC and differential  Order: [id number]   Status: Final result       Visible to patient: Yes (seen)       Next appt: 11/14/2024 at 02:15 PM in Rheumatology (Charles Dalton DO)       Dx: Dermatomyositis (HCC); High risk medi...    0 Result Notes            Component  Ref Range & Units 10/25/24  6:35 AM 4/12/24 11:21 AM 11/21/23  1:33 PM 8/4/23  6:47 AM 5/16/23  2:05 PM 2/18/23  7:25 AM 1/21/23  7:38 AM   WBC  4.31 - 10.16 Thousand/uL 4.60 5.19 5.87 4.43 7.48 4.93 5.00   RBC  3.81 - 5.12 Million/uL 4.18 4.25 4.49 4.27 4.31 4.39 4.20   Hemoglobin  11.5 - 15.4 g/dL 13.0 13.2 13.9 13.3 13.4 13.3 12.8   Hematocrit  34.8 - 46.1 % 40.4 41.5 43.2 40.5 41.2 42.1 40.8   MCV  82 - 98 fL 97 98 96 95 96 96 97   MCH  26.8 - 34.3 pg 31.1 31.1 31.0 31.1 31.1 30.3 30.5   MCHC  31.4 - 37.4 g/dL 32.2 31.8 32.2 32.8 32.5 31.6 31.4   RDW  11.6 - 15.1 % 14.1 13.9 13.4 14.0 13.9 13.9 14.0   MPV  8.9 - 12.7 fL 10.0 10.2 9.4 9.9 9.0 9.5 9.9   Platelets  149 - 390 Thousands/uL 281 293 242 247 237 259 248   nRBC  /100 WBCs 0 0 0 0 0 0 0   Segmented %  43 - 75 % 60 62 71 58 76 High  69 62   Immature Grans %  0 - 2 % 0 0 0 0 0 0 0   Lymphocytes %  14 - 44 % 26 26 20 28 16 22 28   Monocytes %  4 - 12 % 11 10 7 11 7 8 8   Eosinophils Relative  0 - 6 % 2 1 1 2 1 1 1   Basophils Relative  0 - 1 % 1 1 1 1 0 0 1   Absolute Neutrophils  1.85 - 7.62 Thousands/µL 2.78 3.20 4.17 2.57 5.63 3.38 3.12   Absolute Immature Grans  0.00 - 0.20 Thousand/uL 0.01 0.02 0.01 0.01 0.03 0.01 0.01   Absolute Lymphocytes  0.60 - 4.47 Thousands/µL 1.18 1.36 1.19 1.22 1.23 1.08 1.40   Absolute Monocytes  0.17 - 1.22 Thousand/µL 0.51 0.51 0.41 0.49 0.52 0.38 0.39   Eosinophils Absolute  0.00 - 0.61 Thousand/µL 0.09 0.07 0.05 0.10 0.04 0.06 0.05   Basophils Absolute  0.00 - 0.10 Thousands/µL 0.03 0.03 0.04 0.04 0.03 0.02  0.03                      Labs, Imaging, & Other studies:   All pertinent labs and imaging studies were personally reviewed      Reviewed test results and discussed with patient.    Assessment and plan:        Follow-up visit for BRCA2 gene mutation and bilateral breast cancers and melanoma in situ.  She already had ITZEL and BSO and goes for MRI scan of the abdomen for early detection of cancer of pancreas and peritoneum and she goes to her dermatologist and ophthalmologist for melanoma check.  Patient had  bilateral mastectomies in 2008 for hormone receptor positive HER2 negative stage II A cancer in the right breast and stage I A cancer in the left breast and 1 positive lymph node in right axilla.  Patient had reconstruction surgeries, 4 cycles of Taxotere plus Cytoxan followed by 5 years of Femara that she finished in September 2013. She had ITZEL and BSO.    She has been aware of cancer risks because of BRCA2 mutation.    Patient states her family has been aware of her  positive  BRCA 2.    She has  family history of breast and ovarian cancer.  Patient has history of  dermatomyositis .  She has been on methotrexate and folic acid for the last 3 years.  She follows with her rheumatologist.       , Patient had a 9 mm nodule in the right lower lung and she had wedge resection and that was negative for malignancy. Patient follows with the her lung specialist for mild residual ground-glass opacity in right lower lobe and that has improved .  .She has renal and liver cysts and also tiny  polyp in gallbladder again monitored with MRI scan.    .  She had colonoscopy in February 2021 and she states that was clear.   .  She has small lymph nodes in left submandibular area and they are being monitored by her primary physician by  ultrasound of the neck.    She has some tiredness.  She has dryness of the eyes and mouth.  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             Physical examination and test results are as recorded and discussed.  She has BRCA2 mutation. Breast cancers  remain in remission.  She gets checked for other cancers like melanoma, pancreatic cancer and peritoneal cancer and others.  She had ITZEL and BSO.  She has history of melanoma in situ.  She follows with her primary physician and other consultants.    .  All discussed in detail.  Questions answered.  Discussed the importance of eating healthy foods, staying active and health screening test.  Patient is capable of self-care.  Discussed precautions against coronavirus and other infections.   .  Goal is cure from breast cancers and melanoma and early  detection of other cancers mentioned above.  .  See diagnoses, instructions and orders below    1. Malignant neoplasm of nipple of both breasts in female, estrogen receptor positive (HCC)    - CBC and differential; Future  - Comprehensive metabolic panel; Future  - Cancer antigen 27.29; Future  - MRI abdomen w wo contrast; Future    2. BRCA2 gene mutation positive    - MRI abdomen w wo contrast; Future    3. History of melanoma      4. Dermatomyositis (HCC)      5. Carrier of high risk cancer gene mutation    - MRI abdomen w wo contrast; Future    6. At high risk for pancreatic cancer    - MRI abdomen w wo contrast; Future    Blood work and MRI abdomen in May 2025.  Visit in 7 months    I used a dictation device to dictate this note and there could be mistakes in my note and for that she may call my office        Patient voiced understanding and agreed       Counseling / Coordination of Care   . .  Provided counseling and support

## 2024-11-14 ENCOUNTER — OFFICE VISIT (OUTPATIENT)
Dept: RHEUMATOLOGY | Facility: CLINIC | Age: 64
End: 2024-11-14
Payer: COMMERCIAL

## 2024-11-14 VITALS
SYSTOLIC BLOOD PRESSURE: 124 MMHG | BODY MASS INDEX: 27.21 KG/M2 | WEIGHT: 159.4 LBS | TEMPERATURE: 97.4 F | HEIGHT: 64 IN | HEART RATE: 60 BPM | DIASTOLIC BLOOD PRESSURE: 82 MMHG | OXYGEN SATURATION: 98 %

## 2024-11-14 DIAGNOSIS — M81.0 AGE-RELATED OSTEOPOROSIS WITHOUT CURRENT PATHOLOGICAL FRACTURE: ICD-10-CM

## 2024-11-14 DIAGNOSIS — Z79.60 LONG-TERM USE OF IMMUNOSUPPRESSANT MEDICATION: ICD-10-CM

## 2024-11-14 DIAGNOSIS — M33.10 AMYOPATHIC DERMATOMYOSITIS (HCC): Primary | ICD-10-CM

## 2024-11-14 PROCEDURE — 99215 OFFICE O/P EST HI 40 MIN: CPT | Performed by: STUDENT IN AN ORGANIZED HEALTH CARE EDUCATION/TRAINING PROGRAM

## 2024-11-14 RX ORDER — METHOTREXATE 2.5 MG/1
TABLET ORAL
Qty: 72 TABLET | Refills: 2 | Status: SHIPPED | OUTPATIENT
Start: 2024-11-14

## 2024-11-14 RX ORDER — ALENDRONATE SODIUM 70 MG/1
70 TABLET ORAL
Qty: 12 TABLET | Refills: 3 | Status: SHIPPED | OUTPATIENT
Start: 2024-11-14

## 2024-11-14 NOTE — PROGRESS NOTES
Name: Tran Mandujano      : 1960      MRN: [id number]  Encounter Provider: Charles Dalton DO  Encounter Date: 2024   Encounter department: [facility]  :  Assessment & Plan  Amyopathic dermatomyositis (HCC)  Under decent control, still has some skin flares but she feels well enough that she does not want to increase methotrexate at this time    With progressively worsening dyspnea, want to make sure that it is not either from lung involvement of dermatomyositis (less likely given antibody profile) or from methotrexate toxicity    Will see her back sooner if any of this workup is concerning  Orders:    Complete PFT without post bronchodilator; Future    CT chest high resolution; Future    CK; Future    methotrexate 2.5 MG tablet; Take 6 pills one day a week.    Age-related osteoporosis without current pathological fracture  She meets the expanded clinical definition for osteoporosis based on osteopenia by T-score and wrist fractures    Discussed fosamax r:b including ONJ risk, she is amenable    VitD a year ago was great, will recheck with next methotrexate labs; she is still on supplementaiton  Orders:    Vitamin D 25 hydroxy; Future    alendronate (FOSAMAX) 70 mg tablet; Take 1 tablet (70 mg total) by mouth every 7 days    Long-term use of immunosuppressant medication    Orders:    Glom Filt Rate, Estimated; Standing    Creatinine, serum; Standing    Calcium; Standing    Hepatic function panel; Standing    CBC and differential; Standing      Patient's rheumatologic disease(s) threaten long-term function if not appropriately managed.    Patient's rheumatologic medication(s) require intensive monitoring for toxicity. Does not endorse any significant side effects.    History of Present Illness     Does still get rash occasionally, mild, on chest and knuckles and burning sensation on the upper back. Has improved since increasing methotrexate from 10 to  "15.    Occasionally she gets some knee issues, maybe once a month.    Had a temp of a little over 100 about a month ago, had a cough at the time. Felt fatigued. Lasted about 2 days.    Endorses eye dryness. No oral dryness. Does endorse vaginal dryness, which she thinks is a combo of hysterectomy, age, and past chemo.    Occasionally gets swelling in the knuckles, typically PIPs. Maybe once a month or so. Lasts for less than a day.    Legs feel stiff in the mornings, no more than 5 minutes.    Occasional dyspnea with going up stairs, walks up 4 flights of stairs to her office. Over the past year, has been getting progressively worse    In the cold her index fingertips get white and sharply demarcated, discomfort with rewarming. No triphasic color changes.    No longer follows pulm    A year ago she had bilateral wrist fractures from standing height.    Denies:  Fever other than above  Oral/nasal/genital ulcers  Muscle weakness  Uveitis  Oral dryness  Dactylitis  Dysphagia/odynophagia  CP other than when skin is acting up  SOB at rest  Pleurisy  Stomach pain  Hematochezia  Gross hematuria  Joint issues other than noted above    Permanent history: First saw our practice 2022 with Dr. Wilkes, previously dxd with DM based on characteristic chest rash with biopsy of knuckle rash reportedly consistent with such. Initially on hydroxychloroquine, developed skin hyperpigmentation so switched to low-dose methotrexate with good control. Never had any apparent muscle involvement, never had muscle biopsy. Dr. Wilkes workup included SSA Ab which was positive; questioned if disease could be SjD rather than DM but given stability on methotrexate did not .     Objective   /82   Pulse 60   Temp (!) 97.4 °F (36.3 °C) (Tympanic)   Ht 5' 4\" (1.626 m)   Wt 72.3 kg (159 lb 6.4 oz)   SpO2 98%   BMI 27.36 kg/m²      General: Well appearing, in no distress.   Eyes: Sclera non-icteric. EOMI  Extremities: Warm, " well perfused, no edema.   Neuro: Alert and oriented. No gross focal neurological deficits.   Skin: Gottron's rash over few bilateral MCPs (see Media for photos)  MSK exam: no significant tenderness to palpation, no synovitis  Muscle strength   Right   Left    Shoulder abduction 5/5  Shoulder abduction 5/5   Shoulder adduction 5/5  Shoulder adduction 5/5   Shoulder internal rotation 5/5  Shoulder internal rotation 5/5   Shoulder external rotation 5/5  Shoulder external rotation 5/5   Hip flexion 5/5  Hip flexion 5/5   Hip extension 5/5  Hip extension 5/5   Hip abduction 5/5  Hip abduction 5/5   Hip adduction 5/5  Hip adduction 5/5          Neck   Flexion 5/5   Extension 5/5   R sidebending 5/5   L sidebending 5/5

## 2024-11-14 NOTE — PATIENT INSTRUCTIONS
You should be contacted to set up appointments for CT scan and PFTs    Get blood work (GFR, creatinine, calcium, LFTs, CBC) every 3 months while on methotrexate. We will also check a CK level with your next blood work. This should be done around the end of January 2025.    Please get your vitamin D level checked

## 2024-11-14 NOTE — ASSESSMENT & PLAN NOTE
She meets the expanded clinical definition for osteoporosis based on osteopenia by T-score and wrist fractures    Discussed fosamax r:b including ONJ risk, she is amenable    VitD a year ago was great, will recheck with next methotrexate labs; she is still on supplementaiton  Orders:    Vitamin D 25 hydroxy; Future    alendronate (FOSAMAX) 70 mg tablet; Take 1 tablet (70 mg total) by mouth every 7 days

## 2024-11-14 NOTE — ASSESSMENT & PLAN NOTE
Under decent control, still has some skin flares but she feels well enough that she does not want to increase methotrexate at this time    With progressively worsening dyspnea, want to make sure that it is not either from lung involvement of dermatomyositis (less likely given antibody profile) or from methotrexate toxicity    Will see her back sooner if any of this workup is concerning  Orders:    Complete PFT without post bronchodilator; Future    CT chest high resolution; Future    CK; Future    methotrexate 2.5 MG tablet; Take 6 pills one day a week.

## 2024-11-18 ENCOUNTER — CLINIC PROCEDURE ONLY (OUTPATIENT)
Dept: URBAN - METROPOLITAN AREA CLINIC 6 | Facility: CLINIC | Age: 64
End: 2024-11-18

## 2024-11-18 DIAGNOSIS — H26.492: ICD-10-CM

## 2024-11-18 PROCEDURE — 66821 AFTER CATARACT LASER SURGERY: CPT

## 2024-11-18 ASSESSMENT — KERATOMETRY
OD_AXISANGLE2_DEGREES: 83
OS_AXISANGLE2_DEGREES: 73
OD_AXISANGLE_DEGREES: 173
OS_K1POWER_DIOPTERS: 43.75
OS_K2POWER_DIOPTERS: 45.25
OS_AXISANGLE_DEGREES: 163
OD_K2POWER_DIOPTERS: 44.50
OD_K1POWER_DIOPTERS: 43.25

## 2024-11-18 ASSESSMENT — VISUAL ACUITY
OS_SC: 20/25
OD_SC: 20/20

## 2024-11-18 ASSESSMENT — TONOMETRY
OS_IOP_MMHG: 10
OD_IOP_MMHG: 11

## 2024-11-21 ENCOUNTER — HOSPITAL ENCOUNTER (OUTPATIENT)
Dept: RADIOLOGY | Age: 64
Discharge: HOME | End: 2024-11-21
Payer: COMMERCIAL

## 2024-11-21 DIAGNOSIS — M33.10 AMYOPATHIC DERMATOMYOSITIS (HCC): ICD-10-CM

## 2024-11-21 PROCEDURE — 71250 CT THORAX DX C-: CPT

## 2024-11-27 ENCOUNTER — RESULTS FOLLOW-UP (OUTPATIENT)
Dept: RHEUMATOLOGY | Facility: CLINIC | Age: 64
End: 2024-11-27

## 2024-11-27 ENCOUNTER — TELEPHONE (OUTPATIENT)
Age: 64
End: 2024-11-27

## 2024-11-27 DIAGNOSIS — M33.10 AMYOPATHIC DERMATOMYOSITIS (HCC): Primary | ICD-10-CM

## 2024-11-27 NOTE — RESULT ENCOUNTER NOTE
Concerning for progression of dermatomyositis involving lungs vs methotrexate toxicity    Please offer sooner follow up with me, offer 11:45, 12;15, 12:45, or 2:30 on Dec 24th in person or virtual. Will likely discuss mmf vs azathioprine. Please ask her to get blood work (TPMT enzyme activity) checked prior to the appointment, at least a week or two prior if possible as it can take a little bit of time to result

## 2024-12-05 ENCOUNTER — APPOINTMENT (OUTPATIENT)
Dept: LAB | Facility: CLINIC | Age: 64
End: 2024-12-05
Payer: COMMERCIAL

## 2024-12-05 ENCOUNTER — HOSPITAL ENCOUNTER (OUTPATIENT)
Dept: PULMONOLOGY | Facility: HOSPITAL | Age: 64
End: 2024-12-05
Attending: STUDENT IN AN ORGANIZED HEALTH CARE EDUCATION/TRAINING PROGRAM
Payer: COMMERCIAL

## 2024-12-05 DIAGNOSIS — Z15.09 BRCA2 GENE MUTATION POSITIVE: ICD-10-CM

## 2024-12-05 DIAGNOSIS — Z79.60 LONG-TERM USE OF IMMUNOSUPPRESSANT MEDICATION: ICD-10-CM

## 2024-12-05 DIAGNOSIS — M81.0 AGE-RELATED OSTEOPOROSIS WITHOUT CURRENT PATHOLOGICAL FRACTURE: ICD-10-CM

## 2024-12-05 DIAGNOSIS — M33.10 AMYOPATHIC DERMATOMYOSITIS (HCC): ICD-10-CM

## 2024-12-05 DIAGNOSIS — Z15.01 BRCA2 GENE MUTATION POSITIVE: ICD-10-CM

## 2024-12-05 LAB
25(OH)D3 SERPL-MCNC: 55.2 NG/ML (ref 30–100)
ALBUMIN SERPL BCG-MCNC: 4.5 G/DL (ref 3.5–5)
ALP SERPL-CCNC: 72 U/L (ref 34–104)
ALT SERPL W P-5'-P-CCNC: 29 U/L (ref 7–52)
AST SERPL W P-5'-P-CCNC: 27 U/L (ref 13–39)
BASOPHILS # BLD AUTO: 0.04 THOUSANDS/ÂΜL (ref 0–0.1)
BASOPHILS NFR BLD AUTO: 1 % (ref 0–1)
BILIRUB DIRECT SERPL-MCNC: 0.06 MG/DL (ref 0–0.2)
BILIRUB SERPL-MCNC: 0.46 MG/DL (ref 0.2–1)
CALCIUM SERPL-MCNC: 9.3 MG/DL (ref 8.4–10.2)
CANCER AG125 SERPL-ACNC: 10.2 U/ML (ref 0–35)
CK SERPL-CCNC: 56 U/L (ref 26–192)
CREAT SERPL-MCNC: 0.74 MG/DL (ref 0.6–1.3)
EOSINOPHIL # BLD AUTO: 0.17 THOUSAND/ÂΜL (ref 0–0.61)
EOSINOPHIL NFR BLD AUTO: 3 % (ref 0–6)
ERYTHROCYTE [DISTWIDTH] IN BLOOD BY AUTOMATED COUNT: 14.2 % (ref 11.6–15.1)
GFR SERPL CREATININE-BSD FRML MDRD: 87 ML/MIN/1.73SQ M
HCT VFR BLD AUTO: 41 % (ref 34.8–46.1)
HGB BLD-MCNC: 13.3 G/DL (ref 11.5–15.4)
IMM GRANULOCYTES # BLD AUTO: 0.02 THOUSAND/UL (ref 0–0.2)
IMM GRANULOCYTES NFR BLD AUTO: 0 % (ref 0–2)
LYMPHOCYTES # BLD AUTO: 1.31 THOUSANDS/ÂΜL (ref 0.6–4.47)
LYMPHOCYTES NFR BLD AUTO: 19 % (ref 14–44)
MCH RBC QN AUTO: 31 PG (ref 26.8–34.3)
MCHC RBC AUTO-ENTMCNC: 32.4 G/DL (ref 31.4–37.4)
MCV RBC AUTO: 96 FL (ref 82–98)
MONOCYTES # BLD AUTO: 0.56 THOUSAND/ÂΜL (ref 0.17–1.22)
MONOCYTES NFR BLD AUTO: 8 % (ref 4–12)
NEUTROPHILS # BLD AUTO: 4.76 THOUSANDS/ÂΜL (ref 1.85–7.62)
NEUTS SEG NFR BLD AUTO: 69 % (ref 43–75)
NRBC BLD AUTO-RTO: 0 /100 WBCS
PLATELET # BLD AUTO: 263 THOUSANDS/UL (ref 149–390)
PMV BLD AUTO: 9.4 FL (ref 8.9–12.7)
PROT SERPL-MCNC: 7.2 G/DL (ref 6.4–8.4)
RBC # BLD AUTO: 4.29 MILLION/UL (ref 3.81–5.12)
WBC # BLD AUTO: 6.86 THOUSAND/UL (ref 4.31–10.16)

## 2024-12-05 PROCEDURE — 82550 ASSAY OF CK (CPK): CPT

## 2024-12-05 PROCEDURE — 84433 ASY THIOPURIN S-MTHYLTRNSFRS: CPT

## 2024-12-05 PROCEDURE — 94010 BREATHING CAPACITY TEST: CPT | Performed by: INTERNAL MEDICINE

## 2024-12-05 PROCEDURE — 82565 ASSAY OF CREATININE: CPT

## 2024-12-05 PROCEDURE — 80076 HEPATIC FUNCTION PANEL: CPT

## 2024-12-05 PROCEDURE — 94729 DIFFUSING CAPACITY: CPT | Performed by: INTERNAL MEDICINE

## 2024-12-05 PROCEDURE — 85025 COMPLETE CBC W/AUTO DIFF WBC: CPT

## 2024-12-05 PROCEDURE — 94760 N-INVAS EAR/PLS OXIMETRY 1: CPT

## 2024-12-05 PROCEDURE — 82306 VITAMIN D 25 HYDROXY: CPT

## 2024-12-05 PROCEDURE — 94729 DIFFUSING CAPACITY: CPT

## 2024-12-05 PROCEDURE — 94010 BREATHING CAPACITY TEST: CPT

## 2024-12-05 PROCEDURE — 94726 PLETHYSMOGRAPHY LUNG VOLUMES: CPT

## 2024-12-05 PROCEDURE — 94726 PLETHYSMOGRAPHY LUNG VOLUMES: CPT | Performed by: INTERNAL MEDICINE

## 2024-12-05 PROCEDURE — 36415 COLL VENOUS BLD VENIPUNCTURE: CPT

## 2024-12-13 ENCOUNTER — OFFICE VISIT (OUTPATIENT)
Dept: GYNECOLOGIC ONCOLOGY | Facility: CLINIC | Age: 64
End: 2024-12-13
Payer: COMMERCIAL

## 2024-12-13 VITALS
HEART RATE: 59 BPM | BODY MASS INDEX: 27.14 KG/M2 | SYSTOLIC BLOOD PRESSURE: 122 MMHG | HEIGHT: 64 IN | RESPIRATION RATE: 16 BRPM | OXYGEN SATURATION: 98 % | WEIGHT: 159 LBS | DIASTOLIC BLOOD PRESSURE: 82 MMHG

## 2024-12-13 DIAGNOSIS — Z15.09 BRCA2 GENE MUTATION POSITIVE: Primary | ICD-10-CM

## 2024-12-13 DIAGNOSIS — Z15.01 BRCA2 GENE MUTATION POSITIVE: Primary | ICD-10-CM

## 2024-12-13 LAB
REF LAB TEST METHOD: NORMAL
TEST INTERPRETATION: NORMAL
TPMT RBC-CCNC: 23.8 UNITS/ML RBC

## 2024-12-13 PROCEDURE — 99459 PELVIC EXAMINATION: CPT | Performed by: PHYSICIAN ASSISTANT

## 2024-12-13 PROCEDURE — 99213 OFFICE O/P EST LOW 20 MIN: CPT | Performed by: PHYSICIAN ASSISTANT

## 2024-12-13 NOTE — PROGRESS NOTES
Name: Tran Mandujano      : 1960      MRN: [id number]  Encounter Provider: Maira Hodgson PA-C  Encounter Date: 2024   Encounter department: [facility] [place]  :  Assessment & Plan  BRCA2 gene mutation positive  64-year-old with pathogenic BRCA2 mutation with personal history of b/l breast cancer, melanoma in situ and dermatomyositis. She is s/p prophylactic TLH, BSO. Her clinical exam is without evidence of peritoneal cancer.  normal.      Return to the office in 1 year for continued BRCA surveillance with a pre-visit .  Orders:  •  ; Future            History of Present Illness     Reason for Visit / CC:   BRCA surveillance    Tran Mandujano is a 64 y.o. female   who has no new complaints today. No vaginal bleeding, abdominal/pelvic pain. Normal bowel and bladder function. She underwent MRI abdomen and CT chest follow-up in the interim. She has upcoming appointments with med-onc and rheumatology. Her  from 24 was reviewed. Quality of life is good.        Pertinent Medical History   1. BRCA 2 mutation  2. Bilateral breast cancer, stage IIA on left and stage IA on right, ER/MS positive, HER2 negative  3. Melanoma in situ of the chest wall   4. Dermatomyositis    Oncology History   Oncology History   Bilateral malignant neoplasm of breast in female, estrogen receptor positive (HCC)    Initial Diagnosis    Bilateral malignant neoplasm of breast in female, estrogen receptor positive (HCC)      Surgery    Double mastectomy, reconstructive surgery      Chemotherapy    Chemotherapy with Taxotere and Cytoxan for 4 cycles followed by Femara from  thru .     2023 -  Cancer Staged    Staging form: Breast, AJCC 8th Edition  - Clinical stage from 2023: cT1a, cN1(sn), cM0, GX, ER+, MS+, HER2- - Signed by Ernesto Howard MD on 2023  Stage prefix: Initial diagnosis  Method of lymph node assessment: [place] lymph node  "biopsy  Histologic grading system: 3 grade system          Review of Systems   Constitutional: Negative.    HENT: Negative.     Eyes: Negative.    Respiratory: Negative.     Cardiovascular: Negative.    Gastrointestinal: Negative.    Genitourinary: Negative.    Musculoskeletal: Negative.    Skin: Negative.    Neurological: Negative.    Psychiatric/Behavioral: Negative.      A complete review of systems is negative other than that noted above in the HPI.  Medical History Reviewed by provider this encounter:     .  Current Outpatient Medications on File Prior to Visit   Medication Sig Dispense Refill   • alendronate (FOSAMAX) 70 mg tablet Take 1 tablet (70 mg total) by mouth every 7 days 12 tablet 3   • Calcium-Magnesium-Vitamin D (CALCIUM 500 PO) Take 1,000 mg by mouth daily.     • Cholecalciferol (VITAMIN D) 2000 UNITS CAPS Take 2,000 Units/day by mouth     • cholecalciferol (VITAMIN D3) 1,000 units tablet Take 1,000 Units by mouth daily     • clobetasol (TEMOVATE) 0.05 % cream as needed       • folic acid (FOLVITE) 1 mg tablet Take 1 tablet (1 mg total) by mouth daily 90 tablet 3   • Glycerin-Hypromellose- (DRY EYE RELIEF DROPS OP) Apply to eye     • methotrexate 2.5 MG tablet Take 6 pills one day a week. 72 tablet 2   • multivitamin (THERAGRAN) TABS Take 1 tablet by mouth daily.     • mupirocin (BACTROBAN) 2 % ointment Apply topically 3 (three) times a day For 10 days (Patient not taking: Reported on 8/20/2024) 30 g 0     No current facility-administered medications on file prior to visit.         Objective   /82 (Patient Position: Sitting, Cuff Size: Standard)   Pulse 59   Resp 16   Ht 5' 4\" (1.626 m)   Wt 72.1 kg (159 lb)   SpO2 98%   BMI 27.29 kg/m²     Body mass index is 27.29 kg/m².    Physical Exam  Vitals reviewed. Exam conducted with a chaperone present.   Constitutional:       General: She is not in acute distress.     Appearance: Normal appearance. She is not ill-appearing.   HENT:     "  Head: Normocephalic and atraumatic.      Mouth/Throat:      Mouth: Mucous membranes are moist.   Eyes:      General:         Right eye: No discharge.         Left eye: No discharge.      Conjunctiva/sclera: Conjunctivae normal.   Pulmonary:      Effort: Pulmonary effort is normal.   Abdominal:      Palpations: Abdomen is soft. There is no mass.      Tenderness: There is no abdominal tenderness.      Hernia: No hernia is present.   Genitourinary:     Comments: The external female genitalia is normal. The bartholin's, uretheral and skenes glands are normal. The urethral meatus is normal (midline with no lesions). Anus without fissure or lesion. Speculum exam reveals a grossly normal vagina. No masses, lesions,discharge or bleeding. No significant cystocele or rectocele noted. Bimanual exam notes a surgical absent cervix, uterus and adnexal structures. No masses or fullness. Bladder is without fullness, mass or tenderness.  Musculoskeletal:      Right lower leg: No edema.      Left lower leg: No edema.   Skin:     General: Skin is warm and dry.      Coloration: Skin is not jaundiced.      Findings: No rash.   Neurological:      General: No focal deficit present.      Mental Status: She is alert and oriented to person, place, and time.      Cranial Nerves: No cranial nerve deficit.      Sensory: No sensory deficit.      Motor: No weakness.      Gait: Gait normal.   Psychiatric:         Mood and Affect: Mood normal.         Behavior: Behavior normal.         Thought Content: Thought content normal.         Judgment: Judgment normal.          Labs: I have reviewed pertinent labs. CA-125:   Lab Results   Component Value Date/Time     10.2 12/05/2024 03:37 PM

## 2024-12-13 NOTE — ASSESSMENT & PLAN NOTE
64-year-old with pathogenic BRCA2 mutation with personal history of b/l breast cancer, melanoma in situ and dermatomyositis. She is s/p prophylactic TLH, BSO. Her clinical exam is without evidence of peritoneal cancer.  normal.      Return to the office in 1 year for continued BRCA surveillance with a pre-visit .  Orders:  •  ; Future

## 2024-12-17 ENCOUNTER — OFFICE VISIT (OUTPATIENT)
Dept: DERMATOLOGY | Facility: CLINIC | Age: 64
End: 2024-12-17
Payer: COMMERCIAL

## 2024-12-17 VITALS — TEMPERATURE: 97.2 F | WEIGHT: 159 LBS | BODY MASS INDEX: 27.14 KG/M2 | HEIGHT: 64 IN

## 2024-12-17 DIAGNOSIS — L57.8 OTHER SKIN CHANGES DUE TO CHRONIC EXPOSURE TO NONIONIZING RADIATION: ICD-10-CM

## 2024-12-17 DIAGNOSIS — D23.9 DERMATOFIBROMA: ICD-10-CM

## 2024-12-17 DIAGNOSIS — Z87.39 HISTORY OF DERMATOMYOSITIS: Primary | ICD-10-CM

## 2024-12-17 DIAGNOSIS — D22.72 MULTIPLE BENIGN MELANOCYTIC NEVI OF UPPER AND LOWER EXTREMITIES AND TRUNK: ICD-10-CM

## 2024-12-17 DIAGNOSIS — D22.61 MULTIPLE BENIGN MELANOCYTIC NEVI OF UPPER AND LOWER EXTREMITIES AND TRUNK: ICD-10-CM

## 2024-12-17 DIAGNOSIS — Z86.006 HISTORY OF MELANOMA IN SITU: ICD-10-CM

## 2024-12-17 DIAGNOSIS — D22.5 MULTIPLE BENIGN MELANOCYTIC NEVI OF UPPER AND LOWER EXTREMITIES AND TRUNK: ICD-10-CM

## 2024-12-17 DIAGNOSIS — D22.62 MULTIPLE BENIGN MELANOCYTIC NEVI OF UPPER AND LOWER EXTREMITIES AND TRUNK: ICD-10-CM

## 2024-12-17 DIAGNOSIS — L81.4 LENTIGINES: ICD-10-CM

## 2024-12-17 DIAGNOSIS — L82.1 SK (SEBORRHEIC KERATOSIS): ICD-10-CM

## 2024-12-17 DIAGNOSIS — D22.71 MULTIPLE BENIGN MELANOCYTIC NEVI OF UPPER AND LOWER EXTREMITIES AND TRUNK: ICD-10-CM

## 2024-12-17 DIAGNOSIS — D18.01 CHERRY ANGIOMA: ICD-10-CM

## 2024-12-17 PROCEDURE — 99213 OFFICE O/P EST LOW 20 MIN: CPT | Performed by: DERMATOLOGY

## 2024-12-17 NOTE — PATIENT INSTRUCTIONS
"  DOTY ANGIOMAS     Physical Exam:  Anatomic Location Affected:  Trunk and extremities  Morphological Description:  Scattered cherry red papules  Denies pain, itch, bleeding. No treatments tried. Present for years. Present constantly; no modifying factors which make it worse or better.     Assessment and Plan:  Based on a thorough discussion of this condition and the management approach to it (including a comprehensive discussion of the known risks, side effects and potential benefits of treatment), the patient (family) agrees to implement the following specific plan:  Reassure benign        SEBORRHEIC KERATOSIS; NON-INFLAMED     Physical Exam:  Anatomic Location Affected:  Trunk and extremities  Morphological Description:  Waxy, smooth to warty textured, yellow to brownish-grey to dark brown to blackish, discrete, \"stuck-on\" appearing papules.  Present for years. Denies pain, itch, bleeding.      Additional History of Present Condition:  Present constantly; no modifying factors which make it worse or better. No prior treatment.       Assessment and Plan:  Based on a thorough discussion of this condition and the management approach to it (including a comprehensive discussion of the known risks, side effects and potential benefits of treatment), the patient (family) agrees to implement the following specific plan:  Reassure benign  Use sun protection.  Apply SPF 30 or higher at least three times a day.  Wear sun protecting clothing and hats.        SOLAR LENTIGINES   OTHER SKIN CHANGES DUE TO CHRONIC EXPOSURE TO NONIONIZING RADIATION     Physical Exam:  Anatomic Location Affected:  Sun exposed areas of back, chest, arms, legs  Morphological Description:  Multiple scattered brown to tan evenly pigmented macules   Denies pain, itch, bleeding. No treatments tried. Present for months - years. Reports getting newer lesions with sun exposure.         Assessment and Plan:  Based on a thorough discussion of this condition " "and the management approach to it (including a comprehensive discussion of the known risks, side effects and potential benefits of treatment), the patient (family) agrees to implement the following specific plan:  Reassure benign  Use sun protection.  Apply SPF 30 or higher at least three times a day.  Wear sun protecting clothing and hats.         MULTIPLE MELANOCYTIC NEVI (\"Moles\")     Physical Exam:  Anatomic Location Affected: Trunk and extremities  Morphological Description:  Scattered, round to ovoid, symmetrical-appearing, even bordered, skin colored to dark brown macules/papules  Denies pain, itch, bleeding. No treatments tried. Present for years. Present constantly; no modifying factors which make it worse or better. Denies actively changing or growing moles.      Assessment and Plan:  Based on a thorough discussion of this condition and the management approach to it (including a comprehensive discussion of the known risks, side effects and potential benefits of treatment), the patient (family) agrees to implement the following specific plan:  Reassure benign  Monitor for changes  Use sun protection.  Apply SPF 30 or higher at least three times a day.  Wear sun protecting clothing and hats.        Worrisome signs of skin malignancy discussed, questions answered. Regular self-skin check discussed. Advised to call or return to office if patient notices any spots of concern, rapidly growing/changing lesions, bleeding lesions, non-healing lesions. Advised regular SPF use.       DERMATOFIBROMA        Assessment and Plan:  Based on a thorough discussion of this condition and the management approach to it (including a comprehensive discussion of the known risks, side effects and potential benefits of treatment), the patient (family) agrees to implement the following specific plan:  Asymptomatic. Not treatment needed        HISTORY OF MELANOMA IN SITU          Assessment and Plan:  Based on a thorough discussion of " this condition and the management approach to it (including a comprehensive discussion of the known risks, side effects and potential benefits of treatment), the patient (family) agrees to implement the following specific plan:  Continue to monitor for recurrence  Follow up 6 months for skin checks      HISTORY OF DERMATOMYOSITIS       Assessment and Plan:  Based on a thorough discussion of this condition and the management approach to it (including a comprehensive discussion of the known risks, side effects and potential benefits of treatment), the patient (family) agrees to implement the following specific plan:  Continue to follow up with Rheumatology every 6 months; Rheum is managing her MTX  Discussed sun precautions     Recurring nevi          Assessment and Plan:  Based on a thorough discussion of this condition and the management approach to it (including a comprehensive discussion of the known risks, side effects and potential benefits of treatment), the patient (family) agrees to implement the following specific plan:  Monitor for changes

## 2024-12-17 NOTE — PROGRESS NOTES
"[facility] Note     Patient Name: Tran Mandujano  Encounter Date: 12/17/2024     Have you been cared for by a [facility] Dermatologist in the last 3 years and, if so, which description applies to you?    Yes.  I have been here within the last 3 years, and my medical history has NOT changed since that time.  I am FEMALE/of child-bearing potential.    REVIEW OF SYSTEMS:  Have you recently had or currently have any of the following? No changes in my recent health.   PAST MEDICAL HISTORY:  Have you personally ever had or currently have any of the following?  If \"YES,\" then please provide more detail. No changes in my medical history.   HISTORY OF IMMUNOSUPPRESSION: Do you have a history of any of the following:  Systemic Immunosuppression such as Diabetes, Biologic or Immunotherapy, Chemotherapy, Organ Transplantation, Bone Marrow Transplantation or Prednisone?  YES, methotrexate for dermatomyositis      Answering \"YES\" requires the addition of the dotphrase \"IMMUNOSUPPRESSED\" as the first diagnosis of the patient's visit.   FAMILY HISTORY:  Any \"first degree relatives\" (parent, brother, sister, or child) with the following?    No changes in my family's known health.   PATIENT EXPERIENCE:    Do you want the Dermatologist to perform a COMPLETE skin exam today including a clinical examination under the \"bra and underwear\" areas?  Yes  If necessary, do we have your permission to call and leave a detailed message on your Preferred Phone number that includes your specific medical information?  Yes      No Known Allergies   Current Outpatient Medications:     alendronate (FOSAMAX) 70 mg tablet, Take 1 tablet (70 mg total) by mouth every 7 days, Disp: 12 tablet, Rfl: 3    Calcium-Magnesium-Vitamin D (CALCIUM 500 PO), Take 1,000 mg by mouth daily., Disp: , Rfl:     Cholecalciferol (VITAMIN D) 2000 UNITS CAPS, Take 2,000 Units/day by mouth, Disp: , Rfl:     cholecalciferol (VITAMIN D3) 1,000 units tablet, " "Take 1,000 Units by mouth daily, Disp: , Rfl:     clobetasol (TEMOVATE) 0.05 % cream, as needed  , Disp: , Rfl:     folic acid (FOLVITE) 1 mg tablet, Take 1 tablet (1 mg total) by mouth daily, Disp: 90 tablet, Rfl: 3    Glycerin-Hypromellose- (DRY EYE RELIEF DROPS OP), Apply to eye, Disp: , Rfl:     methotrexate 2.5 MG tablet, Take 6 pills one day a week., Disp: 72 tablet, Rfl: 2    multivitamin (THERAGRAN) TABS, Take 1 tablet by mouth daily., Disp: , Rfl:     mupirocin (BACTROBAN) 2 % ointment, Apply topically 3 (three) times a day For 10 days (Patient not taking: Reported on 8/20/2024), Disp: 30 g, Rfl: 0          Whom besides the patient is providing clinical information about today's encounter?   NO ADDITIONAL HISTORIAN (patient alone provided history)    Physical Exam and Assessment/Plan by Diagnosis:    CHERRY ANGIOMAS     Physical Exam:  Anatomic Location Affected:  Trunk and extremities  Morphological Description:  Scattered cherry red papules  Denies pain, itch, bleeding. No treatments tried. Present for years. Present constantly; no modifying factors which make it worse or better.     Assessment and Plan:  Based on a thorough discussion of this condition and the management approach to it (including a comprehensive discussion of the known risks, side effects and potential benefits of treatment), the patient (family) agrees to implement the following specific plan:  Reassure benign        SEBORRHEIC KERATOSIS; NON-INFLAMED     Physical Exam:  Anatomic Location Affected:  Trunk and extremities  Morphological Description:  Waxy, smooth to warty textured, yellow to brownish-grey to dark brown to blackish, discrete, \"stuck-on\" appearing papules.  Present for years. Denies pain, itch, bleeding.      Additional History of Present Condition:  Present constantly; no modifying factors which make it worse or better. No prior treatment.       Assessment and Plan:  Based on a thorough discussion of this condition " "and the management approach to it (including a comprehensive discussion of the known risks, side effects and potential benefits of treatment), the patient (family) agrees to implement the following specific plan:  Reassure benign  Use sun protection.  Apply SPF 30 or higher at least three times a day.  Wear sun protecting clothing and hats.        SOLAR LENTIGINES   OTHER SKIN CHANGES DUE TO CHRONIC EXPOSURE TO NONIONIZING RADIATION     Physical Exam:  Anatomic Location Affected:  Sun exposed areas of back, chest, arms, legs  Morphological Description:  Multiple scattered brown to tan evenly pigmented macules   Denies pain, itch, bleeding. No treatments tried. Present for months - years. Reports getting newer lesions with sun exposure.         Assessment and Plan:  Based on a thorough discussion of this condition and the management approach to it (including a comprehensive discussion of the known risks, side effects and potential benefits of treatment), the patient (family) agrees to implement the following specific plan:  Reassure benign  Use sun protection.  Apply SPF 30 or higher at least three times a day.  Wear sun protecting clothing and hats.         MULTIPLE MELANOCYTIC NEVI (\"Moles\")     Physical Exam:  Anatomic Location Affected: Trunk and extremities  Morphological Description:  Scattered, round to ovoid, symmetrical-appearing, even bordered, skin colored to dark brown macules/papules  Denies pain, itch, bleeding. No treatments tried. Present for years. Present constantly; no modifying factors which make it worse or better. Denies actively changing or growing moles.      Assessment and Plan:  Based on a thorough discussion of this condition and the management approach to it (including a comprehensive discussion of the known risks, side effects and potential benefits of treatment), the patient (family) agrees to implement the following specific plan:  Reassure benign  Monitor for changes  Use sun " "protection.  Apply SPF 30 or higher at least three times a day.  Wear sun protecting clothing and hats.       Worrisome signs of skin malignancy discussed, questions answered. Regular self-skin check discussed. Advised to call or return to office if patient notices any spots of concern, rapidly growing/changing lesions, bleeding lesions, non-healing lesions. Advised regular SPF use.      DERMATOFIBROMA     Physical Exam:  Anatomic Location Affected:  3 on right leg,  2 on left leg  Morphological Description:  Rubbery firm papule that \"dimples\" with lateral pressure.     Additional History of Present Condition:  present on exam      Assessment and Plan:  Based on a thorough discussion of this condition and the management approach to it (including a comprehensive discussion of the known risks, side effects and potential benefits of treatment), the patient (family) agrees to implement the following specific plan:  Asymptomatic. Not treatment needed       HISTORY OF MELANOMA IN SITU     Physical Exam:  Anatomic Location Affected:  middle of chest   Morphological Description of Scar:  well healed  Year Treated: 2014  TNM Classification: N0T0  Suspected Recurrence: No     Additional History of Present Condition:  patient states in 2014 she had removal of Melanoma in situ on chest. Patient has history of breast cancer with a double Mastectomy and Hysterectomy. Patient states she is BRCA 2 positive. Patient does not have any concerns about recurrence at this time.      Assessment and Plan:  Based on a thorough discussion of this condition and the management approach to it (including a comprehensive discussion of the known risks, side effects and potential benefits of treatment), the patient (family) agrees to implement the following specific plan:  Continue to monitor for recurrence  Follow up 6 months for skin checks      HISTORY OF DERMATOMYOSITIS     Physical Exam:  Anatomic Location Affected:  Trunk, face, hands "   Morphological Description: mild erythema on dorsal hands      Additional History of Present Condition:  Patient notes that comes and goes. Recently increased to methotrexate 6 pills a week from 4 and when flares applying clobetasol cream. Methotrexate is managed by rheumatology  Assessment and Plan:  Based on a thorough discussion of this condition and the management approach to it (including a comprehensive discussion of the known risks, side effects and potential benefits of treatment), the patient (family) agrees to implement the following specific plan:  Continue to follow up with Rheumatology every 6 months; Rheum is managing her MTX  Discussed strict SPF     Recurrent nevi   Physical Exam:  Anatomic Location Affected:  Back   Morphological Description:  2 areas of atrophic scar where pigment is recurring   Pertinent Positives:  Pertinent Negatives:    Additional History of Present Condition:  present on exam     Assessment and Plan:  Based on a thorough discussion of this condition and the management approach to it (including a comprehensive discussion of the known risks, side effects and potential benefits of treatment), the patient (family) agrees to implement the following specific plan:  Monitor for changes        Scribe Attestation      I,:  Nikolas Sheehan am acting as a scribe while in the presence of the attending physician.:       I,:  Nilay Smiley MD personally performed the services described in this documentation    as scribed in my presence.:

## 2024-12-20 ENCOUNTER — OFFICE VISIT (OUTPATIENT)
Dept: INTERNAL MEDICINE CLINIC | Facility: CLINIC | Age: 64
End: 2024-12-20
Payer: COMMERCIAL

## 2024-12-20 ENCOUNTER — TELEPHONE (OUTPATIENT)
Dept: INTERNAL MEDICINE CLINIC | Facility: CLINIC | Age: 64
End: 2024-12-20

## 2024-12-20 VITALS
TEMPERATURE: 96.7 F | OXYGEN SATURATION: 100 % | SYSTOLIC BLOOD PRESSURE: 116 MMHG | HEART RATE: 59 BPM | DIASTOLIC BLOOD PRESSURE: 64 MMHG | BODY MASS INDEX: 27.91 KG/M2 | WEIGHT: 162.6 LBS

## 2024-12-20 DIAGNOSIS — N30.00 ACUTE CYSTITIS WITHOUT HEMATURIA: Primary | ICD-10-CM

## 2024-12-20 LAB
SL AMB  POCT GLUCOSE, UA: ABNORMAL
SL AMB LEUKOCYTE ESTERASE,UA: ABNORMAL
SL AMB POCT BILIRUBIN,UA: ABNORMAL
SL AMB POCT BLOOD,UA: ABNORMAL
SL AMB POCT COLOR,UA: ABNORMAL
SL AMB POCT KETONES,UA: ABNORMAL
SL AMB POCT NITRITE,UA: ABNORMAL
SL AMB POCT PH,UA: 6
SL AMB POCT SPECIFIC GRAVITY,UA: 1.01
SL AMB POCT URINE PROTEIN: ABNORMAL
SL AMB POCT UROBILINOGEN: ABNORMAL

## 2024-12-20 PROCEDURE — 99213 OFFICE O/P EST LOW 20 MIN: CPT | Performed by: INTERNAL MEDICINE

## 2024-12-20 PROCEDURE — 81002 URINALYSIS NONAUTO W/O SCOPE: CPT | Performed by: INTERNAL MEDICINE

## 2024-12-20 RX ORDER — NITROFURANTOIN 25; 75 MG/1; MG/1
100 CAPSULE ORAL 2 TIMES DAILY
Qty: 10 CAPSULE | Refills: 0 | Status: SHIPPED | OUTPATIENT
Start: 2024-12-20 | End: 2024-12-25

## 2024-12-20 NOTE — TELEPHONE ENCOUNTER
Please call the patient that unfortunately her urine sample was not sent out to the lab.  She will still complete the antibiotic as prescribed.  If the urinary tract infection symptoms specifically the burning does not improve she should call.  If the vaginal discharge does not resolve like we discussed she should call.

## 2024-12-20 NOTE — PROGRESS NOTES
Name: Tran Mandujano      : 1960      MRN: [id number]  Encounter Provider: Lea Reyes, MD  Encounter Date: 2024   Encounter department: [facility]    Assessment & Plan  Acute cystitis without hematuria  Start Macrobid and discussed that if vaginal discharge continues, she should call or see GYN  Urine was inadvertently not sent out to the lab for microscopy and culture  Will advise patient to call if UTI symptoms do not resolve  Orders:  •  nitrofurantoin (MACROBID) 100 mg capsule; Take 1 capsule (100 mg total) by mouth 2 (two) times a day for 5 days  •  POCT urine dip         History of Present Illness     Urinary Tract Infection   This is a new problem. The current episode started in the past 7 days. The problem occurs every urination. The problem has been gradually worsening. The quality of the pain is described as burning. There has been no fever. Associated symptoms include a discharge. Pertinent negatives include no chills. Treatments tried: Azo. The treatment provided mild relief. There is no history of recurrent UTIs.     Review of Systems   Constitutional:  Negative for chills and fever.   Gastrointestinal:  Negative for abdominal pain.   Genitourinary:  Positive for dysuria and vaginal discharge.     Past Medical History:   Diagnosis Date   • Anesthesia complication     HR drops   • Benign neoplasm of lung     Right lung lobe benign. Removed .   • BRCA2 positive    • Breast cancer (HCC)    • Breathing difficulty     no breathing difficulties but patient reported drop in BP during anesthesia for appendectomy and was advised to have cardiologist consult   • Cancer (HCC)    • Depression    • Dermatomyositis (HCC)    • Lung nodule    • Melanoma (HCC)     mid chest (insitu)   • Wears glasses      Past Surgical History:   Procedure Laterality Date   • APPENDECTOMY         • BREAST SURGERY      Bilateral Mastectomy   • CATARACT EXTRACTION BILATERAL W/  ANTERIOR VITRECTOMY Bilateral 02/2024   • COLONOSCOPY N/A 01/26/2016    Procedure: COLONOSCOPY;  Surgeon: David Gutierrez MD;  Location: [facility];  Service:    • FRACTURE SURGERY  August, 2024   • HYSTERECTOMY      2007   • KNEE SURGERY  2007   • LUNG BIOPSY     • LYMPH NODE BIOPSY     • MASS EXCISION Left 07/18/2019    Procedure: MID BACK MASS EXCISION BIOPSY;  Surgeon: David Llanes MD;  Location: [facility];  Service: General   • MASS EXCISION Left 07/03/2024    left shoulder biopsy   • MASTECTOMY      reconstruction 2008   • OOPHORECTOMY     • SD OPEN TREATMENT RADIAL SHAFT FRACTURE Right 08/30/2023    Procedure: OPEN REDUCTION W/ INTERNAL FIXATION (ORIF) RADIUS / ULNA (WRIST);  Surgeon: Chu Chin MD;  Location: [facility];  Service: Orthopedics   • SKIN BIOPSY       Family History   Problem Relation Age of Onset   • Cancer Mother         Ovarian   • Ovarian cancer Mother    • No Known Problems Father    • BRCA2 Positive Sister    • Heart disease Sister         post partum cardiomyopathy, pacer   • No Known Problems Brother    • Prostate cancer Maternal Uncle    • Prostate cancer Maternal Uncle    • BRCA2 Negative Paternal Aunt    • Breast cancer Paternal Aunt    • Cancer Paternal Aunt         Breast/Ovarian   • Cancer Maternal Grandmother 90        skin   • Skin cancer Maternal Grandmother    • No Known Problems Paternal Grandmother    • No Known Problems Paternal Grandfather    • BRCA2 Positive Daughter      Social History     Tobacco Use   • Smoking status: Never   • Smokeless tobacco: Never   Vaping Use   • Vaping status: Never Used   Substance and Sexual Activity   • Alcohol use: No   • Drug use: No   • Sexual activity: Yes     Partners: Male     Birth control/protection: None     Comment: complete hysterectomy     Current Outpatient Medications on File Prior to Visit   Medication Sig   • alendronate (FOSAMAX) 70 mg tablet Take 1 tablet (70 mg total) by mouth every 7 days   •  Calcium-Magnesium-Vitamin D (CALCIUM 500 PO) Take 1,000 mg by mouth daily.   • Cholecalciferol (VITAMIN D) 2000 UNITS CAPS Take 2,000 Units/day by mouth   • cholecalciferol (VITAMIN D3) 1,000 units tablet Take 1,000 Units by mouth daily   • clobetasol (TEMOVATE) 0.05 % cream as needed     • Glycerin-Hypromellose- (DRY EYE RELIEF DROPS OP) Apply to eye   • methotrexate 2.5 MG tablet Take 6 pills one day a week.   • multivitamin (THERAGRAN) TABS Take 1 tablet by mouth daily.   • folic acid (FOLVITE) 1 mg tablet Take 1 tablet (1 mg total) by mouth daily   • [DISCONTINUED] mupirocin (BACTROBAN) 2 % ointment Apply topically 3 (three) times a day For 10 days (Patient not taking: Reported on 8/20/2024)     No Known Allergies  Immunization History   Administered Date(s) Administered   • COVID-19 MODERNA VACC 0.25 ML IM BOOSTER 03/04/2022   • COVID-19 MODERNA VACC 0.5 ML IM 01/29/2021, 02/26/2021, 08/31/2021   • COVID-19 Pfizer Vac BIVALENT Cali-sucrose 12 Yr+ IM 09/22/2022, 08/22/2023   • COVID-19 Pfizer mRNA vacc PF cali-sucrose 12 yr and older (Comirnaty) 09/03/2024   • H1N1, All Formulations 12/11/2009   • Hep A, adult 06/16/2000, 12/20/2000   • Hep B, adult 06/16/2000, 07/17/2000, 12/20/2000   • Influenza Quadrivalent Preservative Free 3 years and older IM 10/04/2014, 09/19/2016, 10/27/2017   • Influenza Quadrivalent, 6-35 Months IM 10/12/2015   • Influenza Recombinant Preservative Free Im 10/05/2024   • Influenza, injectable, quadrivalent, preservative free 0.5 mL 10/07/2023   • Influenza, recombinant, quadrivalent,injectable, preservative free 11/02/2018, 11/25/2019, 10/10/2020, 10/02/2021, 10/01/2022   • Influenza, seasonal, injectable 09/20/2012, 09/24/2013   • Pneumococcal Polysaccharide PPV23 04/01/2008   • Td (adult), adsorbed 08/26/2003   • Tdap 09/04/2014   • Zoster Vaccine Recombinant 06/27/2019, 12/19/2019     Objective   /64 (BP Location: Left arm, Patient Position: Sitting, Cuff Size: Large)    Pulse 59   Temp (!) 96.7 °F (35.9 °C) (Tympanic)   Wt 73.8 kg (162 lb 9.6 oz)   SpO2 100%   BMI 27.91 kg/m²     Physical Exam  Constitutional:       Appearance: She is not ill-appearing.   Cardiovascular:      Rate and Rhythm: Regular rhythm.      Heart sounds: Normal heart sounds.   Pulmonary:      Breath sounds: Normal breath sounds.   Abdominal:      Palpations: Abdomen is soft.      Tenderness: There is no abdominal tenderness. There is no right CVA tenderness or left CVA tenderness.

## 2024-12-23 ENCOUNTER — OFFICE VISIT (OUTPATIENT)
Dept: SURGICAL ONCOLOGY | Facility: CLINIC | Age: 64
End: 2024-12-23
Payer: COMMERCIAL

## 2024-12-23 ENCOUNTER — TELEPHONE (OUTPATIENT)
Dept: PLASTIC SURGERY | Facility: CLINIC | Age: 64
End: 2024-12-23

## 2024-12-23 VITALS
HEIGHT: 64 IN | DIASTOLIC BLOOD PRESSURE: 79 MMHG | TEMPERATURE: 98.1 F | OXYGEN SATURATION: 96 % | SYSTOLIC BLOOD PRESSURE: 110 MMHG | WEIGHT: 162 LBS | BODY MASS INDEX: 27.66 KG/M2 | HEART RATE: 64 BPM | RESPIRATION RATE: 16 BRPM

## 2024-12-23 DIAGNOSIS — Z85.3 HISTORY OF BREAST CANCER: ICD-10-CM

## 2024-12-23 DIAGNOSIS — Z15.01 BRCA2 GENE MUTATION POSITIVE: Primary | ICD-10-CM

## 2024-12-23 DIAGNOSIS — Z15.09 BRCA2 GENE MUTATION POSITIVE: Primary | ICD-10-CM

## 2024-12-23 DIAGNOSIS — Z08 ENCOUNTER FOR FOLLOW-UP EXAMINATION AFTER COMPLETED TREATMENT FOR MALIGNANT NEOPLASM: ICD-10-CM

## 2024-12-23 DIAGNOSIS — Z98.82 HISTORY OF BILATERAL BREAST IMPLANTS: ICD-10-CM

## 2024-12-23 PROCEDURE — 99213 OFFICE O/P EST LOW 20 MIN: CPT

## 2024-12-23 NOTE — ASSESSMENT & PLAN NOTE
- 1 year follow up  - CBE every 6-12 months  - Self breast exams, to promptly notify if any concerns  Orders:  •  Ambulatory Referral to Plastic Surgery; Future

## 2024-12-23 NOTE — TELEPHONE ENCOUNTER
Received call from Patient to have bilateral saline implants that were put in in 2008 checked by Patricia. Patient has BRACA in chart.     Berta MCCORMACK: Please call patient with next steps for appt with Patricia

## 2024-12-23 NOTE — PROGRESS NOTES
Name: Tran Mandujano      : 1960      MRN: [id number]  Encounter Provider: MASSIMO Knowles  Encounter Date: 2024   Encounter department: [facility]  :  Assessment & Plan  BRCA2 gene mutation positive  - 1 year follow up  - CBE every 6-12 months  - Self breast exams, to promptly notify if any concerns  Orders:  •  Ambulatory Referral to Plastic Surgery; Future    History of breast cancer    Orders:  •  Ambulatory Referral to Plastic Surgery; Future    Encounter for follow-up examination after completed treatment for malignant neoplasm         History of bilateral breast implants    Orders:  •  Ambulatory Referral to Plastic Surgery; Future      History of Present Illness     Patient is a 64-year-old female presenting today for a follow up for BRCA 2 mutation. She has a history of bilateral breast cancer diagnosed in  and melanoma in situ of the chest wall. Her breast cancer pathology revealed invasive carcinoma ER/NV positive, HER2 negative. She underwent bilateral mastectomies with flap reconstruction, chemotherapy, 5 years of aromatase inhibitor therapy. She is s/p [id number]-[id number], and continues to follow with GYN/ONC as well as dermatology. At our last visit together, we discussed evaluating implant integrity with MRI, as they were placed 15 years ago. MRI was not completed. Pt reports insurance denied imaging. We discussed referral to plastics for evaluation. Pt is agreeable. She denies breast changes, persistent cough, SOB, headaches, as well as any new or persistent back, bone, or abdominal pain. There were no concerns upon CBE today. A new rash was appreciated in the form of scattered pink lesions on the upper portion of the left chest wall. Pt denies injury, itching, or other known etiology. I advised monitoring and to inform if worsening or bothersome. I will see the patient back in 1 year or sooner should the need arise. She was instructed  "to call with any questions or concerns prior to this time. All questions were answered today.       Oncology History   Oncology History   Bilateral malignant neoplasm of breast in female, estrogen receptor positive (HCC)    Initial Diagnosis    Bilateral malignant neoplasm of breast in female, estrogen receptor positive (HCC)      Surgery    Double mastectomy, reconstructive surgery      Chemotherapy    Chemotherapy with Taxotere and Cytoxan for 4 cycles followed by [facility] from 2008 thru 2013.     8/31/2023 -  Cancer Staged    Staging form: Breast, AJCC 8th Edition  - Clinical stage from 8/31/2023: cT1a, cN1(sn), cM0, GX, ER+, AZ+, HER2- - Signed by Ernesto Howard MD on 8/31/2023  Stage prefix: Initial diagnosis  Method of lymph node assessment: Timblin lymph node biopsy  Histologic grading system: 3 grade system          Review of Systems   Constitutional:  Negative for activity change, appetite change, fatigue, fever and unexpected weight change.   Respiratory:  Negative for cough and shortness of breath.    Gastrointestinal:  Negative for abdominal pain.   Musculoskeletal:  Negative for back pain.   Skin: Negative.    Neurological: Negative.    Psychiatric/Behavioral:  Negative for confusion.     A complete review of systems is negative other than that noted above in the HPI.         Objective   /79 (Patient Position: Sitting, Cuff Size: Standard)   Pulse 64   Temp 98.1 °F (36.7 °C) (Temporal)   Resp 16   Ht 5' 4\" (1.626 m)   Wt 73.5 kg (162 lb)   SpO2 96%   BMI 27.81 kg/m²     ECOG ECOG Performance Status: 0 - Fully active, able to carry on all pre-disease performance without restriction    Physical Exam  Vitals and nursing note reviewed.   Constitutional:       Appearance: Normal appearance. She is normal weight.   Eyes:      General: No scleral icterus.     Conjunctiva/sclera: Conjunctivae normal.   Cardiovascular:      Rate and Rhythm: Normal rate and regular rhythm.   Pulmonary:      Effort: " Pulmonary effort is normal.      Breath sounds: Normal breath sounds.   Chest:      Chest wall: No mass.   Breasts:     Right: Skin change (surgical scar) present. No swelling, bleeding, inverted nipple, mass, nipple discharge or tenderness.      Left: Skin change (surgical scar) present. No swelling, bleeding, inverted nipple, mass, nipple discharge or tenderness.          Comments: S/p bilateral mastectomies with reconstruction    New rash appreciated in the form of scattered pink lesions on the upper portion of the left chest wall. Pt denies injury, itching, or other known etiology.    Lymphadenopathy:      Upper Body:      Right upper body: No supraclavicular or axillary adenopathy.      Left upper body: No supraclavicular or axillary adenopathy.   Skin:     General: Skin is warm and dry.   Neurological:      General: No focal deficit present.      Mental Status: She is alert and oriented to person, place, and time. Mental status is at baseline.   Psychiatric:         Mood and Affect: Mood normal.         Behavior: Behavior normal.         Thought Content: Thought content normal.         Judgment: Judgment normal.          Labs: I have reviewed pertinent labs.   Office Visit on 12/20/2024   Component Date Value Ref Range Status   • LEUKOCYTE ESTERASE,UA 12/20/2024 neg   Final   • NITRITE,UA 12/20/2024 neg   Final   • SL AMB POCT UROBILINOGEN 12/20/2024 3.5umol/L   Final   • POCT URINE PROTEIN 12/20/2024 NEG   Final   •  PH,UA 12/20/2024 6.0   Final   • BLOOD,UA 12/20/2024 200 Flaco/uL   Final   • SPECIFIC GRAVITY,UA 12/20/2024 1.010   Final   • KETONES,UA 12/20/2024 neg   Final   • BILIRUBIN,UA 12/20/2024 neg   Final   • GLUCOSE, UA 12/20/2024 neg   Final   •  COLOR,UA 12/20/2024 neg   Final   Appointment on 12/05/2024   Component Date Value Ref Range Status   • Total CK 12/05/2024 56  26 - 192 U/L Final   • Creatinine 12/05/2024 0.74  0.60 - 1.30 mg/dL Final    Standardized to IDMS reference method   • eGFR  12/05/2024 87  ml/min/1.73sq m Final   • Calcium 12/05/2024 9.3  8.4 - 10.2 mg/dL Final   • Total Bilirubin 12/05/2024 0.46  0.20 - 1.00 mg/dL Final    Use of this assay is not recommended for patients undergoing treatment with eltrombopag due to the potential for falsely elevated results.  N-acetyl-p-benzoquinone imine (metabolite of Acetaminophen) will generate erroneously low results in samples for patients that have taken an overdose of Acetaminophen.   • Bilirubin, Direct 12/05/2024 0.06  0.00 - 0.20 mg/dL Final   • Alkaline Phosphatase 12/05/2024 72  34 - 104 U/L Final   • AST 12/05/2024 27  13 - 39 U/L Final   • ALT 12/05/2024 29  7 - 52 U/L Final    Specimen collection should occur prior to Sulfasalazine administration due to the potential for falsely depressed results.    • Total Protein 12/05/2024 7.2  6.4 - 8.4 g/dL Final   • Albumin 12/05/2024 4.5  3.5 - 5.0 g/dL Final   • WBC 12/05/2024 6.86  4.31 - 10.16 Thousand/uL Final   • RBC 12/05/2024 4.29  3.81 - 5.12 Million/uL Final   • Hemoglobin 12/05/2024 13.3  11.5 - 15.4 g/dL Final   • Hematocrit 12/05/2024 41.0  34.8 - 46.1 % Final   • MCV 12/05/2024 96  82 - 98 fL Final   • MCH 12/05/2024 31.0  26.8 - 34.3 pg Final   • MCHC 12/05/2024 32.4  31.4 - 37.4 g/dL Final   • RDW 12/05/2024 14.2  11.6 - 15.1 % Final   • MPV 12/05/2024 9.4  8.9 - 12.7 fL Final   • Platelets 12/05/2024 263  149 - 390 Thousands/uL Final   • nRBC 12/05/2024 0  /100 WBCs Final   • Segmented % 12/05/2024 69  43 - 75 % Final   • Immature Grans % 12/05/2024 0  0 - 2 % Final   • Lymphocytes % 12/05/2024 19  14 - 44 % Final   • Monocytes % 12/05/2024 8  4 - 12 % Final   • Eosinophils Relative 12/05/2024 3  0 - 6 % Final   • Basophils Relative 12/05/2024 1  0 - 1 % Final   • Absolute Neutrophils 12/05/2024 4.76  1.85 - 7.62 Thousands/µL Final   • Absolute Immature Grans 12/05/2024 0.02  0.00 - 0.20 Thousand/uL Final   • Absolute Lymphocytes 12/05/2024 1.31  0.60 - 4.47 Thousands/µL Final    • Absolute Monocytes 12/05/2024 0.56  0.17 - 1.22 Thousand/µL Final   • Eosinophils Absolute 12/05/2024 0.17  0.00 - 0.61 Thousand/µL Final   • Basophils Absolute 12/05/2024 0.04  0.00 - 0.10 Thousands/µL Final   • Vit D, 25-Hydroxy 12/05/2024 55.2  30.0 - 100.0 ng/mL Final    Vitamin D guidelines established by Clinical Guidelines Subcommittee  of the Endocrine Society Task Force, 2011    Deficiency <20ng/ml   Insufficiency 20-30ng/ml   Sufficient  ng/ml    • TPMT ACTIVITY 12/05/2024 23.8  Units/mL RBC Final    Reference Range:  Normal: 15.1 - 26.4  Heterozygous for low TPMT variant: 6.3 - 15.0  Homozygous for low TPMT variant: <6.3   • Interpretation 12/05/2024 Comment   Final    The above results can be interpreted as Normal for red  blood cell Thiopurine Methyltransferase activity. For  patients having an intrinsic low level of TPMT, recent RBC  transfusion can variably increase their assayed enzymatic  activity depending on the amount and circulating half-life  of the transfused red blood cells.    This test was developed and its performance characteristics  determined by [facility]. It has not been cleared or approved  by the Food and Drug Administration.    This case has been reviewed, approved, interpreted and  electronically signed by Rios Blas, PhD, [facility].     • METHODOLOGY 12/05/2024 Comment   Final    Enzymatic Endpoint/Liquid Chromatography - Tandem Mass Spectrometry  (LC-MS/MS)

## 2024-12-24 ENCOUNTER — OFFICE VISIT (OUTPATIENT)
Dept: RHEUMATOLOGY | Facility: CLINIC | Age: 64
End: 2024-12-24
Payer: COMMERCIAL

## 2024-12-24 VITALS
HEIGHT: 64 IN | BODY MASS INDEX: 27.14 KG/M2 | SYSTOLIC BLOOD PRESSURE: 120 MMHG | WEIGHT: 159 LBS | DIASTOLIC BLOOD PRESSURE: 80 MMHG | HEART RATE: 74 BPM

## 2024-12-24 DIAGNOSIS — Z79.60 LONG-TERM USE OF IMMUNOSUPPRESSANT MEDICATION: ICD-10-CM

## 2024-12-24 DIAGNOSIS — M33.10 AMYOPATHIC DERMATOMYOSITIS (HCC): Primary | ICD-10-CM

## 2024-12-24 PROCEDURE — 99215 OFFICE O/P EST HI 40 MIN: CPT | Performed by: STUDENT IN AN ORGANIZED HEALTH CARE EDUCATION/TRAINING PROGRAM

## 2024-12-24 RX ORDER — MYCOPHENOLATE MOFETIL 500 MG/1
TABLET ORAL
Qty: 135 TABLET | Refills: 2 | Status: SHIPPED | OUTPATIENT
Start: 2024-12-30

## 2024-12-24 NOTE — PROGRESS NOTES
Name: Tran Mandujano      : 1960      MRN: [id number]  Encounter Provider: Charles Dalton DO  Encounter Date: 2024   Encounter department: [facility]  :  Assessment & Plan  Amyopathic dermatomyositis (HCC)  Given GGOs on imaging, not clear if this is from methotrexate or progression of DM. Could also be from a URI, she did have URI symptoms a few weeks prior    Regardless, will STOP methotrexate and replace with MMF, r:b discussed. Will repeat CT in about 2 mos. If GGOs resolved, then will continue on MMF vs switch back to methotrexate if she feels more comfortable with that. If GGOs stable/worsened will refer to pulm to see if anything else needed and will maintain her on MMF    No signs of muscle involvement by symptoms, exam or labs at this time  Orders:    CT chest high resolution; Future    mycophenolate (CELLCEPT) 500 mg tablet; Take one pill twice a day for 14 days, then increase to two pills twice a day Do not start before 2024.    Long-term use of immunosuppressant medication    Orders:    Creatinine, serum; Standing    Calcium; Standing    Hepatic function panel; Standing    Creatinine, serum; Standing    Calcium; Standing    Hepatic function panel; Standing    CBC and differential; Standing    CBC and differential; Standing      Patient's rheumatologic disease(s) threaten long-term function if not appropriately managed.    Patient's rheumatologic medication(s) require intensive monitoring for toxicity. Does not endorse any significant side effects.    History of Present Illness     HRCT showed NEW LLL GGOs.    No new symptoms, no dysphagia, no new rashes    Permanent history: First saw our practice  with Dr. Wilkes, previously dxd with DM based on characteristic chest rash with biopsy of knuckle rash reportedly consistent with such. Initially on hydroxychloroquine, developed skin hyperpigmentation so switched to low-dose methotrexate  "with good control. Never had any apparent muscle involvement, never had muscle biopsy. Dr. Wilkes workup included SSA Ab which was positive; questioned if disease could be SjD rather than DM but given stability on methotrexate did not .     First saw me Nov 2024, still having some skin flares and progressively worsening dyspnea, concerning for lung involvement of ADM vs methotrexate toxicity.     Objective   /80   Pulse 74   Ht 5' 4\" (1.626 m)   Wt 72.1 kg (159 lb)   BMI 27.29 kg/m²     General: Well appearing, in no distress.   Eyes: Sclera non-icteric. EOMI  Extremities: Warm, well perfused, no edema.   Neuro: Alert and oriented. No gross focal neurological deficits.   Skin: No rashes.  MSK exam:   Muscle strength   Right   Left    Shoulder abduction 5/5  Shoulder abduction 5/5   Shoulder adduction 5/5  Shoulder adduction 5/5   Shoulder internal rotation 5/5  Shoulder internal rotation 5/5   Shoulder external rotation 5/5  Shoulder external rotation 5/5   Hip flexion 5/5  Hip flexion 5/5   Hip extension 5/5  Hip extension 54/5   Hip abduction 5/5  Hip abduction 5/5   Hip adduction 5/5  Hip adduction 5/5          Neck   Flexion 5/5   Extension 5/5   R sidebending 5/5   L sidebending 5/5         Reviewed results of the following  HRCT, TPMT enzyme activity, CBC/diff, LFTs, calcium, creatinine, CK  "

## 2024-12-24 NOTE — PATIENT INSTRUCTIONS
STOP methotrexate and folic acid    START Cellcept next Monday    Get blood work (GFR, creatinine, calcium, LFTs, CBC) every 3 months while on Cellcept.    Please call central scheduling to schedule your repeat CT chest around the end of February    While on your prescribed rheumatologic medication(s) Cellcept: you must STOP the medication if you fall ill (ex: a viral infection, bacterial infection) and not restart it until your illness is resolved and/or you are finished with any antibiotics/antivirals/antifungals that are prescribed for you, whichever happens last.    While on the medication(s), if you are to get a vaccine, you may have to STOP the medication before and after your vaccination. See below for how long to stop your medication according to how often you take it.    LIVE ATTENUATED VACCINES (ex: MMR, rotavirus, smallpox, chickenpox, yellow fever)    Hold before vaccine Hold after vaccine   Steroids 4 weeks 4 weeks   Methotrexate  Azathioprine  Sulfasalazine  Leflunomide  Mycophenolate  Cyclophosphamide (oral) 4 weeks    Calcineurin inhibitors such as:  Tacrolimus  Cyclosporine  Voclosporin 4 weeks    HECTOR inhibitors such as:  Tofacitanib  Upadacitinib  Ruxolitinib  Baricitinib 1 week    TNF inhibitors such as:  Adalimumab and biosimilars  Infliximab  Etanercept  Golimumab  Certolizumab 1 dosing interval (ex: for an every 4 week medication, hold for 4 weeks)    IL-17 inhibitors such as:  Ixekizumab  Secukinumab 1 dosing interval (ex: for an every 4 week medication, hold for 4 weeks)    IL-12/23 inhibitors such as:  Ustekinumab 1 dosing interval (ex: for an every 4 week medication, hold for 4 weeks)    IL-23 inhibitors such as:  Guselkumab  Rizankizumab 1 dosing interval (ex: for an every 4 week medication, hold for 4 weeks)    Belimumab 1 dosing interval (ex: for an every 4 week medication, hold for 4 weeks)    IL-6 inhibitors such as:  Tocilizumab  Sarilumab 1 dosing interval (ex: for an every 4 week  medication, hold for 4 weeks)    IL-1 inhibitors such as:  Anakinra  Rilonacept  Canakinumab 1 dosing interval (ex: for an every 4 week medication, hold for 4 weeks)    Abatacept 1 dosing interval (ex: for an every 4 week medication, hold for 4 weeks)    Cyclophosphamide (IV) 1 dosing interval (ex: for an every 4 week medication, hold for 4 weeks)    Rituximab 6 months    IVI-400 mg/kg  1000 mg/kg  2000 mg/kg   8 months  10 months  11 months      COVID-19 VACCINE    Instructions   Abatacept (IV) Time vaccine so it is given 2 weeks before next scheduled abatacept dose, then receive abatacept as scheduled   Abatacept (SQ) Delay abatacept dose for 2 weeks after vaccine   Belimumab Delay belimumab dose for 2 weeks after vaccine   Cyclophosphamide (IV) Time cyclophosphamide administration so that it will occur 1 week after each vaccine dose   Hydroxychloroquine No changes to hydroxychloroquine timing or vaccine timing   IVIG No changes to IVIG timing or vaccine timing   Rituximab Time vaccine so that it is given 2 weeks before the next scheduled rituximab dose, then receive rituximab as scheduled   All others Delay rheumatologic medication dose for 2 weeks after vaccine, if disease activity allows     INFLUENZA VACCINE    Hold before vaccine Hold after vaccine   Methotrexate 1 week 2 weeks if able   Rituximab  n/a 2 weeks   All others CONTINUE, DO NOT HOLD CONTINUE, DO NOT HOLD     ALL OTHER VACCINES    Hold before vaccine Hold after vaccine   Methotrexate CONTINUE, DO NOT HOLD CONTINUE, DO NOT HOLD   Rituximab  n/a Time vaccination for when next dose is due, then give rituximab at least 2 weeks after vaccine   All others CONTINUE, DO NOT HOLD CONTINUE, DO NOT HOLD     These instructions are consistent with the recommendations set forth by the American College of Rheumatology (ACR).     4:30 PM virtual

## 2025-01-02 ENCOUNTER — POST-OP CHECK (OUTPATIENT)
Dept: URBAN - METROPOLITAN AREA CLINIC 6 | Facility: CLINIC | Age: 65
End: 2025-01-02

## 2025-01-02 DIAGNOSIS — Z96.1: ICD-10-CM

## 2025-01-02 PROCEDURE — 99024 POSTOP FOLLOW-UP VISIT: CPT

## 2025-01-02 ASSESSMENT — KERATOMETRY
OD_K1POWER_DIOPTERS: 43.25
OS_K2POWER_DIOPTERS: 45.25
OD_K2POWER_DIOPTERS: 44.50
OD_AXISANGLE_DEGREES: 173
OS_AXISANGLE2_DEGREES: 73
OS_AXISANGLE_DEGREES: 163
OD_AXISANGLE2_DEGREES: 83
OS_K1POWER_DIOPTERS: 43.75

## 2025-01-02 ASSESSMENT — TONOMETRY
OS_IOP_MMHG: 11
OD_IOP_MMHG: 10

## 2025-01-02 ASSESSMENT — VISUAL ACUITY
OU_SC: J1+
OD_SC: 20/20-1
OS_SC: 20/20

## 2025-01-09 ENCOUNTER — OFFICE VISIT (OUTPATIENT)
Dept: INTERNAL MEDICINE CLINIC | Facility: CLINIC | Age: 65
End: 2025-01-09
Payer: COMMERCIAL

## 2025-01-09 VITALS
HEART RATE: 70 BPM | HEIGHT: 64 IN | DIASTOLIC BLOOD PRESSURE: 60 MMHG | WEIGHT: 163.8 LBS | SYSTOLIC BLOOD PRESSURE: 118 MMHG | TEMPERATURE: 99.8 F | OXYGEN SATURATION: 99 % | BODY MASS INDEX: 27.96 KG/M2

## 2025-01-09 DIAGNOSIS — J01.90 ACUTE NON-RECURRENT SINUSITIS, UNSPECIFIED LOCATION: Primary | ICD-10-CM

## 2025-01-09 PROCEDURE — 99213 OFFICE O/P EST LOW 20 MIN: CPT | Performed by: INTERNAL MEDICINE

## 2025-01-09 RX ORDER — PILOCARPINE HYDROCHLORIDE 10 MG/ML
1 SOLUTION/ DROPS OPHTHALMIC
COMMUNITY
Start: 2025-01-03

## 2025-01-09 NOTE — PROGRESS NOTES
Name: Tran Mandujano      : 1960      MRN: [id number]  Encounter Provider: Lea Reyes, MD  Encounter Date: 2025   Encounter department: [facility]    Assessment & Plan  Acute non-recurrent sinusitis, unspecified location  Within a week of worsening symptoms  Start Augmentin  Continue symptom directed treatment Orders:  •  amoxicillin-clavulanate (AUGMENTIN) 875-125 mg per tablet; Take 1 tablet by mouth every 12 (twelve) hours for 10 days         History of Present Illness     >1 weeks of nasal congestion and colored nasal discharge, swollen neck glands   Sinus pressure started today  Temp 99  Trying OTC Xyzal for 3-4 days and Tylenol with some  No sore throat ear pain cough nausea vomiting diarrhea      Review of Systems   Constitutional:  Positive for fatigue and fever.   HENT:  Positive for congestion, sinus pressure and sinus pain. Negative for sore throat.    Respiratory:  Negative for cough.    Gastrointestinal:  Negative for diarrhea, nausea and vomiting.     Past Medical History:   Diagnosis Date   • Anesthesia complication     HR drops   • Benign neoplasm of lung     Right lung lobe benign. Removed .   • BRCA2 positive    • Breast cancer (HCC)    • Breathing difficulty     no breathing difficulties but patient reported drop in BP during anesthesia for appendectomy and was advised to have cardiologist consult   • Cancer (HCC)    • Depression    • Dermatomyositis (HCC)    • Keloid    • Lung nodule    • Melanoma (HCC)     mid chest (insitu)   • Skin cancer    • Wears glasses      Past Surgical History:   Procedure Laterality Date   • APPENDECTOMY         • BREAST BIOPSY  3/2008   • BREAST LUMPECTOMY     • BREAST RECONSTRUCTION  2008   • BREAST SURGERY      Bilateral Mastectomy   • CATARACT EXTRACTION BILATERAL W/ ANTERIOR VITRECTOMY Bilateral 2024   • COLONOSCOPY N/A 2016    Procedure: COLONOSCOPY;  Surgeon: David Guteirrez MD;   Location: [facility];  Service:    • COSMETIC SURGERY  4/2008    Bilateral mastectomy with reconstruction   • FRACTURE SURGERY  August, 2024   • HYSTERECTOMY      2007   • KNEE SURGERY  2007   • LUNG BIOPSY     • LYMPH NODE BIOPSY     • MASS EXCISION Left 07/18/2019    Procedure: MID BACK MASS EXCISION BIOPSY;  Surgeon: David Llanes MD;  Location: [facility];  Service: General   • MASS EXCISION Left 07/03/2024    left shoulder biopsy   • MASTECTOMY      reconstruction 2008   • OOPHORECTOMY     • UT OPEN TREATMENT RADIAL SHAFT FRACTURE W/INT FIXJ Right 08/30/2023    Procedure: OPEN REDUCTION W/ INTERNAL FIXATION (ORIF) RADIUS / ULNA (WRIST);  Surgeon: Chu Chin MD;  Location: [facility];  Service: Orthopedics   • SKIN BIOPSY       Family History   Problem Relation Age of Onset   • Cancer Mother         Ovarian   • Ovarian cancer Mother    • No Known Problems Father    • BRCA2 Positive Sister    • Heart disease Sister         post partum cardiomyopathy, pacer   • No Known Problems Brother    • Prostate cancer Maternal Uncle    • Prostate cancer Maternal Uncle    • BRCA2 Negative Paternal Aunt    • Breast cancer Paternal Aunt    • Cancer Paternal Aunt         Breast/Ovarian   • Cancer Maternal Grandmother 90        Skin cancer   • Skin cancer Maternal Grandmother    • No Known Problems Paternal Grandmother    • No Known Problems Paternal Grandfather    • BRCA2 Positive Daughter      Social History     Tobacco Use   • Smoking status: Never   • Smokeless tobacco: Never   Vaping Use   • Vaping status: Never Used   Substance and Sexual Activity   • Alcohol use: No   • Drug use: No   • Sexual activity: Yes     Partners: Male     Birth control/protection: None     Comment: complete hysterectomy     Current Outpatient Medications on File Prior to Visit   Medication Sig   • alendronate (FOSAMAX) 70 mg tablet Take 1 tablet (70 mg total) by mouth every 7 days   • Calcium-Magnesium-Vitamin D (CALCIUM 500 PO) Take 1,000 mg by  mouth daily.   • cholecalciferol (VITAMIN D3) 1,000 units tablet Take 1,000 Units by mouth daily   • clobetasol (TEMOVATE) 0.05 % cream as needed     • Glycerin-Hypromellose- (DRY EYE RELIEF DROPS OP) Apply to eye   • multivitamin (THERAGRAN) TABS Take 1 tablet by mouth daily.   • mycophenolate (CELLCEPT) 500 mg tablet Take one pill twice a day for 14 days, then increase to two pills twice a day Do not start before December 30, 2024.   • pilocarpine (ISOPTO CARPINE) 1 % ophthalmic solution Administer 1 drop to both eyes daily at bedtime   • Cholecalciferol (VITAMIN D) 2000 UNITS CAPS Take 2,000 Units/day by mouth (Patient not taking: Reported on 12/24/2024)     No Known Allergies  Immunization History   Administered Date(s) Administered   • COVID-19 MODERNA VACC 0.25 ML IM BOOSTER 03/04/2022   • COVID-19 MODERNA VACC 0.5 ML IM 01/29/2021, 02/26/2021, 08/31/2021   • COVID-19 Pfizer Vac BIVALENT Cali-sucrose 12 Yr+ IM 09/22/2022, 08/22/2023   • COVID-19 Pfizer mRNA vacc PF cali-sucrose 12 yr and older (Comirnaty) 09/03/2024   • H1N1, All Formulations 12/11/2009   • Hep A, adult 06/16/2000, 12/20/2000   • Hep B, adult 06/16/2000, 07/17/2000, 12/20/2000   • Influenza Quadrivalent Preservative Free 3 years and older IM 10/04/2014, 09/19/2016, 10/27/2017   • Influenza Quadrivalent, 6-35 Months IM 10/12/2015   • Influenza Recombinant Preservative Free Im 10/05/2024   • Influenza, injectable, quadrivalent, preservative free 0.5 mL 10/07/2023   • Influenza, recombinant, quadrivalent,injectable, preservative free 11/02/2018, 11/25/2019, 10/10/2020, 10/02/2021, 10/01/2022   • Influenza, seasonal, injectable 09/20/2012, 09/24/2013   • Pneumococcal Polysaccharide PPV23 04/01/2008   • Td (adult), adsorbed 08/26/2003   • Tdap 09/04/2014   • Zoster Vaccine Recombinant 06/27/2019, 12/19/2019     Objective   /60 (BP Location: Left arm, Patient Position: Sitting, Cuff Size: Standard)   Pulse 70   Temp 99.8 °F (37.7 °C)  "(Tympanic)   Ht 5' 4\" (1.626 m)   Wt 74.3 kg (163 lb 12.8 oz)   SpO2 99%   BMI 28.12 kg/m²     Physical Exam  Constitutional:       Appearance: She is ill-appearing.   HENT:      Right Ear: Tympanic membrane and ear canal normal.      Left Ear: Tympanic membrane and ear canal normal.      Nose: Congestion present.      Mouth/Throat:      Pharynx: No posterior oropharyngeal erythema.   Eyes:      Conjunctiva/sclera: Conjunctivae normal.   Cardiovascular:      Rate and Rhythm: Regular rhythm.      Heart sounds: Normal heart sounds.   Pulmonary:      Breath sounds: Normal breath sounds.   Abdominal:      Palpations: Abdomen is soft.      Tenderness: There is no abdominal tenderness.   Musculoskeletal:      Cervical back: Neck supple.         "

## 2025-01-13 ENCOUNTER — APPOINTMENT (OUTPATIENT)
Dept: LAB | Facility: CLINIC | Age: 65
End: 2025-01-13
Payer: COMMERCIAL

## 2025-01-13 DIAGNOSIS — Z79.60 LONG-TERM USE OF IMMUNOSUPPRESSANT MEDICATION: ICD-10-CM

## 2025-01-13 LAB
ALBUMIN SERPL BCG-MCNC: 4.1 G/DL (ref 3.5–5)
ALP SERPL-CCNC: 83 U/L (ref 34–104)
ALT SERPL W P-5'-P-CCNC: 18 U/L (ref 7–52)
AST SERPL W P-5'-P-CCNC: 16 U/L (ref 13–39)
BASOPHILS # BLD AUTO: 0.04 THOUSANDS/ΜL (ref 0–0.1)
BASOPHILS NFR BLD AUTO: 1 % (ref 0–1)
BILIRUB DIRECT SERPL-MCNC: 0.07 MG/DL (ref 0–0.2)
BILIRUB SERPL-MCNC: 0.24 MG/DL (ref 0.2–1)
CALCIUM SERPL-MCNC: 9.6 MG/DL (ref 8.4–10.2)
CREAT SERPL-MCNC: 0.6 MG/DL (ref 0.6–1.3)
EOSINOPHIL # BLD AUTO: 0.18 THOUSAND/ΜL (ref 0–0.61)
EOSINOPHIL NFR BLD AUTO: 2 % (ref 0–6)
ERYTHROCYTE [DISTWIDTH] IN BLOOD BY AUTOMATED COUNT: 13.6 % (ref 11.6–15.1)
GFR SERPL CREATININE-BSD FRML MDRD: 98 ML/MIN/1.73SQ M
HCT VFR BLD AUTO: 38.8 % (ref 34.8–46.1)
HGB BLD-MCNC: 12.6 G/DL (ref 11.5–15.4)
IMM GRANULOCYTES # BLD AUTO: 0.04 THOUSAND/UL (ref 0–0.2)
IMM GRANULOCYTES NFR BLD AUTO: 1 % (ref 0–2)
LYMPHOCYTES # BLD AUTO: 1.55 THOUSANDS/ΜL (ref 0.6–4.47)
LYMPHOCYTES NFR BLD AUTO: 21 % (ref 14–44)
MCH RBC QN AUTO: 30.7 PG (ref 26.8–34.3)
MCHC RBC AUTO-ENTMCNC: 32.5 G/DL (ref 31.4–37.4)
MCV RBC AUTO: 94 FL (ref 82–98)
MONOCYTES # BLD AUTO: 0.69 THOUSAND/ΜL (ref 0.17–1.22)
MONOCYTES NFR BLD AUTO: 9 % (ref 4–12)
NEUTROPHILS # BLD AUTO: 4.89 THOUSANDS/ΜL (ref 1.85–7.62)
NEUTS SEG NFR BLD AUTO: 66 % (ref 43–75)
NRBC BLD AUTO-RTO: 0 /100 WBCS
PLATELET # BLD AUTO: 350 THOUSANDS/UL (ref 149–390)
PMV BLD AUTO: 8.6 FL (ref 8.9–12.7)
PROT SERPL-MCNC: 7.5 G/DL (ref 6.4–8.4)
RBC # BLD AUTO: 4.11 MILLION/UL (ref 3.81–5.12)
WBC # BLD AUTO: 7.39 THOUSAND/UL (ref 4.31–10.16)

## 2025-01-13 PROCEDURE — 80076 HEPATIC FUNCTION PANEL: CPT

## 2025-01-13 PROCEDURE — 36415 COLL VENOUS BLD VENIPUNCTURE: CPT

## 2025-01-13 PROCEDURE — 85025 COMPLETE CBC W/AUTO DIFF WBC: CPT

## 2025-01-13 PROCEDURE — 82565 ASSAY OF CREATININE: CPT

## 2025-01-14 ENCOUNTER — RESULTS FOLLOW-UP (OUTPATIENT)
Dept: RHEUMATOLOGY | Facility: CLINIC | Age: 65
End: 2025-01-14

## 2025-01-27 ENCOUNTER — APPOINTMENT (OUTPATIENT)
Dept: LAB | Facility: CLINIC | Age: 65
End: 2025-01-27
Payer: COMMERCIAL

## 2025-01-27 DIAGNOSIS — Z79.60 LONG-TERM USE OF IMMUNOSUPPRESSANT MEDICATION: ICD-10-CM

## 2025-01-27 LAB
ALBUMIN SERPL BCG-MCNC: 4.5 G/DL (ref 3.5–5)
ALP SERPL-CCNC: 67 U/L (ref 34–104)
ALT SERPL W P-5'-P-CCNC: 20 U/L (ref 7–52)
AST SERPL W P-5'-P-CCNC: 23 U/L (ref 13–39)
BASOPHILS # BLD AUTO: 0.05 THOUSANDS/ΜL (ref 0–0.1)
BASOPHILS NFR BLD AUTO: 1 % (ref 0–1)
BILIRUB DIRECT SERPL-MCNC: 0.09 MG/DL (ref 0–0.2)
BILIRUB SERPL-MCNC: 0.39 MG/DL (ref 0.2–1)
CALCIUM SERPL-MCNC: 10 MG/DL (ref 8.4–10.2)
CREAT SERPL-MCNC: 0.67 MG/DL (ref 0.6–1.3)
EOSINOPHIL # BLD AUTO: 0.11 THOUSAND/ΜL (ref 0–0.61)
EOSINOPHIL NFR BLD AUTO: 2 % (ref 0–6)
ERYTHROCYTE [DISTWIDTH] IN BLOOD BY AUTOMATED COUNT: 13.9 % (ref 11.6–15.1)
GFR SERPL CREATININE-BSD FRML MDRD: 94 ML/MIN/1.73SQ M
HCT VFR BLD AUTO: 41.7 % (ref 34.8–46.1)
HGB BLD-MCNC: 13.2 G/DL (ref 11.5–15.4)
IMM GRANULOCYTES # BLD AUTO: 0.01 THOUSAND/UL (ref 0–0.2)
IMM GRANULOCYTES NFR BLD AUTO: 0 % (ref 0–2)
LYMPHOCYTES # BLD AUTO: 1.5 THOUSANDS/ΜL (ref 0.6–4.47)
LYMPHOCYTES NFR BLD AUTO: 23 % (ref 14–44)
MCH RBC QN AUTO: 29.9 PG (ref 26.8–34.3)
MCHC RBC AUTO-ENTMCNC: 31.7 G/DL (ref 31.4–37.4)
MCV RBC AUTO: 95 FL (ref 82–98)
MONOCYTES # BLD AUTO: 0.54 THOUSAND/ΜL (ref 0.17–1.22)
MONOCYTES NFR BLD AUTO: 8 % (ref 4–12)
NEUTROPHILS # BLD AUTO: 4.35 THOUSANDS/ΜL (ref 1.85–7.62)
NEUTS SEG NFR BLD AUTO: 66 % (ref 43–75)
NRBC BLD AUTO-RTO: 0 /100 WBCS
PLATELET # BLD AUTO: 267 THOUSANDS/UL (ref 149–390)
PMV BLD AUTO: 9.6 FL (ref 8.9–12.7)
PROT SERPL-MCNC: 7.6 G/DL (ref 6.4–8.4)
RBC # BLD AUTO: 4.41 MILLION/UL (ref 3.81–5.12)
WBC # BLD AUTO: 6.56 THOUSAND/UL (ref 4.31–10.16)

## 2025-01-27 PROCEDURE — 80076 HEPATIC FUNCTION PANEL: CPT

## 2025-01-27 PROCEDURE — 36415 COLL VENOUS BLD VENIPUNCTURE: CPT

## 2025-01-27 PROCEDURE — 82565 ASSAY OF CREATININE: CPT

## 2025-01-27 PROCEDURE — 85025 COMPLETE CBC W/AUTO DIFF WBC: CPT

## 2025-02-03 DIAGNOSIS — M33.10 AMYOPATHIC DERMATOMYOSITIS (HCC): ICD-10-CM

## 2025-02-03 RX ORDER — MYCOPHENOLATE MOFETIL 500 MG/1
TABLET ORAL
Qty: 135 TABLET | Refills: 0 | Status: SHIPPED | OUTPATIENT
Start: 2025-02-03

## 2025-02-06 ENCOUNTER — INTERMEDIATE FOLLOW-UP (OUTPATIENT)
Dept: URBAN - METROPOLITAN AREA CLINIC 6 | Facility: CLINIC | Age: 65
End: 2025-02-06

## 2025-02-06 DIAGNOSIS — Z96.1: ICD-10-CM

## 2025-02-06 PROCEDURE — 99024 POSTOP FOLLOW-UP VISIT: CPT

## 2025-02-06 ASSESSMENT — VISUAL ACUITY
OS_SC: 20/25
OD_SC: 20/25

## 2025-02-06 ASSESSMENT — TONOMETRY
OS_IOP_MMHG: 10
OD_IOP_MMHG: 11

## 2025-02-24 ENCOUNTER — HOSPITAL ENCOUNTER (OUTPATIENT)
Dept: CT IMAGING | Facility: HOSPITAL | Age: 65
Discharge: HOME | End: 2025-02-24
Attending: STUDENT IN AN ORGANIZED HEALTH CARE EDUCATION/TRAINING PROGRAM
Payer: COMMERCIAL

## 2025-02-24 DIAGNOSIS — M33.10 AMYOPATHIC DERMATOMYOSITIS (HCC): ICD-10-CM

## 2025-02-24 PROCEDURE — 71250 CT THORAX DX C-: CPT

## 2025-02-24 NOTE — RESULT ENCOUNTER NOTE
HRCT unremarkable, seeing me in clinic soon. Given resolution of GGO, unlikely that it was related to methotrexate toxicity OR from her DM. Likely related to the URI she had previously. Will se if she wants to continue MMF, or switch back to methotrexate, depending on symptoms

## 2025-02-26 ENCOUNTER — OFFICE VISIT (OUTPATIENT)
Dept: INTERNAL MEDICINE CLINIC | Facility: CLINIC | Age: 65
End: 2025-02-26
Payer: COMMERCIAL

## 2025-02-26 VITALS
WEIGHT: 164 LBS | SYSTOLIC BLOOD PRESSURE: 110 MMHG | DIASTOLIC BLOOD PRESSURE: 70 MMHG | BODY MASS INDEX: 28.15 KG/M2 | HEART RATE: 62 BPM | OXYGEN SATURATION: 97 %

## 2025-02-26 DIAGNOSIS — Z17.0 BILATERAL MALIGNANT NEOPLASM OF BREAST IN FEMALE, ESTROGEN RECEPTOR POSITIVE, UNSPECIFIED SITE OF BREAST (HCC): ICD-10-CM

## 2025-02-26 DIAGNOSIS — Z13.1 SCREENING FOR DIABETES MELLITUS: ICD-10-CM

## 2025-02-26 DIAGNOSIS — E66.3 OVERWEIGHT (BMI 25.0-29.9): ICD-10-CM

## 2025-02-26 DIAGNOSIS — E55.9 VITAMIN D DEFICIENCY: ICD-10-CM

## 2025-02-26 DIAGNOSIS — Z13.6 SCREENING FOR CARDIOVASCULAR CONDITION: ICD-10-CM

## 2025-02-26 DIAGNOSIS — R91.8 GROUND GLASS OPACITY PRESENT ON IMAGING OF LUNG: ICD-10-CM

## 2025-02-26 DIAGNOSIS — C50.911 BILATERAL MALIGNANT NEOPLASM OF BREAST IN FEMALE, ESTROGEN RECEPTOR POSITIVE, UNSPECIFIED SITE OF BREAST (HCC): ICD-10-CM

## 2025-02-26 DIAGNOSIS — M33.13 DERMATOMYOSITIS (HCC): ICD-10-CM

## 2025-02-26 DIAGNOSIS — F41.9 ANXIETY: ICD-10-CM

## 2025-02-26 DIAGNOSIS — Z23 ENCOUNTER FOR IMMUNIZATION: Primary | ICD-10-CM

## 2025-02-26 DIAGNOSIS — C50.912 BILATERAL MALIGNANT NEOPLASM OF BREAST IN FEMALE, ESTROGEN RECEPTOR POSITIVE, UNSPECIFIED SITE OF BREAST (HCC): ICD-10-CM

## 2025-02-26 PROCEDURE — 99214 OFFICE O/P EST MOD 30 MIN: CPT | Performed by: INTERNAL MEDICINE

## 2025-02-26 NOTE — ASSESSMENT & PLAN NOTE
I have counselled the pt to follow a healthy and balanced diet ,and recommend routine exercise.  Encouraged routine exercise

## 2025-02-26 NOTE — PROGRESS NOTES
Name: Tran Mandujano      : 1960      MRN: [id number]  Encounter Provider: Zion Peña DO  Encounter Date: 2025   Encounter department: [facility]  :  Assessment & Plan  Vitamin D deficiency  Continue vitamin D supplementation 1000 IUs daily       Overweight (BMI 25.0-29.9)  I have counselled the pt to follow a healthy and balanced diet ,and recommend routine exercise.  Encouraged routine exercise       Bilateral malignant neoplasm of breast in female, estrogen receptor positive, unspecified site of breast (HCC)  Surveillance monitoring through oncology currently stable in remission       Screening for cardiovascular condition  I have counselled the pt to follow a healthy and balanced diet ,and recommend routine exercise.  Orders:  •  Lipid Panel with Direct LDL reflex; Future    Screening for diabetes mellitus    Orders:  •  Comprehensive metabolic panel; Future  •  Hemoglobin A1C; Future    Ground glass opacity present on imaging of lung  Resolution of the groundglass opacity of the lung likely viral in nature possibly related to COVID although testing was negative symptoms have improved repeat CAT scans showed resolution of note the patient's methotrexate was stopped at the time patient was converted to CellCept she will be following up with his rheumatologist . Likely she will be able to return back to methotrexate which she has been on for years without side effects       Anxiety  Currently stable and doing well       Dermatomyositis (HCC)  Clinically stable and doing well continue the current medical regiment will continue monitor.  Currently on CellCept which was recently transitioned from methotrexate because of the abnormal CAT scan fortunately there has been resolution of the findings on the CAT scan likely related to viral illness/COVID she will discuss further with rheumatology if she can return back to the methotrexate     RTO in 6 months call if any  problems         History of Present Illness   HPI 64-year old female coming in for a follow up office visit regarding vitamin D deficiency, overweight, breast cancer/BRCA2 positive, abnormal CAT scan groundglass opacity anxiety and dermatomyositis; the patient reports me compliant taking medications without untoward side effects the.  The patient is here to review his medical condition, update me on the medical condition and the patient reports me no hospitalizations and no ER visits.  No injuries she does report to me a viral illness she underwent CAT scan showing groundglass opacity because of this methotrexate was stopped and she was switched to CellCept for which she has been tolerating well has not had any recent outbreaks of dermatomyositis no rash.  Emotionally the patient is doing well following healthy balanced diet not exercising as much.  Here to review laboratories updates over the last 6 months.  Review of Systems   Constitutional:  Negative for activity change, appetite change and unexpected weight change.   HENT:  Negative for congestion and postnasal drip.    Eyes:  Negative for visual disturbance.   Respiratory:  Negative for cough and shortness of breath.    Cardiovascular:  Negative for chest pain.   Gastrointestinal:  Negative for abdominal pain, diarrhea, nausea and vomiting.   Neurological:  Negative for dizziness, light-headedness and headaches.   Hematological:  Negative for adenopathy.       Objective   /70   Pulse 62   Wt 74.4 kg (164 lb)   SpO2 97%   BMI 28.15 kg/m²      Physical Exam  Vitals and nursing note reviewed.   Constitutional:       General: She is not in acute distress.     Appearance: Normal appearance. She is well-developed. She is not ill-appearing, toxic-appearing or diaphoretic.   HENT:      Head: Normocephalic and atraumatic.      Right Ear: External ear normal.      Left Ear: External ear normal.      Nose: Nose normal.   Eyes:      Pupils: Pupils are equal,  round, and reactive to light.   Cardiovascular:      Rate and Rhythm: Normal rate and regular rhythm.      Heart sounds: Normal heart sounds. No murmur heard.  Pulmonary:      Effort: Pulmonary effort is normal.      Breath sounds: Normal breath sounds.   Abdominal:      General: There is no distension.      Palpations: Abdomen is soft.      Tenderness: There is no abdominal tenderness. There is no guarding.   Neurological:      Mental Status: She is alert.   Psychiatric:         Mood and Affect: Mood is not anxious or depressed.

## 2025-02-27 NOTE — ASSESSMENT & PLAN NOTE
Clinically stable and doing well continue the current medical regiment will continue monitor.  Currently on CellCept which was recently transitioned from methotrexate because of the abnormal CAT scan fortunately there has been resolution of the findings on the CAT scan likely related to viral illness/COVID she will discuss further with rheumatology if she can return back to the methotrexate     RTO in 6 months call if any problems

## 2025-02-27 NOTE — ASSESSMENT & PLAN NOTE
Resolution of the groundglass opacity of the lung likely viral in nature possibly related to COVID although testing was negative symptoms have improved repeat CAT scans showed resolution of note the patient's methotrexate was stopped at the time patient was converted to CellCept she will be following up with his rheumatologist  Likely she will be able to return back to methotrexate which she has been on for years without side effects

## 2025-03-03 ENCOUNTER — TELEPHONE (OUTPATIENT)
Dept: FAMILY MEDICINE CLINIC | Facility: CLINIC | Age: 65
End: 2025-03-03

## 2025-03-03 ENCOUNTER — APPOINTMENT (OUTPATIENT)
Dept: LAB | Facility: CLINIC | Age: 65
End: 2025-03-03
Payer: COMMERCIAL

## 2025-03-03 DIAGNOSIS — Z79.60 LONG-TERM USE OF IMMUNOSUPPRESSANT MEDICATION: ICD-10-CM

## 2025-03-03 LAB
ALBUMIN SERPL BCG-MCNC: 4.7 G/DL (ref 3.5–5)
ALP SERPL-CCNC: 53 U/L (ref 34–104)
ALT SERPL W P-5'-P-CCNC: 18 U/L (ref 7–52)
AST SERPL W P-5'-P-CCNC: 20 U/L (ref 13–39)
BASOPHILS # BLD AUTO: 0.02 THOUSANDS/ÂΜL (ref 0–0.1)
BASOPHILS NFR BLD AUTO: 0 % (ref 0–1)
BILIRUB DIRECT SERPL-MCNC: 0.08 MG/DL (ref 0–0.2)
BILIRUB SERPL-MCNC: 0.49 MG/DL (ref 0.2–1)
CALCIUM SERPL-MCNC: 9.9 MG/DL (ref 8.4–10.2)
CREAT SERPL-MCNC: 0.69 MG/DL (ref 0.6–1.3)
EOSINOPHIL # BLD AUTO: 0.07 THOUSAND/ÂΜL (ref 0–0.61)
EOSINOPHIL NFR BLD AUTO: 2 % (ref 0–6)
ERYTHROCYTE [DISTWIDTH] IN BLOOD BY AUTOMATED COUNT: 13.3 % (ref 11.6–15.1)
GFR SERPL CREATININE-BSD FRML MDRD: 93 ML/MIN/1.73SQ M
HCT VFR BLD AUTO: 42.5 % (ref 34.8–46.1)
HGB BLD-MCNC: 13.6 G/DL (ref 11.5–15.4)
IMM GRANULOCYTES # BLD AUTO: 0.02 THOUSAND/UL (ref 0–0.2)
IMM GRANULOCYTES NFR BLD AUTO: 0 % (ref 0–2)
LYMPHOCYTES # BLD AUTO: 0.85 THOUSANDS/ÂΜL (ref 0.6–4.47)
LYMPHOCYTES NFR BLD AUTO: 18 % (ref 14–44)
MCH RBC QN AUTO: 30.2 PG (ref 26.8–34.3)
MCHC RBC AUTO-ENTMCNC: 32 G/DL (ref 31.4–37.4)
MCV RBC AUTO: 94 FL (ref 82–98)
MONOCYTES # BLD AUTO: 0.48 THOUSAND/ÂΜL (ref 0.17–1.22)
MONOCYTES NFR BLD AUTO: 10 % (ref 4–12)
NEUTROPHILS # BLD AUTO: 3.26 THOUSANDS/ÂΜL (ref 1.85–7.62)
NEUTS SEG NFR BLD AUTO: 70 % (ref 43–75)
NRBC BLD AUTO-RTO: 0 /100 WBCS
PLATELET # BLD AUTO: 237 THOUSANDS/UL (ref 149–390)
PMV BLD AUTO: 9.3 FL (ref 8.9–12.7)
PROT SERPL-MCNC: 7.4 G/DL (ref 6.4–8.4)
RBC # BLD AUTO: 4.51 MILLION/UL (ref 3.81–5.12)
WBC # BLD AUTO: 4.7 THOUSAND/UL (ref 4.31–10.16)

## 2025-03-03 PROCEDURE — 36415 COLL VENOUS BLD VENIPUNCTURE: CPT

## 2025-03-03 PROCEDURE — 80076 HEPATIC FUNCTION PANEL: CPT

## 2025-03-03 PROCEDURE — 85025 COMPLETE CBC W/AUTO DIFF WBC: CPT

## 2025-03-03 PROCEDURE — 82565 ASSAY OF CREATININE: CPT

## 2025-03-03 NOTE — TELEPHONE ENCOUNTER
Patient came in office asking if 3/6/25 appointment with Dr. Mattson was virtual in or in person. Appointment is scheduled as in person. Patient would like to keep appointment as in person. Notified patient that if they change their mind to notify the office.

## 2025-03-05 ENCOUNTER — CONSULT (OUTPATIENT)
Age: 65
End: 2025-03-05
Payer: COMMERCIAL

## 2025-03-05 VITALS — BODY MASS INDEX: 28 KG/M2 | HEIGHT: 64 IN | WEIGHT: 164 LBS

## 2025-03-05 DIAGNOSIS — Z98.890 HISTORY OF BREAST RECONSTRUCTION: Primary | ICD-10-CM

## 2025-03-05 PROCEDURE — 99204 OFFICE O/P NEW MOD 45 MIN: CPT | Performed by: STUDENT IN AN ORGANIZED HEALTH CARE EDUCATION/TRAINING PROGRAM

## 2025-03-06 ENCOUNTER — OFFICE VISIT (OUTPATIENT)
Dept: RHEUMATOLOGY | Facility: CLINIC | Age: 65
End: 2025-03-06
Payer: COMMERCIAL

## 2025-03-06 VITALS
DIASTOLIC BLOOD PRESSURE: 72 MMHG | BODY MASS INDEX: 28.17 KG/M2 | HEIGHT: 64 IN | SYSTOLIC BLOOD PRESSURE: 104 MMHG | OXYGEN SATURATION: 97 % | HEART RATE: 63 BPM | WEIGHT: 165 LBS

## 2025-03-06 DIAGNOSIS — M25.649 STIFFNESS OF HAND JOINT, UNSPECIFIED LATERALITY: ICD-10-CM

## 2025-03-06 DIAGNOSIS — M33.10 AMYOPATHIC DERMATOMYOSITIS (HCC): Primary | ICD-10-CM

## 2025-03-06 DIAGNOSIS — M81.0 AGE-RELATED OSTEOPOROSIS WITHOUT CURRENT PATHOLOGICAL FRACTURE: ICD-10-CM

## 2025-03-06 DIAGNOSIS — Z79.60 LONG-TERM USE OF IMMUNOSUPPRESSANT MEDICATION: ICD-10-CM

## 2025-03-06 PROCEDURE — 99215 OFFICE O/P EST HI 40 MIN: CPT | Performed by: STUDENT IN AN ORGANIZED HEALTH CARE EDUCATION/TRAINING PROGRAM

## 2025-03-06 RX ORDER — FOLIC ACID 1 MG/1
1 TABLET ORAL DAILY
Qty: 90 TABLET | Refills: 2 | Status: SHIPPED | OUTPATIENT
Start: 2025-03-06

## 2025-03-06 RX ORDER — METHOTREXATE 2.5 MG/1
TABLET ORAL
Qty: 48 TABLET | Refills: 2 | Status: SHIPPED | OUTPATIENT
Start: 2025-03-06

## 2025-03-06 NOTE — PROGRESS NOTES
Name: Tran Mandujano      : 1960      MRN: [id number]  Encounter Provider: Charles Dalton DO  Encounter Date: 3/6/2025   Encounter department: [facility]  :  Assessment & Plan  Amyopathic dermatomyositis (HCC)  Resolution of GGOs on subsequent HRCT; most likely that they were related to her recent viral infection    Will switch back to methotrexate as she still gets flares of skin disease, and methotrexate is more titrateable than MMF  Orders:    methotrexate 2.5 MG tablet; Take 6 pills one day a week, 3 in the morning and 3 in the evening.    Stiffness of hand joint, unspecified laterality  Unlikely inflammatory but if it is, methotrexate should help. Advised her that if methotrexate doesn't help, I'll be fairly confident that it isn't inflammatory       Age-related osteoporosis without current pathological fracture  Continue alendronate        Long-term use of immunosuppressant medication    Orders:    folic acid (KP Folic Acid) 1 mg tablet; Take 1 tablet (1 mg total) by mouth daily    Creatinine, serum; Standing    Calcium; Standing    Hepatic function panel; Standing    CBC and differential; Standing      Patient's rheumatologic disease(s) threaten long-term function if not appropriately managed.    Patient's rheumatologic medication(s) require intensive monitoring for toxicity. Does not endorse any significant side effects.    History of Present Illness     Repeat HRCT showed resolution of GGOs    Does still occasionally get skin flares, maybe once a week or so, not more often than she did on the methotrexate. Typically on the chest and upper back.    Sometimes gets a subcutaneous nodule under the skin on the chest, uses clobetasol cream which helps when she uses it.    SOB has improved.     Hands feel weak with turning doors and things like that. No proximal muscle weakness. Is starting to notice some finger stiffness particularly in bilateral 4th and 5th fingers.  "Occasional. No noted specific AM stiffness. Does notice occasional swelling in the knuckles in the same fingers.    Permanent history: First saw our practice 2022 with Dr. Wilkes, previously dxd with DM based on characteristic chest rash with biopsy of knuckle rash reportedly consistent with such. Initially on hydroxychloroquine, developed skin hyperpigmentation so switched to low-dose methotrexate with good control. Never had any apparent muscle involvement, never had muscle biopsy. Dr. Wilkes workup included SSA Ab which was positive.     First saw me Nov 2024, still having some skin flares and progressively worsening dyspnea, concerning for lung involvement of ADM vs methotrexate toxicity. HRCT showed new LLL GGOs; stopped methotrexate due to possibility that this could be from that vs form her DM, and started MMF.    Also osteoporosis (osteopenia on DXA + distal wrist fracture), started alendronate Nov 2024     Objective   /72   Pulse 63   Ht 5' 4\" (1.626 m)   Wt 74.8 kg (165 lb)   SpO2 97%   BMI 28.32 kg/m²      General: Well appearing, in no distress.   Eyes: Sclera non-icteric. EOMI  Extremities: Warm, well perfused, no edema.   Neuro: Alert and oriented. No gross focal neurological deficits.   Skin: No rashes.  MSK exam: no significant tenderness to palpation peripheral joints, no synovitis or synovial hypertrophy  Muscle strength   Right   Left    Shoulder abduction 5/5  Shoulder abduction 5/5   Shoulder adduction 5/5  Shoulder adduction 5/5   Shoulder internal rotation 5/5  Shoulder internal rotation 5/5   Shoulder external rotation 5/5  Shoulder external rotation 5/5   Hip flexion 5/5  Hip flexion 5/5   Hip extension 5/5  Hip extension 5/5   Hip abduction 5/5  Hip abduction 5/5   Hip adduction 5/5  Hip adduction 5/5          Neck   Flexion 5/5   Extension 5/5   R sidebending 5/5   L sidebending 5/5     Finger strength   Right - Flexion   Left - Flexion    1st 5/5  1st 5/5   2nd 5/5  2nd 5/5 "   3rd 5/5  3rd 5/5   4th 5/5 4th 5/5 5th 5/5 5th 5/5          Right - Abduction   Left - Abduction    1-2 5/5  1-2 5/5   2-3 5/5  2-3 5/5   3-4 5/5  3-4 5/5   4-5 5/5 4-5 5/5         Reviewed results of most recent PFTs, HRCT

## 2025-03-06 NOTE — PATIENT INSTRUCTIONS
Continue Cellcept, last dose Saturday evening, and start methotrexate on Monday.    Get blood work (creatinine, calcium, LFTs, CBC) weeks after starting methotrexate, then every 3 months thereafter.    While on your prescribed rheumatologic medication(s) methotrexate: you must STOP the medication if you fall ill (ex: a viral infection, bacterial infection) and not restart it until your illness is resolved and/or you are finished with any antibiotics/antivirals/antifungals that are prescribed for you, whichever happens last.    While on the medication(s), if you are to get a vaccine, you may have to STOP the medication before and after your vaccination. See below for how long to stop your medication according to how often you take it.    LIVE ATTENUATED VACCINES (ex: MMR, rotavirus, smallpox, chickenpox, yellow fever)    Hold before vaccine Hold after vaccine   Steroids 4 weeks 4 weeks   Methotrexate 4 weeks      COVID-19 VACCINE    Instructions   Abatacept (IV) Time vaccine so it is given 2 weeks before next scheduled abatacept dose, then receive abatacept as scheduled   Abatacept (SQ) Delay abatacept dose for 2 weeks after vaccine   Belimumab Delay belimumab dose for 2 weeks after vaccine   Cyclophosphamide (IV) Time cyclophosphamide administration so that it will occur 1 week after each vaccine dose   Hydroxychloroquine No changes to hydroxychloroquine timing or vaccine timing   IVIG No changes to IVIG timing or vaccine timing   Rituximab Time vaccine so that it is given 2 weeks before the next scheduled rituximab dose, then receive rituximab as scheduled   All others Delay rheumatologic medication dose for 2 weeks after vaccine, if disease activity allows     INFLUENZA VACCINE    Hold before vaccine Hold after vaccine   Methotrexate 1 week 2 weeks if able   Rituximab  n/a 2 weeks   All others CONTINUE, DO NOT HOLD CONTINUE, DO NOT HOLD     ALL OTHER VACCINES    Hold before vaccine Hold after vaccine    Methotrexate CONTINUE, DO NOT HOLD CONTINUE, DO NOT HOLD   Rituximab  n/a Time vaccination for when next dose is due, then give rituximab at least 2 weeks after vaccine   All others CONTINUE, DO NOT HOLD CONTINUE, DO NOT HOLD     These instructions are consistent with the recommendations set forth by the American College of Rhe[employer] (ACR).

## 2025-03-12 NOTE — PROGRESS NOTES
Assessment and Plan:  Ms. Mandujano is a 64 y.o. female presenting for eval following mastectomy with implant based recon in 2008.    We discussed the pros/cons and recs regarding implant replacement.  Without concerns for cap con or device integrity, no need to replace if the patient is happy.  I would encourage her to obtain the MRI that is ordered.  She should continue with survivorship.  All patient's questions were answered and she voiced understanding.    History of Present Illness:   Ms. Mandujano is a 64 y.o. female presenting for eval of her implants.  She underwent implant placement with Dr. Varma following bilateral mastectomy in 2008.  She does not have any implant information.  She denies issues with her implants or her appearance.  She has not has an MRI but one is ordered.  She denies nicotine use.  She is active and her weight has been stable.      Review of Systems:  A 12 point ROS was performed and negative except per HPI.    Past Medical History:  Past Medical History:   Diagnosis Date    Anesthesia complication     HR drops    Benign neoplasm of lung     Right lung lobe benign. Removed 2010.    BRCA2 positive     Breast cancer (HCC)     Breathing difficulty 2011    no breathing difficulties but patient reported drop in BP during anesthesia for appendectomy and was advised to have cardiologist consult    Cancer (HCC) 2008    Depression     Dermatomyositis (HCC)     Keloid     Lung nodule     Melanoma (HCC) 2014    mid chest (insitu)    Skin cancer 2010    Wears glasses        Past Surgical History:  Past Surgical History:   Procedure Laterality Date    APPENDECTOMY      2011    BREAST BIOPSY  3/2008    BREAST LUMPECTOMY  1999    BREAST RECONSTRUCTION  4/2008    BREAST SURGERY      Bilateral Mastectomy    CATARACT EXTRACTION BILATERAL W/ ANTERIOR VITRECTOMY Bilateral 02/2024    COLONOSCOPY N/A 01/26/2016    Procedure: COLONOSCOPY;  Surgeon: David Gutierrez MD;  Location: [facility];  Service:      COSMETIC SURGERY  4/2008    Bilateral mastectomy with reconstruction    FRACTURE SURGERY  August, 2024    HYSTERECTOMY      2007    KNEE SURGERY  2007    LUNG BIOPSY      LYMPH NODE BIOPSY      MASS EXCISION Left 07/18/2019    Procedure: MID BACK MASS EXCISION BIOPSY;  Surgeon: David Llanes MD;  Location: [facility];  Service: General    MASS EXCISION Left 07/03/2024    left shoulder biopsy    MASTECTOMY      reconstruction 2008    OOPHORECTOMY      [facility] OPEN TREATMENT RADIAL SHAFT FRACTURE W/INT FIXJ Right 08/30/2023    Procedure: OPEN REDUCTION W/ INTERNAL FIXATION (ORIF) RADIUS / ULNA (WRIST);  Surgeon: Chu Chin MD;  Location: [facility];  Service: Orthopedics    SKIN BIOPSY         Social History:  Social History     Tobacco Use    Smoking status: Never    Smokeless tobacco: Never   Vaping Use    Vaping status: Never Used   Substance Use Topics    Alcohol use: No    Drug use: No       Family History:  Family History   Problem Relation Age of Onset    Cancer Mother         Ovarian    Ovarian cancer Mother     No Known Problems Father     BRCA2 Positive Sister     Heart disease Sister         post partum cardiomyopathy, pacer    No Known Problems Brother     Prostate cancer Maternal Uncle     Prostate cancer Maternal Uncle     BRCA2 Negative Paternal Aunt     Breast cancer Paternal Aunt     Cancer Paternal Aunt         Breast/Ovarian    Cancer Maternal Grandmother 90        Skin cancer    Skin cancer Maternal Grandmother     No Known Problems Paternal Grandmother     No Known Problems Paternal Grandfather     BRCA2 Positive Daughter        Allergies:  No Known Allergies    Medications:  Current Outpatient Medications on File Prior to Visit   Medication Sig Dispense Refill    alendronate (FOSAMAX) 70 mg tablet Take 1 tablet (70 mg total) by mouth every 7 days 12 tablet 3    Calcium-Magnesium-Vitamin D (CALCIUM 500 PO) Take 1,000 mg by mouth daily.      Cholecalciferol (VITAMIN D) 2000 UNITS CAPS Take 2,000  "Units/day by mouth      cholecalciferol (VITAMIN D3) 1,000 units tablet Take 1,000 Units by mouth daily      clobetasol (TEMOVATE) 0.05 % cream as needed        Glycerin-Hypromellose- (DRY EYE RELIEF DROPS OP) Apply to eye      multivitamin (THERAGRAN) TABS Take 1 tablet by mouth daily.      pilocarpine (ISOPTO CARPINE) 1 % ophthalmic solution Administer 1 drop to both eyes daily at bedtime       No current facility-administered medications on file prior to visit.         Physical Examination:  Ht 5' 4\" (1.626 m)   Wt 74.4 kg (164 lb)   BMI 28.15 kg/m²   Estimated body mass index is 28.15 kg/m² as calculated from the following:    Height as of this encounter: 5' 4\" (1.626 m).    Weight as of this encounter: 74.4 kg (164 lb).  General: NAD, well appearing, AAOx3  HEENT: NCAT, EOMI, MMM, supple  Resp: Nonlabored  Heart: RRR  Abdomen: Soft, ND, NT  Extremities/MSK: no LE edema, no obvious deficits in ROM  Neuro: grossly intact with no obvious deficits  Skin: no obvious lesions or rashes  Breast: no palpable mass, no palpable axillary lymphadenopathy, Baker I capsules bilaterally, relative symmetry and palpably intact implants, well healed incisions      Kaylen Gomez, DO  Plastic and Reconstructive Surgery    "

## 2025-04-07 ENCOUNTER — APPOINTMENT (OUTPATIENT)
Dept: LAB | Facility: CLINIC | Age: 65
End: 2025-04-07
Payer: COMMERCIAL

## 2025-04-07 DIAGNOSIS — Z13.6 SCREENING FOR CARDIOVASCULAR CONDITION: ICD-10-CM

## 2025-04-07 DIAGNOSIS — Z79.60 LONG-TERM USE OF IMMUNOSUPPRESSANT MEDICATION: ICD-10-CM

## 2025-04-07 DIAGNOSIS — Z13.1 SCREENING FOR DIABETES MELLITUS: ICD-10-CM

## 2025-04-07 LAB
ALBUMIN SERPL BCG-MCNC: 4.7 G/DL (ref 3.5–5)
ALP SERPL-CCNC: 55 U/L (ref 34–104)
ALT SERPL W P-5'-P-CCNC: 18 U/L (ref 7–52)
AST SERPL W P-5'-P-CCNC: 18 U/L (ref 13–39)
BASOPHILS # BLD AUTO: 0.03 THOUSANDS/ÂΜL (ref 0–0.1)
BASOPHILS NFR BLD AUTO: 1 % (ref 0–1)
BILIRUB DIRECT SERPL-MCNC: 0.1 MG/DL (ref 0–0.2)
BILIRUB SERPL-MCNC: 0.4 MG/DL (ref 0.2–1)
CALCIUM SERPL-MCNC: 9.5 MG/DL (ref 8.4–10.2)
CREAT SERPL-MCNC: 0.64 MG/DL (ref 0.6–1.3)
EOSINOPHIL # BLD AUTO: 0.06 THOUSAND/ÂΜL (ref 0–0.61)
EOSINOPHIL NFR BLD AUTO: 1 % (ref 0–6)
ERYTHROCYTE [DISTWIDTH] IN BLOOD BY AUTOMATED COUNT: 14.1 % (ref 11.6–15.1)
GFR SERPL CREATININE-BSD FRML MDRD: 95 ML/MIN/1.73SQ M
HCT VFR BLD AUTO: 41 % (ref 34.8–46.1)
HGB BLD-MCNC: 13 G/DL (ref 11.5–15.4)
IMM GRANULOCYTES # BLD AUTO: 0.01 THOUSAND/UL (ref 0–0.2)
IMM GRANULOCYTES NFR BLD AUTO: 0 % (ref 0–2)
LYMPHOCYTES # BLD AUTO: 1.26 THOUSANDS/ÂΜL (ref 0.6–4.47)
LYMPHOCYTES NFR BLD AUTO: 26 % (ref 14–44)
MCH RBC QN AUTO: 29.7 PG (ref 26.8–34.3)
MCHC RBC AUTO-ENTMCNC: 31.7 G/DL (ref 31.4–37.4)
MCV RBC AUTO: 94 FL (ref 82–98)
MONOCYTES # BLD AUTO: 0.39 THOUSAND/ÂΜL (ref 0.17–1.22)
MONOCYTES NFR BLD AUTO: 8 % (ref 4–12)
NEUTROPHILS # BLD AUTO: 3.18 THOUSANDS/ÂΜL (ref 1.85–7.62)
NEUTS SEG NFR BLD AUTO: 64 % (ref 43–75)
NRBC BLD AUTO-RTO: 0 /100 WBCS
PLATELET # BLD AUTO: 223 THOUSANDS/UL (ref 149–390)
PMV BLD AUTO: 9.9 FL (ref 8.9–12.7)
PROT SERPL-MCNC: 7.2 G/DL (ref 6.4–8.4)
RBC # BLD AUTO: 4.37 MILLION/UL (ref 3.81–5.12)
WBC # BLD AUTO: 4.93 THOUSAND/UL (ref 4.31–10.16)

## 2025-04-07 PROCEDURE — 85025 COMPLETE CBC W/AUTO DIFF WBC: CPT

## 2025-04-07 PROCEDURE — 36415 COLL VENOUS BLD VENIPUNCTURE: CPT

## 2025-04-07 PROCEDURE — 80076 HEPATIC FUNCTION PANEL: CPT

## 2025-04-07 PROCEDURE — 82565 ASSAY OF CREATININE: CPT

## 2025-04-08 ENCOUNTER — RESULTS FOLLOW-UP (OUTPATIENT)
Dept: RHEUMATOLOGY | Facility: CLINIC | Age: 65
End: 2025-04-08

## 2025-05-10 ENCOUNTER — APPOINTMENT (OUTPATIENT)
Dept: LAB | Facility: CLINIC | Age: 65
End: 2025-05-10
Payer: COMMERCIAL

## 2025-05-10 DIAGNOSIS — Z17.0 MALIGNANT NEOPLASM OF NIPPLE OF BOTH BREASTS IN FEMALE, ESTROGEN RECEPTOR POSITIVE (HCC): ICD-10-CM

## 2025-05-10 DIAGNOSIS — C50.011 MALIGNANT NEOPLASM OF NIPPLE OF BOTH BREASTS IN FEMALE, ESTROGEN RECEPTOR POSITIVE (HCC): ICD-10-CM

## 2025-05-10 DIAGNOSIS — Z79.60 LONG-TERM USE OF IMMUNOSUPPRESSANT MEDICATION: ICD-10-CM

## 2025-05-10 DIAGNOSIS — C50.012 MALIGNANT NEOPLASM OF NIPPLE OF BOTH BREASTS IN FEMALE, ESTROGEN RECEPTOR POSITIVE (HCC): ICD-10-CM

## 2025-05-10 LAB
ALBUMIN SERPL BCG-MCNC: 4.5 G/DL (ref 3.5–5)
ALP SERPL-CCNC: 58 U/L (ref 34–104)
ALT SERPL W P-5'-P-CCNC: 16 U/L (ref 7–52)
ANION GAP SERPL CALCULATED.3IONS-SCNC: 9 MMOL/L (ref 4–13)
AST SERPL W P-5'-P-CCNC: 18 U/L (ref 13–39)
BASOPHILS # BLD AUTO: 0.03 THOUSANDS/ÂΜL (ref 0–0.1)
BASOPHILS NFR BLD AUTO: 1 % (ref 0–1)
BILIRUB DIRECT SERPL-MCNC: 0.12 MG/DL (ref 0–0.2)
BILIRUB SERPL-MCNC: 0.34 MG/DL (ref 0.2–1)
BUN SERPL-MCNC: 26 MG/DL (ref 5–25)
CALCIUM SERPL-MCNC: 9.5 MG/DL (ref 8.4–10.2)
CHLORIDE SERPL-SCNC: 103 MMOL/L (ref 96–108)
CO2 SERPL-SCNC: 29 MMOL/L (ref 21–32)
CREAT SERPL-MCNC: 0.7 MG/DL (ref 0.6–1.3)
EOSINOPHIL # BLD AUTO: 0.07 THOUSAND/ÂΜL (ref 0–0.61)
EOSINOPHIL NFR BLD AUTO: 1 % (ref 0–6)
ERYTHROCYTE [DISTWIDTH] IN BLOOD BY AUTOMATED COUNT: 14.6 % (ref 11.6–15.1)
GFR SERPL CREATININE-BSD FRML MDRD: 93 ML/MIN/1.73SQ M
GLUCOSE P FAST SERPL-MCNC: 88 MG/DL (ref 65–99)
HCT VFR BLD AUTO: 42.8 % (ref 34.8–46.1)
HGB BLD-MCNC: 13.6 G/DL (ref 11.5–15.4)
IMM GRANULOCYTES # BLD AUTO: 0.01 THOUSAND/UL (ref 0–0.2)
IMM GRANULOCYTES NFR BLD AUTO: 0 % (ref 0–2)
LYMPHOCYTES # BLD AUTO: 1.27 THOUSANDS/ÂΜL (ref 0.6–4.47)
LYMPHOCYTES NFR BLD AUTO: 24 % (ref 14–44)
MCH RBC QN AUTO: 30.4 PG (ref 26.8–34.3)
MCHC RBC AUTO-ENTMCNC: 31.8 G/DL (ref 31.4–37.4)
MCV RBC AUTO: 96 FL (ref 82–98)
MONOCYTES # BLD AUTO: 0.53 THOUSAND/ÂΜL (ref 0.17–1.22)
MONOCYTES NFR BLD AUTO: 10 % (ref 4–12)
NEUTROPHILS # BLD AUTO: 3.35 THOUSANDS/ÂΜL (ref 1.85–7.62)
NEUTS SEG NFR BLD AUTO: 64 % (ref 43–75)
NRBC BLD AUTO-RTO: 0 /100 WBCS
PLATELET # BLD AUTO: 235 THOUSANDS/UL (ref 149–390)
PMV BLD AUTO: 9.4 FL (ref 8.9–12.7)
POTASSIUM SERPL-SCNC: 4.2 MMOL/L (ref 3.5–5.3)
PROT SERPL-MCNC: 7 G/DL (ref 6.4–8.4)
RBC # BLD AUTO: 4.48 MILLION/UL (ref 3.81–5.12)
SODIUM SERPL-SCNC: 141 MMOL/L (ref 135–147)
WBC # BLD AUTO: 5.26 THOUSAND/UL (ref 4.31–10.16)

## 2025-05-10 PROCEDURE — 85025 COMPLETE CBC W/AUTO DIFF WBC: CPT

## 2025-05-10 PROCEDURE — 86300 IMMUNOASSAY TUMOR CA 15-3: CPT

## 2025-05-10 PROCEDURE — 80053 COMPREHEN METABOLIC PANEL: CPT

## 2025-05-10 PROCEDURE — 82248 BILIRUBIN DIRECT: CPT

## 2025-05-10 PROCEDURE — 36415 COLL VENOUS BLD VENIPUNCTURE: CPT

## 2025-05-12 ENCOUNTER — RESULTS FOLLOW-UP (OUTPATIENT)
Dept: RHEUMATOLOGY | Facility: CLINIC | Age: 65
End: 2025-05-12

## 2025-05-12 LAB — CANCER AG27-29 SERPL-ACNC: 29.7 U/ML (ref 0–38.6)

## 2025-05-13 ENCOUNTER — HOSPITAL ENCOUNTER (OUTPATIENT)
Dept: MRI IMAGING | Facility: HOSPITAL | Age: 65
Discharge: HOME | End: 2025-05-13
Attending: INTERNAL MEDICINE
Payer: COMMERCIAL

## 2025-05-13 DIAGNOSIS — C50.011 MALIGNANT NEOPLASM OF NIPPLE OF BOTH BREASTS IN FEMALE, ESTROGEN RECEPTOR POSITIVE (HCC): ICD-10-CM

## 2025-05-13 DIAGNOSIS — Z15.01 BRCA2 GENE MUTATION POSITIVE: ICD-10-CM

## 2025-05-13 DIAGNOSIS — Z15.09 BRCA2 GENE MUTATION POSITIVE: ICD-10-CM

## 2025-05-13 DIAGNOSIS — Z91.89 AT HIGH RISK FOR PANCREATIC CANCER: ICD-10-CM

## 2025-05-13 DIAGNOSIS — C50.012 MALIGNANT NEOPLASM OF NIPPLE OF BOTH BREASTS IN FEMALE, ESTROGEN RECEPTOR POSITIVE (HCC): ICD-10-CM

## 2025-05-13 DIAGNOSIS — Z14.8 CARRIER OF HIGH RISK CANCER GENE MUTATION: ICD-10-CM

## 2025-05-13 DIAGNOSIS — Z17.0 MALIGNANT NEOPLASM OF NIPPLE OF BOTH BREASTS IN FEMALE, ESTROGEN RECEPTOR POSITIVE (HCC): ICD-10-CM

## 2025-05-13 PROCEDURE — A9585 GADOBUTROL INJECTION: HCPCS | Performed by: INTERNAL MEDICINE

## 2025-05-13 PROCEDURE — 74183 MRI ABD W/O CNTR FLWD CNTR: CPT

## 2025-05-13 RX ORDER — GADOBUTROL 604.72 MG/ML
7 INJECTION INTRAVENOUS
Status: COMPLETED | OUTPATIENT
Start: 2025-05-13 | End: 2025-05-13

## 2025-05-13 RX ADMIN — GADOBUTROL 7 ML: 604.72 INJECTION INTRAVENOUS at 16:18

## 2025-05-14 ENCOUNTER — OFFICE VISIT (OUTPATIENT)
Dept: RHEUMATOLOGY | Facility: CLINIC | Age: 65
End: 2025-05-14
Payer: COMMERCIAL

## 2025-05-14 VITALS
DIASTOLIC BLOOD PRESSURE: 70 MMHG | BODY MASS INDEX: 27.66 KG/M2 | HEIGHT: 64 IN | WEIGHT: 162 LBS | SYSTOLIC BLOOD PRESSURE: 110 MMHG | OXYGEN SATURATION: 98 % | HEART RATE: 67 BPM

## 2025-05-14 DIAGNOSIS — Z79.60 LONG-TERM USE OF IMMUNOSUPPRESSANT MEDICATION: ICD-10-CM

## 2025-05-14 DIAGNOSIS — M33.10 AMYOPATHIC DERMATOMYOSITIS (HCC): Primary | ICD-10-CM

## 2025-05-14 PROCEDURE — 99215 OFFICE O/P EST HI 40 MIN: CPT | Performed by: STUDENT IN AN ORGANIZED HEALTH CARE EDUCATION/TRAINING PROGRAM

## 2025-05-14 RX ORDER — METHOTREXATE 2.5 MG/1
TABLET ORAL
Qty: 72 TABLET | Refills: 2 | Status: SHIPPED | OUTPATIENT
Start: 2025-05-14

## 2025-05-14 NOTE — PATIENT INSTRUCTIONS
INCREASE methotrexate to 8 pills a week, 4 in the morning and 4 in the evening    Get blood work (creatinine, calcium, LFTs, CBC) every 3 months while on methotrexate. You are next due for this around mid-August    For treatment of dry eyes:  - Use preservative-free eye drops throughout the day (Refresh, Systane)  - Eye lubricants/ointments can be helpful to apply before sleep   - Talk to your eye doctor about prescription drops such as Restasis   - Keep a humidified environment  - Glasses/goggles can be useful to trap in moisture around the eye, especially if going outside in a dry or windy environment

## 2025-05-14 NOTE — PROGRESS NOTES
Name: Tran Mandujano      : 1960      MRN: [id number]  Encounter Provider: Charles Dalton DO  Encounter Date: 2025   Encounter department: [facility]  :  Assessment & Plan  Amyopathic dermatomyositis (HCC)  Having some skin burning and infraorbital skin swelling - could potentially be DM-related? Will increase methotrexate to see if it helps    Her hand symptoms improved on methotrexate as well so likely at least partly inflammatory  Orders:    methotrexate 2.5 MG tablet; Take 8 pills one day a week, 4 in the morning and 4 in the evening.    Long-term use of immunosuppressant medication           Patient's rheumatologic disease(s) threaten long-term function if not appropriately managed.    Patient's rheumatologic medication(s) require intensive monitoring for toxicity.    History of Present Illness     Still sometimes gets some burning in the skin, has been noticing a little more darkness and puffiness under the eyes. The burning sensation is intermittent, a few times a week. Usually lasts for about a day.    No muscle weakness, dysphagia/odynophagia, CP, SOB    Stiffness in the hands is much less noticeable than it was previously.    Dry eyes - worsening, having to use drops 2-3 times a day. Prior to about 6 months ago, had no issues with eye dryness.    Does have some vaginal dryness but not acutely worsening, had early menopause due to hysterectomy. No oral dryness    Doesn't usually get allergies this time of year    No falls or fractures since I saw her last    Permanent history: First saw our practice  with Dr. Wilkes, previously dxd with DM based on characteristic chest rash with biopsy of knuckle rash reportedly consistent with such. Initially on hydroxychloroquine, developed skin hyperpigmentation so switched to low-dose methotrexate with good control. Never had any apparent muscle involvement, never had muscle biopsy. Dr. Wilkes workup included SSA  "Ab which was positive.     First saw me Nov 2024, still having some skin flares and progressively worsening dyspnea, concerning for lung involvement of ADM vs methotrexate toxicity. HRCT showed new LLL GGOs; stopped methotrexate due to possibility that this could be from that vs form her DM, and started MMF. Subsequent HRCT showed resolution of GGOs so felt more likely that they were from her preceding viral illness, so switched MMF back to methotrexate given relative ease of titration.     Also osteoporosis (osteopenia on DXA + distal wrist fracture), started alendronate Nov 2024     Objective   /70   Pulse 67   Ht 5' 4\" (1.626 m)   Wt 73.5 kg (162 lb)   SpO2 98%   BMI 27.81 kg/m²      General: Well appearing, in no distress.   Eyes: Sclera non-icteric. EOMI  Extremities: Warm, well perfused, no edema.   Neuro: Alert and oriented. No gross focal neurological deficits.   Skin: No rashes.  MSK exam:   Muscle strength   Right   Left    Shoulder abduction 5/5  Shoulder abduction 5/5   Shoulder adduction 5/5  Shoulder adduction 5/5   Shoulder internal rotation 5/5  Shoulder internal rotation 5/5   Shoulder external rotation 5/5  Shoulder external rotation 5/5   Hip flexion 5/5  Hip flexion 5/5   Hip extension 5/5  Hip extension 5/5   Hip abduction 5/5  Hip abduction 5/5   Hip adduction 5/5  Hip adduction 5/5          Neck   Flexion 5/5   Extension 5/5   R sidebending 5/5   L sidebending 5/5       "

## 2025-06-12 ENCOUNTER — OFFICE VISIT (OUTPATIENT)
Dept: HEMATOLOGY ONCOLOGY | Facility: CLINIC | Age: 65
End: 2025-06-12
Payer: COMMERCIAL

## 2025-06-12 VITALS
HEIGHT: 64 IN | DIASTOLIC BLOOD PRESSURE: 70 MMHG | BODY MASS INDEX: 28.17 KG/M2 | OXYGEN SATURATION: 98 % | TEMPERATURE: 98.7 F | RESPIRATION RATE: 18 BRPM | WEIGHT: 165 LBS | HEART RATE: 63 BPM | SYSTOLIC BLOOD PRESSURE: 128 MMHG

## 2025-06-12 DIAGNOSIS — Z17.0 BILATERAL MALIGNANT NEOPLASM OF BREAST IN FEMALE, ESTROGEN RECEPTOR POSITIVE, UNSPECIFIED SITE OF BREAST (HCC): Primary | ICD-10-CM

## 2025-06-12 DIAGNOSIS — R10.2 PELVIC PAIN: ICD-10-CM

## 2025-06-12 DIAGNOSIS — R10.9 ABDOMINAL PRESSURE: ICD-10-CM

## 2025-06-12 DIAGNOSIS — Z14.8 CARRIER OF HIGH RISK CANCER GENE MUTATION: ICD-10-CM

## 2025-06-12 DIAGNOSIS — Z85.820 HISTORY OF MELANOMA: ICD-10-CM

## 2025-06-12 DIAGNOSIS — M33.13 DERMATOMYOSITIS (HCC): ICD-10-CM

## 2025-06-12 DIAGNOSIS — Z15.01 BRCA2 GENE MUTATION POSITIVE: ICD-10-CM

## 2025-06-12 DIAGNOSIS — Z15.09 BRCA2 GENE MUTATION POSITIVE: ICD-10-CM

## 2025-06-12 DIAGNOSIS — C50.911 BILATERAL MALIGNANT NEOPLASM OF BREAST IN FEMALE, ESTROGEN RECEPTOR POSITIVE, UNSPECIFIED SITE OF BREAST (HCC): Primary | ICD-10-CM

## 2025-06-12 DIAGNOSIS — C50.912 BILATERAL MALIGNANT NEOPLASM OF BREAST IN FEMALE, ESTROGEN RECEPTOR POSITIVE, UNSPECIFIED SITE OF BREAST (HCC): Primary | ICD-10-CM

## 2025-06-12 PROBLEM — R89.9 ABNORMAL LABORATORY TEST: Status: RESOLVED | Noted: 2018-12-25 | Resolved: 2025-06-12

## 2025-06-12 PROCEDURE — 99214 OFFICE O/P EST MOD 30 MIN: CPT | Performed by: INTERNAL MEDICINE

## 2025-06-12 NOTE — PROGRESS NOTES
Name: Tran Mandujano      : 1960      MRN: [id number]  Encounter Provider: Ernesto Howard MD  Encounter Date: 2025   Encounter department: [facility] [place]  :  Assessment & Plan  Bilateral malignant neoplasm of breast in female, estrogen receptor positive, unspecified site of breast (HCC)    64-year-old female with osteopenia and dermatomyositis who was initially diagnosed in  with HR positive, HER2 negative BRCA2 mutated stage IIa right-sided breast cancer and stage Ia left-sided breast cancer with 1 positive right axillary lymph node status post bilateral mastectomies with reconstruction chemotherapy and AI x 5 years currently under active surveillance in addition to annual MRI abdomen to monitor for pancreatic cancer given high risk mutations.    Plan:  Follow up in 6 months with repeat CBC/CMP/CA27  Will order MRI at that time   No signs of disease re-occurrence     BRCA2 gene mutation positive  Continue annual MRI abdomen, next due 2026  Carrier of high risk cancer gene mutation  As above     Dermatomyositis (HCC)  Managed by rheumatology  On immunosuppression medication  Abdominal pressure  Unclear etiology, persistent for 2 months, lower abdomen  Denies any changes in bowel habits  Ambulatory referral to GI for further evaluation  Orders:    US pelvis transabdominal only; Future    Ambulatory Referral to Gastroenterology; Future    Pelvic pain  Family history of ovarian cancer  Ordered ultrasound  Orders:    US pelvis transabdominal only; Future        Return in about 6 months (around 2025).    History of Present Illness   Chief Complaint   Patient presents with    Follow-up     Initially diagnosed in  with HR positive, HER2 negative, germline BRCA2 mutation, stage IIa right-sided breast cancer and stage Ia left-sided breast cancer with 1 positive lymph node in right axilla status post bilateral mastectomies with reconstruction surgeries,  4 cycles of Taxotere plus Cytoxan followed by 5 years of  letrozole finished September 2013.  Of note patient has family history of breast cancer and ovarian cancer.    Undergoes annual screening with MRI abdomen for pancreatic cancer given high risk with BRCA2 mutation most recent imaging from 5/2025 showing no abnormal findings.    Interval history:    Overall patient is doing well  Does note that she has been having persistent lower abdomen pressure.  The pressure occurs randomly throughout the day.  Is not associated with any changes in bowel habit.  Has not had any dietary changes.  Denies any changes in her stool or hematochezia.  Does have family history of ovarian cancer.    Notes that her last colonoscopy was in 2021 was unremarkable    No other additional concerns at this time    Oncology History   Cancer Staging   Bilateral malignant neoplasm of breast in female, estrogen receptor positive (HCC)  Staging form: Breast, AJCC 8th Edition  - Clinical stage from 8/31/2023: cT1a, cN1(sn), cM0, GX, ER+, KS+, HER2- - Signed by Ernesto Howard MD on 8/31/2023  Stage prefix: Initial diagnosis  Method of lymph node assessment: [place] lymph node biopsy  Histologic grading system: 3 grade system  Oncology History   Bilateral malignant neoplasm of breast in female, estrogen receptor positive (HCC)    Initial Diagnosis    Bilateral malignant neoplasm of breast in female, estrogen receptor positive (HCC)      Surgery    Double mastectomy, reconstructive surgery      Chemotherapy    Chemotherapy with Taxotere and Cytoxan for 4 cycles followed by [facility] from 2008 thru 2013.     8/31/2023 -  Cancer Staged    Staging form: Breast, AJCC 8th Edition  - Clinical stage from 8/31/2023: cT1a, cN1(sn), cM0, GX, ER+, KS+, HER2- - Signed by Ernesto Howard MD on 8/31/2023  Stage prefix: Initial diagnosis  Method of lymph node assessment: [place] lymph node biopsy  Histologic grading system: 3 grade system          Pertinent  "Medical History   06/12/25:   Osteoporosis on bisphosphonate since November 2024  Dermatomyositis follows with rheumatology  Vitamin D deficiency  Anxiety  Melanoma in situ of chest wall  status post ITZEL and BSO  History of right lower lobe 9 mm nodule status post wedge resection unremarkable for malignancy  Renal and liver cysts    Review of Systems   All other systems reviewed and are negative.          Objective   /70 (BP Location: Left arm, Patient Position: Sitting, Cuff Size: Adult)   Pulse 63   Temp 98.7 °F (37.1 °C) (Temporal)   Resp 18   Ht 5' 4\" (1.626 m)   Wt 74.8 kg (165 lb)   SpO2 98%   BMI 28.32 kg/m²     Pain Screening:  Pain Score: 0-No pain  ECOG   0  Physical Exam  Constitutional:       General: She is not in acute distress.     Appearance: Normal appearance.   HENT:      Head: Normocephalic and atraumatic.      Nose: Nose normal.      Mouth/Throat:      Mouth: Mucous membranes are moist.     Eyes:      Extraocular Movements: Extraocular movements intact.      Pupils: Pupils are equal, round, and reactive to light.     Neck:      Comments: Left-sided submandibular salivary gland mildly enlarged  Cardiovascular:      Rate and Rhythm: Normal rate and regular rhythm.   Pulmonary:      Effort: Pulmonary effort is normal.   Abdominal:      General: There is no distension.      Tenderness: There is no abdominal tenderness.     Musculoskeletal:         General: Normal range of motion.      Cervical back: Normal range of motion.      Right lower leg: No edema.      Left lower leg: No edema.     Skin:     General: Skin is warm.     Neurological:      General: No focal deficit present.     Psychiatric:         Mood and Affect: Mood normal.       Labs: I have reviewed the following labs:  Lab Results   Component Value Date/Time    WBC 5.26 05/10/2025 07:37 AM    RBC 4.48 05/10/2025 07:37 AM    Hemoglobin 13.6 05/10/2025 07:37 AM    Hematocrit 42.8 05/10/2025 07:37 AM    MCV 96 05/10/2025 07:37 AM "    MCH 30.4 05/10/2025 07:37 AM    RDW 14.6 05/10/2025 07:37 AM    Platelets 235 05/10/2025 07:37 AM    Segmented % 64 05/10/2025 07:37 AM    Lymphocytes % 24 05/10/2025 07:37 AM    Monocytes % 10 05/10/2025 07:37 AM    Eosinophils Relative 1 05/10/2025 07:37 AM    Basophils Relative 1 05/10/2025 07:37 AM    Immature Grans % 0 05/10/2025 07:37 AM    Absolute Neutrophils 3.35 05/10/2025 07:37 AM     Lab Results   Component Value Date/Time    Potassium 4.2 05/10/2025 07:37 AM    Chloride 103 05/10/2025 07:37 AM    CO2 29 05/10/2025 07:37 AM    BUN 26 (H) 05/10/2025 07:37 AM    Creatinine 0.70 05/10/2025 07:37 AM    Glucose, Fasting 88 05/10/2025 07:37 AM    Calcium 9.5 05/10/2025 07:37 AM    AST 18 05/10/2025 07:37 AM    ALT 16 05/10/2025 07:37 AM    Alkaline Phosphatase 58 05/10/2025 07:37 AM    Total Protein 7.0 05/10/2025 07:37 AM    Albumin 4.5 05/10/2025 07:37 AM    Total Bilirubin 0.34 05/10/2025 07:37 AM    eGFR 93 05/10/2025 07:37 AM     MRI abd/pelvis w/wo contrast:  IMPRESSION:  No findings of pancreatic cancer or metastatic disease in the abdomen.    Dakota Kim DO   PGY-4 Hematology/Oncology Fellow

## 2025-06-12 NOTE — ASSESSMENT & PLAN NOTE
Please arrange follow up with your primary medical clinic as soon as possible. You must understand that you've received an Urgent Care treatment only and that you may be released before all of your medical problems are known or treated. You, the patient, will arrange for follow up as instructed. If your symptoms worsen or fail to improve you should go to the Emergency Room.      Chest Wall Pain: Costochondritis    The chest pain that you have had today is caused by costochondritis. This condition is caused by an inflammation of the cartilage joining your ribs to your breastbone. It is not caused by heart or lung problems. Your healthcare team has made sure that the chest pain you feel is not from a life threatening cause of chest pain such as heart attack, collapsed lung, blood clot in the lung, tear in the aorta, or esophageal rupture. The inflammation may have been brought on by a blow to the chest, lifting heavy objects, intense exercise, or an illness that made you cough and sneeze a lot. It often occurs during times of emotional stress. It can be painful, but it is not dangerous. It usually goes away in 1 to 2 weeks. But it may happen again. Rarely, a more serious condition may cause symptoms similar to costochondritis. Thats why its important to watch for the warning signs listed below.  Home care  Follow these guidelines when caring for yourself at home:  · If you feel that emotional stress is a cause of your condition, try to figure out the sources of that stress. It may not be obvious. Learn ways to deal with the stress in your life. This can include regular exercise, muscle relaxation, meditation, or simply taking time out for yourself.  · You may use acetaminophen, ibuprofen, or naproxen to control pain, unless another pain medicine was prescribed. If you have liver or kidney disease or ever had a stomach ulcer, talk with your healthcare provider before using these medicines.  · You can also help    64-year-old female with osteopenia and dermatomyositis who was initially diagnosed in 2018 with HR positive, HER2 negative BRCA2 mutated stage IIa right-sided breast cancer and stage Ia left-sided breast cancer with 1 positive right axillary lymph node status post bilateral mastectomies with reconstruction chemotherapy and AI x 5 years currently under active surveillance in addition to annual MRI abdomen to monitor for pancreatic cancer given high risk mutations.    Plan:  Follow up in 6 months with repeat CBC/CMP/CA27  Will order MRI at that time   No signs of disease re-occurrence      ease pain by using a hot, wet compress or heating pad. Use this with or without a medicated skin cream that helps relieves pain.  · Do stretching exercise as advised by your provider.  · Take any prescribed medicines as directed.  Follow-up care  Follow up with your healthcare provider, or as advised, if you do not start to get better in the next 2 days.  When to seek medical advice  Call your healthcare provider right away if any of these occur:  · A change in the type of pain. Call if it feels different, becomes more serious, lasts longer, or spreads into your shoulder, arm, neck, jaw, or back.  · Shortness of breath or pain gets worse when you breathe  · Weakness, dizziness, or fainting  · Cough with dark-colored sputum (phlegm) or blood  · Abdominal pain  · Dark red or black stools  · Fever of 100.4ºF (38ºC) or higher, or as directed by your healthcare provider  Date Last Reviewed: 12/1/2016  © 1019-1353 [employer]. [address] [place] [address], [place] [address]. All rights reserved. This information is not intended as a substitute for professional medical care. Always follow your healthcare professional's instructions.

## 2025-06-12 NOTE — PATIENT INSTRUCTIONS
Follow up in 6 months with blood work  US pelvis ordered will call if abnormal  Referral placed for GI eval they will call you

## 2025-06-13 NOTE — ASSESSMENT & PLAN NOTE
Family history of ovarian cancer  Ordered ultrasound  Orders:    US pelvis transabdominal only; Future

## 2025-06-13 NOTE — ASSESSMENT & PLAN NOTE
Unclear etiology, persistent for 2 months, lower abdomen  Denies any changes in bowel habits  Ambulatory referral to GI for further evaluation  Orders:    US pelvis transabdominal only; Future    Ambulatory Referral to Gastroenterology; Future

## 2025-06-18 ENCOUNTER — HOSPITAL ENCOUNTER (OUTPATIENT)
Dept: RADIOLOGY | Age: 65
Discharge: HOME | End: 2025-06-18
Attending: INTERNAL MEDICINE
Payer: COMMERCIAL

## 2025-06-18 DIAGNOSIS — R10.2 PELVIC PAIN: ICD-10-CM

## 2025-06-18 DIAGNOSIS — R10.9 ABDOMINAL PRESSURE: ICD-10-CM

## 2025-06-18 PROCEDURE — 76857 US EXAM PELVIC LIMITED: CPT

## 2025-07-01 ENCOUNTER — OFFICE VISIT (OUTPATIENT)
Dept: DERMATOLOGY | Facility: CLINIC | Age: 65
End: 2025-07-01
Payer: COMMERCIAL

## 2025-07-01 VITALS — TEMPERATURE: 97.8 F | BODY MASS INDEX: 28.37 KG/M2 | WEIGHT: 165.3 LBS

## 2025-07-01 DIAGNOSIS — D22.61 MULTIPLE BENIGN MELANOCYTIC NEVI OF UPPER AND LOWER EXTREMITIES AND TRUNK: ICD-10-CM

## 2025-07-01 DIAGNOSIS — D22.72 MULTIPLE BENIGN MELANOCYTIC NEVI OF UPPER AND LOWER EXTREMITIES AND TRUNK: ICD-10-CM

## 2025-07-01 DIAGNOSIS — D22.62 MULTIPLE BENIGN MELANOCYTIC NEVI OF UPPER AND LOWER EXTREMITIES AND TRUNK: ICD-10-CM

## 2025-07-01 DIAGNOSIS — D23.9 DERMATOFIBROMA: ICD-10-CM

## 2025-07-01 DIAGNOSIS — D22.71 MULTIPLE BENIGN MELANOCYTIC NEVI OF UPPER AND LOWER EXTREMITIES AND TRUNK: ICD-10-CM

## 2025-07-01 DIAGNOSIS — L81.4 LENTIGO: ICD-10-CM

## 2025-07-01 DIAGNOSIS — L57.8 OTHER SKIN CHANGES DUE TO CHRONIC EXPOSURE TO NONIONIZING RADIATION: ICD-10-CM

## 2025-07-01 DIAGNOSIS — D18.01 CHERRY ANGIOMA: ICD-10-CM

## 2025-07-01 DIAGNOSIS — Z86.006 HISTORY OF MELANOMA IN SITU: Primary | ICD-10-CM

## 2025-07-01 DIAGNOSIS — D22.5 MULTIPLE BENIGN MELANOCYTIC NEVI OF UPPER AND LOWER EXTREMITIES AND TRUNK: ICD-10-CM

## 2025-07-01 DIAGNOSIS — L82.1 SEBORRHEIC KERATOSES: ICD-10-CM

## 2025-07-01 DIAGNOSIS — Z87.39 HISTORY OF DERMATOMYOSITIS: ICD-10-CM

## 2025-07-01 PROCEDURE — 99213 OFFICE O/P EST LOW 20 MIN: CPT | Performed by: DERMATOLOGY

## 2025-07-01 NOTE — PATIENT INSTRUCTIONS
HISTORY OF MELANOMA IN SITU     Assessment and Plan:  Based on a thorough discussion of this condition and the management approach to it (including a comprehensive discussion of the known risks, side effects and potential benefits of treatment), the patient (family) agrees to implement the following specific plan:  Continue to monitor for recurrence  Follow up 1 year  for skin checks      HISTORY OF DERMATOMYOSITIS     Assessment and Plan:  Based on a thorough discussion of this condition and the management approach to it (including a comprehensive discussion of the known risks, side effects and potential benefits of treatment), the patient (family) agrees to implement the following specific plan:  Continue to follow up with Rheumatology every 6 months; Rheum is managing her MTX  Discussed strict SPF     DERMATOFIBROMA      Assessment and Plan:  Based on a thorough discussion of this condition and the management approach to it (including a comprehensive discussion of the known risks, side effects and potential benefits of treatment), the patient (family) agrees to implement the following specific plan:  Benign, reassurance provided to patient  No features concerning for malignancy on both clinical and dermatoscopic exam today      CHERRY ANGIOMAS     Assessment and Plan:  Based on a thorough discussion of this condition and the management approach to it (including a comprehensive discussion of the known risks, side effects and potential benefits of treatment), the patient (family) agrees to implement the following specific plan:  Reassure benign        SEBORRHEIC KERATOSIS; NON-INFLAMED        Assessment and Plan:  Based on a thorough discussion of this condition and the management approach to it (including a comprehensive discussion of the known risks, side effects and potential benefits of treatment), the patient (family) agrees to implement the following specific plan:  Reassure benign  Use sun protection.  Apply  "SPF 30 or higher at least three times a day.  Wear sun protecting clothing and hats.        SOLAR LENTIGINES   OTHER SKIN CHANGES DUE TO CHRONIC EXPOSURE TO NONIONIZING RADIATION      Assessment and Plan:  Based on a thorough discussion of this condition and the management approach to it (including a comprehensive discussion of the known risks, side effects and potential benefits of treatment), the patient (family) agrees to implement the following specific plan:  Reassure benign  Use sun protection.  Apply SPF 30 or higher at least three times a day.  Wear sun protecting clothing and hats.         MULTIPLE MELANOCYTIC NEVI (\"Moles\")       Assessment and Plan:  Based on a thorough discussion of this condition and the management approach to it (including a comprehensive discussion of the known risks, side effects and potential benefits of treatment), the patient (family) agrees to implement the following specific plan:  Reassure benign  Monitor for changes  Use sun protection.  Apply SPF 30 or higher at least three times a day.  Wear sun protecting clothing and hats.       Worrisome signs of skin malignancy discussed, questions answered. Regular self-skin check discussed. Advised to call or return to office if patient notices any spots of concern, rapidly growing/changing lesions, bleeding lesions, non-healing lesions. Advised regular SPF use.      "

## 2025-07-01 NOTE — PROGRESS NOTES
"[facility] Dermatology Clinic Note     Patient Name: Tran Mandujano  Encounter Date: 7/1/2025       Have you been cared for by a [facility] Dermatologist in the last 3 years and, if so, which description applies to you? Yes. I have been here within the last 3 years, and my medical history has NOT changed since that time. I am not of child-bearing potential.     REVIEW OF SYSTEMS:  Have you recently had or currently have any of the following? No changes in my recent health.   PAST MEDICAL HISTORY:  Have you personally ever had or currently have any of the following?  If \"YES,\" then please provide more detail. No changes in my medical history.   HISTORY OF IMMUNOSUPPRESSION: Do you have a history of any of the following:  Systemic Immunosuppression such as Diabetes, Biologic or Immunotherapy, Chemotherapy, Organ Transplantation, Bone Marrow Transplantation or Prednisone?  No     Answering \"YES\" requires the addition of the dotphrase \"IMMUNOSUPPRESSED\" as the first diagnosis of the patient's visit.   FAMILY HISTORY:  Any \"first degree relatives\" (parent, brother, sister, or child) with the following?    No changes in my family's known health.   PATIENT EXPERIENCE:    Do you want the Dermatologist to perform a COMPLETE skin exam today including a clinical examination under the \"bra and underwear\" areas?  Yes  If necessary, do we have your permission to call and leave a detailed message on your Preferred Phone number that includes your specific medical information?  Yes      Allergies[1] Current Medications[2]              Whom besides the patient is providing clinical information about today's encounter?   NO ADDITIONAL HISTORIAN (patient alone provided history)    Physical Exam and Assessment/Plan by Diagnosis:    HISTORY OF MELANOMA IN SITU     Physical Exam:  Anatomic Location Affected:  middle of chest   Morphological Description of Scar:  well healed  Year Treated: 2014  TNM Classification: [id number]  Suspected " Recurrence: No     Additional History of Present Condition:  patient states in 2014 she had removal of Melanoma in situ on chest. Patient has history of breast cancer with a double Mastectomy and Hysterectomy. Patient states she is BRCA 2 positive. Patient does not have any concerns about recurrence at this time.      Assessment and Plan:  Based on a thorough discussion of this condition and the management approach to it (including a comprehensive discussion of the known risks, side effects and potential benefits of treatment), the patient (family) agrees to implement the following specific plan:  Continue to monitor for recurrence  Follow up 6 months for skin checks      HISTORY OF DERMATOMYOSITIS     Physical Exam:  Anatomic Location Affected:  Trunk, face, hands   Morphological Description: mild erythema on dorsal hands      Additional History of Present Condition:  Patient notes that comes and goes. Recently increased to methotrexate 6 pills a week from 4 and when flares applying clobetasol cream. Methotrexate is managed by rheumatology    Assessment and Plan:  Based on a thorough discussion of this condition and the management approach to it (including a comprehensive discussion of the known risks, side effects and potential benefits of treatment), the patient (family) agrees to implement the following specific plan:  Continue to follow up with Rheumatology every 6 months; Rheum is managing her MTX  Discussed strict SPF     DERMATOFIBROMA  Physical Exam:  Anatomic Location Affected:  Multiple on Right leg  Morphological Description:  firm round light pink to brown papule      Additional History of Present Condition:  Denies pain, itch, bleeding. No treatments tried.     Assessment and Plan:  Based on a thorough discussion of this condition and the management approach to it (including a comprehensive discussion of the known risks, side effects and potential benefits of treatment), the patient (family) agrees to  "implement the following specific plan:  Benign, reassurance provided to patient  No features concerning for malignancy on both clinical and dermatoscopic exam today      CHERRY ANGIOMAS     Physical Exam:  Anatomic Location Affected:  Trunk and extremities  Morphological Description:  Scattered cherry red papules  Denies pain, itch, bleeding. No treatments tried. Present for years. Present constantly; no modifying factors which make it worse or better.     Assessment and Plan:  Based on a thorough discussion of this condition and the management approach to it (including a comprehensive discussion of the known risks, side effects and potential benefits of treatment), the patient (family) agrees to implement the following specific plan:  Reassure benign        SEBORRHEIC KERATOSIS; NON-INFLAMED     Physical Exam:  Anatomic Location Affected:  Trunk and extremities  Morphological Description:  Waxy, smooth to warty textured, yellow to brownish-grey to dark brown to blackish, discrete, \"stuck-on\" appearing papules.  Present for years. Denies pain, itch, bleeding.      Additional History of Present Condition:  Present constantly; no modifying factors which make it worse or better. No prior treatment.       Assessment and Plan:  Based on a thorough discussion of this condition and the management approach to it (including a comprehensive discussion of the known risks, side effects and potential benefits of treatment), the patient (family) agrees to implement the following specific plan:  Reassure benign  Use sun protection.  Apply SPF 30 or higher at least three times a day.  Wear sun protecting clothing and hats.        SOLAR LENTIGINES   OTHER SKIN CHANGES DUE TO CHRONIC EXPOSURE TO NONIONIZING RADIATION     Physical Exam:  Anatomic Location Affected:  Sun exposed areas of back, chest, arms, legs  Morphological Description:  Multiple scattered brown to tan evenly pigmented macules   Denies pain, itch, bleeding. No " "treatments tried. Present for months - years. Reports getting newer lesions with sun exposure.         Assessment and Plan:  Based on a thorough discussion of this condition and the management approach to it (including a comprehensive discussion of the known risks, side effects and potential benefits of treatment), the patient (family) agrees to implement the following specific plan:  Reassure benign  Use sun protection.  Apply SPF 30 or higher at least three times a day.  Wear sun protecting clothing and hats.         MULTIPLE MELANOCYTIC NEVI (\"Moles\")     Physical Exam:  Anatomic Location Affected: Trunk and extremities  Morphological Description:  Scattered, round to ovoid, symmetrical-appearing, even bordered, skin colored to dark brown macules/papules  Denies pain, itch, bleeding. No treatments tried. Present for years. Present constantly; no modifying factors which make it worse or better. Denies actively changing or growing moles.      Assessment and Plan:  Based on a thorough discussion of this condition and the management approach to it (including a comprehensive discussion of the known risks, side effects and potential benefits of treatment), the patient (family) agrees to implement the following specific plan:  Reassure benign  Monitor for changes  Use sun protection.  Apply SPF 30 or higher at least three times a day.  Wear sun protecting clothing and hats.       Worrisome signs of skin malignancy discussed, questions answered. Regular self-skin check discussed. Advised to call or return to office if patient notices any spots of concern, rapidly growing/changing lesions, bleeding lesions, non-healing lesions. Advised regular SPF use.      Scribe Attestation      I,:  Maira Mancia am acting as a scribe while in the presence of the attending physician.:       I,:  Nilay Smiley MD personally performed the services described in this documentation    as scribed in my presence.:                   [1] No " Known Allergies  [2]   Current Outpatient Medications:     alendronate (FOSAMAX) 70 mg tablet, Take 1 tablet (70 mg total) by mouth every 7 days, Disp: 12 tablet, Rfl: 3    Calcium-Magnesium-Vitamin D (CALCIUM 500 PO), Take 1,000 mg by mouth in the morning., Disp: , Rfl:     Cholecalciferol (VITAMIN D) 2000 UNITS CAPS, Take 2,000 Units/day by mouth, Disp: , Rfl:     cholecalciferol (VITAMIN D3) 1,000 units tablet, Take 1,000 Units by mouth in the morning., Disp: , Rfl:     clobetasol (TEMOVATE) 0.05 % cream, as needed, Disp: , Rfl:     folic acid (KP Folic Acid) 1 mg tablet, Take 1 tablet (1 mg total) by mouth daily, Disp: 90 tablet, Rfl: 2    Glycerin-Hypromellose- (DRY EYE RELIEF DROPS OP), Apply to eye, Disp: , Rfl:     methotrexate 2.5 MG tablet, Take 8 pills one day a week, 4 in the morning and 4 in the evening., Disp: 72 tablet, Rfl: 2    multivitamin (THERAGRAN) TABS, Take 1 tablet by mouth in the morning., Disp: , Rfl:     pilocarpine (ISOPTO CARPINE) 1 % ophthalmic solution, Administer 1 drop to both eyes daily at bedtime (Patient not taking: Reported on 5/14/2025), Disp: , Rfl:

## 2025-07-29 ENCOUNTER — CONSULT (OUTPATIENT)
Dept: GASTROENTEROLOGY | Facility: CLINIC | Age: 65
End: 2025-07-29
Attending: INTERNAL MEDICINE
Payer: COMMERCIAL

## 2025-07-29 VITALS
SYSTOLIC BLOOD PRESSURE: 110 MMHG | DIASTOLIC BLOOD PRESSURE: 75 MMHG | HEIGHT: 65 IN | HEART RATE: 65 BPM | WEIGHT: 162 LBS | BODY MASS INDEX: 26.99 KG/M2

## 2025-07-29 DIAGNOSIS — R10.9 ABDOMINAL PRESSURE: Primary | ICD-10-CM

## 2025-07-29 DIAGNOSIS — Z15.09 BRCA2 GENE MUTATION POSITIVE: ICD-10-CM

## 2025-07-29 DIAGNOSIS — Z15.01 BRCA2 GENE MUTATION POSITIVE: ICD-10-CM

## 2025-07-29 PROCEDURE — 99204 OFFICE O/P NEW MOD 45 MIN: CPT | Performed by: NURSE PRACTITIONER

## 2025-07-30 ENCOUNTER — APPOINTMENT (OUTPATIENT)
Dept: LAB | Facility: CLINIC | Age: 65
End: 2025-07-30

## 2025-07-30 DIAGNOSIS — R10.9 ABDOMINAL PRESSURE: ICD-10-CM

## 2025-07-31 ENCOUNTER — APPOINTMENT (OUTPATIENT)
Dept: LAB | Age: 65
End: 2025-07-31
Attending: NURSE PRACTITIONER
Payer: COMMERCIAL

## 2025-07-31 LAB — HEMOCCULT STL QL IA: NEGATIVE

## 2025-07-31 PROCEDURE — G0328 FECAL BLOOD SCRN IMMUNOASSAY: HCPCS

## 2025-08-15 ENCOUNTER — APPOINTMENT (OUTPATIENT)
Dept: LAB | Facility: CLINIC | Age: 65
End: 2025-08-15
Attending: INTERNAL MEDICINE
Payer: COMMERCIAL

## 2025-08-15 LAB
ALBUMIN SERPL BCG-MCNC: 4.6 G/DL (ref 3.5–5)
ALP SERPL-CCNC: 49 U/L (ref 34–104)
ALT SERPL W P-5'-P-CCNC: 20 U/L (ref 7–52)
ANION GAP SERPL CALCULATED.3IONS-SCNC: 3 MMOL/L (ref 4–13)
AST SERPL W P-5'-P-CCNC: 20 U/L (ref 13–39)
BILIRUB SERPL-MCNC: 0.4 MG/DL (ref 0.2–1)
BUN SERPL-MCNC: 21 MG/DL (ref 5–25)
CALCIUM SERPL-MCNC: 9 MG/DL (ref 8.4–10.2)
CHLORIDE SERPL-SCNC: 106 MMOL/L (ref 96–108)
CHOLEST SERPL-MCNC: 166 MG/DL (ref ?–200)
CO2 SERPL-SCNC: 32 MMOL/L (ref 21–32)
CREAT SERPL-MCNC: 0.68 MG/DL (ref 0.6–1.3)
EST. AVERAGE GLUCOSE BLD GHB EST-MCNC: 108 MG/DL
GFR SERPL CREATININE-BSD FRML MDRD: 94 ML/MIN/1.73SQ M
GLUCOSE P FAST SERPL-MCNC: 84 MG/DL (ref 65–99)
HBA1C MFR BLD: 5.4 %
HDLC SERPL-MCNC: 58 MG/DL
LDLC SERPL CALC-MCNC: 93 MG/DL (ref 0–100)
POTASSIUM SERPL-SCNC: 4.1 MMOL/L (ref 3.5–5.3)
PROT SERPL-MCNC: 6.9 G/DL (ref 6.4–8.4)
SODIUM SERPL-SCNC: 141 MMOL/L (ref 135–147)
TRIGL SERPL-MCNC: 73 MG/DL (ref ?–150)

## (undated) DEVICE — DRAPE EQUIPMENT RF WAND

## (undated) DEVICE — SPONGE SCRUB 4 PCT CHLORHEXIDINE

## (undated) DEVICE — GLOVE INDICATOR PI UNDERGLOVE SZ 9 BLUE

## (undated) DEVICE — GAUZE SPONGES,16 PLY: Brand: CURITY

## (undated) DEVICE — 3M™ COBAN™ NL STERILE NON-LATEX SELF-ADHERENT WRAP, 2084S, 4 IN X 5 YD (10 CM X 4,5 M), 18 ROLLS/CASE: Brand: 3M™ COBAN™

## (undated) DEVICE — ACE WRAP 4 IN UNSTERILE

## (undated) DEVICE — INTENDED FOR TISSUE SEPARATION, AND OTHER PROCEDURES THAT REQUIRE A SHARP SURGICAL BLADE TO PUNCTURE OR CUT.: Brand: BARD-PARKER SAFETY BLADES SIZE 15, STERILE

## (undated) DEVICE — ADHESIVE SKN CLSR HISTOACRYL FLEX 0.5ML LF

## (undated) DEVICE — STERILE BETHLEHEM PLASTIC HAND: Brand: CARDINAL HEALTH

## (undated) DEVICE — 1.8MM DRILL BIT WITH DEPTH MARK/QC/110MM

## (undated) DEVICE — BETHLEHEM UNIVERSAL MINOR GEN: Brand: CARDINAL HEALTH

## (undated) DEVICE — PADDING CAST 4 IN  COTTON STRL

## (undated) DEVICE — SUT VICRYL 3-0 SH 27 IN J416H

## (undated) DEVICE — DRAPE C-ARM X-RAY

## (undated) DEVICE — PLUMEPEN PRO 10FT

## (undated) DEVICE — CHLORAPREP HI-LITE 26ML ORANGE

## (undated) DEVICE — DISPOSABLE EQUIPMENT COVER: Brand: SMALL TOWEL DRAPE

## (undated) DEVICE — GAUZE SPONGES,USP TYPE VII GAUZE, 12 PLY: Brand: CURITY

## (undated) DEVICE — OCCLUSIVE GAUZE STRIP,3% BISMUTH TRIBROMOPHENATE IN PETROLATUM BLEND: Brand: XEROFORM

## (undated) DEVICE — GLOVE SRG BIOGEL 9

## (undated) DEVICE — 2.0MM DRILL BIT/QC/100MM

## (undated) DEVICE — LIGHT HANDLE COVER SLEEVE DISP BLUE STELLAR

## (undated) DEVICE — GLOVE SRG BIOGEL ECLIPSE 7

## (undated) DEVICE — ACE WRAP 4 IN STERILE

## (undated) DEVICE — U-DRAPE: Brand: CONVERTORS

## (undated) DEVICE — SUT ETHILON 2-0 FSLX 30 IN 1674H

## (undated) DEVICE — VIAL DECANTER

## (undated) DEVICE — CUFF TOURNIQUET 18 X 4 IN QUICK CONNECT DISP 1 BLADDER

## (undated) DEVICE — 1.25MM KIRSCHNER WIRE W/TROCAR POINT 150MM
Type: IMPLANTABLE DEVICE | Site: WRIST | Status: NON-FUNCTIONAL
Removed: 2023-08-30

## (undated) RX ORDER — PILOCARPINE HYDROCHLORIDE OPHTHALMIC SOLUTION 10 MG/ML: 1 SOLUTION/ DROPS OPHTHALMIC EVERY EVENING